# Patient Record
Sex: MALE | Race: BLACK OR AFRICAN AMERICAN | NOT HISPANIC OR LATINO | Employment: PART TIME | URBAN - METROPOLITAN AREA
[De-identification: names, ages, dates, MRNs, and addresses within clinical notes are randomized per-mention and may not be internally consistent; named-entity substitution may affect disease eponyms.]

---

## 2018-04-06 LAB
ABSOL LYMPHOCYTES (HISTORICAL): 1.9 K/UL (ref 0.5–4)
ACETAMINOPHEN LEVEL (HISTORICAL): <10 UG/ML (ref 10–30)
ALBUMIN SERPL BCP-MCNC: 5 G/DL (ref 3–5.2)
ALP SERPL-CCNC: 68 U/L (ref 43–122)
ALT SERPL W P-5'-P-CCNC: 28 U/L (ref 9–52)
AMPHETAMINE URINE (HISTORICAL): NEGATIVE
ANION GAP SERPL CALCULATED.3IONS-SCNC: 12 MMOL/L (ref 5–14)
AST SERPL W P-5'-P-CCNC: 24 U/L (ref 17–59)
BARBITURATE URINE (HISTORICAL): NEGATIVE
BASOPHILS # BLD AUTO: 0.1 K/UL (ref 0–0.1)
BASOPHILS # BLD AUTO: 1 % (ref 0–1)
BENZODIAZEPINE URINE (HISTORICAL): NEGATIVE
BILIRUB SERPL-MCNC: 0.4 MG/DL
BILIRUB UR QL STRIP: NEGATIVE MG/DL
BUN SERPL-MCNC: 8 MG/DL (ref 5–25)
CALCIUM SERPL-MCNC: 9.7 MG/DL (ref 8.4–10.2)
CHLORIDE SERPL-SCNC: 106 MEQ/L (ref 97–108)
CLARITY UR: CLEAR
CO2 SERPL-SCNC: 22 MMOL/L (ref 22–30)
COCAINE (METAB.), URINE (HISTORICAL): NEGATIVE
COLOR UR: YELLOW
CREATINE, SERUM (HISTORICAL): 0.88 MG/DL (ref 0.7–1.5)
DEPRECATED RDW RBC AUTO: 15.1 %
DRUG COMMENT (HISTORICAL): ABNORMAL
EGFR (HISTORICAL): >60 ML/MIN/1.73 M2
EOSINOPHIL # BLD AUTO: 0 K/UL (ref 0–0.4)
EOSINOPHIL NFR BLD AUTO: 0 % (ref 0–6)
GLUCOSE SERPL-MCNC: 92 MG/DL (ref 70–99)
GLUCOSE UR STRIP-MCNC: NEGATIVE MG/DL
HCT VFR BLD AUTO: 43.6 % (ref 41–53)
HGB BLD-MCNC: 14.2 G/DL (ref 13.5–17.5)
HGB UR QL STRIP.AUTO: NEGATIVE
KETONES UR STRIP-MCNC: NEGATIVE MG/DL
LEUKOCYTE ESTERASE UR QL STRIP: NEGATIVE
LYMPHOCYTES NFR BLD AUTO: 15 % (ref 25–45)
MCH RBC QN AUTO: 28 PG (ref 26–34)
MCHC RBC AUTO-ENTMCNC: 32.5 % (ref 31–36)
MCV RBC AUTO: 86 FL (ref 80–100)
MDMA (GC/MS) (HISTORICAL): NEGATIVE
METHADONE URINE (HISTORICAL): NEGATIVE
METHAMPHETAMINE URINE (HISTORICAL): NEGATIVE
METHYL ALCOHOL (HISTORICAL): NEGATIVE MG/DL
MONOCYTES # BLD AUTO: 0.9 K/UL (ref 0.2–0.9)
MONOCYTES NFR BLD AUTO: 7 % (ref 1–10)
NEUTROPHILS ABS COUNT (HISTORICAL): 9.9 K/UL (ref 1.8–7.8)
NEUTS SEG NFR BLD AUTO: 77 % (ref 45–65)
NITRITE UR QL STRIP: NEGATIVE
OPIATES (HISTORICAL): NEGATIVE
OXYCODONE (HISTORICAL): NEGATIVE
PH UR STRIP.AUTO: 6.5 [PH] (ref 4.5–8)
PHENCYCLIDINE URINE (HISTORICAL): NEGATIVE
PLATELET # BLD AUTO: 316 K/MCL (ref 150–450)
POTASSIUM SERPL-SCNC: 3.9 MEQ/L (ref 3.6–5)
PROT UR STRIP-MCNC: NEGATIVE MG/DL
RBC # BLD AUTO: 5.05 M/MCL (ref 4.5–5.9)
SALICYLATE LEVEL (HISTORICAL): <1 MG/DL (ref 10–30)
SODIUM SERPL-SCNC: 141 MEQ/L (ref 137–147)
SP GR UR STRIP.AUTO: 1.01 (ref 1–1.04)
THC URINE (HISTORICAL): POSITIVE
TOTAL PROTEIN (HISTORICAL): 8.6 G/DL (ref 5.9–8.4)
TRICYCLICS URINE (HISTORICAL): NEGATIVE
UROBILINOGEN UR QL STRIP.AUTO: NEGATIVE MG/DL (ref 0–1)
WBC # BLD AUTO: 12.9 K/MCL (ref 4.5–11)

## 2018-04-11 LAB
ABSOL LYMPHOCYTES (HISTORICAL): 1.8 K/UL (ref 0.5–4)
ALBUMIN SERPL BCP-MCNC: 4.7 G/DL (ref 3–5.2)
ALP SERPL-CCNC: 64 U/L (ref 43–122)
ALT SERPL W P-5'-P-CCNC: 32 U/L (ref 9–52)
ANION GAP SERPL CALCULATED.3IONS-SCNC: 13 MMOL/L (ref 5–14)
AST SERPL W P-5'-P-CCNC: 24 U/L (ref 17–59)
BANDS (HISTORICAL): 2 % (ref 3–11)
BILIRUB SERPL-MCNC: 0.3 MG/DL
BUN SERPL-MCNC: 12 MG/DL (ref 5–25)
CALCIUM SERPL-MCNC: 9.6 MG/DL (ref 8.4–10.2)
CHLORIDE SERPL-SCNC: 103 MEQ/L (ref 97–108)
CO2 SERPL-SCNC: 26 MMOL/L (ref 22–30)
COMMENT (HISTORICAL): ABNORMAL
CREATINE, SERUM (HISTORICAL): 0.81 MG/DL (ref 0.7–1.5)
DEPRECATED RDW RBC AUTO: 15.3 %
EGFR (HISTORICAL): >60 ML/MIN/1.73 M2
EOSINOPHIL # BLD AUTO: 0.1 K/UL (ref 0–0.4)
EOSINOPHIL NFR BLD AUTO: 1 % (ref 0–6)
GLUCOSE SERPL-MCNC: 76 MG/DL (ref 70–99)
HCT VFR BLD AUTO: 44.1 % (ref 41–53)
HGB BLD-MCNC: 13.7 G/DL (ref 13.5–17.5)
LYMPHOCYTES NFR BLD AUTO: 15 % (ref 25–45)
MCH RBC QN AUTO: 27.4 PG (ref 26–34)
MCHC RBC AUTO-ENTMCNC: 31.1 % (ref 31–36)
MCV RBC AUTO: 88 FL (ref 80–100)
MONOCYTES # BLD AUTO: 0.7 K/UL (ref 0.2–0.9)
MONOCYTES NFR BLD AUTO: 6 % (ref 1–10)
NEUTROPHILS ABS COUNT (HISTORICAL): 9.5 K/UL (ref 1.8–7.8)
NEUTS SEG NFR BLD AUTO: 76 % (ref 45–65)
PLATELET # BLD AUTO: 314 K/MCL (ref 150–450)
POTASSIUM SERPL-SCNC: 4.5 MEQ/L (ref 3.6–5)
RBC # BLD AUTO: 5.01 M/MCL (ref 4.5–5.9)
RBC MORPHOLOGY (HISTORICAL): ABNORMAL
SODIUM SERPL-SCNC: 142 MEQ/L (ref 137–147)
TOTAL PROTEIN (HISTORICAL): 8.3 G/DL (ref 5.9–8.4)
VITAMIN D25-HYDROXY (HISTORICAL): 14.4 NG/ML (ref 30–100)
WBC # BLD AUTO: 12.1 K/MCL (ref 4.5–11)

## 2018-04-19 ENCOUNTER — TELEPHONE (OUTPATIENT)
Dept: BEHAVIORAL/MENTAL HEALTH CLINIC | Facility: CLINIC | Age: 28
End: 2018-04-19

## 2018-04-19 NOTE — TELEPHONE ENCOUNTER
Behavorial Health Outpatient Intake Questions    Referred by: INSURANCE    Check with provider before scheduling    Are there any developmental disabilities? No    Does the patient have hearing impairment? No    Does the patient have ICM or CTT? No    Taking injectable psychiatric medications? NoIf yes, patient can not be seen here  Has the patient ever seen or currently see a psychiatrist? Yes If yes who/when? SEEN 2013  Doctor Theron Hernandez [facility]    Has the patient ever seen or currently see a therapist? Yes If yes who/when? AT [facility]    How many visits did the pt have for previous psychiatric treatment? X 1 5 YEARS     History    Has the patient served in the [facility] [place] [address]? No    If yes, have you had combat services? No    Was the patient activated into federal active duty as a member of the national guard or reserve? No    Minor Child    Who has custody of the child? N/A    Is there a custody agreement? N/A    If there is a custody agreement remind parent that they must bring a copy to the first appt or they will not be seen  [facility]     Presenting Problem (in patient's words) SEEN LAST WEEK AT [facility] FOR ANXIETY,PARANOIA,DX: SCHIZOPHRENIA,PSYCHOSIS    Substance Abuse:No concerns of substance abuse are reported  Has the patient been seen here previously, either inpatient or outpatient? No outpatient    If seen as outpatient, what provider(s) did the patient see? N/A    A member of the patient's family has been in therapy here with NO    ACCEPTED as a patient No Appointment Date: 7/6/18 @ 3:00PM CHIN MYERS    Referred Elsewhere? Yes    Primary Care Physician: No primary care provider on file  [facility]    PCP telephone number: None    SUB: PARDEEP  INS: MANDA OMALLEYO  (MA)  Id: [id number]     GRP:   [phone]

## 2018-07-06 ENCOUNTER — OFFICE VISIT (OUTPATIENT)
Dept: BEHAVIORAL/MENTAL HEALTH CLINIC | Facility: CLINIC | Age: 28
End: 2018-07-06
Payer: COMMERCIAL

## 2018-07-06 DIAGNOSIS — F20.9 SCHIZOPHRENIA, UNSPECIFIED TYPE (HCC): Primary | ICD-10-CM

## 2018-07-06 PROBLEM — F20.81 SCHIZOPHRENIFORM DISORDER (HCC): Status: ACTIVE | Noted: 2018-07-06

## 2018-07-06 PROCEDURE — 90834 PSYTX W PT 45 MINUTES: CPT | Performed by: SOCIAL WORKER

## 2018-07-06 NOTE — PSYCH
Assessment/Plan:      There are no diagnoses linked to this encounter  Subjective: Has heard loud sounds, had panic, admitted to [facility]     Patient ID: Mike Pedersen is a 29 y o  male  HPI:     Pre-morbid level of function and History of Present Illness: Chronic psychiatric history  Previous Psychiatric/psychological treatment/year: Psychiatrist at [facility], Therapist: Cesar Tompkins  Current Psychiatrist/Therapist: Malena psychiatrist at [address]  and/or Partial and Other Community Resources Used (CTT, ICM, VNA): Inpatient [place] [facility], [facility], [facility]      Problem Assessment:     SOCIAL/VOCATION:  Family Constellation (include parents, relationship with each and pertinent Psych/Medical History):     No family history on file  Mother: Brittney  Spouse: NA   Father: Linda Evans: NA   Sibling: Half sibs, Sarah-33, Ana Caputo, "there's more but it goes into the sound"   Sibling: NA   Children: NA   Other: Step father: Jabier Soriano relates best to "before all this started I was working two jobs, didn't have time for a social life"  he lives with alone  Domestic Violence: No past history of domestic violence and There is no history of child abuse    Additional Comments related to family/relationships/peer support: Good relationship with mother and sister  Can see them 3 days straight and then not see them for 3 months  Has good relationship with father, step father and brothers     School or Work History (strengths/limitations/needs): Trying to go back to work by July 20  Worked as a  at [employer]    Her highest grade level achieved was one year at [employer] for 3 semesters and then the voices started in 2002     history includesNA    Financial status includes not a stressor  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   On SSI and food stamps    LEISURE ASSESSMENT: Listen to music and video games, walk around and before he knows it's night time, watches movie, no groups or clubs  his primary language is [place]  Preferred language is [place]  Ethnic considerations are Afro-American  "To me I'm more  because I have tay's  They give me blood transfusions  I've had over 32 blood transfusions"   Religions affiliations and level of involvement Spiritism-watches Jew on TV, when goes to Jew "they are doing their own thing and that's not really Spiritism  They can be dong Sikhism, satanic, so I stopped going to Jew "   Does spirituality help you cope? Yes     FUNCTIONAL STATUS: There has been a recent change in Jerry Williamson ability to do the following: Pt does not drive    Level of Assistance Needed/By Whom?: Independent, except for driving    Jerry Williamson learns best by  picture    SUBSTANCE ABUSE ASSESSMENT: no substance abuse    Substance/Route/Age/Amount/Frequency/Last Use: NA    DETOX HISTORY: NA    Previous detox/rehab treatment: NA    HEALTH ASSESSMENT: no nausea, no vomiting and diarrhea    LEGAL: No Mental Health Advance Directive or Power of  on file and NA    Prenatal History: N/A    Delivery History: N/A    Developmental Milestones: N/A  Temperament as an infant was N/A  Temperament as a toddler was N/A  Temperament at school age was N/A  Temperament as a teenager was N/A  Risk Assessment:   The following ratings are based on my interview(s) with patient    Risk of Harm to Self:   Demographic risk factors include lowest socioeconomic class, never  or  status and male  Historical Risk Factors include chronic psychiatric problems  Recent Specific Risk Factors include presence of hallucinations  Additional Factors for a Child or Adolescent NA    Risk of Harm to Others:   Demographic Risk Factors include male and unemployed  Historical Risk Factors include NA  Recent Specific Risk Factors include concomitant mood or thought disorder    Access to Weapons:   Jerry Williamson has access to the following weapons: NA   The following steps have been taken to ensure weapons are properly secured: NA    Based on the above information, the client presents the following risk of harm to self or others:  low    The following interventions are recommended:   no intervention changes    Notes regarding this Risk Assessment: NA        Review Of Systems:     Mood Anxiety, Emotional Lability and Hostility only when people do "stupid stuff"   Behavior Unusual Behavior, Violent Behavior and Violent when people do "stupid stuff"   Thought Content Disturbing Thoughts, Feelings, Unreasonalbe or Irrational Fears, Magical Thinking and Having Fantasies   General Sleep Disturbances and difficulty falling and staying asleep   Personality Change in Personality and Character Deficiency   Other Psych Symptoms anhedonic, difficulty making decisions, tense, mind goes blank, fear of dying,    Constitutional Feeling Tired   ENT NA   Cardiovascular Heart Rate is Fast and Chest Pain   Respiratory NA   Gastrointestinal Chrone's Disease   Genitourinary NA   Musculoskeletal NA   Integumentary NA   Neurological Confusion   Endocrine NA         Mental status:  Appearance calm and cooperative  and good eye contact    Mood irritable, anxious and angry   Affect affect was flat   Speech pressured   Thought Processes disorganized and flight of ideas   Hallucinations auditory hallucinations   Thought Content somatic delusions and paranoid ideation    Abnormal Thoughts NA   Orientation  oriented to person and place and time   Remote Memory short term memory intact and long term memory intact   Attention Span concentration intact   Intellect Impaired Attention Span   Fund of Knowledge adequate fund of knowledge regarding vocabulary    Insight Poor insight    Judgement judgment was impaired   Muscle Strength Muscle strength and tone were normal and Normal gait    Language no difficulty naming common objects, no difficulty repeating a phrase  and no difficulty writing a sentence    Pain none   Pain Scale 0

## 2018-10-08 ENCOUNTER — OFFICE VISIT (OUTPATIENT)
Dept: BEHAVIORAL/MENTAL HEALTH CLINIC | Facility: CLINIC | Age: 28
End: 2018-10-08
Payer: COMMERCIAL

## 2018-10-08 ENCOUNTER — DOCUMENTATION (OUTPATIENT)
Dept: PSYCHIATRY | Facility: CLINIC | Age: 28
End: 2018-10-08

## 2018-10-08 DIAGNOSIS — F20.9 SCHIZOPHRENIA, UNSPECIFIED TYPE (HCC): Primary | ICD-10-CM

## 2018-10-08 PROCEDURE — 90834 PSYTX W PT 45 MINUTES: CPT | Performed by: SOCIAL WORKER

## 2018-10-08 NOTE — PROGRESS NOTES
Treatment Plan Tracking    # 1Treatment Plan not completed within required time limits due to: Client did not schedule an appointment within 15 days of initial assessment  Saida Elliott

## 2018-10-08 NOTE — PROGRESS NOTES
Psychotherapy Provided: Individual Psychotherapy 45 minutes     Length of time in session: 45 minutes    Goals addressed in session: Goal 1, Goal 2 and Goal 3      Pain:      none    0    Current suicide risk : [address] Treatment Plan [facility]: Diagnosis and Treatment Plan explained to Gregor Nguyen relates understanding diagnosis and is agreeable to Treatment Plan  Yes     D: Business items discussed  Pt identified the following problems; drama, Sz, and thoughts are loud and his stomach is messed up  Pt identified the following long term goals; understand what's going on, feel like he is not hearing voices, and figure out how to fix his head  Pt stated he is not taking meds because he has had a bad reaction  A: Pt thinking and speech were disorganized  P: Pt will work on all the objectives for all the goals      Intervention techniques; treatment planning, questioning, explanation, and redirection    RTO: Monday, October 22, 2018 at 11 am

## 2018-10-08 NOTE — PSYCH
Anu Orlando  1990       Date of Initial Treatment Plan: 10/8/18   Date of Current Treatment Plan: 10/08/18        Treatment Plan Number 1     Strengths/Personal Resources for Self Care: I work hard  I am a people interactor  I am caring, smart,     Diagnosis:   1  Schizophrenia, unspecified type ([facility] [address] )         Area of Needs:   1  I have drama  2  I have Schizophrenia  3  My thought are loud and my stomach is messed up      Long Term Goal 1: A  I will understand what's going on    Target Date: 1/19  Completion Date:  NA         Short Term Objectives for Goal 1: A  I will think about and apply what I discuss in therapy   Target Date: 1/19    Long Term Goal 2:   I will feel like I am not hearing voices    Target Date: 1/19  Completion Date: N/A    Short Term Objectives for Goal 2: A  I will think about and apply what I discuss in therapy   Target Date: 1/19         Long Term Goal # 3:   I will figure out how to fix my head     Target Date: 1/19  Completion Date: N/A    Short Term Objectives for Goal 3: A  I will think about and apply what I discuss in therapy   Target Date: 1/19    GOAL 1: Modality: Individual 2 x per month   Completion Date NA and The person(s) responsible for carrying out the plan is  Breezy Redman and Teo    GOAL 2: Modality: Individual 2 x per month   Completion Date NA and The person(s) responsible for carrying out the plan is  Breezy Redman and Teo     GOAL 3: Modality: Individual 2 x per month   Completion Date NA and The person(s) responsible for carrying out the plan is  Breezy Redman and RosiO [facility] [address]: Diagnosis and Treatment Plan explained to Julee Velez relates understanding diagnosis and is agreeable to Treatment Plan         Client Comments : Please share your thoughts, feelings, need and/or experiences regarding your treatment plan:       "No comments"              Patient signature, Date Time: __________________________________________   10/8/18 Christiano, Date Time: Felix Weston   10/8/18   1147             Physician cosigner signature, Date, Time: ________________________________

## 2018-11-05 ENCOUNTER — DOCUMENTATION (OUTPATIENT)
Dept: PSYCHIATRY | Facility: CLINIC | Age: 28
End: 2018-11-05

## 2018-11-05 NOTE — PROGRESS NOTES
Pt did not arrive for scheduled appt  at 11:00am  Attempt to contact by phone but appears number is not working       RTO: 11/19 at 11:00am

## 2018-11-19 ENCOUNTER — DOCUMENTATION (OUTPATIENT)
Dept: PSYCHIATRY | Facility: CLINIC | Age: 28
End: 2018-11-19

## 2018-11-20 ENCOUNTER — DOCUMENTATION (OUTPATIENT)
Dept: PSYCHIATRY | Facility: CLINIC | Age: 28
End: 2018-11-20

## 2018-11-20 NOTE — PROGRESS NOTES
11/16/18    cancellation   Received: 4 days ago   Message Contents   Amanuel Drew   Cc: Amanuel Good             Pt cancelled for Monday, 11/19/18, he just got a job and can't miss work

## 2018-12-11 ENCOUNTER — DOCUMENTATION (OUTPATIENT)
Dept: BEHAVIORAL/MENTAL HEALTH CLINIC | Facility: CLINIC | Age: 28
End: 2018-12-11

## 2018-12-11 NOTE — PROGRESS NOTES
Assessment/Plan:      There are no diagnoses linked to this encounter  Subjective:      Patient ID: Elvia Mckeon is a 29 y o  male  Outpatient Discharge Summary:   Admission Date: 7/6/18  Sowmya Hurd was referred by Insurance  Discharge Date: 12/11/18    Discharge Diagnosis:    Schizophrenia, unspecified (F20 9)  Treating Physician: BETY  Treatment Complications: Cancelled 2 appointments and no showed for one  Presenting Problem: Has heard loud sounds, had panic, admitted to [facility]  Course of treatment includes:    Initial evaluation and one follow up appt    Treatment Progress: poor  Criteria for Discharge: Pt did not return to therapy after second session  Aftercare recommendations include Return to therapy, prn, f/u with psychiatrist  Discharge Medications include:  Current Outpatient Prescriptions:     aspirin 325 mg tablet, Take 325 mg by mouth as needed for mild pain , Disp: , Rfl:     Prognosis: poor

## 2023-06-25 ENCOUNTER — APPOINTMENT (EMERGENCY)
Dept: RADIOLOGY | Facility: HOSPITAL | Age: 33
End: 2023-06-25

## 2023-06-25 ENCOUNTER — HOSPITAL ENCOUNTER (EMERGENCY)
Facility: HOSPITAL | Age: 33
Discharge: HOME | End: 2023-06-25
Attending: EMERGENCY MEDICINE

## 2023-06-25 VITALS
TEMPERATURE: 97 F | RESPIRATION RATE: 20 BRPM | BODY MASS INDEX: 22.84 KG/M2 | DIASTOLIC BLOOD PRESSURE: 52 MMHG | SYSTOLIC BLOOD PRESSURE: 103 MMHG | WEIGHT: 141.5 LBS | HEART RATE: 104 BPM | OXYGEN SATURATION: 100 %

## 2023-06-25 DIAGNOSIS — S89.92XA INJURY OF LEFT KNEE, INITIAL ENCOUNTER: Primary | ICD-10-CM

## 2023-06-25 DIAGNOSIS — M54.50 LOW BACK PAIN: ICD-10-CM

## 2023-06-25 PROCEDURE — 73564 X-RAY EXAM KNEE 4 OR MORE: CPT

## 2023-06-25 PROCEDURE — 96372 THER/PROPH/DIAG INJ SC/IM: CPT

## 2023-06-25 PROCEDURE — 99283 EMERGENCY DEPT VISIT LOW MDM: CPT

## 2023-06-25 RX ORDER — METHOCARBAMOL 500 MG/1
500 TABLET, FILM COATED ORAL 2 TIMES DAILY
Qty: 20 TABLET | Refills: 0 | Status: SHIPPED | OUTPATIENT
Start: 2023-06-25

## 2023-06-25 RX ORDER — FERROUS SULFATE 325(65) MG
TABLET ORAL
COMMUNITY

## 2023-06-25 RX ORDER — LIDOCAINE 50 MG/G
1 PATCH TOPICAL EVERY 24 HOURS
Qty: 15 PATCH | Refills: 0 | Status: SHIPPED | OUTPATIENT
Start: 2023-06-25 | End: 2023-07-10

## 2023-06-25 RX ORDER — LIDOCAINE 50 MG/G
1 PATCH TOPICAL ONCE
Status: DISCONTINUED | OUTPATIENT
Start: 2023-06-25 | End: 2023-06-25 | Stop reason: HOSPADM

## 2023-06-25 RX ORDER — KETOROLAC TROMETHAMINE 30 MG/ML
15 INJECTION, SOLUTION INTRAMUSCULAR; INTRAVENOUS ONCE
Status: COMPLETED | OUTPATIENT
Start: 2023-06-25 | End: 2023-06-25

## 2023-06-25 RX ORDER — SENNOSIDES 8.6 MG
650 CAPSULE ORAL EVERY 8 HOURS PRN
Qty: 30 TABLET | Refills: 0 | Status: SHIPPED | OUTPATIENT
Start: 2023-06-25

## 2023-06-25 RX ORDER — NAPROXEN 500 MG/1
500 TABLET ORAL 2 TIMES DAILY WITH MEALS
Qty: 20 TABLET | Refills: 0 | Status: SHIPPED | OUTPATIENT
Start: 2023-06-25 | End: 2024-06-24

## 2023-06-25 RX ADMIN — KETOROLAC TROMETHAMINE 15 MG: 30 INJECTION, SOLUTION INTRAMUSCULAR at 11:47

## 2023-06-25 RX ADMIN — LIDOCAINE 5% 1 PATCH: 700 PATCH TOPICAL at 11:46

## 2023-06-25 NOTE — DISCHARGE INSTRUCTIONS
Wear knee immobilizer until follow-up with orthopedics  Take medications as needed for symptoms do not drive or operate heavy machinery while taking Robaxin  [facility] with [facility]  Follow-up with orthopedics  Return to ED for new or worsening symptoms as discussed

## 2023-06-25 NOTE — Clinical Note
Anny Geiger was seen and treated in our emergency department on 6/25/2023  wear knee immobilizer, use crutches until cleared by orthopedics    Diagnosis: knee injury    Trang Beck    He may return on this date: 06/28/2023         If you have any questions or concerns, please don't hesitate to call        Guillermo Schultz PA-C    ______________________________           _______________          _______________  Hospital Representative                              Date                                Time

## 2023-06-25 NOTE — ED PROVIDER NOTES
History  Chief Complaint   Patient presents with   • Knee Pain     Pt reports left knee pain x 1 week  41-year-old male past medical history of anemia, schizophrenia presents to the emergency department complaining of left knee pain x2 weeks and lower back pain x1 week  Dates that he was doing squats 2 weeks ago when he felt pain in the front of his left knee  Denies previous injury to the area  Was not using any weights when he was doing the squats  Denies dislocation  Reports since then he has been having left knee pain that is worse with flexion  Pain with extension  States he is able to bear weight and ambulate but with pain  States he sleeps on a futon and thinks that is why his back hurts  Denies any trauma, denies urinary continence, fecal incontinence, saddle anesthesia, fevers, chills, numbness, weakness, tingling, redness, swelling  History provided by:  Patient      Prior to Admission Medications   Prescriptions Last Dose Informant Patient Reported? Taking?   aspirin 325 mg tablet   Yes No   Sig: Take 325 mg by mouth as needed for mild pain  ferrous sulfate 325 (65 Fe) mg tablet   Yes No   Sig: Take by mouth      Facility-Administered Medications: None       Past Medical History:   Diagnosis Date   • Anemia 2004    hospitalization/transfusion       Past Surgical History:   Procedure Laterality Date   • [place] ANRCT XM SURG REQ ANES GENERAL SPI/EDRL DX N/A 4/7/2016    Procedure: EXAM UNDER ANESTHESIA (EUA); possible REMOVAL OF FOREIGN BODY anoscopy ;  Surgeon: Rene Morton MD;  Location: [facility];  Service: Colorectal   • OH SURG TX ANAL FISTULA INTERSPHINCTERIC N/A 4/7/2016    Procedure: superficial FISTULOTOMY; SETON PROCEDURE drainage of chronic ischiofistula abscess and debridement;  Surgeon: Rene Morton MD;  Location: [facility];  Service: Colorectal       History reviewed  No pertinent family history    I have reviewed and agree with the history as documented  E-Cigarette/Vaping     E-Cigarette/Vaping Substances     Social History     Tobacco Use   • Smoking status: Light Smoker     Packs/day: 0 25     Types: Cigarettes   • Smokeless tobacco: Never   • Tobacco comments:     Codie says 7 cigarettes per day   Substance Use Topics   • Alcohol use: No   • Drug use: No       Review of Systems   Constitutional: Negative for chills and fever  Musculoskeletal: Positive for arthralgias  Negative for gait problem and joint swelling  Skin: Negative for color change, rash and wound  Neurological: Negative for weakness and numbness  All other systems reviewed and are negative  Physical Exam  Physical Exam  Vitals and nursing note reviewed  Constitutional:       General: He is not in acute distress  Appearance: Normal appearance  He is not ill-appearing  HENT:      Head: Normocephalic and atraumatic  Cardiovascular:      Rate and Rhythm: Normal rate and regular rhythm  Pulmonary:      Effort: Pulmonary effort is normal    Abdominal:      General: There is no distension  Palpations: Abdomen is soft  Tenderness: There is no abdominal tenderness  Musculoskeletal:      Cervical back: Normal and neck supple  Thoracic back: Normal       Lumbar back: No tenderness or bony tenderness  Normal range of motion  Left hip: Normal  Normal strength  Left knee: Bony tenderness present  No swelling, effusion or crepitus  Normal range of motion  Tenderness present  Left ankle: Normal       Left foot: Normal  Normal capillary refill  Normal pulse  Skin:     General: Skin is warm and dry  Capillary Refill: Capillary refill takes less than 2 seconds  Findings: No bruising, erythema or rash  Neurological:      Mental Status: He is alert  Sensory: No sensory deficit (LE )  Motor: No weakness (LE )        Gait: Gait normal    Psychiatric:         Mood and Affect: Mood normal          Behavior: Behavior normal  Vital Signs  ED Triage Vitals   Temperature Pulse Respirations Blood Pressure SpO2   06/25/23 1119 06/25/23 1119 06/25/23 1119 06/25/23 1119 06/25/23 1119   (!) 97 °F (36 1 °C) 104 20 103/52 100 %      Temp Source Heart Rate Source Patient Position - Orthostatic VS BP Location FiO2 (%)   06/25/23 1119 06/25/23 1119 06/25/23 1119 06/25/23 1119 --   Oral Monitor Sitting Right arm       Pain Score       06/25/23 1147       9           Vitals:    06/25/23 1119   BP: 103/52   Pulse: 104   Patient Position - Orthostatic VS: Sitting         Visual Acuity      ED Medications  Medications   ketorolac (TORADOL) injection 15 mg (15 mg Intramuscular Given 6/25/23 1147)       Diagnostic Studies  Results Reviewed     None                 XR knee 4+ views left injury   ED Interpretation by Chanel Salazar PA-C (06/25 1202)   No acute fx or dislocation       Final Result by Ariella Avelar MD (06/26 5490)      No acute osseous abnormality  Abnormal findings suggestive of patellar tendinitis  Workstation performed: [id number]                    Procedures  Procedures         ED Course  ED Course as of 06/26/23 2131   Rosa  Jun 25, 2023   1229 Reports he took Tylenol Extra Strength prior to arrival   Without allergy  SBIRT 22yo+    Flowsheet Row Most Recent Value   Initial Alcohol Screen: US AUDIT-C     1  How often do you have a drink containing alcohol? 0 Filed at: 06/25/2023 1150   2  How many drinks containing alcohol do you have on a typical day you are drinking? 0 Filed at: 06/25/2023 1150   3a  Male UNDER 65: How often do you have five or more drinks on one occasion? 0 Filed at: 06/25/2023 1150   3b  FEMALE Any Age, or MALE 65+: How often do you have 4 or more drinks on one occassion? 0 Filed at: 06/25/2023 1150   Audit-C Score 0 Filed at: 06/25/2023 1150   PREMA: How many times in the past year have you        Used an illegal drug or used a prescription medication for "non-medical reasons? Never Filed at: 06/25/2023 1150                    Medical Decision Making  Patient is experiencing acute lower back pain present for <6 weeks  Started after he was sleeping differently due to knee pain  The patient has no symptoms of new extremity weakness or paresthesia, urinary retention or incontinence, fecal incontinence, saddle anesthesia or paresthesia, unintentional weight loss, unexplained fevers/chills or night sweats, recent trauma, persistent vertigo present at rest, or truncal ataxia  The patient has no history of Cancer (active or in remission), osteoporosis, chronic corticosteroid use, immunosuppression, recent spinal procedures or IVDA  There were no unexplained abnormal vital signs or new neurologic deficits on physical exam  Therefore imaging is not indicated at this time  L knee pain from squatting  fx vs strain vs sprain vs meniscal injury vs tendon injury  No dislocation  Afebrile  no skin changes  No signs of infection  no swelling or effusion  intact ROM, pain with flexion  Strength intact  Distal perfusion intact  Sensation intact  is able to bear weight, ambulate  Xray ordered  no acute fx visualized on wet read  Placed in knee immobilizer, ortho follow up given  All imaging and/or lab testing discussed with patient, strict return to ED precautions discussed  Patient recommended to follow up promptly with appropriate outpatient provider  Patient and/or family members verbalizes understanding and agrees with plan  Patient and/or family members were given opportunity to ask questions, all questions were answered at this time  Patient is stable for discharge      Portions of the record may have been created with voice recognition software  Occasional wrong word or \"sound a like\" substitutions may have occurred due to the inherent limitations of voice recognition software  Read the chart carefully and recognize, using context, where substitutions have occurred   " Amount and/or Complexity of Data Reviewed  Radiology: ordered and independent interpretation performed  Risk  OTC drugs  Prescription drug management  Disposition  Final diagnoses:   Injury of left knee, initial encounter   Low back pain     Time reflects when diagnosis was documented in both MDM as applicable and the Disposition within this note     Time User Action Codes Description Comment    6/25/2023 12:29 PM Lor Anders Add [Q48 68ID] Injury of left knee, initial encounter     6/25/2023 12:29 PM Lor Anders Add [M54 50] Low back pain       ED Disposition     ED Disposition   Discharge    Condition   Stable    Date/Time   Sun Jun 25, 2023 12:29 PM    Comment   Khai Gillna discharge to home/self care                 Follow-up Information     Follow up With Specialties Details Why Contact Info Additional [phone] [facility] Orthopedic Surgery Schedule an appointment as soon as possible for a visit  For follow up regarding your symptoms [address] [address]  [address] [place] [address], [address] [phone]          Discharge Medication List as of 6/25/2023 12:31 PM      START taking these medications    Details   acetaminophen (TYLENOL) 650 mg CR tablet Take 1 tablet (650 mg total) by mouth every 8 (eight) hours as needed for mild pain, Starting Sun 6/25/2023, Normal      lidocaine (LIDODERM) 5 % Apply 1 patch topically over 12 hours every 24 hours for 15 days Remove & Discard patch within 12 hours or as directed by MD, Starting Sun 6/25/2023, Until Mon 7/10/2023, Normal      methocarbamol (ROBAXIN) 500 mg tablet Take 1 tablet (500 mg total) by mouth 2 (two) times a day, Starting Sun 6/25/2023, Normal      naproxen (EC NAPROSYN) 500 MG EC tablet Take 1 tablet (500 mg total) by mouth 2 (two) times a day with meals, Starting Sun 6/25/2023, Until Mon 6/24/2024, Normal         CONTINUE these medications which have NOT CHANGED    Details   aspirin 325 mg tablet Take 325 mg by mouth as needed for mild pain , Until Discontinued, Historical Med      ferrous sulfate 325 (65 Fe) mg tablet Take by mouth, Historical Med                 PDMP Review     None          ED Provider  Electronically Signed by           Bishop Huey PA-C  06/26/23 1785

## 2023-06-26 ENCOUNTER — TELEPHONE (OUTPATIENT)
Dept: PHYSICAL THERAPY | Facility: OTHER | Age: 33
End: 2023-06-26

## 2023-06-26 NOTE — TELEPHONE ENCOUNTER
Call placed to the patient per [facility] referral     Voice message left for patient to call back  Phone number and hours of business provided  This is the 1st attempt to reach the patient  Will defer per protocol  On AVS to schedule with Ortho for left knee     [facility] (PT) for low back

## 2023-07-29 ENCOUNTER — HOSPITAL ENCOUNTER (INPATIENT)
Facility: HOSPITAL | Age: 33
LOS: 6 days | Discharge: HOME | DRG: 385 | End: 2023-08-04
Attending: EMERGENCY MEDICINE | Admitting: INTERNAL MEDICINE
Payer: MEDICARE

## 2023-07-29 DIAGNOSIS — K50.014 CROHN'S DISEASE OF SMALL INTESTINE WITH ABSCESS (HCC): Primary | ICD-10-CM

## 2023-07-29 DIAGNOSIS — F20.81 SCHIZOPHRENIFORM DISORDER (HCC): ICD-10-CM

## 2023-07-29 DIAGNOSIS — R93.89 ABNORMAL CT SCAN: ICD-10-CM

## 2023-07-29 DIAGNOSIS — F20.9 SCHIZOPHRENIA, UNSPECIFIED TYPE (HCC): ICD-10-CM

## 2023-07-29 DIAGNOSIS — D64.9 SYMPTOMATIC ANEMIA: ICD-10-CM

## 2023-07-29 DIAGNOSIS — K50.10 CROHN'S COLITIS (HCC): ICD-10-CM

## 2023-07-29 PROBLEM — M25.569 KNEE PAIN: Status: ACTIVE | Noted: 2023-07-29

## 2023-07-29 PROBLEM — K50.90 CROHN'S DISEASE (HCC): Status: ACTIVE | Noted: 2023-07-29

## 2023-07-29 LAB
ABO GROUP BLD: NORMAL
ABO GROUP BLD: NORMAL
ALBUMIN SERPL BCP-MCNC: 2.9 G/DL (ref 3.5–5)
ALP SERPL-CCNC: 66 U/L (ref 34–104)
ALT SERPL W P-5'-P-CCNC: 5 U/L (ref 7–52)
ANION GAP SERPL CALCULATED.3IONS-SCNC: 8 MMOL/L
AST SERPL W P-5'-P-CCNC: 10 U/L (ref 13–39)
BASOPHILS # BLD AUTO: 0.04 THOUSANDS/ÂΜL (ref 0–0.1)
BASOPHILS NFR BLD AUTO: 0 % (ref 0–1)
BILIRUB SERPL-MCNC: 0.19 MG/DL (ref 0.2–1)
BLD GP AB SCN SERPL QL: NEGATIVE
BUN SERPL-MCNC: 5 MG/DL (ref 5–25)
CALCIUM ALBUM COR SERPL-MCNC: 9.4 MG/DL (ref 8.3–10.1)
CALCIUM SERPL-MCNC: 8.5 MG/DL (ref 8.4–10.2)
CHLORIDE SERPL-SCNC: 102 MMOL/L (ref 96–108)
CO2 SERPL-SCNC: 25 MMOL/L (ref 21–32)
CREAT SERPL-MCNC: 0.83 MG/DL (ref 0.6–1.3)
EOSINOPHIL # BLD AUTO: 0.06 THOUSAND/ÂΜL (ref 0–0.61)
EOSINOPHIL NFR BLD AUTO: 0 % (ref 0–6)
ERYTHROCYTE [DISTWIDTH] IN BLOOD BY AUTOMATED COUNT: 19.4 % (ref 11.6–15.1)
GFR SERPL CREATININE-BSD FRML MDRD: 115 ML/MIN/1.73SQ M
GLUCOSE SERPL-MCNC: 100 MG/DL (ref 65–140)
HCT VFR BLD AUTO: 18.8 % (ref 36.5–49.3)
HGB BLD-MCNC: 4.9 G/DL (ref 12–17)
IMM GRANULOCYTES # BLD AUTO: 0.08 THOUSAND/UL (ref 0–0.2)
IMM GRANULOCYTES NFR BLD AUTO: 1 % (ref 0–2)
LYMPHOCYTES # BLD AUTO: 3.07 THOUSANDS/ÂΜL (ref 0.6–4.47)
LYMPHOCYTES NFR BLD AUTO: 21 % (ref 14–44)
MCH RBC QN AUTO: 17.1 PG (ref 26.8–34.3)
MCHC RBC AUTO-ENTMCNC: 26.1 G/DL (ref 31.4–37.4)
MCV RBC AUTO: 66 FL (ref 82–98)
MONOCYTES # BLD AUTO: 1.37 THOUSAND/ÂΜL (ref 0.17–1.22)
MONOCYTES NFR BLD AUTO: 9 % (ref 4–12)
NEUTROPHILS # BLD AUTO: 10.23 THOUSANDS/ÂΜL (ref 1.85–7.62)
NEUTS SEG NFR BLD AUTO: 69 % (ref 43–75)
NRBC BLD AUTO-RTO: 0 /100 WBCS
PLATELET # BLD AUTO: 803 THOUSANDS/UL (ref 149–390)
PMV BLD AUTO: 8.2 FL (ref 8.9–12.7)
POTASSIUM SERPL-SCNC: 3.6 MMOL/L (ref 3.5–5.3)
PROT SERPL-MCNC: 7.9 G/DL (ref 6.4–8.4)
RBC # BLD AUTO: 2.86 MILLION/UL (ref 3.88–5.62)
RH BLD: POSITIVE
RH BLD: POSITIVE
SODIUM SERPL-SCNC: 135 MMOL/L (ref 135–147)
SPECIMEN EXPIRATION DATE: NORMAL
WBC # BLD AUTO: 14.85 THOUSAND/UL (ref 4.31–10.16)

## 2023-07-29 PROCEDURE — 86900 BLOOD TYPING SEROLOGIC ABO: CPT | Performed by: EMERGENCY MEDICINE

## 2023-07-29 PROCEDURE — 86920 COMPATIBILITY TEST SPIN: CPT

## 2023-07-29 PROCEDURE — 85025 COMPLETE CBC W/AUTO DIFF WBC: CPT | Performed by: EMERGENCY MEDICINE

## 2023-07-29 PROCEDURE — 99291 CRITICAL CARE FIRST HOUR: CPT | Performed by: EMERGENCY MEDICINE

## 2023-07-29 PROCEDURE — 99284 EMERGENCY DEPT VISIT MOD MDM: CPT

## 2023-07-29 PROCEDURE — 86850 RBC ANTIBODY SCREEN: CPT | Performed by: EMERGENCY MEDICINE

## 2023-07-29 PROCEDURE — 99223 1ST HOSP IP/OBS HIGH 75: CPT | Performed by: INTERNAL MEDICINE

## 2023-07-29 PROCEDURE — 86901 BLOOD TYPING SEROLOGIC RH(D): CPT | Performed by: EMERGENCY MEDICINE

## 2023-07-29 PROCEDURE — C9113 INJ PANTOPRAZOLE SODIUM, VIA: HCPCS | Performed by: INTERNAL MEDICINE

## 2023-07-29 PROCEDURE — 80053 COMPREHEN METABOLIC PANEL: CPT | Performed by: EMERGENCY MEDICINE

## 2023-07-29 PROCEDURE — 36415 COLL VENOUS BLD VENIPUNCTURE: CPT | Performed by: EMERGENCY MEDICINE

## 2023-07-29 PROCEDURE — P9016 RBC LEUKOCYTES REDUCED: HCPCS

## 2023-07-29 RX ORDER — ACETAMINOPHEN 325 MG/1
650 TABLET ORAL EVERY 6 HOURS PRN
Status: DISCONTINUED | OUTPATIENT
Start: 2023-07-29 | End: 2023-08-04 | Stop reason: HOSPADM

## 2023-07-29 RX ORDER — PANTOPRAZOLE SODIUM 40 MG/10ML
40 INJECTION, POWDER, LYOPHILIZED, FOR SOLUTION INTRAVENOUS EVERY 12 HOURS SCHEDULED
Status: DISCONTINUED | OUTPATIENT
Start: 2023-07-29 | End: 2023-07-31

## 2023-07-29 RX ORDER — LIDOCAINE 50 MG/G
1 PATCH TOPICAL EVERY 24 HOURS
Status: DISCONTINUED | OUTPATIENT
Start: 2023-07-29 | End: 2023-08-04 | Stop reason: HOSPADM

## 2023-07-29 RX ADMIN — PANTOPRAZOLE SODIUM 40 MG: 40 INJECTION, POWDER, FOR SOLUTION INTRAVENOUS at 23:05

## 2023-07-29 RX ADMIN — LIDOCAINE 1 PATCH: 50 PATCH CUTANEOUS at 18:04

## 2023-07-29 NOTE — ASSESSMENT & PLAN NOTE
22-year-old male with history of schizophrenia, Crohn's disease, anemia presenting to the ER complaining of generalized fatigue, knee pain and worried that his hemoglobin is low. Patient overall a poor historian, some history was obtained from patient's sister who is also in the room. Patient states he injured his left knee at the gym several months ago has been ongoing knee pain and has been taking ibuprofen 3 times a day totaling 600 mg daily. Patient also states he felt fatigued has been having weight loss and loose diarrhea stools. Denies any hematemesis, hematochezia or melena. Complains of nausea but denies any vomiting. Denies any fever or chills.     · Hemoglobin 4.9, last hemoglobin was 11 in 2017/2018  ·  heme positive stool in the ED  · 2 unit PRBC ordered by ED  · Monitor H&H  · Clear liquid diet, will make patient n.p.o. past midnight  · GI consultation will be appreciated  · Continue PPI twice daily

## 2023-07-29 NOTE — ED NOTES
Made a call to lab concerning the type and screen recheck. Per lab, still in process.      Dawit Newman RN  07/29/23 [id number]

## 2023-07-29 NOTE — ASSESSMENT & PLAN NOTE
· Patient was diagnosed with Crohns disease at age 15  · He has not followed with GI and is currently not on any medications  · Patient complains of having loose stools for the past several days, nonbloody watery.   · GI consultation and input will be appreciated  · will send for CRP, fecal calprotectin

## 2023-07-29 NOTE — PLAN OF CARE
Problem: PAIN - ADULT  Goal: Verbalizes/displays adequate comfort level or baseline comfort level  Description: Interventions:  - Encourage patient to monitor pain and request assistance  - Assess pain using appropriate pain scale  - Administer analgesics based on type and severity of pain and evaluate response  - Implement non-pharmacological measures as appropriate and evaluate response  - Consider cultural and social influences on pain and pain management  - Notify physician/advanced practitioner if interventions unsuccessful or patient reports new pain  Outcome: Progressing     Problem: INFECTION - ADULT  Goal: Absence or prevention of progression during hospitalization  Description: INTERVENTIONS:  - Assess and monitor for signs and symptoms of infection  - Monitor lab/diagnostic results  - Monitor all insertion sites, i.e. indwelling lines, tubes, and drains  - Monitor endotracheal if appropriate and nasal secretions for changes in amount and color  - Gracey appropriate cooling/warming therapies per order  - Administer medications as ordered  - Instruct and encourage patient and family to use good hand hygiene technique  - Identify and instruct in appropriate isolation precautions for identified infection/condition  Outcome: Progressing     Problem: SAFETY ADULT  Goal: Patient will remain free of falls  Description: INTERVENTIONS:  - Educate patient/family on patient safety including physical limitations  - Instruct patient to call for assistance with activity   - Consult OT/PT to assist with strengthening/mobility   - Keep Call bell within reach  - Keep bed low and locked with side rails adjusted as appropriate  - Keep care items and personal belongings within reach  - Initiate and maintain comfort rounds  - Make Fall Risk Sign visible to staff  - Offer Toileting every 2 Hours, in advance of need  - Initiate/Maintain alarm  - Obtain necessary fall risk management equipment  - Apply yellow socks and bracelet for high fall risk patients  - Consider moving patient to room near nurses station  Outcome: Progressing  Goal: Maintain or return to baseline ADL function  Description: INTERVENTIONS:  -  Assess patient's ability to carry out ADLs; assess patient's baseline for ADL function and identify physical deficits which impact ability to perform ADLs (bathing, care of mouth/teeth, toileting, grooming, dressing, etc.)  - Assess/evaluate cause of self-care deficits   - Assess range of motion  - Assess patient's mobility; develop plan if impaired  - Assess patient's need for assistive devices and provide as appropriate  - Encourage maximum independence but intervene and supervise when necessary  - Involve family in performance of ADLs  - Assess for home care needs following discharge   - Consider OT consult to assist with ADL evaluation and planning for discharge  - Provide patient education as appropriate  Outcome: Progressing  Goal: Maintains/Returns to pre admission functional level  Description: INTERVENTIONS:  - Perform BMAT or MOVE assessment daily.   - Set and communicate daily mobility goal to care team and patient/family/caregiver. - Collaborate with rehabilitation services on mobility goals if consulted  - Perform Range of Motion 3 times a day. - Reposition patient every 2 hours.   - Dangle patient 3 times a day  - Stand patient 3 times a day  - Ambulate patient 3 times a day  - Out of bed to chair 3 times a day   - Out of bed for meals 3 times a day  - Out of bed for toileting  - Record patient progress and toleration of activity level   Outcome: Progressing     Problem: DISCHARGE PLANNING  Goal: Discharge to home or other facility with appropriate resources  Description: INTERVENTIONS:  - Identify barriers to discharge w/patient and caregiver  - Arrange for needed discharge resources and transportation as appropriate  - Identify discharge learning needs (meds, wound care, etc.)  - Arrange for interpretive services to assist at discharge as needed  - Refer to Case Management Department for coordinating discharge planning if the patient needs post-hospital services based on physician/advanced practitioner order or complex needs related to functional status, cognitive ability, or social support system  Outcome: Progressing     Problem: Knowledge Deficit  Goal: Patient/family/caregiver demonstrates understanding of disease process, treatment plan, medications, and discharge instructions  Description: Complete learning assessment and assess knowledge base. Interventions:  - Provide teaching at level of understanding  - Provide teaching via preferred learning methods  Outcome: Progressing     Problem: NEUROSENSORY - ADULT  Goal: Achieves maximal functionality and self care  Description: INTERVENTIONS  - Monitor swallowing and airway patency with patient fatigue and changes in neurological status  - Encourage and assist patient to increase activity and self care.    - Encourage visually impaired, hearing impaired and aphasic patients to use assistive/communication devices  Outcome: Progressing     Problem: CARDIOVASCULAR - ADULT  Goal: Maintains optimal cardiac output and hemodynamic stability  Description: INTERVENTIONS:  - Monitor I/O, vital signs and rhythm  - Monitor for S/S and trends of decreased cardiac output  - Administer and titrate ordered vasoactive medications to optimize hemodynamic stability  - Assess quality of pulses, skin color and temperature  - Assess for signs of decreased coronary artery perfusion  - Instruct patient to report change in severity of symptoms  Outcome: Progressing  Goal: Absence of cardiac dysrhythmias or at baseline rhythm  Description: INTERVENTIONS:  - Continuous cardiac monitoring, vital signs, obtain 12 lead EKG if ordered  - Administer antiarrhythmic and heart rate control medications as ordered  - Monitor electrolytes and administer replacement therapy as ordered  Outcome: Progressing     Problem: GASTROINTESTINAL - ADULT  Goal: Minimal or absence of nausea and/or vomiting  Description: INTERVENTIONS:  - Administer IV fluids if ordered to ensure adequate hydration  - Maintain NPO status until nausea and vomiting are resolved  - Nasogastric tube if ordered  - Administer ordered antiemetic medications as needed  - Provide nonpharmacologic comfort measures as appropriate  - Advance diet as tolerated, if ordered  - Consider nutrition services referral to assist patient with adequate nutrition and appropriate food choices  Outcome: Progressing  Goal: Maintains or returns to baseline bowel function  Description: INTERVENTIONS:  - Assess bowel function  - Encourage oral fluids to ensure adequate hydration  - Administer IV fluids if ordered to ensure adequate hydration  - Administer ordered medications as needed  - Encourage mobilization and activity  - Consider nutritional services referral to assist patient with adequate nutrition and appropriate food choices  Outcome: Progressing  Goal: Maintains adequate nutritional intake  Description: INTERVENTIONS:  - Monitor percentage of each meal consumed  - Identify factors contributing to decreased intake, treat as appropriate  - Assist with meals as needed  - Monitor I&O, weight, and lab values if indicated  - Obtain nutrition services referral as needed  Outcome: Progressing     Problem: METABOLIC, FLUID AND ELECTROLYTES - ADULT  Goal: Fluid balance maintained  Description: INTERVENTIONS:  - Monitor labs   - Monitor I/O and WT  - Instruct patient on fluid and nutrition as appropriate  - Assess for signs & symptoms of volume excess or deficit  Outcome: Progressing     Problem: HEMATOLOGIC - ADULT  Goal: Maintains hematologic stability  Description: INTERVENTIONS  - Assess for signs and symptoms of bleeding or hemorrhage  - Monitor labs  - Administer supportive blood products/factors as ordered and appropriate  Outcome: Progressing     Problem: MUSCULOSKELETAL - ADULT  Goal: Maintain or return mobility to safest level of function  Description: INTERVENTIONS:  - Assess patient's ability to carry out ADLs; assess patient's baseline for ADL function and identify physical deficits which impact ability to perform ADLs (bathing, care of mouth/teeth, toileting, grooming, dressing, etc.)  - Assess/evaluate cause of self-care deficits   - Assess range of motion  - Assess patient's mobility  - Assess patient's need for assistive devices and provide as appropriate  - Encourage maximum independence but intervene and supervise when necessary  - Involve family in performance of ADLs  - Assess for home care needs following discharge   - Consider OT consult to assist with ADL evaluation and planning for discharge  - Provide patient education as appropriate  Outcome: Progressing  Goal: Maintain proper alignment of affected body part  Description: INTERVENTIONS:  - Support, maintain and protect limb and body alignment  - Provide patient/ family with appropriate education  Outcome: Progressing

## 2023-07-29 NOTE — ASSESSMENT & PLAN NOTE
· Patient complains of left knee pain this has been ongoing for the past several weeks  · Knee x-ray 6/26/2023 revealed no acute abnormality, patellar tendinitis  · Range of motion intact, no obvious joint swelling  · Continue with pain control

## 2023-07-29 NOTE — PROGRESS NOTES
During admission navigator assessment, RN asked patient if he was having any pain to which patient replied "No. I feel pretty good now, I just had motrin." RN asked patient where he got motrin from and patient stated "Oh, I have it in my bag. I started to feel pain when I got up to the bathroom before I came up here and took 2 before they brought me here." RN educated patient on importance of not taking ibuprofen d/t to bleeding precautions as well as safety measures with mediation interactions in hospital. Dr. Vinicio Mckeon informed of incident and medications sent to pharmacy.

## 2023-07-29 NOTE — H&P
[facility]  H&P  Name: Angela Adames 35 y.o. male I MRN: [id number]  Unit/Bed#: ED-26 I Date of Admission: 7/29/2023   Date of Service: 7/29/2023 I Hospital Day: 0      Assessment/Plan   * Anemia  Assessment & Plan   75-year-old male with history of schizophrenia, Crohn's disease, anemia presenting to the ER complaining of generalized fatigue, knee pain and worried that his hemoglobin is low. Patient overall a poor historian, some history was obtained from patient's sister who is also in the room. Patient states he injured his left knee at the gym several months ago has been ongoing knee pain and has been taking ibuprofen 3 times a day totaling 600 mg daily. Patient also states he felt fatigued has been having weight loss and loose diarrhea stools. Denies any hematemesis, hematochezia or melena. Complains of nausea but denies any vomiting. Denies any fever or chills. · Hemoglobin 4.9, last hemoglobin was 11 in 2017/2018  ·  heme positive stool in the ED  · 2 unit PRBC ordered by ED  · Monitor H&H  · Clear liquid diet, will make patient n.p.o. past midnight  · GI consultation will be appreciated  · Continue PPI twice daily    Knee pain  Assessment & Plan  · Patient complains of left knee pain this has been ongoing for the past several weeks  · Knee x-ray 6/26/2023 revealed no acute abnormality, patellar tendinitis  · Range of motion intact, no obvious joint swelling  · Continue with pain control    Crohn's disease ([address])  Assessment & Plan  · Patient was diagnosed with Crohns disease at age 15  · He has not followed with GI and is currently not on any medications  · Patient complains of having loose stools for the past several days, nonbloody watery.   · GI consultation and input will be appreciated  · will send for CRP, fecal calprotectin    Schizophrenia ([address])  Assessment & Plan  · Patient currently not on any medications  · Is not currently following with psych  · Mood stable           VTE Prophylaxis: Contraindicated due to anemia  /    Code Status: Full  POLST: There is no POLST form on file for this patient (pre-hospital)    Anticipated Length of Stay:  Patient will be admitted on an Inpatient basis with an anticipated length of stay of  Greater than 2 midnights. Justification for Hospital Stay: anemia    Total Time for Visit, including Counseling / Coordination of Care:Greater than 50% of this total time spent on direct patient counseling and coordination of care. Chief Complaint:   Fatigue, knee pain    History of Present Illness:    Radha Almazan is a 35 y.o. male who presents with fatigue, knee pain. Patient has history of schizophrenia, Crohn's disease, anemia presenting to the ER complaining of generalized fatigue, knee pain and worried that his hemoglobin is low. Patient overall a poor historian, some history was obtained from patient's sister who is also in the room. Patient states he injured his left knee at the gym several months ago has been ongoing knee pain and has been taking ibuprofen 3 times a day totaling 600 mg daily. Patient also states he felt fatigued has been having weight loss and loose diarrhea stools. Denies any hematemesis, hematochezia or melena. Complains of nausea but denies any vomiting. Denies any fever or chills. Review of Systems:    Review of Systems   Constitutional: Positive for appetite change, fatigue and unexpected weight change. Eyes: Negative. Gastrointestinal: Positive for diarrhea and nausea. Endocrine: Negative. Genitourinary: Negative. Allergic/Immunologic: Negative. Neurological: Negative.         Past Medical and Surgical History:     Past Medical History:   Diagnosis Date   • Anemia 2004    hospitalization/transfusion       Past Surgical History:   Procedure Laterality Date   • DE ANRCT XM SURG REQ ANES GENERAL SPI/EDRL DX N/A 4/7/2016    Procedure: EXAM UNDER ANESTHESIA (EUA); possible REMOVAL OF FOREIGN BODY anoscopy ;  Surgeon: Ted Wong MD;  Location: [facility];  Service: Colorectal   • MS SURG TX ANAL FISTULA INTERSPHINCTERIC N/A 4/7/2016    Procedure: superficial FISTULOTOMY; SETON PROCEDURE drainage of chronic ischiofistula abscess and debridement;  Surgeon: Ted Wong MD;  Location: [facility];  Service: Colorectal       Meds/Allergies:    Prior to Admission medications    Medication Sig Start Date End Date Taking? Authorizing Provider   acetaminophen (TYLENOL) 650 mg CR tablet Take 1 tablet (650 mg total) by mouth every 8 (eight) hours as needed for mild pain 6/25/23  Yes Alphonso Valverde PA-C   ferrous sulfate 325 (65 Fe) mg tablet Take by mouth   Yes Historical Provider, MD   aspirin 325 mg tablet Take 325 mg by mouth as needed for mild pain. Patient not taking: Reported on 7/29/2023    Historical Provider, MD   lidocaine (LIDODERM) 5 % Apply 1 patch topically over 12 hours every 24 hours for 15 days Remove & Discard patch within 12 hours or as directed by MD 6/25/23 7/10/23  Alphonso Valverde PA-C   methocarbamol (ROBAXIN) 500 mg tablet Take 1 tablet (500 mg total) by mouth 2 (two) times a day  Patient not taking: Reported on 7/29/2023 6/25/23   Alphonso Valverde PA-C   naproxen (EC NAPROSYN) 500 MG EC tablet Take 1 tablet (500 mg total) by mouth 2 (two) times a day with meals  Patient not taking: Reported on 7/29/2023 6/25/23 6/24/24  Alphonso Valverde PA-C     I have reviewed home medications with patient personally. Allergies:    Allergies   Allergen Reactions   • Haldol Decanoate [Haloperidol] Anaphylaxis   • Oxycodone-Acetaminophen Rash     "swollon red rash"   • Sulfamethoxazole-Trimethoprim      "never really worked"       Social History:     Social History     Substance and Sexual Activity   Alcohol Use No     Social History     Tobacco Use   Smoking Status Light Smoker   • Packs/day: 0.25   • Types: Cigarettes   Smokeless Tobacco Never   Tobacco Comments    [employer] says 7 cigarettes per day     Social History     Substance and Sexual Activity   Drug Use No       Family History:    History reviewed. No pertinent family history. Physical Exam:     Vitals:   Blood Pressure: 105/59 (07/29/23 1500)  Pulse: 73 (07/29/23 1500)  Respirations: 16 (07/29/23 1500)  SpO2: 100 % (07/29/23 1500)    Constitutional: Patient is oriented to person, place and time, no acute distress  HEENT:  Normocephalic, atraumatic  Cardiovascular: Normal S1S2, RRR, No murmurs/rubs/gallops appreciated. Pulmonary:  Bilateral air entry, No rhonchi/rales/wheezing appreciated  Abdominal: Soft, Bowel sounds present, Non-tender, Non-distended  Extremities:  No cyanosis, clubbing or edema. Neurological: Cranial nerves II-XII grossly intact, sensation intact, otherwise no focal neurological symptoms. Additional Data:     Lab Results: I have personally reviewed pertinent reports. Results from last 7 days   Lab Units 07/29/23  1224   WBC Thousand/uL 14.85*   HEMOGLOBIN g/dL 4.9*   HEMATOCRIT % 18.8*   PLATELETS Thousands/uL 803*   NEUTROS PCT % 69   LYMPHS PCT % 21   MONOS PCT % 9   EOS PCT % 0     Results from last 7 days   Lab Units 07/29/23  1224   POTASSIUM mmol/L 3.6   CHLORIDE mmol/L 102   CO2 mmol/L 25   BUN mg/dL 5   CREATININE mg/dL 0.83   CALCIUM mg/dL 8.5   ALK PHOS U/L 66   ALT U/L 5*   AST U/L 10*           Imaging: I have personally reviewed pertinent reports. No results found. [facility] Yes     ** Please Note: This note has been constructed using a voice recognition system.  **

## 2023-07-29 NOTE — ED PROVIDER NOTES
History  Chief Complaint   Patient presents with   • Knee Injury     Pt reports hurt knee working out first week of July - Pt reports was seen for it and got xrays. Pt reports still having a lot of pain and wrist pains due to getting up with hands - Pt also wants HGB levels checked      59-year-old male with history of Crohn's colitis, schizophrenia, anemia presents to the emergency department requesting to have his hemoglobin checked. Patient is a poor historian secondary to his mental illness and is very vague with his symptoms and duration of his symptoms. Some of the history is obtained via the patient's sister who is in the room. The patient had injured his left knee at the gym several months ago. He had an x-ray done that showed patellar tendinitis but was otherwise normal.  He has been taking some Motrin without much relief and has also noticed increasing arm pain bilaterally secondary to using his arms to get up. Patient's sister is concerned because he has lost a significant amount of weight unsure how much over the last couple of months. He is really only been drinking liquids and taking something called liquid oxygen that he bought off [employer]. Patient does admit to diarrhea but denies any bloody stools. He has required blood transfusions in the past.  He is complaining of fatigue but no shortness of breath. Patient unsure the last time he saw his gastroenterologist.  He states he lost his insurance and his job. He does not take any other medications for Crohn's disease. History provided by:  Patient and relative  History limited by:  Psychiatric disorder  Medical Problem  Onset quality:  Unable to specify  Duration:  2 months  Timing:  Constant  Progression:  Unchanged  Context:  Possible anemia.   Patient complaining of weight loss, nonbloody diarrhea and fatigue with history of Crohn's  Relieved by:  Nothing  Ineffective treatments:  Liquid oxygen  Associated symptoms: diarrhea and fatigue Associated symptoms: no abdominal pain, no chest pain, no congestion, no cough, no fever, no headaches, no loss of consciousness, no myalgias, no nausea, no shortness of breath and no vomiting        Prior to Admission Medications   Prescriptions Last Dose Informant Patient Reported? Taking?   acetaminophen (TYLENOL) 650 mg CR tablet   No No   Sig: Take 1 tablet (650 mg total) by mouth every 8 (eight) hours as needed for mild pain   aspirin 325 mg tablet   Yes No   Sig: Take 325 mg by mouth as needed for mild pain. ferrous sulfate 325 (65 Fe) mg tablet   Yes No   Sig: Take by mouth   lidocaine (LIDODERM) 5 %   No No   Sig: Apply 1 patch topically over 12 hours every 24 hours for 15 days Remove & Discard patch within 12 hours or as directed by MD   methocarbamol (ROBAXIN) 500 mg tablet   No No   Sig: Take 1 tablet (500 mg total) by mouth 2 (two) times a day   naproxen (EC NAPROSYN) 500 MG EC tablet   No No   Sig: Take 1 tablet (500 mg total) by mouth 2 (two) times a day with meals      Facility-Administered Medications: None       Past Medical History:   Diagnosis Date   • Anemia 2004    hospitalization/transfusion       Past Surgical History:   Procedure Laterality Date   • CA ANRCT XM SURG REQ ANES GENERAL SPI/EDRL DX N/A 4/7/2016    Procedure: EXAM UNDER ANESTHESIA (EUA); possible REMOVAL OF FOREIGN BODY anoscopy ;  Surgeon: Bushra Bryan MD;  Location: [facility];  Service: Colorectal   • CA SURG TX ANAL FISTULA INTERSPHINCTERIC N/A 4/7/2016    Procedure: superficial FISTULOTOMY; SETON PROCEDURE drainage of chronic ischiofistula abscess and debridement;  Surgeon: Bushra Bryan MD;  Location: [facility];  Service: Colorectal       History reviewed. No pertinent family history. I have reviewed and agree with the history as documented.     E-Cigarette/Vaping     E-Cigarette/Vaping Substances     Social History     Tobacco Use   • Smoking status: Light Smoker     Packs/day: 0.25     Types: Cigarettes • Smokeless tobacco: Never   • Tobacco comments:     Codie says 7 cigarettes per day   Substance Use Topics   • Alcohol use: No   • Drug use: No       Review of Systems   Constitutional: Positive for activity change, appetite change, fatigue and unexpected weight change. Negative for chills, diaphoresis and fever. HENT: Negative. Negative for congestion. Eyes: Negative. Respiratory: Negative. Negative for cough and shortness of breath. Cardiovascular: Negative. Negative for chest pain. Gastrointestinal: Positive for diarrhea. Negative for abdominal pain, nausea and vomiting. Genitourinary: Negative. Musculoskeletal: Negative for myalgias and neck pain. Skin: Negative. Allergic/Immunologic: Negative. Neurological: Negative. Negative for loss of consciousness, weakness, numbness and headaches. Hematological: Negative. Psychiatric/Behavioral: Negative. All other systems reviewed and are negative. Physical Exam  Physical Exam  Vitals and nursing note reviewed. Constitutional:       General: He is awake. He is not in acute distress. Appearance: Normal appearance. He is well-developed and normal weight. He is not ill-appearing, toxic-appearing or diaphoretic. HENT:      Head: Normocephalic and atraumatic. Right Ear: External ear normal.      Left Ear: External ear normal.      Nose: Nose normal.      Mouth/Throat:      Mouth: Mucous membranes are moist.   Eyes:      General: No scleral icterus. Conjunctiva/sclera: Conjunctivae normal.      Pupils: Pupils are equal, round, and reactive to light. Neck:      Thyroid: No thyromegaly. Vascular: No JVD. Cardiovascular:      Rate and Rhythm: Normal rate and regular rhythm. Heart sounds: Normal heart sounds. No murmur heard. No gallop. Pulmonary:      Effort: Pulmonary effort is normal. No respiratory distress. Breath sounds: Normal breath sounds. No stridor. No wheezing, rhonchi or rales. Abdominal:      General: Bowel sounds are normal. There is no distension. Palpations: Abdomen is soft. There is no mass. Tenderness: There is no abdominal tenderness. Hernia: No hernia is present. Genitourinary:     Rectum: Guaiac result positive (Trace heme positive). Musculoskeletal:         General: No tenderness or deformity. Normal range of motion. Cervical back: Normal range of motion and neck supple. Right lower leg: No edema. Left lower leg: No edema. Lymphadenopathy:      Cervical: No cervical adenopathy. Skin:     General: Skin is warm and dry. Coloration: Skin is pale. Skin is not jaundiced. Findings: No bruising, erythema, lesion or rash. Neurological:      General: No focal deficit present. Mental Status: He is alert and oriented to person, place, and time. Motor: No weakness. Deep Tendon Reflexes: Reflexes are normal and symmetric. Psychiatric:         Mood and Affect: Mood normal.         Behavior: Behavior is cooperative.          Vital Signs  ED Triage Vitals [07/29/23 1143]   Temp Pulse Respirations Blood Pressure SpO2   -- (!) 108 16 124/62 100 %      Temp src Heart Rate Source Patient Position - Orthostatic VS BP Location FiO2 (%)   -- Monitor Sitting Right arm --      Pain Score       --           Vitals:    07/29/23 1143   BP: 124/62   Pulse: (!) 108   Patient Position - Orthostatic VS: Sitting         Visual Acuity      ED Medications  Medications - No data to display    Diagnostic Studies  Results Reviewed     Procedure Component Value Units Date/Time    CBC and differential [[id number]]     Lab Status: No result Specimen: Blood     Comprehensive metabolic panel [[id number]]     Lab Status: No result Specimen: Blood                  No orders to display              Procedures  CriticalCare Time    Date/Time: 7/29/2023 2:18 PM    Performed by: Celia Waters DO  Authorized by: Celia Waters DO    Critical care provider statement:     Critical care time (minutes):  30    Critical care time was exclusive of:  Separately billable procedures and treating other patients and teaching time    Critical care was necessary to treat or prevent imminent or life-threatening deterioration of the following conditions:  Circulatory failure    Critical care was time spent personally by me on the following activities:  Blood draw for specimens, obtaining history from patient or surrogate, development of treatment plan with patient or surrogate, discussions with consultants, evaluation of patient's response to treatment, examination of patient, interpretation of cardiac output measurements, ordering and performing treatments and interventions, ordering and review of laboratory studies, ordering and review of radiographic studies, re-evaluation of patient's condition and review of old charts    I assumed direction of critical care for this patient from another provider in my specialty: no               ED Course  ED Course as of 07/29/23 1346   Sat Jul 29, 2023   1314 CMP normal other than mildly decreased LFTs   1325 Hemoglobin(!!): 4.9  Will transfuse. Will admit   1326 WBC(!): 14.85                                             Medical Decision Making  43-year-old male with history of Crohn's colitis presents to the ED requesting hemoglobin being checked. Some of the history is via the patient and also the patient's sister as the patient tends to ramble. He does have a history of schizophrenia. He had injured his knee a couple of months ago and ended up losing his job secondary to not being able to work because of the pain. He does have ongoing pain. He had a previous x-ray that showed tendinitis. He now complains of bilateral arm pain from trying to push himself off the ground. He complains of unintentional weight loss but cannot give me a specific amount over the last several months along with nonbloody diarrhea.   He has never had any surgery for Crohn's disease and does not take any medications for Crohn's other than liquid oxygen which he bought off of [address]. He has had some fatigue and loss of appetite. He has not seen his GI in quite some time due to lack of insurance. On exam he does appear pale. He otherwise examines normally with no abdominal tenderness or distention on exam.  Will check basic labs to rule out significant anemia requiring transfusion. He has had some symptoms of fatigue. He has required transfusions in the past but cannot tell me exactly when. Amount and/or Complexity of Data Reviewed  Labs: ordered. Decision-making details documented in ED Course. Disposition  Final diagnoses:   None     ED Disposition     None      Follow-up Information    None         Patient's Medications   Discharge Prescriptions    No medications on file       No discharge procedures on file.     PDMP Review     None          ED Provider  Electronically Signed by           Lakshmi Lewis DO  07/29/23 6792

## 2023-07-30 ENCOUNTER — APPOINTMENT (INPATIENT)
Dept: CT IMAGING | Facility: HOSPITAL | Age: 33
DRG: 385 | End: 2023-07-30
Payer: MEDICARE

## 2023-07-30 LAB
ABO GROUP BLD BPU: NORMAL
ABO GROUP BLD BPU: NORMAL
ALBUMIN SERPL BCP-MCNC: 2.7 G/DL (ref 3.5–5)
ALP SERPL-CCNC: 56 U/L (ref 34–104)
ALT SERPL W P-5'-P-CCNC: 4 U/L (ref 7–52)
ANION GAP SERPL CALCULATED.3IONS-SCNC: 5 MMOL/L
AST SERPL W P-5'-P-CCNC: 6 U/L (ref 13–39)
BASOPHILS # BLD AUTO: 0.05 THOUSANDS/ÂΜL (ref 0–0.1)
BASOPHILS NFR BLD AUTO: 0 % (ref 0–1)
BILIRUB SERPL-MCNC: 0.45 MG/DL (ref 0.2–1)
BPU ID: NORMAL
BPU ID: NORMAL
BUN SERPL-MCNC: 3 MG/DL (ref 5–25)
C DIFF TOX GENS STL QL NAA+PROBE: NEGATIVE
CALCIUM ALBUM COR SERPL-MCNC: 9.2 MG/DL (ref 8.3–10.1)
CALCIUM SERPL-MCNC: 8.2 MG/DL (ref 8.4–10.2)
CAMPYLOBACTER DNA SPEC NAA+PROBE: NORMAL
CHLORIDE SERPL-SCNC: 103 MMOL/L (ref 96–108)
CO2 SERPL-SCNC: 27 MMOL/L (ref 21–32)
CREAT SERPL-MCNC: 0.63 MG/DL (ref 0.6–1.3)
CROSSMATCH: NORMAL
CROSSMATCH: NORMAL
CRP SERPL QL: 97.2 MG/L
EOSINOPHIL # BLD AUTO: 0.04 THOUSAND/ÂΜL (ref 0–0.61)
EOSINOPHIL NFR BLD AUTO: 0 % (ref 0–6)
ERYTHROCYTE [DISTWIDTH] IN BLOOD BY AUTOMATED COUNT: 22.8 % (ref 11.6–15.1)
FERRITIN SERPL-MCNC: 19 NG/ML (ref 24–336)
GFR SERPL CREATININE-BSD FRML MDRD: 129 ML/MIN/1.73SQ M
GLUCOSE SERPL-MCNC: 78 MG/DL (ref 65–140)
HCT VFR BLD AUTO: 23 % (ref 36.5–49.3)
HCT VFR BLD AUTO: 29.5 % (ref 36.5–49.3)
HGB BLD-MCNC: 6.6 G/DL (ref 12–17)
HGB BLD-MCNC: 8.7 G/DL (ref 12–17)
IMM GRANULOCYTES # BLD AUTO: 0.11 THOUSAND/UL (ref 0–0.2)
IMM GRANULOCYTES NFR BLD AUTO: 1 % (ref 0–2)
IRON SATN MFR SERPL: 19 % (ref 20–50)
IRON SERPL-MCNC: 34 UG/DL (ref 65–175)
LYMPHOCYTES # BLD AUTO: 2.12 THOUSANDS/ÂΜL (ref 0.6–4.47)
LYMPHOCYTES NFR BLD AUTO: 14 % (ref 14–44)
MCH RBC QN AUTO: 20 PG (ref 26.8–34.3)
MCHC RBC AUTO-ENTMCNC: 28.7 G/DL (ref 31.4–37.4)
MCV RBC AUTO: 70 FL (ref 82–98)
MONOCYTES # BLD AUTO: 1.38 THOUSAND/ÂΜL (ref 0.17–1.22)
MONOCYTES NFR BLD AUTO: 9 % (ref 4–12)
NEUTROPHILS # BLD AUTO: 11.38 THOUSANDS/ÂΜL (ref 1.85–7.62)
NEUTS SEG NFR BLD AUTO: 76 % (ref 43–75)
NRBC BLD AUTO-RTO: 0 /100 WBCS
PLATELET # BLD AUTO: 683 THOUSANDS/UL (ref 149–390)
PMV BLD AUTO: 8.2 FL (ref 8.9–12.7)
POTASSIUM SERPL-SCNC: 3.8 MMOL/L (ref 3.5–5.3)
PROT SERPL-MCNC: 7.3 G/DL (ref 6.4–8.4)
RBC # BLD AUTO: 3.3 MILLION/UL (ref 3.88–5.62)
SALMONELLA DNA SPEC QL NAA+PROBE: NORMAL
SHIGA TOXIN STX GENE SPEC NAA+PROBE: NORMAL
SHIGELLA DNA SPEC QL NAA+PROBE: NORMAL
SODIUM SERPL-SCNC: 135 MMOL/L (ref 135–147)
TIBC SERPL-MCNC: 179 UG/DL (ref 250–450)
UNIT DISPENSE STATUS: NORMAL
UNIT DISPENSE STATUS: NORMAL
UNIT PRODUCT CODE: NORMAL
UNIT PRODUCT CODE: NORMAL
UNIT PRODUCT VOLUME: 300 ML
UNIT PRODUCT VOLUME: 300 ML
UNIT RH: NORMAL
UNIT RH: NORMAL
WBC # BLD AUTO: 15.08 THOUSAND/UL (ref 4.31–10.16)

## 2023-07-30 PROCEDURE — 99254 IP/OBS CNSLTJ NEW/EST MOD 60: CPT | Performed by: SURGERY

## 2023-07-30 PROCEDURE — 87505 NFCT AGENT DETECTION GI: CPT | Performed by: INTERNAL MEDICINE

## 2023-07-30 PROCEDURE — 85018 HEMOGLOBIN: CPT | Performed by: STUDENT IN AN ORGANIZED HEALTH CARE EDUCATION/TRAINING PROGRAM

## 2023-07-30 PROCEDURE — P9016 RBC LEUKOCYTES REDUCED: HCPCS

## 2023-07-30 PROCEDURE — 85014 HEMATOCRIT: CPT | Performed by: STUDENT IN AN ORGANIZED HEALTH CARE EDUCATION/TRAINING PROGRAM

## 2023-07-30 PROCEDURE — 87493 C DIFF AMPLIFIED PROBE: CPT | Performed by: INTERNAL MEDICINE

## 2023-07-30 PROCEDURE — 85025 COMPLETE CBC W/AUTO DIFF WBC: CPT | Performed by: INTERNAL MEDICINE

## 2023-07-30 PROCEDURE — 83540 ASSAY OF IRON: CPT | Performed by: STUDENT IN AN ORGANIZED HEALTH CARE EDUCATION/TRAINING PROGRAM

## 2023-07-30 PROCEDURE — G1004 CDSM NDSC: HCPCS

## 2023-07-30 PROCEDURE — 80074 ACUTE HEPATITIS PANEL: CPT | Performed by: STUDENT IN AN ORGANIZED HEALTH CARE EDUCATION/TRAINING PROGRAM

## 2023-07-30 PROCEDURE — 86140 C-REACTIVE PROTEIN: CPT | Performed by: INTERNAL MEDICINE

## 2023-07-30 PROCEDURE — 83993 ASSAY FOR CALPROTECTIN FECAL: CPT | Performed by: INTERNAL MEDICINE

## 2023-07-30 PROCEDURE — 80053 COMPREHEN METABOLIC PANEL: CPT | Performed by: INTERNAL MEDICINE

## 2023-07-30 PROCEDURE — C9113 INJ PANTOPRAZOLE SODIUM, VIA: HCPCS | Performed by: INTERNAL MEDICINE

## 2023-07-30 PROCEDURE — 74177 CT ABD & PELVIS W/CONTRAST: CPT

## 2023-07-30 PROCEDURE — 83550 IRON BINDING TEST: CPT | Performed by: STUDENT IN AN ORGANIZED HEALTH CARE EDUCATION/TRAINING PROGRAM

## 2023-07-30 PROCEDURE — 99254 IP/OBS CNSLTJ NEW/EST MOD 60: CPT | Performed by: GENERAL PRACTICE

## 2023-07-30 PROCEDURE — 82728 ASSAY OF FERRITIN: CPT | Performed by: STUDENT IN AN ORGANIZED HEALTH CARE EDUCATION/TRAINING PROGRAM

## 2023-07-30 RX ORDER — MAGNESIUM HYDROXIDE/ALUMINUM HYDROXICE/SIMETHICONE 120; 1200; 1200 MG/30ML; MG/30ML; MG/30ML
30 SUSPENSION ORAL EVERY 4 HOURS PRN
Status: DISCONTINUED | OUTPATIENT
Start: 2023-07-30 | End: 2023-08-04 | Stop reason: HOSPADM

## 2023-07-30 RX ADMIN — ALUMINUM HYDROXIDE, MAGNESIUM HYDROXIDE, AND SIMETHICONE 30 ML: 200; 200; 20 SUSPENSION ORAL at 04:14

## 2023-07-30 RX ADMIN — LIDOCAINE 1 PATCH: 50 PATCH CUTANEOUS at 17:59

## 2023-07-30 RX ADMIN — ACETAMINOPHEN 325MG 650 MG: 325 TABLET ORAL at 06:39

## 2023-07-30 RX ADMIN — PANTOPRAZOLE SODIUM 40 MG: 40 INJECTION, POWDER, FOR SOLUTION INTRAVENOUS at 08:36

## 2023-07-30 RX ADMIN — IOHEXOL 100 ML: 300 INJECTION, SOLUTION INTRAVENOUS at 13:08

## 2023-07-30 RX ADMIN — IOHEXOL 50 ML: 240 INJECTION, SOLUTION INTRATHECAL; INTRAVASCULAR; INTRAVENOUS; ORAL at 13:08

## 2023-07-30 RX ADMIN — POLYETHYLENE GLYCOL 3350, SODIUM SULFATE ANHYDROUS, SODIUM BICARBONATE, SODIUM CHLORIDE, POTASSIUM CHLORIDE 4000 ML: 236; 22.74; 6.74; 5.86; 2.97 POWDER, FOR SOLUTION ORAL at 17:59

## 2023-07-30 RX ADMIN — PANTOPRAZOLE SODIUM 40 MG: 40 INJECTION, POWDER, FOR SOLUTION INTRAVENOUS at 20:21

## 2023-07-30 NOTE — ASSESSMENT & PLAN NOTE
Left knee pain without any evidence of trauma. Knee x-ray last 6/25/2023 showed: no acute osseous abnormality. Abnormal findings suggestive of patellar tendinitis.   · Continue supportive care

## 2023-07-30 NOTE — PLAN OF CARE
Problem: PAIN - ADULT  Goal: Verbalizes/displays adequate comfort level or baseline comfort level  Description: Interventions:  - Encourage patient to monitor pain and request assistance  - Assess pain using appropriate pain scale  - Administer analgesics based on type and severity of pain and evaluate response  - Implement non-pharmacological measures as appropriate and evaluate response  - Consider cultural and social influences on pain and pain management  - Notify physician/advanced practitioner if interventions unsuccessful or patient reports new pain  7/30/2023 0737 by Ronny Casas RN  Outcome: Progressing  7/29/2023 1816 by Ronny Casas RN  Outcome: Progressing     Problem: INFECTION - ADULT  Goal: Absence or prevention of progression during hospitalization  Description: INTERVENTIONS:  - Assess and monitor for signs and symptoms of infection  - Monitor lab/diagnostic results  - Monitor all insertion sites, i.e. indwelling lines, tubes, and drains  - Monitor endotracheal if appropriate and nasal secretions for changes in amount and color  - [place] appropriate cooling/warming therapies per order  - Administer medications as ordered  - Instruct and encourage patient and family to use good hand hygiene technique  - Identify and instruct in appropriate isolation precautions for identified infection/condition  7/30/2023 0737 by oRnny Casas RN  Outcome: Progressing  7/29/2023 1816 by Ronny Casas RN  Outcome: Progressing     Problem: SAFETY ADULT  Goal: Patient will remain free of falls  Description: INTERVENTIONS:  - Educate patient/family on patient safety including physical limitations  - Instruct patient to call for assistance with activity   - Consult OT/PT to assist with strengthening/mobility   - Keep Call bell within reach  - Keep bed low and locked with side rails adjusted as appropriate  - Keep care items and personal belongings within reach  - Initiate and maintain comfort rounds  - Make Fall Risk Sign visible to staff  - Offer Toileting every 2 Hours, in advance of need  - Initiate/Maintain alarm  - Obtain necessary fall risk management equipment  - Apply yellow socks and bracelet for high fall risk patients  - Consider moving patient to room near nurses station  7/30/2023 0737 by Jeremie Garrett RN  Outcome: Progressing  7/29/2023 1816 by Jeremie Garrett RN  Outcome: Progressing  Goal: Maintain or return to baseline ADL function  Description: INTERVENTIONS:  -  Assess patient's ability to carry out ADLs; assess patient's baseline for ADL function and identify physical deficits which impact ability to perform ADLs (bathing, care of mouth/teeth, toileting, grooming, dressing, etc.)  - Assess/evaluate cause of self-care deficits   - Assess range of motion  - Assess patient's mobility; develop plan if impaired  - Assess patient's need for assistive devices and provide as appropriate  - Encourage maximum independence but intervene and supervise when necessary  - Involve family in performance of ADLs  - Assess for home care needs following discharge   - Consider OT consult to assist with ADL evaluation and planning for discharge  - Provide patient education as appropriate  7/30/2023 0737 by Jeremie Garrett RN  Outcome: Progressing  7/29/2023 1816 by Jeremie Garrett RN  Outcome: Progressing  Goal: Maintains/Returns to pre admission functional level  Description: INTERVENTIONS:  - Perform BMAT or MOVE assessment daily.   - Set and communicate daily mobility goal to care team and patient/family/caregiver. - Collaborate with rehabilitation services on mobility goals if consulted  - Perform Range of Motion 3 times a day. - Reposition patient every 2 hours.   - Dangle patient 3 times a day  - Stand patient 3 times a day  - Ambulate patient 3 times a day  - Out of bed to chair 3 times a day   - Out of bed for meals 3 times a day  - Out of bed for toileting  - Record patient progress and toleration of activity level   7/30/2023 [id number] by Viry Ball RN  Outcome: Progressing  7/29/2023 1816 by Viry Ball RN  Outcome: Progressing     Problem: DISCHARGE PLANNING  Goal: Discharge to home or other facility with appropriate resources  Description: INTERVENTIONS:  - Identify barriers to discharge w/patient and caregiver  - Arrange for needed discharge resources and transportation as appropriate  - Identify discharge learning needs (meds, wound care, etc.)  - Arrange for interpretive services to assist at discharge as needed  - Refer to Case Management Department for coordinating discharge planning if the patient needs post-hospital services based on physician/advanced practitioner order or complex needs related to functional status, cognitive ability, or social support system  7/30/2023 0737 by Viry Ball RN  Outcome: Progressing  7/29/2023 1816 by Viry Ball RN  Outcome: Progressing     Problem: Knowledge Deficit  Goal: Patient/family/caregiver demonstrates understanding of disease process, treatment plan, medications, and discharge instructions  Description: Complete learning assessment and assess knowledge base. Interventions:  - Provide teaching at level of understanding  - Provide teaching via preferred learning methods  7/30/2023 0737 by Viry Ball RN  Outcome: Progressing  7/29/2023 1816 by Viry Ball RN  Outcome: Progressing     Problem: NEUROSENSORY - ADULT  Goal: Achieves maximal functionality and self care  Description: INTERVENTIONS  - Monitor swallowing and airway patency with patient fatigue and changes in neurological status  - Encourage and assist patient to increase activity and self care.    - Encourage visually impaired, hearing impaired and aphasic patients to use assistive/communication devices  7/30/2023 0737 by Viry Ball RN  Outcome: Progressing  7/29/2023 1816 by Viry Ball RN  Outcome: Progressing     Problem: CARDIOVASCULAR - ADULT  Goal: Maintains optimal cardiac output and hemodynamic stability  Description: INTERVENTIONS:  - Monitor I/O, vital signs and rhythm  - Monitor for S/S and trends of decreased cardiac output  - Administer and titrate ordered vasoactive medications to optimize hemodynamic stability  - Assess quality of pulses, skin color and temperature  - Assess for signs of decreased coronary artery perfusion  - Instruct patient to report change in severity of symptoms  7/30/2023 0737 by Jennifer Gomez RN  Outcome: Progressing  7/29/2023 1816 by Jennifer Gomez RN  Outcome: Progressing  Goal: Absence of cardiac dysrhythmias or at baseline rhythm  Description: INTERVENTIONS:  - Continuous cardiac monitoring, vital signs, obtain 12 lead EKG if ordered  - Administer antiarrhythmic and heart rate control medications as ordered  - Monitor electrolytes and administer replacement therapy as ordered  7/30/2023 0737 by Jennifer Gomez RN  Outcome: Progressing  7/29/2023 1816 by Jennifer Gomez RN  Outcome: Progressing     Problem: GASTROINTESTINAL - ADULT  Goal: Minimal or absence of nausea and/or vomiting  Description: INTERVENTIONS:  - Administer IV fluids if ordered to ensure adequate hydration  - Maintain NPO status until nausea and vomiting are resolved  - Nasogastric tube if ordered  - Administer ordered antiemetic medications as needed  - Provide nonpharmacologic comfort measures as appropriate  - Advance diet as tolerated, if ordered  - Consider nutrition services referral to assist patient with adequate nutrition and appropriate food choices  7/30/2023 0737 by Jennifer Gomez RN  Outcome: Progressing  7/29/2023 1816 by Jennifer Gomez RN  Outcome: Progressing  Goal: Maintains or returns to baseline bowel function  Description: INTERVENTIONS:  - Assess bowel function  - Encourage oral fluids to ensure adequate hydration  - Administer IV fluids if ordered to ensure adequate hydration  - Administer ordered medications as needed  - Encourage mobilization and activity  - Consider nutritional services referral to assist patient with adequate nutrition and appropriate food choices  7/30/2023 0737 by Cassie Cisneros RN  Outcome: Progressing  7/29/2023 1816 by Cassie Cisneros RN  Outcome: Progressing  Goal: Maintains adequate nutritional intake  Description: INTERVENTIONS:  - Monitor percentage of each meal consumed  - Identify factors contributing to decreased intake, treat as appropriate  - Assist with meals as needed  - Monitor I&O, weight, and lab values if indicated  - Obtain nutrition services referral as needed  7/30/2023 0737 by Cassie Cisneros RN  Outcome: Progressing  7/29/2023 1816 by Cassie Cisneros RN  Outcome: Progressing     Problem: METABOLIC, FLUID AND ELECTROLYTES - ADULT  Goal: Fluid balance maintained  Description: INTERVENTIONS:  - Monitor labs   - Monitor I/O and WT  - Instruct patient on fluid and nutrition as appropriate  - Assess for signs & symptoms of volume excess or deficit  7/30/2023 0737 by Cassie Cisneros RN  Outcome: Progressing  7/29/2023 1816 by Cassie Cisneros RN  Outcome: Progressing     Problem: HEMATOLOGIC - ADULT  Goal: Maintains hematologic stability  Description: INTERVENTIONS  - Assess for signs and symptoms of bleeding or hemorrhage  - Monitor labs  - Administer supportive blood products/factors as ordered and appropriate  7/30/2023 0737 by Cassie Cisneros RN  Outcome: Progressing  7/29/2023 1816 by Cassie Cisneros RN  Outcome: Progressing     Problem: MUSCULOSKELETAL - ADULT  Goal: Maintain or return mobility to safest level of function  Description: INTERVENTIONS:  - Assess patient's ability to carry out ADLs; assess patient's baseline for ADL function and identify physical deficits which impact ability to perform ADLs (bathing, care of mouth/teeth, toileting, grooming, dressing, etc.)  - Assess/evaluate cause of self-care deficits   - Assess range of motion  - Assess patient's mobility  - Assess patient's need for assistive devices and provide as appropriate  - Encourage maximum independence but intervene and supervise when necessary  - Involve family in performance of ADLs  - Assess for home care needs following discharge   - Consider OT consult to assist with ADL evaluation and planning for discharge  - Provide patient education as appropriate  7/30/2023 0737 by Evonne Soliman RN  Outcome: Progressing  7/29/2023 1816 by Evonne Soliman RN  Outcome: Progressing  Goal: Maintain proper alignment of affected body part  Description: INTERVENTIONS:  - Support, maintain and protect limb and body alignment  - Provide patient/ family with appropriate education  7/30/2023 0737 by Evonne Soliman RN  Outcome: Progressing  7/29/2023 1816 by Evonne Soliman RN  Outcome: Progressing

## 2023-07-30 NOTE — PLAN OF CARE
Problem: PAIN - ADULT  Goal: Verbalizes/displays adequate comfort level or baseline comfort level  Description: Interventions:  - Encourage patient to monitor pain and request assistance  - Assess pain using appropriate pain scale  - Administer analgesics based on type and severity of pain and evaluate response  - Implement non-pharmacological measures as appropriate and evaluate response  - Consider cultural and social influences on pain and pain management  - Notify physician/advanced practitioner if interventions unsuccessful or patient reports new pain  Outcome: Progressing     Problem: INFECTION - ADULT  Goal: Absence or prevention of progression during hospitalization  Description: INTERVENTIONS:  - Assess and monitor for signs and symptoms of infection  - Monitor lab/diagnostic results  - Monitor all insertion sites, i.e. indwelling lines, tubes, and drains  - Monitor endotracheal if appropriate and nasal secretions for changes in amount and color  - Gurley appropriate cooling/warming therapies per order  - Administer medications as ordered  - Instruct and encourage patient and family to use good hand hygiene technique  - Identify and instruct in appropriate isolation precautions for identified infection/condition  Outcome: Progressing     Problem: SAFETY ADULT  Goal: Patient will remain free of falls  Description: INTERVENTIONS:  - Educate patient/family on patient safety including physical limitations  - Instruct patient to call for assistance with activity   - Consult OT/PT to assist with strengthening/mobility   - Keep Call bell within reach  - Keep bed low and locked with side rails adjusted as appropriate  - Keep care items and personal belongings within reach  - Initiate and maintain comfort rounds  - Make Fall Risk Sign visible to staff  - Offer Toileting every 2 Hours, in advance of need    - Obtain necessary fall risk management equipment:   - Apply yellow socks and bracelet for high fall risk patients  - Consider moving patient to room near nurses station  Outcome: Progressing  Goal: Maintain or return to baseline ADL function  Description: INTERVENTIONS:  -  Assess patient's ability to carry out ADLs; assess patient's baseline for ADL function and identify physical deficits which impact ability to perform ADLs (bathing, care of mouth/teeth, toileting, grooming, dressing, etc.)  - Assess/evaluate cause of self-care deficits   - Assess range of motion  - Assess patient's mobility; develop plan if impaired  - Assess patient's need for assistive devices and provide as appropriate  - Encourage maximum independence but intervene and supervise when necessary  - Involve family in performance of ADLs  - Assess for home care needs following discharge   - Consider OT consult to assist with ADL evaluation and planning for discharge  - Provide patient education as appropriate  Outcome: Progressing  Goal: Maintains/Returns to pre admission functional level  Description: INTERVENTIONS:  - Perform BMAT or MOVE assessment daily.   - Set and communicate daily mobility goal to care team and patient/family/caregiver. - Collaborate with rehabilitation services on mobility goals if consulted  - Perform Range of Motion 2 times a day.   -- Out of bed to chair 3 times a day   - Out of bed for meals 3 times a day  - Out of bed for toileting  - Record patient progress and toleration of activity level   Outcome: Progressing     Problem: DISCHARGE PLANNING  Goal: Discharge to home or other facility with appropriate resources  Description: INTERVENTIONS:  - Identify barriers to discharge w/patient and caregiver  - Arrange for needed discharge resources and transportation as appropriate  - Identify discharge learning needs (meds, wound care, etc.)  - Arrange for interpretive services to assist at discharge as needed  - Refer to Case Management Department for coordinating discharge planning if the patient needs post-hospital services based on physician/advanced practitioner order or complex needs related to functional status, cognitive ability, or social support system  Outcome: Progressing     Problem: Knowledge Deficit  Goal: Patient/family/caregiver demonstrates understanding of disease process, treatment plan, medications, and discharge instructions  Description: Complete learning assessment and assess knowledge base. Interventions:  - Provide teaching at level of understanding  - Provide teaching via preferred learning methods  Outcome: Progressing     Problem: NEUROSENSORY - ADULT  Goal: Achieves maximal functionality and self care  Description: INTERVENTIONS  - Monitor swallowing and airway patency with patient fatigue and changes in neurological status  - Encourage and assist patient to increase activity and self care.    - Encourage visually impaired, hearing impaired and aphasic patients to use assistive/communication devices  Outcome: Progressing     Problem: CARDIOVASCULAR - ADULT  Goal: Maintains optimal cardiac output and hemodynamic stability  Description: INTERVENTIONS:  - Monitor I/O, vital signs and rhythm  - Monitor for S/S and trends of decreased cardiac output  - Administer and titrate ordered vasoactive medications to optimize hemodynamic stability  - Assess quality of pulses, skin color and temperature  - Assess for signs of decreased coronary artery perfusion  - Instruct patient to report change in severity of symptoms  Outcome: Progressing  Goal: Absence of cardiac dysrhythmias or at baseline rhythm  Description: INTERVENTIONS:  - Continuous cardiac monitoring, vital signs, obtain 12 lead EKG if ordered  - Administer antiarrhythmic and heart rate control medications as ordered  - Monitor electrolytes and administer replacement therapy as ordered  Outcome: Progressing     Problem: GASTROINTESTINAL - ADULT  Goal: Minimal or absence of nausea and/or vomiting  Description: INTERVENTIONS:  - Administer IV fluids if ordered to ensure adequate hydration  - Maintain NPO status until nausea and vomiting are resolved  - Nasogastric tube if ordered  - Administer ordered antiemetic medications as needed  - Provide nonpharmacologic comfort measures as appropriate  - Advance diet as tolerated, if ordered  - Consider nutrition services referral to assist patient with adequate nutrition and appropriate food choices  Outcome: Progressing  Goal: Maintains or returns to baseline bowel function  Description: INTERVENTIONS:  - Assess bowel function  - Encourage oral fluids to ensure adequate hydration  - Administer IV fluids if ordered to ensure adequate hydration  - Administer ordered medications as needed  - Encourage mobilization and activity  - Consider nutritional services referral to assist patient with adequate nutrition and appropriate food choices  Outcome: Progressing  Goal: Maintains adequate nutritional intake  Description: INTERVENTIONS:  - Monitor percentage of each meal consumed  - Identify factors contributing to decreased intake, treat as appropriate  - Assist with meals as needed  - Monitor I&O, weight, and lab values if indicated  - Obtain nutrition services referral as needed  Outcome: Progressing     Problem: METABOLIC, FLUID AND ELECTROLYTES - ADULT  Goal: Fluid balance maintained  Description: INTERVENTIONS:  - Monitor labs   - Monitor I/O and WT  - Instruct patient on fluid and nutrition as appropriate  - Assess for signs & symptoms of volume excess or deficit  Outcome: Progressing     Problem: HEMATOLOGIC - ADULT  Goal: Maintains hematologic stability  Description: INTERVENTIONS  - Assess for signs and symptoms of bleeding or hemorrhage  - Monitor labs  - Administer supportive blood products/factors as ordered and appropriate  Outcome: Progressing     Problem: MUSCULOSKELETAL - ADULT  Goal: Maintain or return mobility to safest level of function  Description: INTERVENTIONS:  - Assess patient's ability to carry out ADLs; assess patient's baseline for ADL function and identify physical deficits which impact ability to perform ADLs (bathing, care of mouth/teeth, toileting, grooming, dressing, etc.)  - Assess/evaluate cause of self-care deficits   - Assess range of motion  - Assess patient's mobility  - Assess patient's need for assistive devices and provide as appropriate  - Encourage maximum independence but intervene and supervise when necessary  - Involve family in performance of ADLs  - Assess for home care needs following discharge   - Consider OT consult to assist with ADL evaluation and planning for discharge  - Provide patient education as appropriate  Outcome: Progressing  Goal: Maintain proper alignment of affected body part  Description: INTERVENTIONS:  - Support, maintain and protect limb and body alignment  - Provide patient/ family with appropriate education  Outcome: Progressing

## 2023-07-30 NOTE — ASSESSMENT & PLAN NOTE
Received 3 units packed RBC so far. We will continue monitoring hemoglobin levels.     · Appreciate GI recommendations, for bidirectional evaluation with an EGD and colonoscopy  · Continue PPI   · Suspect anemia from malabsorption from Crohn's disease  · Will need outpatient B12 level, patient was transfused and B12 likely to be unreliable    Results from last 7 days   Lab Units 07/30/23  1616 07/30/23  0608 07/29/23  1224   HEMOGLOBIN g/dL 8.7* 6.6* 4.9*   MCV fL  --  70* 66*   RDW %  --  22.8* 19.4*   IRON ug/dL  --  34*  --    TIBC ug/dL  --  179*  --    FERRITIN ng/mL  --  19*  --

## 2023-07-30 NOTE — CONSULTS
Consultation - [address] [place] [address] [facility]  Angus Snyder 35 y.o. male MRN: [id number]  Unit/Bed#: [place] [id number] [facility] [place] [id number] Encounter: [id number]        Consults    Reason for Consult / Principal Problem:     Crohn's disease     ASSESSMENT AND PLAN:       80-year-old male with history significant for schizophrenia and fistulizing Crohn's disease (dx at age 15) with ischiorectal abscess currently not on treatment, presenting with several weeks history of generalized fatigue, weight loss, and bloody diarrhea concerning for acute Crohn's flare with acute blood loss anemia s/p transfusion of 3 units of PRBCs. Unfortunately has been lost to follow-up over several years and has had complications from his Crohn's disease including complex fistula and ischiorectal abscess. CRP elevated to 97.2. Infectious stool studies and fecal calprotectin pending. He has not had a colonoscopy in several years and his overall clinical presentation is concerning for an acute flare warranting bidirectional endoscopic evaluation. I suspect given the severity of his symptoms and prior complications, he will likely need induction with biologics and close follow-up in the IBD clinic. 1. Clear liquid diet today. 2. N.p.o. after midnight for EGD and colonoscopy tomorrow. 3. Monitor hemoglobin and transfuse for less than 7.  4. Follow-up infectious stool studies and fecal calprotectin. 5. Obtain pre-biologic workup including serologies for HIV, Hepatitis, and Quantiferon TB. 6. Ultimately needs long-term outpatient follow-up in the IBD clinic for management of his Crohn's disease. Rest of care per primary team.  Thank you for this consultation.    ______________________________________________________________________    HPI: Clarissa Hutchison is a 80-year-old male with history significant for schizophrenia and fistulizing Crohn's disease (dx at age 15) with ischiorectal abscess currently not on treatment, whom we are asked to see in consultation for management of Crohn's disease and rectal bleeding. Patient is a poor historian. He presented to the hospital with several weeks history of generalized fatigue, weight loss, and bloody diarrhea. He is unable to quantify bowel movements or amount of weight loss. Of note, patient reports a knee injury earlier this month for which he was taking Ibuprofen multiple times a day, cannot quantify amount. Denies coffee ground emesis, reflux, abdominal pain, melena. He reports previously being treated with Pentasa following initial diagnosis of Crohn's disease, however, he has been off of any medication for many years. Per chart review, he was following with [facility] previously with some concerns for ischiorectal abscess 2/2 crohn's disease in 2021. He does not appear to have had any follow up since this complication. Infectious stool studies and fecal calprotectin are pending at this time. CRP elevated to 97.2. Per chart review, patient also has a history of visualizing Crohn's disease with complex Crohn's fistula status post resection and history of rectal abscess in 2016. REVIEW OF SYSTEMS:  10 point ROS reviewed and negative except otherwise noted in the HPI above.      Historical Information   Past Medical History:   Diagnosis Date   • Anemia 2004    hospitalization/transfusion     Past Surgical History:   Procedure Laterality Date   • CO ANRCT XM SURG REQ ANES GENERAL SPI/EDRL DX N/A 4/7/2016    Procedure: EXAM UNDER ANESTHESIA (EUA); possible REMOVAL OF FOREIGN BODY anoscopy ;  Surgeon: Adry Mata MD;  Location: [facility];  Service: Colorectal   • CO SURG TX ANAL FISTULA INTERSPHINCTERIC N/A 4/7/2016    Procedure: superficial FISTULOTOMY; SETON PROCEDURE drainage of chronic ischiofistula abscess and debridement;  Surgeon: Adry Mata MD;  Location: [facility];  Service: Colorectal     Social History   Social History     Substance and Sexual Activity   Alcohol Use Not Currently    Comment: Socially     Social History     Substance and Sexual Activity   Drug Use No     Social History     Tobacco Use   Smoking Status Light Smoker   • Packs/day: 0.25   • Types: Cigarettes   Smokeless Tobacco Never   Tobacco Comments    Codie says 7 cigarettes per day     History reviewed. No pertinent family history. Meds/Allergies     Medications Prior to Admission   Medication   • acetaminophen (TYLENOL) 650 mg CR tablet   • ferrous sulfate 325 (65 Fe) mg tablet   • aspirin 325 mg tablet   • lidocaine (LIDODERM) 5 %   • methocarbamol (ROBAXIN) 500 mg tablet   • naproxen (EC NAPROSYN) 500 MG EC tablet     Current Facility-Administered Medications   Medication Dose Route Frequency   • acetaminophen (TYLENOL) tablet 650 mg  650 mg Oral Q6H PRN   • aluminum-magnesium hydroxide-simethicone (MAALOX) oral suspension 30 mL  30 mL Oral Q4H PRN   • lidocaine (LIDODERM) 5 % patch 1 patch  1 patch Topical Q24H   • pantoprazole (PROTONIX) injection 40 mg  40 mg Intravenous Q12H [address]   • polyethylene glycol (GOLYTELY) bowel prep 4,000 mL  4,000 mL Oral Once       Allergies   Allergen Reactions   • Haldol Decanoate [Haloperidol] Anaphylaxis   • Oxycodone-Acetaminophen Rash     "swollon red rash"   • Sulfamethoxazole-Trimethoprim      "never really worked"         Objective     Blood pressure 113/74, pulse 57, temperature 97.6 °F (36.4 °C), temperature source Axillary, resp. rate 21, height 5' 6" (1.676 m), weight 58 kg (127 lb 13.9 oz), SpO2 100 %. Body mass index is 20.64 kg/m².     PHYSICAL EXAM:    General:  NAD  Eyes: no conjunctival icterus or pallor  Abdominal: Soft, non-tender, non-distended  Extremities: Warm, no deformities, no edema  Neuro: alert and oriented  Psych: Normal affect    Lab Results:   Admission on 07/29/2023   Component Date Value   • WBC 07/29/2023 14.85 (H)    • RBC 07/29/2023 2.86 (L)    • Hemoglobin 07/29/2023 4.9 (LL)    • Hematocrit 07/29/2023 18.8 (L)    • MCV 07/29/2023 66 (L) • MCH 07/29/2023 17.1 (L)    • MCHC 07/29/2023 26.1 (L)    • RDW 07/29/2023 19.4 (H)    • MPV 07/29/2023 8.2 (L)    • Platelets 87/87/4005 803 (H)    • nRBC 07/29/2023 0    • Neutrophils Relative 07/29/2023 69    • Immat GRANS % 07/29/2023 1    • Lymphocytes Relative 07/29/2023 21    • Monocytes Relative 07/29/2023 9    • Eosinophils Relative 07/29/2023 0    • Basophils Relative 07/29/2023 0    • Neutrophils Absolute 07/29/2023 10.23 (H)    • Immature Grans Absolute 07/29/2023 0.08    • Lymphocytes Absolute 07/29/2023 3.07    • Monocytes Absolute 07/29/2023 1.37 (H)    • Eosinophils Absolute 07/29/2023 0.06    • Basophils Absolute 07/29/2023 0.04    • Sodium 07/29/2023 135    • Potassium 07/29/2023 3.6    • Chloride 07/29/2023 102    • CO2 07/29/2023 25    • ANION GAP 07/29/2023 8    • BUN 07/29/2023 5    • Creatinine 07/29/2023 0.83    • Glucose 07/29/2023 100    • Calcium 07/29/2023 8.5    • Corrected Calcium 07/29/2023 9.4    • AST 07/29/2023 10 (L)    • ALT 07/29/2023 5 (L)    • Alkaline Phosphatase 07/29/2023 66    • Total Protein 07/29/2023 7.9    • Albumin 07/29/2023 2.9 (L)    • Total Bilirubin 07/29/2023 0.19 (L)    • eGFR 07/29/2023 115    • ABO Grouping 07/29/2023 A    • Rh Factor 07/29/2023 Positive    • Antibody Screen 07/29/2023 Negative    • Specimen Expiration Date 07/29/2023 [id number]    • Unit Product Code 07/30/2023 [id number]    • Unit Number 07/30/2023 [id number]    • Unit ABO 07/30/2023 A    • Unit [facility] 07/30/2023 POS    • Crossmatch 07/30/2023 Compatible    • Unit Dispense Status 07/30/2023 Presumed Trans    • Unit Product Volume 07/30/2023 300    • Unit Product Code 07/30/2023 [id number]    • Unit Number 07/30/2023 [id number]    • Unit ABO 07/30/2023 A    • Unit [facility] 07/30/2023 POS    • Crossmatch 07/30/2023 Compatible    • Unit Dispense Status 07/30/2023 Presumed Trans    • Unit Product Volume 07/30/2023 300    • ABO Grouping 07/29/2023 A    • Rh Factor 07/29/2023 Positive    • Sodium 07/30/2023 135    • Potassium 07/30/2023 3.8    • Chloride 07/30/2023 103    • CO2 07/30/2023 27    • ANION GAP 07/30/2023 5    • BUN 07/30/2023 3 (L)    • Creatinine 07/30/2023 0.63    • Glucose 07/30/2023 78    • Calcium 07/30/2023 8.2 (L)    • Corrected Calcium 07/30/2023 9.2    • AST 07/30/2023 6 (L)    • ALT 07/30/2023 4 (L)    • Alkaline Phosphatase 07/30/2023 56    • Total Protein 07/30/2023 7.3    • Albumin 07/30/2023 2.7 (L)    • Total Bilirubin 07/30/2023 0.45    • eGFR 07/30/2023 129    • WBC 07/30/2023 15.08 (H)    • RBC 07/30/2023 3.30 (L)    • Hemoglobin 07/30/2023 6.6 (LL)    • Hematocrit 07/30/2023 23.0 (L)    • MCV 07/30/2023 70 (L)    • MCH 07/30/2023 20.0 (L)    • MCHC 07/30/2023 28.7 (L)    • RDW 07/30/2023 22.8 (H)    • MPV 07/30/2023 8.2 (L)    • Platelets 80/80/4830 683 (H)    • nRBC 07/30/2023 0    • Neutrophils Relative 07/30/2023 76 (H)    • Immat GRANS % 07/30/2023 1    • Lymphocytes Relative 07/30/2023 14    • Monocytes Relative 07/30/2023 9    • Eosinophils Relative 07/30/2023 0    • Basophils Relative 07/30/2023 0    • Neutrophils Absolute 07/30/2023 11.38 (H)    • Immature Grans Absolute 07/30/2023 0.11    • Lymphocytes Absolute 07/30/2023 2.12    • Monocytes Absolute 07/30/2023 1.38 (H)    • Eosinophils Absolute 07/30/2023 0.04    • Basophils Absolute 07/30/2023 0.05    • CRP 07/30/2023 97.2 (H)    • Unit Product Code 07/30/2023 [id number]    • Unit Number 07/30/2023 [id number]    • Unit ABO 07/30/2023 A    • Unit [facility] 07/30/2023 POS    • Crossmatch 07/30/2023 Compatible    • Unit Dispense Status 07/30/2023 Issued    • Unit Product Volume 07/30/2023 350        Imaging Studies: I have personally reviewed pertinent imaging studies. No results found. Zehra Antunez D.O.   Gastroenterology Fellow  PGY-4  Available on [facility]  7/30/2023 12:30 PM

## 2023-07-30 NOTE — ASSESSMENT & PLAN NOTE
Patient was diagnosed with Crohns disease at age 15  · Seems to have been lost to follow-up. Currently not on any medications.     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

## 2023-07-31 ENCOUNTER — APPOINTMENT (INPATIENT)
Dept: GASTROENTEROLOGY | Facility: HOSPITAL | Age: 33
DRG: 385 | End: 2023-07-31
Payer: MEDICARE

## 2023-07-31 ENCOUNTER — ANESTHESIA (INPATIENT)
Dept: GASTROENTEROLOGY | Facility: HOSPITAL | Age: 33
DRG: 385 | End: 2023-07-31
Payer: MEDICARE

## 2023-07-31 ENCOUNTER — ANESTHESIA EVENT (INPATIENT)
Dept: GASTROENTEROLOGY | Facility: HOSPITAL | Age: 33
DRG: 385 | End: 2023-07-31
Payer: MEDICARE

## 2023-07-31 PROBLEM — M25.569 KNEE PAIN: Status: RESOLVED | Noted: 2023-07-29 | Resolved: 2023-07-31

## 2023-07-31 LAB
ABO GROUP BLD BPU: NORMAL
BPU ID: NORMAL
CROSSMATCH: NORMAL
HAV IGM SER QL: NORMAL
HBV CORE IGM SER QL: NORMAL
HBV SURFACE AG SER QL: NORMAL
HCV AB SER QL: NORMAL
UNIT DISPENSE STATUS: NORMAL
UNIT PRODUCT CODE: NORMAL
UNIT PRODUCT VOLUME: 350 ML
UNIT RH: NORMAL

## 2023-07-31 PROCEDURE — 99233 SBSQ HOSP IP/OBS HIGH 50: CPT | Performed by: INTERNAL MEDICINE

## 2023-07-31 PROCEDURE — 88112 CYTOPATH CELL ENHANCE TECH: CPT | Performed by: PATHOLOGY

## 2023-07-31 PROCEDURE — 88342 IMHCHEM/IMCYTCHM 1ST ANTB: CPT | Performed by: SPECIALIST

## 2023-07-31 PROCEDURE — 0DB98ZX EXCISION OF DUODENUM, VIA NATURAL OR ARTIFICIAL OPENING ENDOSCOPIC, DIAGNOSTIC: ICD-10-PCS | Performed by: INTERNAL MEDICINE

## 2023-07-31 PROCEDURE — NC001 PR NO CHARGE: Performed by: SURGERY

## 2023-07-31 PROCEDURE — 88305 TISSUE EXAM BY PATHOLOGIST: CPT | Performed by: SPECIALIST

## 2023-07-31 PROCEDURE — 0W9M0ZZ DRAINAGE OF MALE PERINEUM, OPEN APPROACH: ICD-10-PCS | Performed by: INTERNAL MEDICINE

## 2023-07-31 PROCEDURE — 0DBE8ZX EXCISION OF LARGE INTESTINE, VIA NATURAL OR ARTIFICIAL OPENING ENDOSCOPIC, DIAGNOSTIC: ICD-10-PCS | Performed by: STUDENT IN AN ORGANIZED HEALTH CARE EDUCATION/TRAINING PROGRAM

## 2023-07-31 PROCEDURE — C9113 INJ PANTOPRAZOLE SODIUM, VIA: HCPCS | Performed by: INTERNAL MEDICINE

## 2023-07-31 PROCEDURE — 0DB68ZX EXCISION OF STOMACH, VIA NATURAL OR ARTIFICIAL OPENING ENDOSCOPIC, DIAGNOSTIC: ICD-10-PCS | Performed by: INTERNAL MEDICINE

## 2023-07-31 PROCEDURE — 88341 IMHCHEM/IMCYTCHM EA ADD ANTB: CPT | Performed by: SPECIALIST

## 2023-07-31 RX ORDER — SODIUM CHLORIDE 9 MG/ML
INJECTION, SOLUTION INTRAVENOUS CONTINUOUS PRN
Status: DISCONTINUED | OUTPATIENT
Start: 2023-07-31 | End: 2023-07-31

## 2023-07-31 RX ORDER — FLUCONAZOLE 100 MG/1
100 TABLET ORAL DAILY
Status: DISCONTINUED | OUTPATIENT
Start: 2023-08-01 | End: 2023-08-04 | Stop reason: HOSPADM

## 2023-07-31 RX ORDER — CEFAZOLIN SODIUM 2 G/50ML
2000 SOLUTION INTRAVENOUS EVERY 8 HOURS
Status: DISCONTINUED | OUTPATIENT
Start: 2023-07-31 | End: 2023-08-01

## 2023-07-31 RX ORDER — LIDOCAINE HCL/PF 100 MG/5ML
SYRINGE (ML) INJECTION AS NEEDED
Status: DISCONTINUED | OUTPATIENT
Start: 2023-07-31 | End: 2023-07-31

## 2023-07-31 RX ORDER — PROPOFOL 10 MG/ML
INJECTION, EMULSION INTRAVENOUS AS NEEDED
Status: DISCONTINUED | OUTPATIENT
Start: 2023-07-31 | End: 2023-07-31

## 2023-07-31 RX ORDER — METHYLPREDNISOLONE SODIUM SUCCINATE 40 MG/ML
20 INJECTION, POWDER, LYOPHILIZED, FOR SOLUTION INTRAMUSCULAR; INTRAVENOUS EVERY 8 HOURS SCHEDULED
Status: DISCONTINUED | OUTPATIENT
Start: 2023-07-31 | End: 2023-08-04 | Stop reason: HOSPADM

## 2023-07-31 RX ORDER — ENOXAPARIN SODIUM 100 MG/ML
40 INJECTION SUBCUTANEOUS
Status: DISCONTINUED | OUTPATIENT
Start: 2023-08-01 | End: 2023-08-04 | Stop reason: HOSPADM

## 2023-07-31 RX ORDER — FLUCONAZOLE 100 MG/1
200 TABLET ORAL ONCE
Status: COMPLETED | OUTPATIENT
Start: 2023-07-31 | End: 2023-07-31

## 2023-07-31 RX ORDER — PANTOPRAZOLE SODIUM 40 MG/1
40 TABLET, DELAYED RELEASE ORAL
Status: DISCONTINUED | OUTPATIENT
Start: 2023-08-01 | End: 2023-08-04 | Stop reason: HOSPADM

## 2023-07-31 RX ADMIN — PROPOFOL 50 MG: 10 INJECTION, EMULSION INTRAVENOUS at 18:27

## 2023-07-31 RX ADMIN — PROPOFOL 150 MG: 10 INJECTION, EMULSION INTRAVENOUS at 18:04

## 2023-07-31 RX ADMIN — METHYLPREDNISOLONE SODIUM SUCCINATE 20 MG: 40 INJECTION, POWDER, FOR SOLUTION INTRAMUSCULAR; INTRAVENOUS at 22:39

## 2023-07-31 RX ADMIN — PROPOFOL 50 MG: 10 INJECTION, EMULSION INTRAVENOUS at 18:19

## 2023-07-31 RX ADMIN — LIDOCAINE 1 PATCH: 50 PATCH CUTANEOUS at 20:24

## 2023-07-31 RX ADMIN — PROPOFOL 50 MG: 10 INJECTION, EMULSION INTRAVENOUS at 18:08

## 2023-07-31 RX ADMIN — FLUCONAZOLE 200 MG: 100 TABLET ORAL at 20:24

## 2023-07-31 RX ADMIN — CEFAZOLIN SODIUM 2000 MG: 2 SOLUTION INTRAVENOUS at 20:24

## 2023-07-31 RX ADMIN — SODIUM CHLORIDE: 0.9 INJECTION, SOLUTION INTRAVENOUS at 17:58

## 2023-07-31 RX ADMIN — PANTOPRAZOLE SODIUM 40 MG: 40 INJECTION, POWDER, FOR SOLUTION INTRAVENOUS at 08:53

## 2023-07-31 RX ADMIN — PROPOFOL 50 MG: 10 INJECTION, EMULSION INTRAVENOUS at 18:10

## 2023-07-31 RX ADMIN — LIDOCAINE HYDROCHLORIDE 100 MG: 20 INJECTION INTRAVENOUS at 18:04

## 2023-07-31 NOTE — PLAN OF CARE
Problem: PAIN - ADULT  Goal: Verbalizes/displays adequate comfort level or baseline comfort level  Description: Interventions:  - Encourage patient to monitor pain and request assistance  - Assess pain using appropriate pain scale  - Administer analgesics based on type and severity of pain and evaluate response  - Implement non-pharmacological measures as appropriate and evaluate response  - Consider cultural and social influences on pain and pain management  - Notify physician/advanced practitioner if interventions unsuccessful or patient reports new pain  Outcome: Progressing     Problem: INFECTION - ADULT  Goal: Absence or prevention of progression during hospitalization  Description: INTERVENTIONS:  - Assess and monitor for signs and symptoms of infection  - Monitor lab/diagnostic results  - Monitor all insertion sites, i.e. indwelling lines, tubes, and drains  - Monitor endotracheal if appropriate and nasal secretions for changes in amount and color  - [place] appropriate cooling/warming therapies per order  - Administer medications as ordered  - Instruct and encourage patient and family to use good hand hygiene technique  - Identify and instruct in appropriate isolation precautions for identified infection/condition  Outcome: Progressing     Problem: SAFETY ADULT  Goal: Patient will remain free of falls  Description: INTERVENTIONS:  - Educate patient/family on patient safety including physical limitations  - Instruct patient to call for assistance with activity   - Consult OT/PT to assist with strengthening/mobility   - Keep Call bell within reach  - Keep bed low and locked with side rails adjusted as appropriate  - Keep care items and personal belongings within reach  - Initiate and maintain comfort rounds  - Make Fall Risk Sign visible to staff  - Apply yellow socks and bracelet for high fall risk patients  - Consider moving patient to room near nurses station  Outcome: Progressing  Goal: Maintain or return to baseline ADL function  Description: INTERVENTIONS:  -  Assess patient's ability to carry out ADLs; assess patient's baseline for ADL function and identify physical deficits which impact ability to perform ADLs (bathing, care of mouth/teeth, toileting, grooming, dressing, etc.)  - Assess/evaluate cause of self-care deficits   - Assess range of motion  - Assess patient's mobility; develop plan if impaired  - Assess patient's need for assistive devices and provide as appropriate  - Encourage maximum independence but intervene and supervise when necessary  - Involve family in performance of ADLs  - Assess for home care needs following discharge   - Consider OT consult to assist with ADL evaluation and planning for discharge  - Provide patient education as appropriate  Outcome: Progressing  Goal: Maintains/Returns to pre admission functional level  Description: INTERVENTIONS:  - Perform BMAT or MOVE assessment daily.   - Set and communicate daily mobility goal to care team and patient/family/caregiver.    - Collaborate with rehabilitation services on mobility goals if consulted  - Out of bed for toileting  - Record patient progress and toleration of activity level   Outcome: Progressing     Problem: DISCHARGE PLANNING  Goal: Discharge to home or other facility with appropriate resources  Description: INTERVENTIONS:  - Identify barriers to discharge w/patient and caregiver  - Arrange for needed discharge resources and transportation as appropriate  - Identify discharge learning needs (meds, wound care, etc.)  - Arrange for interpretive services to assist at discharge as needed  - Refer to Case Management Department for coordinating discharge planning if the patient needs post-hospital services based on physician/advanced practitioner order or complex needs related to functional status, cognitive ability, or social support system  Outcome: Progressing     Problem: Knowledge Deficit  Goal: Patient/family/caregiver demonstrates understanding of disease process, treatment plan, medications, and discharge instructions  Description: Complete learning assessment and assess knowledge base. Interventions:  - Provide teaching at level of understanding  - Provide teaching via preferred learning methods  Outcome: Progressing     Problem: NEUROSENSORY - ADULT  Goal: Achieves maximal functionality and self care  Description: INTERVENTIONS  - Monitor swallowing and airway patency with patient fatigue and changes in neurological status  - Encourage and assist patient to increase activity and self care.    - Encourage visually impaired, hearing impaired and aphasic patients to use assistive/communication devices  Outcome: Progressing     Problem: CARDIOVASCULAR - ADULT  Goal: Maintains optimal cardiac output and hemodynamic stability  Description: INTERVENTIONS:  - Monitor I/O, vital signs and rhythm  - Monitor for S/S and trends of decreased cardiac output  - Administer and titrate ordered vasoactive medications to optimize hemodynamic stability  - Assess quality of pulses, skin color and temperature  - Assess for signs of decreased coronary artery perfusion  - Instruct patient to report change in severity of symptoms  Outcome: Progressing  Goal: Absence of cardiac dysrhythmias or at baseline rhythm  Description: INTERVENTIONS:  - Continuous cardiac monitoring, vital signs, obtain 12 lead EKG if ordered  - Administer antiarrhythmic and heart rate control medications as ordered  - Monitor electrolytes and administer replacement therapy as ordered  Outcome: Progressing     Problem: GASTROINTESTINAL - ADULT  Goal: Minimal or absence of nausea and/or vomiting  Description: INTERVENTIONS:  - Administer IV fluids if ordered to ensure adequate hydration  - Maintain NPO status until nausea and vomiting are resolved  - Nasogastric tube if ordered  - Administer ordered antiemetic medications as needed  - Provide nonpharmacologic comfort measures as appropriate  - Advance diet as tolerated, if ordered  - Consider nutrition services referral to assist patient with adequate nutrition and appropriate food choices  Outcome: Progressing  Goal: Maintains or returns to baseline bowel function  Description: INTERVENTIONS:  - Assess bowel function  - Encourage oral fluids to ensure adequate hydration  - Administer IV fluids if ordered to ensure adequate hydration  - Administer ordered medications as needed  - Encourage mobilization and activity  - Consider nutritional services referral to assist patient with adequate nutrition and appropriate food choices  Outcome: Progressing  Goal: Maintains adequate nutritional intake  Description: INTERVENTIONS:  - Monitor percentage of each meal consumed  - Identify factors contributing to decreased intake, treat as appropriate  - Assist with meals as needed  - Monitor I&O, weight, and lab values if indicated  - Obtain nutrition services referral as needed  Outcome: Progressing     Problem: METABOLIC, FLUID AND ELECTROLYTES - ADULT  Goal: Fluid balance maintained  Description: INTERVENTIONS:  - Monitor labs   - Monitor I/O and WT  - Instruct patient on fluid and nutrition as appropriate  - Assess for signs & symptoms of volume excess or deficit  Outcome: Progressing     Problem: HEMATOLOGIC - ADULT  Goal: Maintains hematologic stability  Description: INTERVENTIONS  - Assess for signs and symptoms of bleeding or hemorrhage  - Monitor labs  - Administer supportive blood products/factors as ordered and appropriate  Outcome: Progressing     Problem: MUSCULOSKELETAL - ADULT  Goal: Maintain or return mobility to safest level of function  Description: INTERVENTIONS:  - Assess patient's ability to carry out ADLs; assess patient's baseline for ADL function and identify physical deficits which impact ability to perform ADLs (bathing, care of mouth/teeth, toileting, grooming, dressing, etc.)  - Assess/evaluate cause of self-care deficits   - Assess range of motion  - Assess patient's mobility  - Assess patient's need for assistive devices and provide as appropriate  - Encourage maximum independence but intervene and supervise when necessary  - Involve family in performance of ADLs  - Assess for home care needs following discharge   - Consider OT consult to assist with ADL evaluation and planning for discharge  - Provide patient education as appropriate  Outcome: Progressing  Goal: Maintain proper alignment of affected body part  Description: INTERVENTIONS:  - Support, maintain and protect limb and body alignment  - Provide patient/ family with appropriate education  Outcome: Progressing

## 2023-07-31 NOTE — PLAN OF CARE
Problem: PAIN - ADULT  Goal: Verbalizes/displays adequate comfort level or baseline comfort level  Description: Interventions:  - Encourage patient to monitor pain and request assistance  - Assess pain using appropriate pain scale  - Administer analgesics based on type and severity of pain and evaluate response  - Implement non-pharmacological measures as appropriate and evaluate response  - Consider cultural and social influences on pain and pain management  - Notify physician/advanced practitioner if interventions unsuccessful or patient reports new pain  Outcome: Progressing     Problem: INFECTION - ADULT  Goal: Absence or prevention of progression during hospitalization  Description: INTERVENTIONS:  - Assess and monitor for signs and symptoms of infection  - Monitor lab/diagnostic results  - Monitor all insertion sites, i.e. indwelling lines, tubes, and drains  - Monitor endotracheal if appropriate and nasal secretions for changes in amount and color  - [place] appropriate cooling/warming therapies per order  - Administer medications as ordered  - Instruct and encourage patient and family to use good hand hygiene technique  - Identify and instruct in appropriate isolation precautions for identified infection/condition  Outcome: Progressing     Problem: SAFETY ADULT  Goal: Patient will remain free of falls  Description: INTERVENTIONS:  - Educate patient/family on patient safety including physical limitations  - Instruct patient to call for assistance with activity   - Consult OT/PT to assist with strengthening/mobility   - Keep Call bell within reach  - Keep bed low and locked with side rails adjusted as appropriate  - Keep care items and personal belongings within reach  - Initiate and maintain comfort rounds  - Make Fall Risk Sign visible to staff  - Offer Toileting every 2 Hours, in advance of need  - Obtain necessary fall risk management equipment:   - Apply yellow socks and bracelet for high fall risk patients  - Consider moving patient to room near nurses station  Outcome: Progressing     Problem: Knowledge Deficit  Goal: Patient/family/caregiver demonstrates understanding of disease process, treatment plan, medications, and discharge instructions  Description: Complete learning assessment and assess knowledge base.   Interventions:  - Provide teaching at level of understanding  - Provide teaching via preferred learning methods  Outcome: Progressing     Problem: GASTROINTESTINAL - ADULT  Goal: Minimal or absence of nausea and/or vomiting  Description: INTERVENTIONS:  - Administer IV fluids if ordered to ensure adequate hydration  - Maintain NPO status until nausea and vomiting are resolved  - Nasogastric tube if ordered  - Administer ordered antiemetic medications as needed  - Provide nonpharmacologic comfort measures as appropriate  - Advance diet as tolerated, if ordered  - Consider nutrition services referral to assist patient with adequate nutrition and appropriate food choices  Outcome: Progressing     Problem: METABOLIC, FLUID AND ELECTROLYTES - ADULT  Goal: Fluid balance maintained  Description: INTERVENTIONS:  - Monitor labs   - Monitor I/O and WT  - Instruct patient on fluid and nutrition as appropriate  - Assess for signs & symptoms of volume excess or deficit  Outcome: Progressing     Problem: HEMATOLOGIC - ADULT  Goal: Maintains hematologic stability  Description: INTERVENTIONS  - Assess for signs and symptoms of bleeding or hemorrhage  - Monitor labs  - Administer supportive blood products/factors as ordered and appropriate  Outcome: Progressing

## 2023-07-31 NOTE — CONSULTS
TeleConsultation - [address] [place] [address] 35 y.o. male MRN: [id number]  Unit/Bed#: [facility] 2 [address] [facility] 221-02 Encounter: [id number]        REQUIRED DOCUMENTATION:     1. This service was provided via Telemedicine. 2. Provider located at [facility].  3. TeleMed provider: Robina Sandoval MD.  4. Identify all parties in room with patient during tele consult: Patient   5. Patient was then informed that this was a Telemedicine visit and that the exam was being conducted confidentially over secure lines. My office door was closed. No one else was in the room. Patient acknowledged consent and understanding of privacy and security of the Telemedicine visit, and gave us permission to have the assistant stay in the room in order to assist with the history and to conduct the exam.  I informed the patient that I have reviewed their record in Epic and presented the opportunity for them to ask any questions regarding the visit today. The patient agreed to participate. Discussed with Jennifer Salazar M.D     Assessment/Plan     Present on Admission:  • Schizophrenia [facility]    Assessment:    Schizophrenia     Patient presents with some notable disorganization in speech but denying any active auditory visual hallucinations. Discussed with patient use of Abilify 5 mg daily to which she was ambivalent about this would be the recommendation. Patient currently without any indication for voluntary or involuntary inpatient psychiatric admission and safe for discharge home.       Treatment Plan:    Planned Medication Changes:    -Per Above     Current Medications:     Current Facility-Administered Medications   Medication Dose Route Frequency Provider Last Rate   • acetaminophen  650 mg Oral Q6H PRN Cuong Breen MD     • aluminum-magnesium hydroxide-simethicone  30 mL Oral Q4H PRN Pastora Chow PA-C     • lidocaine  1 patch Topical Q24H Cuong Breen MD     • pantoprazole  40 mg Intravenous Q12H Ladi Nicole MD         Risks / Benefits of Treatment:    Risks, benefits, and possible side effects of medications explained to patient and patient verbalizes understanding. Other treatment modalities recommended as indicated:    · psychotherapy      Consults  Physician Requesting Consult: Alberto Seay MD  Principal Problem:Anemia    Reason for Consult: Psych Evaluation       History of Present Illness      Patient reports that he hurt his knee while doing squats and that the Vitamin C and Pop sicle may have been irritating his stomach. Patient states that he does not have schizophrenia and that his mood is good. Patient with some disorganized speech. Patient states that he has been IP before but feels that "its like Identity Theft". Patient states that he lives with his step father and that he works. When asked about AVH he discussed "sound concentration" Patient denied any current SI/HI/AVH or other acute psychiatric complaints. Psychiatric Review Of Systems:    sleep: no  appetite changes: no  weight changes: no  energy/anergy: no  interest/pleasure/anhedonia: no  somatic symptoms: no  anxiety/panic: no  lang: no  guilty/hopeless: no  self injurious behavior/risky behavior: no    Historical Information     Past Psychiatric History:     Psychiatric Hospitalizations:   • Several past inpatient psychiatric admissions  Outpatient Treatment History:   • Yes previously   Suicide Attempts:   • None  History of self-harm:   • None  Violence History:   • no  Past Psychiatric medication trials: Unsure     Substance Abuse History: Patient smokes cigarettes daily but denied any illicit substances or alcohol       Family Psychiatric History: Denied         Social History:    Education: high school diploma/GED  Learning Disabilities: Denied  Marital history: single  Children: Denied   Living arrangement, social support: The patient lives in home with step father.   Occupational History: unemployed  Functioning Relationships: good support system. Other Pertinent History: None    Traumatic History:     Abuse: None  Other Traumatic Events: none    Past Medical History:   Diagnosis Date   • Anemia 2004    hospitalization/transfusion       Medical Review Of Systems:    Review of Systems    Meds/Allergies     all current active meds have been reviewed  Allergies   Allergen Reactions   • Haldol Decanoate [Haloperidol] Anaphylaxis   • Oxycodone-Acetaminophen Rash     "swollon red rash"   • Sulfamethoxazole-Trimethoprim      "never really worked"       Objective     Vital signs in last 24 hours:  Temp:  [97.6 °F (36.4 °C)-99.3 °F (37.4 °C)] 98 °F (36.7 °C)  HR:  [57-82] 70  Resp:  [15-21] 16  BP: ()/(60-79) 110/69      Intake/Output Summary (Last 24 hours) at 7/30/2023 2123  Last data filed at 7/30/2023 2047  Gross per 24 hour   Intake 3070 ml   Output --   Net 3070 ml       Mental Status Evaluation:    Appearance:  age appropriate   Behavior:  normal   Speech:  normal pitch and normal volume   Mood:  "Fine"   Affect:  blunted   Language: naming objects   Thought Process:  disorganized   Associations intact associations   Thought Content:  normal   Perceptual Disturbances: None   Risk Potential: Suicidal Ideations none  Homicidal Ideations none  Potential for Aggression No   Sensorium:  person, place and time/date   Cognition:  recent and remote memory grossly intact   Consciousness:  alert    Attention: attention span and concentration were age appropriate   Intellect: within normal limits   Fund of Knowledge: awareness of current events: President   Insight:  limited   Judgment: limited   Muscle Strength:  Muscle Tone: normal NFT  normal   Gait/Station: normal gait/station   Motor Activity: no abnormal movements       Lab Results: I have personally reviewed all pertinent laboratory/tests results.      Most Recent Labs:   Lab Results   Component Value Date    WBC 15.08 (H) 07/30/2023    RBC 3.30 (L) 07/30/2023    HGB 8.7 (L) 07/30/2023    HCT 29.5 (L) 07/30/2023     (H) 07/30/2023    RDW 22.8 (H) 07/30/2023    NEUTROABS 11.38 (H) 07/30/2023    SODIUM 135 07/30/2023    K 3.8 07/30/2023     07/30/2023    CO2 27 07/30/2023    BUN 3 (L) 07/30/2023    CREATININE 0.63 07/30/2023    GLUC 78 07/30/2023    CALCIUM 8.2 (L) 07/30/2023    AST 6 (L) 07/30/2023    ALT 4 (L) 07/30/2023    ALKPHOS 56 07/30/2023    TP 7.3 07/30/2023    ALB 2.7 (L) 07/30/2023    TBILI 0.45 07/30/2023       Imaging Studies: CT abdomen pelvis w contrast    Addendum Date: 7/30/2023 Addendum:   ADDENDUM: Addition to the impression: 6. Mild periportal edema. This is a nonspecific finding that can be seen in hepatitis, cholangitis, blunt abdominal trauma, meghan hepatis lymphadenopathy, CHF and several less common entities. Result Date: 7/30/2023  Narrative: CT ABDOMEN AND PELVIS WITH IV CONTRAST INDICATION:   Crohn's exacerbation Anemia anemia, crohns (rule out strictures). History of abscess of the medial right buttock in 2015. COMPARISON: CT of the pelvis from October 7, 2015. No more recent examinations available (a report of a CT of the abdomen and pelvis from [facility], dated 5-17, accessed via Care Everywhere, reads, in part, "mild thickening of the terminal ileum and rectum can indicate active enteritis"". . TECHNIQUE:  CT examination of the abdomen and pelvis was performed. Multiplanar 2D reformatted images were created from the source data. This examination, like all CT scans performed in the [facility], was performed utilizing techniques to minimize radiation dose exposure, including the use of iterative reconstruction and automated exposure control. Radiation dose length product (DLP) for this visit:  401 mGy-cm IV Contrast:  50 mL of iohexol (OMNIPAQUE) 100 mL of iohexol (OMNIPAQUE) Enteric Contrast: Enteric contrast was administered.  FINDINGS: ABDOMEN LOWER CHEST:  No clinically significant abnormality identified in the visualized lower chest. LIVER/BILIARY TREE: Mild periportal edema. Otherwise normal attenuation. 1.0 x 2.4 cm irregular area of decreased enhancement in segment 2, not described on the outside CT report from 5/2/2017. No other masses. Bile ducts normal in caliber. GALLBLADDER:  No calcified gallstones. No pericholecystic inflammatory change. SPLEEN:  Unremarkable. PANCREAS:  Unremarkable. ADRENAL GLANDS:  Unremarkable. KIDNEYS/URETERS:  Unremarkable. No hydronephrosis. STOMACH AND BOWEL: Stomach unremarkable. Mural thickening in the distal 6 cm of terminal ileum, consistent with the history of Crohn disease. More proximally, small intestine normal in appearance. No evidence of small bowel obstruction. Other than the terminal ileum, no evidence of luminal narrowing in the small intestine. Diffuse mural thickening and luminal narrowing of the entire colon and rectum. Short segment of relatively normal colon ("skipped segment") at the rectosigmoid junction (series 2, image 127), which should not be mistaken for an extraluminal collection. APPENDIX: Normal. ABDOMINOPELVIC CAVITY: Multiple prominent mesenteric lymph nodes, presumably reactive, related to the extensive colitis. Otherwise, no lymphadenopathy or mass. Trace amount of pelvic and subhepatic ascites. No discrete collection. No extraluminal gas. VESSELS:  Unremarkable for patient's age. PELVIS REPRODUCTIVE ORGANS:  Unremarkable for patient's age. URINARY BLADDER: Apparent bladder wall thickening, probably an artifact due to limited distention of the bladder. ABDOMINAL WALL/INGUINAL REGIONS: No lymphadenopathy or mass. Diffuse skin thickening in the inferomedial portions of both buttocks, along the gluteal cleft, possibly cellulitis related. 2.0 x 0.8 x 2.1 cm subcutaneous collection of gas and fluid in the inferomedial left buttock (series 2, images 159-166, suspicious for abscess. OSSEOUS STRUCTURES:  No acute fracture or destructive osseous lesion. Impression: 1.   Crohn's disease involving the distal 6 cm of terminal ileum. 2.  Small intestine otherwise normal in appearance. 3.  Pancolitis, presumably Crohn's disease. 4.  No evidence of intestinal fistula or sinus tract. 5.  Cutaneous and subcutaneous thickening in the inferomedial portions of both buttocks, along the gluteal cleft, possibly cellulitis related to perianal Crohn's disease. In addition, 2 cm subcutaneous collection, consistent with abscess, in the left inferomedial buttock. The study was marked in [facility] for immediate notification. Workstation performed: [id number]     EKG/Pathology/Other Studies:   Lab Results   Component Value Date    VENTRATE 67 04/07/2016    ATRIALRATE 67 04/07/2016    PRINT 136 04/07/2016    QRSDINT 126 04/07/2016    QTINT 402 04/07/2016    QTCINT 424 04/07/2016    PAXIS 84 04/07/2016    QRSAXIS 26 04/07/2016    TWAVEAXIS 16 04/07/2016        Code Status: Level 1 - Full Code  Advance Directive and Living Will:      Power of :    POLST:      Screenings:    1. Nutrition Screening  Nutrition Assessment (completed by Staff): Nutrition  Feeding: Able to feed self  Diet Type: NPO  Appetite: Poor    2. Pain Screening  Pain Screening: Pain Assessment  Pain Assessment Tool: 0-10  Pain Score: 0  Pain Location/Orientation: Orientation: Left, Location: Knee    3. Suicide Screening  ED Crisis Suicide Risk Assessment:        Counseling / Coordination of Care: Total floor / unit time spent today 30 minutes. Greater than 50% of total time was spent with the patient and / or family counseling and / or coordination of care. A description of the counseling / coordination of care: Direct Patient Care, Chart Review, and Documentation.

## 2023-07-31 NOTE — ANESTHESIA PREPROCEDURE EVALUATION
Procedure:  EGD  COLONOSCOPY    Relevant Problems   HEMATOLOGY   (+) Anemia      NEURO/PSYCH   (+) Schizophrenia (HCC)   (+) Schizophreniform disorder (HCC)      Other   (+) Crohn's disease (HCC)        Physical Exam    Airway    Mallampati score: II  TM Distance: >3 FB  Neck ROM: full     Dental   No notable dental hx     Cardiovascular  Rhythm: regular, Rate: normal, Cardiovascular exam normal    Pulmonary  Pulmonary exam normal Breath sounds clear to auscultation,     Other Findings        Anesthesia Plan  ASA Score- 2     Anesthesia Type- IV sedation with anesthesia with ASA Monitors. Additional Monitors:   Airway Plan:           Plan Factors-    Chart reviewed. Patient summary reviewed. Patient is not a current smoker. Patient instructed to abstain from smoking on day of procedure. Patient did not smoke on day of surgery. Induction- intravenous. Postoperative Plan-     Informed Consent- Anesthetic plan and risks discussed with patient.

## 2023-07-31 NOTE — PROCEDURES
Incision and drain    Date/Time: 7/31/2023 8:42 AM    Performed by: Avni Mehta MD  Authorized by: Avni Mehta MD  Universal Protocol:  Consent: Verbal consent obtained. Consent given by: patient  Patient identity confirmed: verbally with patient      Patient location:  Bedside  Location:     Type:  Abscess    Location:  Anogenital    Anogenital location:  Perineum  Pre-procedure details:     Skin preparation:  Betadine  Anesthesia (see MAR for exact dosages): Anesthesia method:  Local infiltration    Local anesthetic:  Lidocaine 1% w/o epi  Procedure details:     Complexity:  Simple    Needle aspiration: no      Incision types:  Cruciate    Scalpel blade:  11    Approach:  Open    Incision depth:  Subcutaneous    Wound management:  Probed and deloculated and irrigated with saline    Drainage:  Purulent    Drainage amount: Moderate (3-5 cc)    Packing materials:  1/2 in iodoform gauze  Post-procedure details:     Patient tolerance of procedure: Tolerated well, no immediate complications  Comments:      Given that the patient's white blood cell count was 15 from 14.8 it was decided that even in the setting of his significant fistula disease the abscess should be drained to rule this out as a source of leukocytosis.

## 2023-07-31 NOTE — CONSULTS
Consult: General surgery  Manfred Wilhelm 35 y.o. male MRN: [id number]  Unit/Bed#: [facility] 2 [address] [facility] 221-02 Encounter: [id number]        Assessment/Plan     Assessment:  Patient is a 35 y.o. male with history of schizophrenia as well as fistulizing Crohn's disease currently not on medication status post multiple incision and drainages of perirectal abscesses who presented with complaints of generalized fatigue, weight loss, and bloody diarrhea found to be anemic to 4.9 on presentation and is status post multiple transfusions. General surgery consulted for concern for left gluteal abscess. On exam skin and tissue of the perineum is markedly thickened with multiple tracts consistent with fistulas multiple of which are actively draining scant amounts of serosanguineous to seropurulent fluid. There is no discrete area of tenderness, erythema, or warmth consistent with abscess. Afebrile, VSS  WBC: 14; Hb.7 from 6.6  CTAP showed:  1. Crohn's disease involving the distal 6 cm of terminal ileum.     2.  Small intestine otherwise normal in appearance.     3.  Pancolitis, presumably Crohn's disease.     4. No evidence of intestinal fistula or sinus tract.     5.  Cutaneous and subcutaneous thickening in the inferomedial portions of both buttocks, along the gluteal cleft, possibly cellulitis related to perianal Crohn's disease. In addition, 2 cm subcutaneous collection, consistent with abscess, in the left inferomedial buttock.     Plan:  -No acute surgical intervention   - Plan for follow-up with [facility] colorectal surgeon of which the patient follows  - Crohn's management per GI  - Rest of care per primary team    History of Present Illness     HPI:  Manfred Wilhelm is a 35 y.o. male with history of schizophrenia as well as fistulizing Crohn's disease currently not on medication status post multiple incision and drainages of perirectal abscesses who presented with complaints of generalized fatigue, weight loss, and bloody diarrhea found to be anemic to 4.9 on presentation. He is currently not on any medication for Crohn's or schizophrenia. A history was very difficult to obtain given the patient's tangential thought and pressured speech; history was primarily obtained via chart review. Patient reportedly diagnosed with fistulizing Crohn's disease at age 15 years with endoscopic evaluation at outside facility; Biopsies showed ileocolonic Crohn's disease. Complicated by fistulizing disease with ischio rectal abscess requiring surgical drainage at [facility] in 2021. He reports that he has intermittent episodes of drainage since his initial presentation and has had multiple I&D's both at [facility] and [facility]. He reports that he was trying to get into see his colorectal surgeon prior to presenting for his other symptoms due to ongoing drainage was started the week prior. He denies fevers, chills, worsening pain in the perineum.     Review of Systems   As above, otherwise negative  Inpatient consult to Acute Care Surgery  Consult performed by: Que Cabral MD  Consult ordered by: Michael Redman MD          Historical Information   Past Medical History:   Diagnosis Date   • Anemia 2004    hospitalization/transfusion     Past Surgical History:   Procedure Laterality Date   • [place] ANRCT XM SURG REQ ANES GENERAL SPI/EDRL DX N/A 4/7/2016    Procedure: EXAM UNDER ANESTHESIA (EUA); possible REMOVAL OF FOREIGN BODY anoscopy ;  Surgeon: Martha Dailey MD;  Location: [facility];  Service: Colorectal   • [place] SURG TX ANAL FISTULA INTERSPHINCTERIC N/A 4/7/2016    Procedure: superficial FISTULOTOMY; SETON PROCEDURE drainage of chronic ischiofistula abscess and debridement;  Surgeon: Martha Dailey MD;  Location: [facility];  Service: Colorectal     Social History   Social History     Substance and Sexual Activity   Alcohol Use Not Currently    Comment: Socially     Social History     Substance and Sexual Activity   Drug Use No     Social History     Tobacco Use   Smoking Status Light Smoker   • Packs/day: 0.25   • Types: Cigarettes   Smokeless Tobacco Never   Tobacco Comments    Codie says 7 cigarettes per day     Family History: non-contributory    Meds/Allergies   all medications and allergies reviewed  Allergies   Allergen Reactions   • Haldol Decanoate [Haloperidol] Anaphylaxis   • Oxycodone-Acetaminophen Rash     "swollon red rash"   • Sulfamethoxazole-Trimethoprim      "never really worked"       Objective   First Vitals:   Blood Pressure: 124/62 (07/29/23 1143)  Pulse: (!) 108 (07/29/23 1143)  Temperature: 98.3 °F (36.8 °C) (07/29/23 1742)  Temp Source: Oral (07/29/23 1742)  Respirations: 16 (07/29/23 1143)  Height: 5' 6" (167.6 cm) (07/29/23 1805)  Weight - Scale: 58 kg (127 lb 13.9 oz) (07/29/23 1805)  SpO2: 100 % (07/29/23 1143)    Current Vitals:   Blood Pressure: 110/69 (07/30/23 1518)  Pulse: 70 (07/30/23 1518)  Temperature: 98 °F (36.7 °C) (07/30/23 1518)  Temp Source: Axillary (07/30/23 1354)  Respirations: 16 (07/30/23 1518)  Height: 5' 6" (167.6 cm) (07/29/23 1805)  Weight - Scale: 58 kg (127 lb 13.9 oz) (07/29/23 1805)  SpO2: 100 % (07/30/23 1518)      Intake/Output Summary (Last 24 hours) at 7/30/2023 2314  Last data filed at 7/30/2023 2047  Gross per 24 hour   Intake 2680 ml   Output --   Net 2680 ml       Invasive Devices     Peripheral Intravenous Line  Duration           Peripheral IV 07/29/23 Left Antecubital 1 day                Physical Exam  General: NAD  Skin: Warm, dry, anicteric  HEENT: Normocephalic, atraumatic  CV: RRR  Pulm:  Nonlabored breathing on room air  Abd: Soft, ND/NT  Perineum: With skin and tissue of the perineum is generally thick and nodular with multiple areas of draining sinuses consistent with fistula; the drainage range from serosanguineous to seropurulent. There was no discrete areas of tenderness, erythema, or induration.   There was 1 area of nodularity versus fluctuance of the left buttock although again it was not tender, indurated, or erythematous. MSK: Symmetric, no edema, no tenderness, no deformity  Neuro: AOx3, GCS 15    Lab Results: I have personally reviewed pertinent lab results. Imaging: I have personally reviewed pertinent reports. and I have personally reviewed pertinent films in PACS  EKG, Pathology, and Other Studies: I have personally reviewed pertinent reports.       Code Status: Level 1 - Full Code  Advance Directive and Living Will:      Power of :    POLST:

## 2023-07-31 NOTE — PROGRESS NOTES
[address] [facility]  Progress Note  Name: Hiren Onofre  MRN: [id number]  Unit/Bed#: [facility] 2 [address] [facility] [place] 221-02 I Date of Admission: 7/29/2023   Date of Service: 7/31/2023 I Hospital Day: 2    Assessment/Plan   * Anemia  Assessment & Plan  Received 3 units packed RBC so far. We will continue monitoring hemoglobin levels. · Appreciate GI recommendations, for bidirectional evaluation with an EGD and colonoscopy  · Continue PPI   · Suspect anemia from malabsorption from Crohn's disease  · Will need outpatient B12 level, patient was transfused and B12 likely to be unreliable    Results from last 7 days   Lab Units 07/30/23  1616 07/30/23  0608 07/29/23  1224   HEMOGLOBIN g/dL 8.7* 6.6* 4.9*   MCV fL  --  70* 66*   RDW %  --  22.8* 19.4*   IRON ug/dL  --  34*  --    TIBC ug/dL  --  179*  --    FERRITIN ng/mL  --  19*  --          Crohn's disease (HCC)  Assessment & Plan  Patient was diagnosed with Crohns disease at age 15  · Seems to have been lost to follow-up. Currently not on any medications. 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 Admits to auditory hallucinations. Denies any SI or HI    Knee pain-resolved as of 7/31/2023  Assessment & Plan  Left knee pain without any evidence of trauma. Knee x-ray last 6/25/2023 showed: no acute osseous abnormality. Abnormal findings suggestive of patellar tendinitis.   · Continue supportive care                 Hospital Course:     80-year-old male patient with a past medical history of schizophrenia and inflammatory bowel disease diagnosed in eighth grade. Patient was treated initially with Pentasa but not currently on any therapy. His Crohn's disease is complicated by fistula, previously underwent incision and drainage for right buttocks abscess. Patient has previously been evaluated at [facility] and [facility]. He now presents with anemia fatigue and malaise with a admission hemoglobin of 4.9. He also has weight loss and consistent ibuprofen use. Assessment:      Principal Problem:    Anemia  Active Problems:    Schizophrenia ([address])    Crohn's disease ([address])      Plan:    · Endoscopy and colonoscopy  · We will plan for course of antibiotic given abscess, hopefully surgery has obtained culture. Plan for 7-day course regardless       VTE Pharmacologic Prophylaxis:   Pharmacologic: Pharmacologic VTE Prophylaxis contraindicated due to Anemia with concern for gastrointestinal hemorrhage  Mechanical VTE Prophylaxis in Place: Yes    Patient Centered Rounds: I have performed bedside rounds with nursing staff today. Discussions with Specialists or Other Care Team Provider: Discussed with case management    Education and Discussions with Family / Patient: Patient has updated his family member    Time Spent for Care: 1 hour. More than 50% of total time spent on counseling and coordination of care as described above. Current Length of Stay: 2 day(s)    Current Patient Status: Inpatient   Certification Statement: The patient will continue to require additional inpatient hospital stay due to Management of anemia, colonoscopy and endoscopy    Discharge Plan / Estimated Discharge Date: Possibly tomorrow    Code Status: Level 1 - Full Code      Subjective:   Seen and examined, the patient reports he takes healing oxygen water for the treatment of his disease. He is no longer on any type of medication for his Crohn's disease and has previous known fistula.     A complete and comprehensive 14 point organ system review has been performed and all other systems are negative other than stated above. Objective:     Vitals:   Temp (24hrs), Av.7 °F (37.1 °C), Min:97.7 °F (36.5 °C), Max:99.1 °F (37.3 °C)    Temp:  [97.7 °F (36.5 °C)-99.1 °F (37.3 °C)] 97.7 °F (36.5 °C)  HR:  [69] 69  Resp:  [12-20] 12  BP: (100-123)/(62-82) 108/62  SpO2:  [99 %-100 %] 99 %  Body mass index is 20.64 kg/m². Input and Output Summary (last 24 hours): Intake/Output Summary (Last 24 hours) at 2023  Last data filed at 2023  Gross per 24 hour   Intake 2000 ml   Output --   Net 2000 ml       Physical Exam:     General: well appearing, no acute distress  HEENT: atraumatic, PERRLA, moist mucosa, normal pharynx, normal tonsils and adenoids, normal tongue, no fluid in sinuses  Neck: Trachea midline, no carotid bruit, no masses  Respiratory: normal chest wall expansion, CTA B, no r/r/w, no rubs  Cardiovascular: RRR, no m/r/g, Normal S1 and S2  Abdomen: Soft, non-tender, non-distended, normal bowel sounds in all quadrants, no hepatosplenomegaly, no tympany  Rectal: deferred  Musculoskeletal: normal ROM in upper and lower extremities  Integumentary: warm, dry, and pink, with no rash, purpura, or petechia  Heme/Lymph: no lymphadenopathy, no bruises  Neurological: Cranial Nerves II-XII grossly intact  Psychiatric: cooperative with normal mood, affect, and cognition      Additional Data:     Labs:    Results from last 7 days   Lab Units 23  1616 23  0608   WBC Thousand/uL  --  15.08*   HEMOGLOBIN g/dL 8.7* 6.6*   HEMATOCRIT % 29.5* 23.0*   PLATELETS Thousands/uL  --  683*   NEUTROS PCT %  --  76*   LYMPHS PCT %  --  14   MONOS PCT %  --  9   EOS PCT %  --  0     Results from last 7 days   Lab Units 23  0608   POTASSIUM mmol/L 3.8   CHLORIDE mmol/L 103   CO2 mmol/L 27   BUN mg/dL 3*   CREATININE mg/dL 0.63   CALCIUM mg/dL 8.2*   ALK PHOS U/L 56   ALT U/L 4*   AST U/L 6*           * I Have Reviewed All Lab Data Listed Above.   * Additional Pertinent Lab Tests Reviewed: [address] Admission Reviewed    Imaging:    Imaging Reports Reviewed Today Include: No new imaging  Imaging Personally Reviewed by Myself Includes: No new imaging    Recent Cultures (last 7 days):     Results from last 7 days   Lab Units 07/30/23  0339   C DIFF TOXIN B BY PCR  Negative       Last 24 Hours Medication List:   Current Facility-Administered Medications   Medication Dose Route Frequency Provider Last Rate   • acetaminophen  650 mg Oral Q6H PRN Ashok Amos MD     • aluminum-magnesium hydroxide-simethicone  30 mL Oral Q4H PRN Pastora Guzman PA-C     • cefazolin  2,000 mg Intravenous Q8H Kory Mcdonald DO     • lidocaine  1 patch Topical Q24H Ashok Amos MD     • pantoprazole  40 mg Intravenous Q12H Kieran Garcia MD          Today, Patient Was Seen By: Andrea Brumfield DO    ** Please Note: This note was completed in part utilizing M-Modal Fluency Direct Software. Grammatical errors, random word insertions, spelling mistakes, and incomplete sentences may be an occasional consequence of this system secondary to software limitations, ambient noise, and hardware issues. If you have any questions or concerns about the content, text, or information contained within the body of this dictation, please contact the provider for clarification.  **

## 2023-08-01 PROBLEM — K20.90 ESOPHAGITIS: Status: ACTIVE | Noted: 2023-08-01

## 2023-08-01 PROBLEM — E43 SEVERE PROTEIN-CALORIE MALNUTRITION (HCC): Status: ACTIVE | Noted: 2023-08-01

## 2023-08-01 LAB
ANION GAP SERPL CALCULATED.3IONS-SCNC: 8 MMOL/L
BUN SERPL-MCNC: 6 MG/DL (ref 5–25)
CALCIUM SERPL-MCNC: 8.3 MG/DL (ref 8.4–10.2)
CALPROTECTIN STL-MCNT: 1660 UG/G (ref 0–120)
CHLORIDE SERPL-SCNC: 102 MMOL/L (ref 96–108)
CO2 SERPL-SCNC: 23 MMOL/L (ref 21–32)
CREAT SERPL-MCNC: 0.87 MG/DL (ref 0.6–1.3)
CRP SERPL QL: 106.2 MG/L
ERYTHROCYTE [DISTWIDTH] IN BLOOD BY AUTOMATED COUNT: 25.5 % (ref 11.6–15.1)
GFR SERPL CREATININE-BSD FRML MDRD: 113 ML/MIN/1.73SQ M
GLUCOSE SERPL-MCNC: 165 MG/DL (ref 65–140)
HBV CORE AB SER QL: NORMAL
HBV CORE IGM SER QL: NORMAL
HBV SURFACE AG SER QL: NORMAL
HCT VFR BLD AUTO: 30.9 % (ref 36.5–49.3)
HCV AB SER QL: NORMAL
HGB BLD-MCNC: 8.8 G/DL (ref 12–17)
HIV 1+2 AB+HIV1 P24 AG SERPL QL IA: NORMAL
HIV1 P24 AG SER QL: NORMAL
MCH RBC QN AUTO: 20.9 PG (ref 26.8–34.3)
MCHC RBC AUTO-ENTMCNC: 28.5 G/DL (ref 31.4–37.4)
MCV RBC AUTO: 73 FL (ref 82–98)
PLATELET # BLD AUTO: 706 THOUSANDS/UL (ref 149–390)
PMV BLD AUTO: 8.1 FL (ref 8.9–12.7)
POTASSIUM SERPL-SCNC: 4.1 MMOL/L (ref 3.5–5.3)
RBC # BLD AUTO: 4.21 MILLION/UL (ref 3.88–5.62)
SODIUM SERPL-SCNC: 133 MMOL/L (ref 135–147)
WBC # BLD AUTO: 14.3 THOUSAND/UL (ref 4.31–10.16)

## 2023-08-01 PROCEDURE — 87340 HEPATITIS B SURFACE AG IA: CPT | Performed by: STUDENT IN AN ORGANIZED HEALTH CARE EDUCATION/TRAINING PROGRAM

## 2023-08-01 PROCEDURE — 80048 BASIC METABOLIC PNL TOTAL CA: CPT | Performed by: INTERNAL MEDICINE

## 2023-08-01 PROCEDURE — 86803 HEPATITIS C AB TEST: CPT | Performed by: STUDENT IN AN ORGANIZED HEALTH CARE EDUCATION/TRAINING PROGRAM

## 2023-08-01 PROCEDURE — 86140 C-REACTIVE PROTEIN: CPT | Performed by: STUDENT IN AN ORGANIZED HEALTH CARE EDUCATION/TRAINING PROGRAM

## 2023-08-01 PROCEDURE — 86480 TB TEST CELL IMMUN MEASURE: CPT | Performed by: STUDENT IN AN ORGANIZED HEALTH CARE EDUCATION/TRAINING PROGRAM

## 2023-08-01 PROCEDURE — 86704 HEP B CORE ANTIBODY TOTAL: CPT | Performed by: STUDENT IN AN ORGANIZED HEALTH CARE EDUCATION/TRAINING PROGRAM

## 2023-08-01 PROCEDURE — 99233 SBSQ HOSP IP/OBS HIGH 50: CPT | Performed by: INTERNAL MEDICINE

## 2023-08-01 PROCEDURE — 86705 HEP B CORE ANTIBODY IGM: CPT | Performed by: STUDENT IN AN ORGANIZED HEALTH CARE EDUCATION/TRAINING PROGRAM

## 2023-08-01 PROCEDURE — 99232 SBSQ HOSP IP/OBS MODERATE 35: CPT | Performed by: INTERNAL MEDICINE

## 2023-08-01 PROCEDURE — 85027 COMPLETE CBC AUTOMATED: CPT | Performed by: INTERNAL MEDICINE

## 2023-08-01 PROCEDURE — 87806 HIV AG W/HIV1&2 ANTB W/OPTIC: CPT | Performed by: STUDENT IN AN ORGANIZED HEALTH CARE EDUCATION/TRAINING PROGRAM

## 2023-08-01 RX ORDER — CEPHALEXIN 500 MG/1
500 CAPSULE ORAL EVERY 6 HOURS SCHEDULED
Status: DISCONTINUED | OUTPATIENT
Start: 2023-08-01 | End: 2023-08-04 | Stop reason: HOSPADM

## 2023-08-01 RX ADMIN — CEPHALEXIN 500 MG: 500 CAPSULE ORAL at 17:16

## 2023-08-01 RX ADMIN — METHYLPREDNISOLONE SODIUM SUCCINATE 20 MG: 40 INJECTION, POWDER, FOR SOLUTION INTRAMUSCULAR; INTRAVENOUS at 15:38

## 2023-08-01 RX ADMIN — CEFAZOLIN SODIUM 2000 MG: 2 SOLUTION INTRAVENOUS at 10:18

## 2023-08-01 RX ADMIN — METHYLPREDNISOLONE SODIUM SUCCINATE 20 MG: 40 INJECTION, POWDER, FOR SOLUTION INTRAMUSCULAR; INTRAVENOUS at 05:15

## 2023-08-01 RX ADMIN — FLUCONAZOLE 100 MG: 100 TABLET ORAL at 10:15

## 2023-08-01 RX ADMIN — CEFAZOLIN SODIUM 2000 MG: 2 SOLUTION INTRAVENOUS at 02:00

## 2023-08-01 RX ADMIN — LIDOCAINE 1 PATCH: 50 PATCH CUTANEOUS at 17:15

## 2023-08-01 RX ADMIN — PANTOPRAZOLE SODIUM 40 MG: 40 TABLET, DELAYED RELEASE ORAL at 05:15

## 2023-08-01 NOTE — ASSESSMENT & PLAN NOTE
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 Has disease of the terminal ileum  Given fistula with purulent material we will plan for course of Keflex over the next 5 to 7 days.   White blood cell elevation but could be related to corticosteroids as well as infection  Day 2 of 7-10

## 2023-08-01 NOTE — ANESTHESIA POSTPROCEDURE EVALUATION
Post-Op Assessment Note    CV Status:  Stable    Pain management: adequate     Mental Status:  Alert and awake   Hydration Status:  Euvolemic   PONV Controlled:  Controlled   Airway Patency:  Patent      Post Op Vitals Reviewed: Yes            No notable events documented.     /73 (07/31/23 2107)    Temp 98.6 °F (37 °C) (07/31/23 2107)    Pulse     Resp 18 (07/31/23 2107)    SpO2

## 2023-08-01 NOTE — ASSESSMENT & PLAN NOTE
Malnutrition Findings:   Adult Malnutrition type: Acute illness  Adult Degree of Malnutrition: Other severe protein calorie malnutrition  Malnutrition Characteristics: Inadequate energy, Weight loss                  360 Statement: Severe calorie-protein malnutrition in context of acute illness r/t poor appetite, inadequate PO intake, altered GI function as evidance by energy intake less than 50% compared to estimated needsx3 weeks, significant 9.5% wt loss x 1 month ( 64.2kg 6/25/23-> 58kg 7/29/23); Treated with PO diet    BMI Findings: Body mass index is 20.64 kg/m².

## 2023-08-01 NOTE — QUICK NOTE
Wound check performed. Left gluteal incision appears to be clean and dry. Packing had been previously removed by patient. Incision approximately 1cm. No drainage appreciated. Repacked the wound with 1/4 plain packing (approximately 2-3 cm of packing was used). Placed untapped 4x4 gauze in the midline. Surgery will sign off at this time. Please reach out to [facility] Surgery role via tigertext with any additional questions or concerns. Rest of wound care per nursing. Orders have been placed.  See below for a visual of the wound:

## 2023-08-01 NOTE — MALNUTRITION/BMI
This medical record reflects one or more clinical indicators suggestive of malnutrition and/or morbid obesity. Malnutrition Findings:   Adult Malnutrition type: Acute illness  Adult Degree of Malnutrition: Other severe protein calorie malnutrition  Malnutrition Characteristics: Inadequate energy, Weight loss                  360 Statement: Severe calorie-protein malnutrition in context of acute illness r/t poor appetite, inadequate PO intake, altered GI function as evidance by energy intake less than 50% compared to estimated needsx3 weeks, significant 9.5% wt loss x 1 month ( 64.2kg 6/25/23-> 58kg 7/29/23); Treated with PO diet    BMI Findings: Body mass index is 20.64 kg/m². See Nutrition note dated 8/1/23 for additional details. Completed nutrition assessment is viewable in the nutrition documentation.

## 2023-08-01 NOTE — PROGRESS NOTES
[address] [facility]  Progress Note  Name: Sanjay Marie  MRN: [id number]  Unit/Bed#: [place] 2 [address] [facility] 221-02 I Date of Admission: 7/29/2023   Date of Service: 8/1/2023 I Hospital Day: 3    Assessment/Plan   * Anemia  Assessment & Plan  Received 3 units packed RBC so far. We will continue monitoring hemoglobin levels. · Appreciate GI recommendations, for bidirectional evaluation with an EGD and colonoscopy  · Continue PPI   · Suspect anemia from malabsorption from Crohn's disease  · Will need outpatient B12 level, patient was transfused and B12 likely to be unreliable    Results from last 7 days   Lab Units 08/01/23  0450 07/30/23  1616 07/30/23  0608 07/29/23  1224   HEMOGLOBIN g/dL 8.8* 8.7* 6.6* 4.9*   MCV fL 73*  --  70* 66*   RDW % 25.5*  --  22.8* 19.4*   IRON ug/dL  --   --  34*  --    TIBC ug/dL  --   --  179*  --    FERRITIN ng/mL  --   --  19*  --          Esophagitis  Assessment & Plan  Thought to have candidal esophagitis  Continue Diflucan day #2 of 14    Severe protein-calorie malnutrition (HCC)  Assessment & Plan  Malnutrition Findings:   Adult Malnutrition type: Acute illness  Adult Degree of Malnutrition: Other severe protein calorie malnutrition  Malnutrition Characteristics: Inadequate energy, Weight loss                  360 Statement: Severe calorie-protein malnutrition in context of acute illness r/t poor appetite, inadequate PO intake, altered GI function as evidance by energy intake less than 50% compared to estimated needsx3 weeks, significant 9.5% wt loss x 1 month ( 64.2kg 6/25/23-> 58kg 7/29/23); Treated with PO diet    BMI Findings: Body mass index is 20.64 kg/m². Crohn's disease [facility])  Assessment & Plan  Patient was diagnosed with Crohns disease at age 15  Found to have severe Crohn's on colonoscopy, as well as stricture at the ileocecal valve.   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 Has disease of the terminal ileum  Given fistula with purulent material we will plan for course of Keflex over the next 5 to 7 days. White blood cell elevation but could be related to corticosteroids as well as infection  Day 2 of 7-10       Schizophrenia (HCC)  Assessment & Plan  Currently not on any medications. Will need outpatient follow-up with psychiatry at discharge               Hospital Course:     51-year-old male patient with Crohn's disease who presents with Crohn's flare as well as rectal fistula    Assessment:      Principal Problem:    Anemia  Active Problems:    Schizophrenia ([address])    Crohn's disease ([address])    Severe protein-calorie malnutrition ([address])    Esophagitis      Plan:    · Change Ancef to Keflex  · Continue IV Solu-Medrol  · Continue Diflucan  · Discharge planning       VTE Pharmacologic Prophylaxis:   Pharmacologic: Enoxaparin (Lovenox)  Mechanical VTE Prophylaxis in Place: Yes    Patient Centered Rounds: I have performed bedside rounds with nursing staff today. Discussions with Specialists or Other Care Team Provider: Discussed with case management as well as GI    Education and Discussions with Family / Patient: Patient has updated family    Time Spent for Care: 1 hour. More than 50% of total time spent on counseling and coordination of care as described above. Current Length of Stay: 3 day(s)    Current Patient Status: Inpatient   Certification Statement: The patient will continue to require additional inpatient hospital stay due to Ongoing Crohn's flare    Discharge Plan / Estimated Discharge Date: Possibly tomorrow    Code Status: Level 1 - Full Code      Subjective:   Patient seen and examined, no acute complaints. No nausea no vomiting. Feels well    A complete and comprehensive 14 point organ system review has been performed and all other systems are negative other than stated above.     Objective:     Vitals:   Temp (24hrs), Av.9 °F (37.2 °C), Min:97.7 °F (36.5 °C), Max:100.3 °F (37.9 °C)    Temp:  [97.7 °F (36.5 °C)-100.3 °F (37.9 °C)] 99 °F (37.2 °C)  HR:  [62-69] 67  Resp:  [12-18] 16  BP: (100-117)/(58-73) 108/63  SpO2:  [99 %-100 %] 100 %  Body mass index is 20.64 kg/m². Input and Output Summary (last 24 hours): Intake/Output Summary (Last 24 hours) at 8/1/2023 1622  Last data filed at 8/1/2023 0559  Gross per 24 hour   Intake 2080 ml   Output --   Net 2080 ml       Physical Exam:     General: well appearing, no acute distress  HEENT: atraumatic, PERRLA, moist mucosa, normal pharynx, normal tonsils and adenoids, normal tongue, no fluid in sinuses  Neck: Trachea midline, no carotid bruit, no masses  Respiratory: normal chest wall expansion, CTA B, no r/r/w, no rubs  Cardiovascular: RRR, no m/r/g, Normal S1 and S2  Abdomen: Soft, non-tender, non-distended, normal bowel sounds in all quadrants, no hepatosplenomegaly, no tympany  Rectal: deferred  Musculoskeletal: normal ROM in upper and lower extremities  Integumentary: warm, dry, and pink, with no rash, purpura, or petechia  Heme/Lymph: no lymphadenopathy, no bruises  Neurological: Cranial Nerves II-XII grossly intact  Psychiatric: cooperative with normal mood, affect, and cognition      Additional Data:     Labs:    Results from last 7 days   Lab Units 08/01/23  0450 07/30/23  1616 07/30/23  0608   WBC Thousand/uL 14.30*  --  15.08*   HEMOGLOBIN g/dL 8.8*   < > 6.6*   HEMATOCRIT % 30.9*   < > 23.0*   PLATELETS Thousands/uL 706*  --  683*   NEUTROS PCT %  --   --  76*   LYMPHS PCT %  --   --  14   MONOS PCT %  --   --  9   EOS PCT %  --   --  0    < > = values in this interval not displayed.      Results from last 7 days   Lab Units 08/01/23  0450 07/30/23  0608   POTASSIUM mmol/L 4.1 3.8   CHLORIDE mmol/L 102 103   CO2 mmol/L 23 27   BUN mg/dL 6 3*   CREATININE mg/dL 0.87 0.63   CALCIUM mg/dL 8.3* 8.2*   ALK PHOS U/L  --  56   ALT U/L  --  4*   AST U/L  --  6*           * I Have Reviewed All Lab Data Listed Above. * Additional Pertinent Lab Tests Reviewed: [address] Admission Reviewed    Imaging:    Imaging Reports Reviewed Today Include: Reviewed colonoscopy  Imaging Personally Reviewed by Myself Includes: Reviewed colonoscopy    Recent Cultures (last 7 days):     Results from last 7 days   Lab Units 07/30/23  0339   C DIFF TOXIN B BY PCR  Negative       Last 24 Hours Medication List:   Current Facility-Administered Medications   Medication Dose Route Frequency Provider Last Rate   • acetaminophen  650 mg Oral Q6H PRN Serge Pineda MD     • aluminum-magnesium hydroxide-simethicone  30 mL Oral Q4H PRN Pastora Bonilla PA-C     • cephalexin  500 mg Oral Q6H [address] Kory Althea Ogren, DO     • enoxaparin  40 mg Subcutaneous Q24H [address] Von G Chiraya, DO     • fluconazole  100 mg Oral Daily [facility] [address] [facility]     • lidocaine  1 patch Topical Q24H Serge Pineda MD     • methylPREDNISolone sodium succinate  20 mg Intravenous Q8H [address] Von G [facility], DO     • pantoprazole  40 mg Oral Early Morning Nancy Mccord DO          Today, Patient Was Seen By: Richard Batista DO    ** Please Note: This note was completed in part utilizing [employer] Software. Grammatical errors, random word insertions, spelling mistakes, and incomplete sentences may be an occasional consequence of this system secondary to software limitations, ambient noise, and hardware issues. If you have any questions or concerns about the content, text, or information contained within the body of this dictation, please contact the provider for clarification.  **

## 2023-08-01 NOTE — PLAN OF CARE
Problem: PAIN - ADULT  Goal: Verbalizes/displays adequate comfort level or baseline comfort level  Description: Interventions:  - Encourage patient to monitor pain and request assistance  - Assess pain using appropriate pain scale  - Administer analgesics based on type and severity of pain and evaluate response  - Implement non-pharmacological measures as appropriate and evaluate response  - Consider cultural and social influences on pain and pain management  - Notify physician/advanced practitioner if interventions unsuccessful or patient reports new pain  Outcome: Progressing     Problem: INFECTION - ADULT  Goal: Absence or prevention of progression during hospitalization  Description: INTERVENTIONS:  - Assess and monitor for signs and symptoms of infection  - Monitor lab/diagnostic results  - Monitor all insertion sites, i.e. indwelling lines, tubes, and drains  - Monitor endotracheal if appropriate and nasal secretions for changes in amount and color  - [place] appropriate cooling/warming therapies per order  - Administer medications as ordered  - Instruct and encourage patient and family to use good hand hygiene technique  - Identify and instruct in appropriate isolation precautions for identified infection/condition  Outcome: Progressing     Problem: SAFETY ADULT  Goal: Patient will remain free of falls  Description: INTERVENTIONS:  - Educate patient/family on patient safety including physical limitations  - Instruct patient to call for assistance with activity   - Consult OT/PT to assist with strengthening/mobility   - Keep Call bell within reach  - Keep bed low and locked with side rails adjusted as appropriate  - Keep care items and personal belongings within reach  - Initiate and maintain comfort rounds  - Make Fall Risk Sign visible to staff  - Apply yellow socks and bracelet for high fall risk patients  - Consider moving patient to room near nurses station  Outcome: Progressing  Goal: Maintain or return to baseline ADL function  Description: INTERVENTIONS:  -  Assess patient's ability to carry out ADLs; assess patient's baseline for ADL function and identify physical deficits which impact ability to perform ADLs (bathing, care of mouth/teeth, toileting, grooming, dressing, etc.)  - Assess/evaluate cause of self-care deficits   - Assess range of motion  - Assess patient's mobility; develop plan if impaired  - Assess patient's need for assistive devices and provide as appropriate  - Encourage maximum independence but intervene and supervise when necessary  - Involve family in performance of ADLs  - Assess for home care needs following discharge   - Consider OT consult to assist with ADL evaluation and planning for discharge  - Provide patient education as appropriate  Outcome: Progressing  Goal: Maintains/Returns to pre admission functional level  Description: INTERVENTIONS:  - Perform BMAT or MOVE assessment daily.   - Set and communicate daily mobility goal to care team and patient/family/caregiver.    - Collaborate with rehabilitation services on mobility goals if consulted  - Out of bed for toileting  - Record patient progress and toleration of activity level   Outcome: Progressing     Problem: DISCHARGE PLANNING  Goal: Discharge to home or other facility with appropriate resources  Description: INTERVENTIONS:  - Identify barriers to discharge w/patient and caregiver  - Arrange for needed discharge resources and transportation as appropriate  - Identify discharge learning needs (meds, wound care, etc.)  - Arrange for interpretive services to assist at discharge as needed  - Refer to Case Management Department for coordinating discharge planning if the patient needs post-hospital services based on physician/advanced practitioner order or complex needs related to functional status, cognitive ability, or social support system  Outcome: Progressing     Problem: Knowledge Deficit  Goal: Patient/family/caregiver demonstrates understanding of disease process, treatment plan, medications, and discharge instructions  Description: Complete learning assessment and assess knowledge base. Interventions:  - Provide teaching at level of understanding  - Provide teaching via preferred learning methods  Outcome: Progressing     Problem: NEUROSENSORY - ADULT  Goal: Achieves maximal functionality and self care  Description: INTERVENTIONS  - Monitor swallowing and airway patency with patient fatigue and changes in neurological status  - Encourage and assist patient to increase activity and self care.    - Encourage visually impaired, hearing impaired and aphasic patients to use assistive/communication devices  Outcome: Progressing     Problem: CARDIOVASCULAR - ADULT  Goal: Maintains optimal cardiac output and hemodynamic stability  Description: INTERVENTIONS:  - Monitor I/O, vital signs and rhythm  - Monitor for S/S and trends of decreased cardiac output  - Administer and titrate ordered vasoactive medications to optimize hemodynamic stability  - Assess quality of pulses, skin color and temperature  - Assess for signs of decreased coronary artery perfusion  - Instruct patient to report change in severity of symptoms  Outcome: Progressing  Goal: Absence of cardiac dysrhythmias or at baseline rhythm  Description: INTERVENTIONS:  - Continuous cardiac monitoring, vital signs, obtain 12 lead EKG if ordered  - Administer antiarrhythmic and heart rate control medications as ordered  - Monitor electrolytes and administer replacement therapy as ordered  Outcome: Progressing     Problem: GASTROINTESTINAL - ADULT  Goal: Minimal or absence of nausea and/or vomiting  Description: INTERVENTIONS:  - Administer IV fluids if ordered to ensure adequate hydration  - Maintain NPO status until nausea and vomiting are resolved  - Nasogastric tube if ordered  - Administer ordered antiemetic medications as needed  - Provide nonpharmacologic comfort measures as appropriate  - Advance diet as tolerated, if ordered  - Consider nutrition services referral to assist patient with adequate nutrition and appropriate food choices  Outcome: Progressing  Goal: Maintains or returns to baseline bowel function  Description: INTERVENTIONS:  - Assess bowel function  - Encourage oral fluids to ensure adequate hydration  - Administer IV fluids if ordered to ensure adequate hydration  - Administer ordered medications as needed  - Encourage mobilization and activity  - Consider nutritional services referral to assist patient with adequate nutrition and appropriate food choices  Outcome: Progressing  Goal: Maintains adequate nutritional intake  Description: INTERVENTIONS:  - Monitor percentage of each meal consumed  - Identify factors contributing to decreased intake, treat as appropriate  - Assist with meals as needed  - Monitor I&O, weight, and lab values if indicated  - Obtain nutrition services referral as needed  Outcome: Progressing     Problem: METABOLIC, FLUID AND ELECTROLYTES - ADULT  Goal: Fluid balance maintained  Description: INTERVENTIONS:  - Monitor labs   - Monitor I/O and WT  - Instruct patient on fluid and nutrition as appropriate  - Assess for signs & symptoms of volume excess or deficit  Outcome: Progressing     Problem: HEMATOLOGIC - ADULT  Goal: Maintains hematologic stability  Description: INTERVENTIONS  - Assess for signs and symptoms of bleeding or hemorrhage  - Monitor labs  - Administer supportive blood products/factors as ordered and appropriate  Outcome: Progressing     Problem: MUSCULOSKELETAL - ADULT  Goal: Maintain or return mobility to safest level of function  Description: INTERVENTIONS:  - Assess patient's ability to carry out ADLs; assess patient's baseline for ADL function and identify physical deficits which impact ability to perform ADLs (bathing, care of mouth/teeth, toileting, grooming, dressing, etc.)  - Assess/evaluate cause of self-care deficits   - Assess range of motion  - Assess patient's mobility  - Assess patient's need for assistive devices and provide as appropriate  - Encourage maximum independence but intervene and supervise when necessary  - Involve family in performance of ADLs  - Assess for home care needs following discharge   - Consider OT consult to assist with ADL evaluation and planning for discharge  - Provide patient education as appropriate  Outcome: Progressing  Goal: Maintain proper alignment of affected body part  Description: INTERVENTIONS:  - Support, maintain and protect limb and body alignment  - Provide patient/ family with appropriate education  Outcome: Progressing     Problem: Nutrition/Hydration-ADULT  Goal: Nutrient/Hydration intake appropriate for improving, restoring or maintaining nutritional needs  Description: Monitor and assess patient's nutrition/hydration status for malnutrition. Collaborate with interdisciplinary team and initiate plan and interventions as ordered. Monitor patient's weight and dietary intake as ordered or per policy. Utilize nutrition screening tool and intervene as necessary. Determine patient's food preferences and provide high-protein, high-caloric foods as appropriate.      INTERVENTIONS:  - Monitor oral intake, urinary output, labs, and treatment plans  - Assess nutrition and hydration status and recommend course of action  - Evaluate amount of meals eaten  - Assist patient with eating if necessary   - Allow adequate time for meals  - Recommend/ encourage appropriate diets, oral nutritional supplements, and vitamin/mineral supplements  - Order, calculate, and assess calorie counts as needed  - Recommend, monitor, and adjust tube feedings and TPN based on assessed needs  - Assess need for intravenous fluids  - Provide nutrition/hydration education as appropriate  - Include patient/family/caregiver in decisions related to nutrition  Outcome: Progressing

## 2023-08-01 NOTE — PROGRESS NOTES
Progress Note -  [facility]  Josué Campos 35 y.o. male MRN: [id number]  Unit/Bed#: 1575 [address] [place] 221-02 Encounter: [id number]      ASSESSMENT AND PLAN:      40-year-old male with past medical history of schizoaffective disorder, Crohn's disease previously on Pentasa but currently on not on medication and lost to follow-up is presenting with abdominal pain, diarrhea, anemia. GI is consulted for management of his Crohn's disease. 1. Crohn's disease  2. Abdominal pain  3. Nausea, vomiting, diarrhea  Colonoscopy performed this admission showed severe pancolitis with evidence of TI involvement. Fortunately infectious studies have been negative. He is overall improved on  • Continue IV Solu-Medrol 20 mg every 8 for 72 hours total.  • Low fiber low residue diet. • IV fluids per primary team.  • Patient will require Remicade as an outpatient. We will begin prior authorization if patient elects to follow-up with [facility]. • Chronic hepatitis panel and QuantiFERON gold in preparation for biologic use. • DVT prophylaxis with Lovenox  • Monitor hemodynamics  • Monitor bowel movements. Rest of care per primary team.    ______________________________________________________________________    Subjective: Seen and examined. Overall improved. Had 2 bowel movements this morning which were brown. Denies any significant abdominal pain, nausea, vomiting. Tolerating diet. Rest of ROS was negative. REVIEW OF SYSTEMS:    Review of Systems   Constitutional: Negative for chills and fever. HENT: Negative for congestion and sinus pressure. Respiratory: Negative for cough and shortness of breath. Cardiovascular: Negative for chest pain, palpitations and leg swelling. Gastrointestinal: Negative for abdominal pain, diarrhea, nausea and vomiting. Genitourinary: Negative for dysuria and hematuria. Musculoskeletal: Negative for arthralgias and back pain.    Skin: Negative for color change and rash. Neurological: Negative for dizziness and headaches. Psychiatric/Behavioral: Negative for agitation and confusion. All other systems reviewed and are negative. Historical Information   Past Medical History:   Diagnosis Date   • Anemia 2004    hospitalization/transfusion     Past Surgical History:   Procedure Laterality Date   • NE ANRCT XM SURG REQ ANES GENERAL SPI/EDRL DX N/A 4/7/2016    Procedure: EXAM UNDER ANESTHESIA (EUA); possible REMOVAL OF FOREIGN BODY anoscopy ;  Surgeon: Jaclyn Guerrero MD;  Location: [facility];  Service: Colorectal   • NE SURG TX ANAL FISTULA INTERSPHINCTERIC N/A 4/7/2016    Procedure: superficial FISTULOTOMY; SETON PROCEDURE drainage of chronic ischiofistula abscess and debridement;  Surgeon: Jaclyn Guerrero MD;  Location: [facility];  Service: Colorectal     Social History   Social History     Substance and Sexual Activity   Alcohol Use Not Currently    Comment: Socially     Social History     Substance and Sexual Activity   Drug Use No     Social History     Tobacco Use   Smoking Status Light Smoker   • Packs/day: 0.25   • Types: Cigarettes   Smokeless Tobacco Never   Tobacco Comments    [employer] says 7 cigarettes per day     History reviewed. No pertinent family history.     Meds/Allergies     Medications Prior to Admission   Medication   • acetaminophen (TYLENOL) 650 mg CR tablet   • ferrous sulfate 325 (65 Fe) mg tablet   • aspirin 325 mg tablet   • lidocaine (LIDODERM) 5 %   • methocarbamol (ROBAXIN) 500 mg tablet   • naproxen (EC NAPROSYN) 500 MG EC tablet     Current Facility-Administered Medications   Medication Dose Route Frequency   • acetaminophen (TYLENOL) tablet 650 mg  650 mg Oral Q6H PRN   • aluminum-magnesium hydroxide-simethicone (MAALOX) oral suspension 30 mL  30 mL Oral Q4H PRN   • ceFAZolin (ANCEF) IVPB (premix in dextrose) 2,000 mg 50 mL  2,000 mg Intravenous Q8H   • enoxaparin (LOVENOX) subcutaneous injection 40 mg  40 mg Subcutaneous Q24H [address]   • fluconazole (DIFLUCAN) tablet 100 mg  100 mg Oral Daily   • lidocaine (LIDODERM) 5 % patch 1 patch  1 patch Topical Q24H   • methylPREDNISolone sodium succinate (Solu-MEDROL) injection 20 mg  20 mg Intravenous Q8H [address]   • pantoprazole (PROTONIX) EC tablet 40 mg  40 mg Oral Early Morning       Allergies   Allergen Reactions   • Haldol Decanoate [Haloperidol] Anaphylaxis   • Oxycodone-Acetaminophen Rash     "swollon red rash"   • Sulfamethoxazole-Trimethoprim      "never really worked"           Objective     Blood pressure 117/73, pulse 67, temperature 98.6 °F (37 °C), temperature source Oral, resp. rate 18, height 5' 6" (1.676 m), weight 58 kg (127 lb 13.9 oz), SpO2 100 %. Body mass index is 20.64 kg/m². Intake/Output Summary (Last 24 hours) at 8/1/2023 0745  Last data filed at 8/1/2023 0559  Gross per 24 hour   Intake 2080 ml   Output --   Net 2080 ml         PHYSICAL EXAM:      Physical Exam  Vitals and nursing note reviewed. Constitutional:       General: He is not in acute distress. Appearance: Normal appearance. He is well-developed. He is not ill-appearing. HENT:      Head: Normocephalic and atraumatic. Mouth/Throat:      Mouth: Mucous membranes are moist.   Eyes:      Extraocular Movements: Extraocular movements intact. Conjunctiva/sclera: Conjunctivae normal.   Cardiovascular:      Rate and Rhythm: Normal rate. Pulses: Normal pulses. Pulmonary:      Effort: Pulmonary effort is normal.   Abdominal:      General: Abdomen is flat. Bowel sounds are normal. There is no distension. Palpations: Abdomen is soft. Tenderness: There is no abdominal tenderness. There is no guarding. Musculoskeletal:      Cervical back: Neck supple. Right lower leg: No edema. Left lower leg: No edema. Skin:     General: Skin is warm and dry. Neurological:      General: No focal deficit present.       Mental Status: He is alert and oriented to person, place, and time.    Psychiatric:         Mood and Affect: Mood normal.         Behavior: Behavior normal.          Lab Results:   Admission on 07/29/2023   Component Date Value   • WBC 07/29/2023 14.85 (H)    • RBC 07/29/2023 2.86 (L)    • Hemoglobin 07/29/2023 4.9 (LL)    • Hematocrit 07/29/2023 18.8 (L)    • MCV 07/29/2023 66 (L)    • MCH 07/29/2023 17.1 (L)    • MCHC 07/29/2023 26.1 (L)    • RDW 07/29/2023 19.4 (H)    • MPV 07/29/2023 8.2 (L)    • Platelets 61/00/8507 803 (H)    • nRBC 07/29/2023 0    • Neutrophils Relative 07/29/2023 69    • Immat GRANS % 07/29/2023 1    • Lymphocytes Relative 07/29/2023 21    • Monocytes Relative 07/29/2023 9    • Eosinophils Relative 07/29/2023 0    • Basophils Relative 07/29/2023 0    • Neutrophils Absolute 07/29/2023 10.23 (H)    • Immature Grans Absolute 07/29/2023 0.08    • Lymphocytes Absolute 07/29/2023 3.07    • Monocytes Absolute 07/29/2023 1.37 (H)    • Eosinophils Absolute 07/29/2023 0.06    • Basophils Absolute 07/29/2023 0.04    • Sodium 07/29/2023 135    • Potassium 07/29/2023 3.6    • Chloride 07/29/2023 102    • CO2 07/29/2023 25    • ANION GAP 07/29/2023 8    • BUN 07/29/2023 5    • Creatinine 07/29/2023 0.83    • Glucose 07/29/2023 100    • Calcium 07/29/2023 8.5    • Corrected Calcium 07/29/2023 9.4    • AST 07/29/2023 10 (L)    • ALT 07/29/2023 5 (L)    • Alkaline Phosphatase 07/29/2023 66    • Total Protein 07/29/2023 7.9    • Albumin 07/29/2023 2.9 (L)    • Total Bilirubin 07/29/2023 0.19 (L)    • eGFR 07/29/2023 115    • ABO Grouping 07/29/2023 A    • Rh Factor 07/29/2023 Positive    • Antibody Screen 07/29/2023 Negative    • Specimen Expiration Date 07/29/2023 [id number]    • Unit Product Code 07/30/2023 [id number]    • Unit Number 07/30/2023 [id number]    • Unit ABO 07/30/2023 A    • Unit [facility] 07/30/2023 POS    • Crossmatch 07/30/2023 Compatible    • Unit Dispense Status 07/30/2023 Presumed Trans    • Unit Product Volume 07/30/2023 300    • Unit Product Code 07/30/2023 [id number]    • Unit Number 07/30/2023 [id number]    • Unit ABO 07/30/2023 A    • Unit [facility] 07/30/2023 POS    • Crossmatch 07/30/2023 Compatible    • Unit Dispense Status 07/30/2023 Presumed Trans    • Unit Product Volume 07/30/2023 300    • ABO Grouping 07/29/2023 A    • Rh Factor 07/29/2023 Positive    • Salmonella sp PCR 07/30/2023 None Detected    • Shigella sp/Enteroinvasi* 07/30/2023 None Detected    • Campylobacter sp (jejuni* 07/30/2023 None Detected    • Shiga toxin 1/Shiga toxi* 07/30/2023 None Detected    • C.difficile toxin by PCR 07/30/2023 Negative    • Sodium 07/30/2023 135    • Potassium 07/30/2023 3.8    • Chloride 07/30/2023 103    • CO2 07/30/2023 27    • ANION GAP 07/30/2023 5    • BUN 07/30/2023 3 (L)    • Creatinine 07/30/2023 0.63    • Glucose 07/30/2023 78    • Calcium 07/30/2023 8.2 (L)    • Corrected Calcium 07/30/2023 9.2    • AST 07/30/2023 6 (L)    • ALT 07/30/2023 4 (L)    • Alkaline Phosphatase 07/30/2023 56    • Total Protein 07/30/2023 7.3    • Albumin 07/30/2023 2.7 (L)    • Total Bilirubin 07/30/2023 0.45    • eGFR 07/30/2023 129    • WBC 07/30/2023 15.08 (H)    • RBC 07/30/2023 3.30 (L)    • Hemoglobin 07/30/2023 6.6 (LL)    • Hematocrit 07/30/2023 23.0 (L)    • MCV 07/30/2023 70 (L)    • MCH 07/30/2023 20.0 (L)    • MCHC 07/30/2023 28.7 (L)    • RDW 07/30/2023 22.8 (H)    • MPV 07/30/2023 8.2 (L)    • Platelets 56/80/6080 683 (H)    • nRBC 07/30/2023 0    • Neutrophils Relative 07/30/2023 76 (H)    • Immat GRANS % 07/30/2023 1    • Lymphocytes Relative 07/30/2023 14    • Monocytes Relative 07/30/2023 9    • Eosinophils Relative 07/30/2023 0    • Basophils Relative 07/30/2023 0    • Neutrophils Absolute 07/30/2023 11.38 (H)    • Immature Grans Absolute 07/30/2023 0.11    • Lymphocytes Absolute 07/30/2023 2.12    • Monocytes Absolute 07/30/2023 1.38 (H)    • Eosinophils Absolute 07/30/2023 0.04    • Basophils Absolute 07/30/2023 0.05    • CRP 07/30/2023 97.2 (H)    • Unit Product Code 07/31/2023 [id number]    • Unit Number 07/31/2023 [id number]    • Unit ABO 07/31/2023 A    • Unit [facility] 07/31/2023 POS    • Crossmatch 07/31/2023 Compatible    • Unit Dispense Status 07/31/2023 Presumed Trans    • Unit Product Volume 07/31/2023 350    • Iron Saturation 07/30/2023 19 (L)    • TIBC 07/30/2023 179 (L)    • Iron 07/30/2023 34 (L)    • Ferritin 07/30/2023 19 (L)    • Hemoglobin 07/30/2023 8.7 (L)    • Hematocrit 07/30/2023 29.5 (L)    • Hepatitis B Surface Ag 07/30/2023 Non-reactive    • Hep A IgM 07/30/2023 Non-reactive    • Hepatitis C Ab 07/30/2023 Non-reactive    • Hep B C IgM 07/30/2023 Non-reactive    • Sodium 08/01/2023 133 (L)    • Potassium 08/01/2023 4.1    • Chloride 08/01/2023 102    • CO2 08/01/2023 23    • ANION GAP 08/01/2023 8    • BUN 08/01/2023 6    • Creatinine 08/01/2023 0.87    • Glucose 08/01/2023 165 (H)    • Calcium 08/01/2023 8.3 (L)    • eGFR 08/01/2023 113    • WBC 08/01/2023 14.30 (H)    • RBC 08/01/2023 4.21    • Hemoglobin 08/01/2023 8.8 (L)    • Hematocrit 08/01/2023 30.9 (L)    • MCV 08/01/2023 73 (L)    • MCH 08/01/2023 20.9 (L)    • MCHC 08/01/2023 28.5 (L)    • RDW 08/01/2023 25.5 (H)    • Platelets 47/95/6225 706 (H)    • MPV 08/01/2023 8.1 (L)        Imaging Studies: I have personally reviewed pertinent imaging studies. [facility] [email or web address]   Gastroenterology Fellow  PGY-4  Available via [employer]  8/1/2023 7:45 AM

## 2023-08-01 NOTE — PLAN OF CARE
Problem: PAIN - ADULT  Goal: Verbalizes/displays adequate comfort level or baseline comfort level  Description: Interventions:  - Encourage patient to monitor pain and request assistance  - Assess pain using appropriate pain scale  - Administer analgesics based on type and severity of pain and evaluate response  - Implement non-pharmacological measures as appropriate and evaluate response  - Consider cultural and social influences on pain and pain management  - Notify physician/advanced practitioner if interventions unsuccessful or patient reports new pain  Outcome: Progressing     Problem: INFECTION - ADULT  Goal: Absence or prevention of progression during hospitalization  Description: INTERVENTIONS:  - Assess and monitor for signs and symptoms of infection  - Monitor lab/diagnostic results  - Monitor all insertion sites, i.e. indwelling lines, tubes, and drains  - Monitor endotracheal if appropriate and nasal secretions for changes in amount and color  - Jackson appropriate cooling/warming therapies per order  - Administer medications as ordered  - Instruct and encourage patient and family to use good hand hygiene technique  - Identify and instruct in appropriate isolation precautions for identified infection/condition  Outcome: Progressing     Problem: SAFETY ADULT  Goal: Patient will remain free of falls  Description: INTERVENTIONS:  - Educate patient/family on patient safety including physical limitations  - Instruct patient to call for assistance with activity   - Consult OT/PT to assist with strengthening/mobility   - Keep Call bell within reach  - Keep bed low and locked with side rails adjusted as appropriate  - Keep care items and personal belongings within reach  - Initiate and maintain comfort rounds  - Make Fall Risk Sign visible to staff  -Outcome: Progressing     Problem: DISCHARGE PLANNING  Goal: Discharge to home or other facility with appropriate resources  Description: INTERVENTIONS:  - Identify barriers to discharge w/patient and caregiver  - Arrange for needed discharge resources and transportation as appropriate  - Identify discharge learning needs (meds, wound care, etc.)  - Arrange for interpretive services to assist at discharge as needed  - Refer to Case Management Department for coordinating discharge planning if the patient needs post-hospital services based on physician/advanced practitioner order or complex needs related to functional status, cognitive ability, or social support system  Outcome: Progressing     Problem: Knowledge Deficit  Goal: Patient/family/caregiver demonstrates understanding of disease process, treatment plan, medications, and discharge instructions  Description: Complete learning assessment and assess knowledge base.   Interventions:  - Provide teaching at level of understanding  - Provide teaching via preferred learning methods  Outcome: Progressing     Problem: GASTROINTESTINAL - ADULT  Goal: Minimal or absence of nausea and/or vomiting  Description: INTERVENTIONS:  - Administer IV fluids if ordered to ensure adequate hydration  - Maintain NPO status until nausea and vomiting are resolved  - Nasogastric tube if ordered  - Administer ordered antiemetic medications as needed  - Provide nonpharmacologic comfort measures as appropriate  - Advance diet as tolerated, if ordered  - Consider nutrition services referral to assist patient with adequate nutrition and appropriate food choices  Outcome: Progressing     Problem: GASTROINTESTINAL - ADULT  Goal: Maintains or returns to baseline bowel function  Description: INTERVENTIONS:  - Assess bowel function  - Encourage oral fluids to ensure adequate hydration  - Administer IV fluids if ordered to ensure adequate hydration  - Administer ordered medications as needed  - Encourage mobilization and activity  - Consider nutritional services referral to assist patient with adequate nutrition and appropriate food choices  Outcome: Progressing     Problem: GASTROINTESTINAL - ADULT  Goal: Maintains adequate nutritional intake  Description: INTERVENTIONS:  - Monitor percentage of each meal consumed  - Identify factors contributing to decreased intake, treat as appropriate  - Assist with meals as needed  - Monitor I&O, weight, and lab values if indicated  - Obtain nutrition services referral as needed  Outcome: Progressing     Problem: METABOLIC, FLUID AND ELECTROLYTES - ADULT  Goal: Fluid balance maintained  Description: INTERVENTIONS:  - Monitor labs   - Monitor I/O and WT  - Instruct patient on fluid and nutrition as appropriate  - Assess for signs & symptoms of volume excess or deficit  Outcome: Progressing     Problem: HEMATOLOGIC - ADULT  Goal: Maintains hematologic stability  Description: INTERVENTIONS  - Assess for signs and symptoms of bleeding or hemorrhage  - Monitor labs  - Administer supportive blood products/factors as ordered and appropriate  Outcome: Progressing     Problem: Nutrition/Hydration-ADULT  Goal: Nutrient/Hydration intake appropriate for improving, restoring or maintaining nutritional needs  Description: Monitor and assess patient's nutrition/hydration status for malnutrition. Collaborate with interdisciplinary team and initiate plan and interventions as ordered. Monitor patient's weight and dietary intake as ordered or per policy. Utilize nutrition screening tool and intervene as necessary. Determine patient's food preferences and provide high-protein, high-caloric foods as appropriate.      INTERVENTIONS:  - Monitor oral intake, urinary output, labs, and treatment plans  - Assess nutrition and hydration status and recommend course of action  - Evaluate amount of meals eaten  - Assist patient with eating if necessary   - Allow adequate time for meals  - Recommend/ encourage appropriate diets, oral nutritional supplements, and vitamin/mineral supplements  - Order, calculate, and assess calorie counts as needed  - Recommend, monitor, and adjust tube feedings and TPN based on assessed needs  - Assess need for intravenous fluids  - Provide nutrition/hydration education as appropriate  - Include patient/family/caregiver in decisions related to nutrition  Outcome: Progressing

## 2023-08-01 NOTE — ASSESSMENT & PLAN NOTE
Received 3 units packed RBC so far. We will continue monitoring hemoglobin levels.     · Appreciate GI recommendations, for bidirectional evaluation with an EGD and colonoscopy  · Continue PPI   · Suspect anemia from malabsorption from Crohn's disease  · Will need outpatient B12 level, patient was transfused and B12 likely to be unreliable    Results from last 7 days   Lab Units 08/01/23  0450 07/30/23  1616 07/30/23  0608 07/29/23  1224   HEMOGLOBIN g/dL 8.8* 8.7* 6.6* 4.9*   MCV fL 73*  --  70* 66*   RDW % 25.5*  --  22.8* 19.4*   IRON ug/dL  --   --  34*  --    TIBC ug/dL  --   --  179*  --    FERRITIN ng/mL  --   --  19*  --

## 2023-08-02 ENCOUNTER — TELEPHONE (OUTPATIENT)
Dept: GASTROENTEROLOGY | Facility: CLINIC | Age: 33
End: 2023-08-02

## 2023-08-02 LAB
ANION GAP SERPL CALCULATED.3IONS-SCNC: 6 MMOL/L
BUN SERPL-MCNC: 6 MG/DL (ref 5–25)
CALCIUM SERPL-MCNC: 8.5 MG/DL (ref 8.4–10.2)
CHLORIDE SERPL-SCNC: 103 MMOL/L (ref 96–108)
CO2 SERPL-SCNC: 27 MMOL/L (ref 21–32)
CREAT SERPL-MCNC: 0.75 MG/DL (ref 0.6–1.3)
ERYTHROCYTE [DISTWIDTH] IN BLOOD BY AUTOMATED COUNT: 26 % (ref 11.6–15.1)
GAMMA INTERFERON BACKGROUND BLD IA-ACNC: 0.02 IU/ML
GFR SERPL CREATININE-BSD FRML MDRD: 120 ML/MIN/1.73SQ M
GLUCOSE SERPL-MCNC: 105 MG/DL (ref 65–140)
HCT VFR BLD AUTO: 28 % (ref 36.5–49.3)
HGB BLD-MCNC: 8 G/DL (ref 12–17)
M TB IFN-G BLD-IMP: ABNORMAL
M TB IFN-G CD4+ BCKGRND COR BLD-ACNC: 0 IU/ML
M TB IFN-G CD4+ BCKGRND COR BLD-ACNC: 0 IU/ML
MCH RBC QN AUTO: 20.9 PG (ref 26.8–34.3)
MCHC RBC AUTO-ENTMCNC: 28.6 G/DL (ref 31.4–37.4)
MCV RBC AUTO: 73 FL (ref 82–98)
MITOGEN IGNF BCKGRD COR BLD-ACNC: <0.1 IU/ML
PLATELET # BLD AUTO: 702 THOUSANDS/UL (ref 149–390)
PMV BLD AUTO: 8.1 FL (ref 8.9–12.7)
POTASSIUM SERPL-SCNC: 3.9 MMOL/L (ref 3.5–5.3)
RBC # BLD AUTO: 3.83 MILLION/UL (ref 3.88–5.62)
SODIUM SERPL-SCNC: 136 MMOL/L (ref 135–147)
WBC # BLD AUTO: 22.73 THOUSAND/UL (ref 4.31–10.16)

## 2023-08-02 PROCEDURE — 99233 SBSQ HOSP IP/OBS HIGH 50: CPT | Performed by: INTERNAL MEDICINE

## 2023-08-02 PROCEDURE — 99232 SBSQ HOSP IP/OBS MODERATE 35: CPT | Performed by: INTERNAL MEDICINE

## 2023-08-02 PROCEDURE — 80048 BASIC METABOLIC PNL TOTAL CA: CPT | Performed by: INTERNAL MEDICINE

## 2023-08-02 PROCEDURE — 85027 COMPLETE CBC AUTOMATED: CPT | Performed by: INTERNAL MEDICINE

## 2023-08-02 PROCEDURE — 88112 CYTOPATH CELL ENHANCE TECH: CPT | Performed by: PATHOLOGY

## 2023-08-02 RX ADMIN — CEPHALEXIN 500 MG: 500 CAPSULE ORAL at 11:47

## 2023-08-02 RX ADMIN — CEPHALEXIN 500 MG: 500 CAPSULE ORAL at 06:27

## 2023-08-02 RX ADMIN — METHYLPREDNISOLONE SODIUM SUCCINATE 20 MG: 40 INJECTION, POWDER, FOR SOLUTION INTRAMUSCULAR; INTRAVENOUS at 21:50

## 2023-08-02 RX ADMIN — METHYLPREDNISOLONE SODIUM SUCCINATE 20 MG: 40 INJECTION, POWDER, FOR SOLUTION INTRAMUSCULAR; INTRAVENOUS at 14:19

## 2023-08-02 RX ADMIN — CEPHALEXIN 500 MG: 500 CAPSULE ORAL at 17:42

## 2023-08-02 RX ADMIN — METHYLPREDNISOLONE SODIUM SUCCINATE 20 MG: 40 INJECTION, POWDER, FOR SOLUTION INTRAMUSCULAR; INTRAVENOUS at 06:26

## 2023-08-02 RX ADMIN — PANTOPRAZOLE SODIUM 40 MG: 40 TABLET, DELAYED RELEASE ORAL at 06:27

## 2023-08-02 RX ADMIN — LIDOCAINE 1 PATCH: 50 PATCH CUTANEOUS at 17:42

## 2023-08-02 RX ADMIN — FLUCONAZOLE 100 MG: 100 TABLET ORAL at 08:30

## 2023-08-02 RX ADMIN — CEPHALEXIN 500 MG: 500 CAPSULE ORAL at 01:39

## 2023-08-02 NOTE — TELEPHONE ENCOUNTER
----- Message from Kayla Nova MD sent at 8/1/2023  4:29 PM EDT -----  This patient was seen as an inpatient by our team.  He is a history of severe ileocolonic Crohn's disease and is proving on IV steroids. Please start prior approval process with this patient's insurance for infliximab 10 mg/kg. The prebiologic work-up has been sent as an inpatient and is pending.   Thank you

## 2023-08-02 NOTE — NURSING NOTE
Approx 2300 pt IV began leaking. Pt allowed 1 attempt at new IV but told nurse to take it out during the insertion. Pt refused IV statingl "the veins need time for the swelling to go down". Will try again during AM labs. Pt went into the bathroom and cleansed his wound by himself. Pt wouldn't allow nurse to dress per wound care instructions. Pt didn't sleep all night.

## 2023-08-02 NOTE — ASSESSMENT & PLAN NOTE
Received 3 units packed RBC so far. We will continue monitoring hemoglobin levels.     · Appreciate GI recommendations, for bidirectional evaluation with an EGD and colonoscopy  · Continue PPI   · Suspect anemia from malabsorption from Crohn's disease  · Will need outpatient B12 level, patient was transfused and B12 likely to be unreliable    Results from last 7 days   Lab Units 08/02/23  0557 08/01/23  0450 07/30/23  1616 07/30/23  0608 07/29/23  1224   HEMOGLOBIN g/dL 8.0* 8.8* 8.7* 6.6* 4.9*   MCV fL 73* 73*  --  70* 66*   RDW % 26.0* 25.5*  --  22.8* 19.4*   IRON ug/dL  --   --   --  34*  --    TIBC ug/dL  --   --   --  179*  --    FERRITIN ng/mL  --   --   --  19*  --

## 2023-08-02 NOTE — PROGRESS NOTES
Progress Note -  [facility]  Brooke Gil 35 y.o. male MRN: [id number]  Unit/Bed#: 1575 [address] [place] 221-02 Encounter: [id number]      ASSESSMENT AND PLAN:      26-year-old male with past medical history of schizoaffective disorder, Crohn's disease previously on Pentasa but currently on not on medication and lost to follow-up is presenting with abdominal pain, diarrhea, anemia. GI is consulted for management of his Crohn's disease. 1. Crohn's disease  2. Abdominal pain  3. Nausea, vomiting, diarrhea  Colonoscopy performed this admission showed severe pancolitis with evidence of TI involvement. Fortunately infectious studies have been negative. He is overall improved. • Continue IV Solu-Medrol 20 mg every 8 for 72 hours total.  Transition on 8/4/2023 to prednisone 40 mg daily with slow taper. • Low fiber low residue diet. • IV fluids per primary team.  • Patient will require Remicade as an outpatient. We will begin prior authorization as patient would like to follow-up with [facility]. • Chronic hepatitis panel and QuantiFERON gold in preparation for biologic use. • DVT prophylaxis with Lovenox  • Monitor hemodynamics  • Monitor bowel movements. Rest of care per primary team.    ______________________________________________________________________    Subjective: Seen and examined. Overall improved. Had 2 bowel movements this morning which were brown. Denies any significant abdominal pain, nausea, vomiting. Tolerating diet. Rest of ROS was negative. REVIEW OF SYSTEMS:    Review of Systems   Constitutional: Negative for chills and fever. HENT: Negative for congestion and sinus pressure. Respiratory: Negative for cough and shortness of breath. Cardiovascular: Negative for chest pain, palpitations and leg swelling. Gastrointestinal: Negative for abdominal pain, diarrhea, nausea and vomiting. Genitourinary: Negative for dysuria and hematuria.    Musculoskeletal: Negative for arthralgias and back pain. Skin: Negative for color change and rash. Neurological: Negative for dizziness and headaches. Psychiatric/Behavioral: Negative for agitation and confusion. All other systems reviewed and are negative. Historical Information   Past Medical History:   Diagnosis Date   • Anemia 2004    hospitalization/transfusion     Past Surgical History:   Procedure Laterality Date   • TN ANRCT XM SURG REQ ANES GENERAL SPI/EDRL DX N/A 4/7/2016    Procedure: EXAM UNDER ANESTHESIA (EUA); possible REMOVAL OF FOREIGN BODY anoscopy ;  Surgeon: Hugh Houser MD;  Location: [facility];  Service: Colorectal   • TN SURG TX ANAL FISTULA INTERSPHINCTERIC N/A 4/7/2016    Procedure: superficial FISTULOTOMY; SETON PROCEDURE drainage of chronic ischiofistula abscess and debridement;  Surgeon: Hugh Houser MD;  Location: [facility];  Service: Colorectal     Social History   Social History     Substance and Sexual Activity   Alcohol Use Not Currently    Comment: Socially     Social History     Substance and Sexual Activity   Drug Use No     Social History     Tobacco Use   Smoking Status Light Smoker   • Packs/day: 0.25   • Types: Cigarettes   Smokeless Tobacco Never   Tobacco Comments    [employer] says 7 cigarettes per day     History reviewed. No pertinent family history.     Meds/Allergies     Medications Prior to Admission   Medication   • acetaminophen (TYLENOL) 650 mg CR tablet   • ferrous sulfate 325 (65 Fe) mg tablet   • aspirin 325 mg tablet   • lidocaine (LIDODERM) 5 %   • methocarbamol (ROBAXIN) 500 mg tablet   • naproxen (EC NAPROSYN) 500 MG EC tablet     Current Facility-Administered Medications   Medication Dose Route Frequency   • acetaminophen (TYLENOL) tablet 650 mg  650 mg Oral Q6H PRN   • aluminum-magnesium hydroxide-simethicone (MAALOX) oral suspension 30 mL  30 mL Oral Q4H PRN   • cephalexin (KEFLEX) capsule 500 mg  500 mg Oral Q6H [address]   • enoxaparin (LOVENOX) subcutaneous injection 40 mg  40 mg Subcutaneous Q24H [address]   • fluconazole (DIFLUCAN) tablet 100 mg  100 mg Oral Daily   • lidocaine (LIDODERM) 5 % patch 1 patch  1 patch Topical Q24H   • methylPREDNISolone sodium succinate (Solu-MEDROL) injection 20 mg  20 mg Intravenous Q8H [address]   • pantoprazole (PROTONIX) EC tablet 40 mg  40 mg Oral Early Morning       Allergies   Allergen Reactions   • Haldol Decanoate [Haloperidol] Anaphylaxis   • Oxycodone-Acetaminophen Rash     "swollon red rash"   • Sulfamethoxazole-Trimethoprim      "never really worked"           Objective     Blood pressure 142/91, pulse 61, temperature 99 °F (37.2 °C), resp. rate 16, height 5' 6" (1.676 m), weight 58 kg (127 lb 13.9 oz), SpO2 100 %. Body mass index is 20.64 kg/m². No intake or output data in the 24 hours ending 08/02/23 0734      PHYSICAL EXAM:      Physical Exam  Vitals and nursing note reviewed. Constitutional:       General: He is not in acute distress. Appearance: Normal appearance. He is well-developed. He is not ill-appearing. HENT:      Head: Normocephalic and atraumatic. Mouth/Throat:      Mouth: Mucous membranes are moist.   Eyes:      Extraocular Movements: Extraocular movements intact. Conjunctiva/sclera: Conjunctivae normal.   Cardiovascular:      Rate and Rhythm: Normal rate. Pulses: Normal pulses. Pulmonary:      Effort: Pulmonary effort is normal.   Abdominal:      General: Abdomen is flat. Bowel sounds are normal. There is no distension. Palpations: Abdomen is soft. Tenderness: There is no abdominal tenderness. There is no guarding. Musculoskeletal:      Cervical back: Neck supple. Right lower leg: No edema. Left lower leg: No edema. Skin:     General: Skin is warm and dry. Neurological:      General: No focal deficit present. Mental Status: He is alert and oriented to person, place, and time. Comments: Flight of ideas. Patient difficult to redirect. Psychiatric:         Mood and Affect: Mood normal.         Behavior: Behavior normal.          Lab Results:   Admission on 07/29/2023   Component Date Value   • WBC 07/29/2023 14.85 (H)    • RBC 07/29/2023 2.86 (L)    • Hemoglobin 07/29/2023 4.9 (LL)    • Hematocrit 07/29/2023 18.8 (L)    • MCV 07/29/2023 66 (L)    • MCH 07/29/2023 17.1 (L)    • MCHC 07/29/2023 26.1 (L)    • RDW 07/29/2023 19.4 (H)    • MPV 07/29/2023 8.2 (L)    • Platelets 05/82/8580 803 (H)    • nRBC 07/29/2023 0    • Neutrophils Relative 07/29/2023 69    • Immat GRANS % 07/29/2023 1    • Lymphocytes Relative 07/29/2023 21    • Monocytes Relative 07/29/2023 9    • Eosinophils Relative 07/29/2023 0    • Basophils Relative 07/29/2023 0    • Neutrophils Absolute 07/29/2023 10.23 (H)    • Immature Grans Absolute 07/29/2023 0.08    • Lymphocytes Absolute 07/29/2023 3.07    • Monocytes Absolute 07/29/2023 1.37 (H)    • Eosinophils Absolute 07/29/2023 0.06    • Basophils Absolute 07/29/2023 0.04    • Sodium 07/29/2023 135    • Potassium 07/29/2023 3.6    • Chloride 07/29/2023 102    • CO2 07/29/2023 25    • ANION GAP 07/29/2023 8    • BUN 07/29/2023 5    • Creatinine 07/29/2023 0.83    • Glucose 07/29/2023 100    • Calcium 07/29/2023 8.5    • Corrected Calcium 07/29/2023 9.4    • AST 07/29/2023 10 (L)    • ALT 07/29/2023 5 (L)    • Alkaline Phosphatase 07/29/2023 66    • Total Protein 07/29/2023 7.9    • Albumin 07/29/2023 2.9 (L)    • Total Bilirubin 07/29/2023 0.19 (L)    • eGFR 07/29/2023 115    • ABO Grouping 07/29/2023 A    • Rh Factor 07/29/2023 Positive    • Antibody Screen 07/29/2023 Negative    • Specimen Expiration Date 07/29/2023 [id number]    • Unit Product Code 07/30/2023 I3016D22    • Unit Number 07/30/2023 [id number]    • Unit ABO 07/30/2023 A    • Unit [facility] 07/30/2023 POS    • Crossmatch 07/30/2023 Compatible    • Unit Dispense Status 07/30/2023 Presumed Trans    • Unit Product Volume 07/30/2023 300    • Unit Product Code 07/30/2023 S1428S62    • Unit Number 07/30/2023 [id number]    • Unit ABO 07/30/2023 A    • Unit [facility] 07/30/2023 POS    • Crossmatch 07/30/2023 Compatible    • Unit Dispense Status 07/30/2023 Presumed Trans    • Unit Product Volume 07/30/2023 300    • ABO Grouping 07/29/2023 A    • Rh Factor 07/29/2023 Positive    • Salmonella sp PCR 07/30/2023 None Detected    • Shigella sp/Enteroinvasi* 07/30/2023 None Detected    • Campylobacter sp (jejuni* 07/30/2023 None Detected    • Shiga toxin 1/Shiga toxi* 07/30/2023 None Detected    • C.difficile toxin by PCR 07/30/2023 Negative    • Calprotectin 07/30/2023 1660 (H)    • Sodium 07/30/2023 135    • Potassium 07/30/2023 3.8    • Chloride 07/30/2023 103    • CO2 07/30/2023 27    • ANION GAP 07/30/2023 5    • BUN 07/30/2023 3 (L)    • Creatinine 07/30/2023 0.63    • Glucose 07/30/2023 78    • Calcium 07/30/2023 8.2 (L)    • Corrected Calcium 07/30/2023 9.2    • AST 07/30/2023 6 (L)    • ALT 07/30/2023 4 (L)    • Alkaline Phosphatase 07/30/2023 56    • Total Protein 07/30/2023 7.3    • Albumin 07/30/2023 2.7 (L)    • Total Bilirubin 07/30/2023 0.45    • eGFR 07/30/2023 129    • WBC 07/30/2023 15.08 (H)    • RBC 07/30/2023 3.30 (L)    • Hemoglobin 07/30/2023 6.6 (LL)    • Hematocrit 07/30/2023 23.0 (L)    • MCV 07/30/2023 70 (L)    • MCH 07/30/2023 20.0 (L)    • MCHC 07/30/2023 28.7 (L)    • RDW 07/30/2023 22.8 (H)    • MPV 07/30/2023 8.2 (L)    • Platelets 20/45/5947 683 (H)    • nRBC 07/30/2023 0    • Neutrophils Relative 07/30/2023 76 (H)    • Immat GRANS % 07/30/2023 1    • Lymphocytes Relative 07/30/2023 14    • Monocytes Relative 07/30/2023 9    • Eosinophils Relative 07/30/2023 0    • Basophils Relative 07/30/2023 0    • Neutrophils Absolute 07/30/2023 11.38 (H)    • Immature Grans Absolute 07/30/2023 0.11    • Lymphocytes Absolute 07/30/2023 2.12    • Monocytes Absolute 07/30/2023 1.38 (H)    • Eosinophils Absolute 07/30/2023 0.04    • Basophils Absolute 07/30/2023 0.05    • CRP 07/30/2023 97.2 (H)    • Unit Product Code 07/31/2023 [id number]    • Unit Number 07/31/2023 [id number]    • Unit ABO 07/31/2023 A    • Unit [facility] 07/31/2023 POS    • Crossmatch 07/31/2023 Compatible    • Unit Dispense Status 07/31/2023 Presumed Trans    • Unit Product Volume 07/31/2023 350    • Iron Saturation 07/30/2023 19 (L)    • TIBC 07/30/2023 179 (L)    • Iron 07/30/2023 34 (L)    • Ferritin 07/30/2023 19 (L)    • Hemoglobin 07/30/2023 8.7 (L)    • Hematocrit 07/30/2023 29.5 (L)    • Hepatitis B Surface Ag 07/30/2023 Non-reactive    • Hep A IgM 07/30/2023 Non-reactive    • Hepatitis C Ab 07/30/2023 Non-reactive    • Hep B C IgM 07/30/2023 Non-reactive    • Rapid HIV 1 AND 2 08/01/2023 Non-Reactive    • HIV-1 P24 Ag Screen 08/01/2023 Non-Reactive    • Sodium 08/01/2023 133 (L)    • Potassium 08/01/2023 4.1    • Chloride 08/01/2023 102    • CO2 08/01/2023 23    • ANION GAP 08/01/2023 8    • BUN 08/01/2023 6    • Creatinine 08/01/2023 0.87    • Glucose 08/01/2023 165 (H)    • Calcium 08/01/2023 8.3 (L)    • eGFR 08/01/2023 113    • WBC 08/01/2023 14.30 (H)    • RBC 08/01/2023 4.21    • Hemoglobin 08/01/2023 8.8 (L)    • Hematocrit 08/01/2023 30.9 (L)    • MCV 08/01/2023 73 (L)    • MCH 08/01/2023 20.9 (L)    • MCHC 08/01/2023 28.5 (L)    • RDW 08/01/2023 25.5 (H)    • Platelets 68/24/3832 706 (H)    • MPV 08/01/2023 8.1 (L)    • CRP 08/01/2023 106.2 (H)    • Hepatitis B Surface Ag 08/01/2023 Non-reactive    • Hepatitis C Ab 08/01/2023 Non-reactive    • Hep B C IgM 08/01/2023 Non-reactive    • Hep B Core Total Ab 08/01/2023 Non-reactive    • Sodium 08/02/2023 136    • Potassium 08/02/2023 3.9    • Chloride 08/02/2023 103    • CO2 08/02/2023 27    • ANION GAP 08/02/2023 6    • BUN 08/02/2023 6    • Creatinine 08/02/2023 0.75    • Glucose 08/02/2023 105    • Calcium 08/02/2023 8.5    • eGFR 08/02/2023 120    • WBC 08/02/2023 22.73 (H)    • RBC 08/02/2023 3.83 (L)    • Hemoglobin 08/02/2023 8.0 (L)    • Hematocrit 08/02/2023 28.0 (L)    • MCV 08/02/2023 73 (L)    • MCH 08/02/2023 20.9 (L)    • MCHC 08/02/2023 28.6 (L)    • RDW 08/02/2023 26.0 (H)    • Platelets 15/56/3735 702 (H)    • MPV 08/02/2023 8.1 (L)        Imaging Studies: I have personally reviewed pertinent imaging studies. [address] [facility] D.O.   Gastroenterology Fellow  PGY-4  Available via [employer]  8/2/2023 7:34 AM

## 2023-08-02 NOTE — PLAN OF CARE
Problem: PAIN - ADULT  Goal: Verbalizes/displays adequate comfort level or baseline comfort level  Description: Interventions:  - Encourage patient to monitor pain and request assistance  - Assess pain using appropriate pain scale  - Administer analgesics based on type and severity of pain and evaluate response  - Implement non-pharmacological measures as appropriate and evaluate response  - Consider cultural and social influences on pain and pain management  - Notify physician/advanced practitioner if interventions unsuccessful or patient reports new pain  Outcome: Progressing     Problem: INFECTION - ADULT  Goal: Absence or prevention of progression during hospitalization  Description: INTERVENTIONS:  - Assess and monitor for signs and symptoms of infection  - Monitor lab/diagnostic results  - Monitor all insertion sites, i.e. indwelling lines, tubes, and drains  - Monitor endotracheal if appropriate and nasal secretions for changes in amount and color  - [place] appropriate cooling/warming therapies per order  - Administer medications as ordered  - Instruct and encourage patient and family to use good hand hygiene technique  - Identify and instruct in appropriate isolation precautions for identified infection/condition  Outcome: Progressing     Problem: SAFETY ADULT  Goal: Patient will remain free of falls  Description: INTERVENTIONS:  - Educate patient/family on patient safety including physical limitations  - Instruct patient to call for assistance with activity   - Consult OT/PT to assist with strengthening/mobility   - Keep Call bell within reach  - Keep bed low and locked with side rails adjusted as appropriate  - Keep care items and personal belongings within reach  - Initiate and maintain comfort rounds  - Make Fall Risk Sign visible to staff  - Obtain necessary fall risk management equipment: bed rails  - Apply yellow socks and bracelet for high fall risk patients  - Consider moving patient to room near nurses station  Outcome: Progressing  Goal: Maintain or return to baseline ADL function  Description: INTERVENTIONS:  -  Assess patient's ability to carry out ADLs; assess patient's baseline for ADL function and identify physical deficits which impact ability to perform ADLs (bathing, care of mouth/teeth, toileting, grooming, dressing, etc.)  - Assess/evaluate cause of self-care deficits   - Assess range of motion  - Assess patient's mobility; develop plan if impaired  - Assess patient's need for assistive devices and provide as appropriate  - Encourage maximum independence but intervene and supervise when necessary  - Involve family in performance of ADLs  - Assess for home care needs following discharge   - Consider OT consult to assist with ADL evaluation and planning for discharge  - Provide patient education as appropriate  Outcome: Progressing  Goal: Maintains/Returns to pre admission functional level  Description: INTERVENTIONS:  - Perform BMAT or MOVE assessment daily.   - Set and communicate daily mobility goal to care team and patient/family/caregiver.    - Collaborate with rehabilitation services on mobility goals if consulted  - Ambulate patient 3 times a day  - Out of bed to chair 2 times a day   - Out of bed for meals 2 times a day  - Out of bed for toileting  - Record patient progress and toleration of activity level   Outcome: Progressing     Problem: DISCHARGE PLANNING  Goal: Discharge to home or other facility with appropriate resources  Description: INTERVENTIONS:  - Identify barriers to discharge w/patient and caregiver  - Arrange for needed discharge resources and transportation as appropriate  - Identify discharge learning needs (meds, wound care, etc.)  - Arrange for interpretive services to assist at discharge as needed  - Refer to Case Management Department for coordinating discharge planning if the patient needs post-hospital services based on physician/advanced practitioner order or complex needs related to functional status, cognitive ability, or social support system  Outcome: Progressing     Problem: Knowledge Deficit  Goal: Patient/family/caregiver demonstrates understanding of disease process, treatment plan, medications, and discharge instructions  Description: Complete learning assessment and assess knowledge base. Interventions:  - Provide teaching at level of understanding  - Provide teaching via preferred learning methods  Outcome: Progressing     Problem: NEUROSENSORY - ADULT  Goal: Achieves maximal functionality and self care  Description: INTERVENTIONS  - Monitor swallowing and airway patency with patient fatigue and changes in neurological status  - Encourage and assist patient to increase activity and self care.    - Encourage visually impaired, hearing impaired and aphasic patients to use assistive/communication devices  Outcome: Progressing     Problem: CARDIOVASCULAR - ADULT  Goal: Maintains optimal cardiac output and hemodynamic stability  Description: INTERVENTIONS:  - Monitor I/O, vital signs and rhythm  - Monitor for S/S and trends of decreased cardiac output  - Administer and titrate ordered vasoactive medications to optimize hemodynamic stability  - Assess quality of pulses, skin color and temperature  - Assess for signs of decreased coronary artery perfusion  - Instruct patient to report change in severity of symptoms  Outcome: Progressing  Goal: Absence of cardiac dysrhythmias or at baseline rhythm  Description: INTERVENTIONS:  - Continuous cardiac monitoring, vital signs, obtain 12 lead EKG if ordered  - Administer antiarrhythmic and heart rate control medications as ordered  - Monitor electrolytes and administer replacement therapy as ordered  Outcome: Progressing     Problem: GASTROINTESTINAL - ADULT  Goal: Minimal or absence of nausea and/or vomiting  Description: INTERVENTIONS:  - Administer IV fluids if ordered to ensure adequate hydration  - Maintain NPO status until nausea and vomiting are resolved  - Nasogastric tube if ordered  - Administer ordered antiemetic medications as needed  - Provide nonpharmacologic comfort measures as appropriate  - Advance diet as tolerated, if ordered  - Consider nutrition services referral to assist patient with adequate nutrition and appropriate food choices  Outcome: Progressing  Goal: Maintains or returns to baseline bowel function  Description: INTERVENTIONS:  - Assess bowel function  - Encourage oral fluids to ensure adequate hydration  - Administer IV fluids if ordered to ensure adequate hydration  - Administer ordered medications as needed  - Encourage mobilization and activity  - Consider nutritional services referral to assist patient with adequate nutrition and appropriate food choices  Outcome: Progressing  Goal: Maintains adequate nutritional intake  Description: INTERVENTIONS:  - Monitor percentage of each meal consumed  - Identify factors contributing to decreased intake, treat as appropriate  - Assist with meals as needed  - Monitor I&O, weight, and lab values if indicated  - Obtain nutrition services referral as needed  Outcome: Progressing     Problem: METABOLIC, FLUID AND ELECTROLYTES - ADULT  Goal: Fluid balance maintained  Description: INTERVENTIONS:  - Monitor labs   - Monitor I/O and WT  - Instruct patient on fluid and nutrition as appropriate  - Assess for signs & symptoms of volume excess or deficit  Outcome: Progressing     Problem: HEMATOLOGIC - ADULT  Goal: Maintains hematologic stability  Description: INTERVENTIONS  - Assess for signs and symptoms of bleeding or hemorrhage  - Monitor labs  - Administer supportive blood products/factors as ordered and appropriate  Outcome: Progressing     Problem: MUSCULOSKELETAL - ADULT  Goal: Maintain or return mobility to safest level of function  Description: INTERVENTIONS:  - Assess patient's ability to carry out ADLs; assess patient's baseline for ADL function and identify physical deficits which impact ability to perform ADLs (bathing, care of mouth/teeth, toileting, grooming, dressing, etc.)  - Assess/evaluate cause of self-care deficits   - Assess range of motion  - Assess patient's mobility  - Assess patient's need for assistive devices and provide as appropriate  - Encourage maximum independence but intervene and supervise when necessary  - Involve family in performance of ADLs  - Assess for home care needs following discharge   - Consider OT consult to assist with ADL evaluation and planning for discharge  - Provide patient education as appropriate  Outcome: Progressing  Goal: Maintain proper alignment of affected body part  Description: INTERVENTIONS:  - Support, maintain and protect limb and body alignment  - Provide patient/ family with appropriate education  Outcome: Progressing     Problem: Nutrition/Hydration-ADULT  Goal: Nutrient/Hydration intake appropriate for improving, restoring or maintaining nutritional needs  Description: Monitor and assess patient's nutrition/hydration status for malnutrition. Collaborate with interdisciplinary team and initiate plan and interventions as ordered. Monitor patient's weight and dietary intake as ordered or per policy. Utilize nutrition screening tool and intervene as necessary. Determine patient's food preferences and provide high-protein, high-caloric foods as appropriate.      INTERVENTIONS:  - Monitor oral intake, urinary output, labs, and treatment plans  - Assess nutrition and hydration status and recommend course of action  - Evaluate amount of meals eaten  - Assist patient with eating if necessary   - Allow adequate time for meals  - Recommend/ encourage appropriate diets, oral nutritional supplements, and vitamin/mineral supplements  - Order, calculate, and assess calorie counts as needed  - Recommend, monitor, and adjust tube feedings and TPN based on assessed needs  - Assess need for intravenous fluids  - Provide nutrition/hydration education as appropriate  - Include patient/family/caregiver in decisions related to nutrition  Outcome: Progressing

## 2023-08-02 NOTE — PROGRESS NOTES
[address] [facility]  Progress Note  Name: Chi Gonzalez  MRN: [id number]  Unit/Bed#: 1575 [facility] 2 [address] [facility] [place] 221-02 I Date of Admission: 7/29/2023   Date of Service: 8/2/2023 I Hospital Day: 4    Assessment/Plan   * Anemia  Assessment & Plan  Received 3 units packed RBC so far. We will continue monitoring hemoglobin levels. · Appreciate GI recommendations, for bidirectional evaluation with an EGD and colonoscopy  · Continue PPI   · Suspect anemia from malabsorption from Crohn's disease  · Will need outpatient B12 level, patient was transfused and B12 likely to be unreliable    Results from last 7 days   Lab Units 08/02/23  0557 08/01/23  0450 07/30/23  1616 07/30/23  0608 07/29/23  1224   HEMOGLOBIN g/dL 8.0* 8.8* 8.7* 6.6* 4.9*   MCV fL 73* 73*  --  70* 66*   RDW % 26.0* 25.5*  --  22.8* 19.4*   IRON ug/dL  --   --   --  34*  --    TIBC ug/dL  --   --   --  179*  --    FERRITIN ng/mL  --   --   --  19*  --          Esophagitis  Assessment & Plan  Thought to have candidal esophagitis  Continue Diflucan day #3 of 14    Severe protein-calorie malnutrition (HCC)  Assessment & Plan  Malnutrition Findings:   Adult Malnutrition type: Acute illness  Adult Degree of Malnutrition: Other severe protein calorie malnutrition  Malnutrition Characteristics: Inadequate energy, Weight loss                  360 Statement: Severe calorie-protein malnutrition in context of acute illness r/t poor appetite, inadequate PO intake, altered GI function as evidance by energy intake less than 50% compared to estimated needsx3 weeks, significant 9.5% wt loss x 1 month ( 64.2kg 6/25/23-> 58kg 7/29/23); Treated with PO diet    BMI Findings: Body mass index is 20.64 kg/m². Crohn's disease [facility])  Assessment & Plan  Patient was diagnosed with Crohns disease at age 15  Found to have severe Crohn's on colonoscopy, as well as stricture at the ileocecal valve.   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 Has disease of the terminal ileum  Given fistula with purulent material we will plan for course of Keflex over the next 5 to 7 days. White blood cell elevation but could be related to corticosteroids as well as infection  Day 3 of 7       Schizophrenia (HCC)  Assessment & Plan  Currently not on any medications. Will need outpatient follow-up with psychiatry at discharge                 Hospital Course:     15-year-old male patient admitted with Crohn's flare, on IV steroids with severe disease. Will need outpatient evaluation for possible biologic therapy    Assessment:      Principal Problem:    Anemia  Active Problems:    Schizophrenia ([address])    Crohn's disease ([address])    Severe protein-calorie malnutrition ([address])    Esophagitis      Plan:    · Continue IV steroid  · New Diflucan for presumed Candida esophagitis  · Continue antibiotics for rectal fistula status post incision and drainage       VTE Pharmacologic Prophylaxis:   Pharmacologic: Enoxaparin (Lovenox)  Mechanical VTE Prophylaxis in Place: Yes    Patient Centered Rounds: I have performed bedside rounds with nursing staff today. Discussions with Specialists or Other Care Team Provider: Discussed with GI    Education and Discussions with Family / Patient: Patient updating family    Time Spent for Care: 1 hour. More than 50% of total time spent on counseling and coordination of care as described above. Current Length of Stay: 4 day(s)    Current Patient Status: Inpatient   Certification Statement: The patient will continue to require additional inpatient hospital stay due to IV steroid    Discharge Plan / Estimated Discharge Date: Tomorrow    Code Status: Level 1 - Full Code      Subjective:   Seen and examined, feels well. Tolerating oral diet.   He is hopeful for discharge tomorrow    A complete and comprehensive 14 point organ system review has been performed and all other systems are negative other than stated above. Objective:     Vitals:   Temp (24hrs), Av.2 °F (37.3 °C), Min:99 °F (37.2 °C), Max:99.5 °F (37.5 °C)    Temp:  [99 °F (37.2 °C)-99.5 °F (37.5 °C)] 99.5 °F (37.5 °C)  HR:  [61-68] 68  Resp:  [16-20] 20  BP: (108-142)/(63-91) 121/80  SpO2:  [100 %] 100 %  Body mass index is 20.64 kg/m². Input and Output Summary (last 24 hours):     No intake or output data in the 24 hours ending 23 1447    Physical Exam:     General: well appearing, no acute distress  HEENT: atraumatic, PERRLA, moist mucosa, normal pharynx, normal tonsils and adenoids, normal tongue, no fluid in sinuses  Neck: Trachea midline, no carotid bruit, no masses  Respiratory: normal chest wall expansion, CTA B, no r/r/w, no rubs  Cardiovascular: RRR, no m/r/g, Normal S1 and S2  Abdomen: Soft, non-tender, non-distended, normal bowel sounds in all quadrants, no hepatosplenomegaly, no tympany  Rectal: deferred  Musculoskeletal: normal ROM in upper and lower extremities  Integumentary: warm, dry, and pink, with no rash, purpura, or petechia  Heme/Lymph: no lymphadenopathy, no bruises  Neurological: Cranial Nerves II-XII grossly intact  Psychiatric: cooperative with normal mood, affect, and cognition      Additional Data:     Labs:    Results from last 7 days   Lab Units 23  0557 23  1616 23  0608   WBC Thousand/uL 22.73*   < > 15.08*   HEMOGLOBIN g/dL 8.0*   < > 6.6*   HEMATOCRIT % 28.0*   < > 23.0*   PLATELETS Thousands/uL 702*   < > 683*   NEUTROS PCT %  --   --  76*   LYMPHS PCT %  --   --  14   MONOS PCT %  --   --  9   EOS PCT %  --   --  0    < > = values in this interval not displayed.      Results from last 7 days   Lab Units 23  0557 23  0450 23  0608   POTASSIUM mmol/L 3.9   < > 3.8   CHLORIDE mmol/L 103   < > 103   CO2 mmol/L 27   < > 27   BUN mg/dL 6   < > 3*   CREATININE mg/dL 0.75   < > 0.63   CALCIUM mg/dL 8.5   < > 8.2*   ALK PHOS U/L  --   --  56   ALT U/L --   --  4*   AST U/L  --   --  6*    < > = values in this interval not displayed. * I Have Reviewed All Lab Data Listed Above. * Additional Pertinent Lab Tests Reviewed: [address] Admission Reviewed    Imaging:    Imaging Reports Reviewed Today Include: No new imaging  Imaging Personally Reviewed by Myself Includes: No new imaging    Recent Cultures (last 7 days):     Results from last 7 days   Lab Units 07/30/23  0339   C DIFF TOXIN B BY PCR  Negative       Last 24 Hours Medication List:   Current Facility-Administered Medications   Medication Dose Route Frequency Provider Last Rate   • acetaminophen  650 mg Oral Q6H PRN Carson Bernstein MD     • aluminum-magnesium hydroxide-simethicone  30 mL Oral Q4H PRN Pastora Azar PA-C     • cephalexin  500 mg Oral Q6H [address], DO     • enoxaparin  40 mg Subcutaneous Q24H [address] [facility], DO     • fluconazole  100 mg Oral Daily [facility], [facility]     • lidocaine  1 patch Topical Q24H Carson Bernstein MD     • methylPREDNISolone sodium succinate  20 mg Intravenous Q8H [address] [facility], DO     • pantoprazole  40 mg Oral Early Morning Meeta Bell DO          Today, Patient Was Seen By: Jovani Escobar DO    ** Please Note: This note was completed in part utilizing [employer] Software. Grammatical errors, random word insertions, spelling mistakes, and incomplete sentences may be an occasional consequence of this system secondary to software limitations, ambient noise, and hardware issues. If you have any questions or concerns about the content, text, or information contained within the body of this dictation, please contact the provider for clarification.  **

## 2023-08-02 NOTE — PLAN OF CARE
Problem: PAIN - ADULT  Goal: Verbalizes/displays adequate comfort level or baseline comfort level  Description: Interventions:  - Encourage patient to monitor pain and request assistance  - Assess pain using appropriate pain scale  - Administer analgesics based on type and severity of pain and evaluate response  - Implement non-pharmacological measures as appropriate and evaluate response  - Consider cultural and social influences on pain and pain management  - Notify physician/advanced practitioner if interventions unsuccessful or patient reports new pain  Outcome: Progressing     Problem: INFECTION - ADULT  Goal: Absence or prevention of progression during hospitalization  Description: INTERVENTIONS:  - Assess and monitor for signs and symptoms of infection  - Monitor lab/diagnostic results  - Monitor all insertion sites, i.e. indwelling lines, tubes, and drains  - Monitor endotracheal if appropriate and nasal secretions for changes in amount and color  - [place] appropriate cooling/warming therapies per order  - Administer medications as ordered  - Instruct and encourage patient and family to use good hand hygiene technique  - Identify and instruct in appropriate isolation precautions for identified infection/condition  Outcome: Progressing     Problem: SAFETY ADULT  Goal: Patient will remain free of falls  Description: INTERVENTIONS:  - Educate patient/family on patient safety including physical limitations  - Instruct patient to call for assistance with activity   - Consult OT/PT to assist with strengthening/mobility   - Keep Call bell within reach  - Keep bed low and locked with side rails adjusted as appropriate  - Keep care items and personal belongings within reach  - Initiate and maintain comfort rounds  - Make Fall Risk Sign visible to staff  - Apply yellow socks and bracelet for high fall risk patients  - Consider moving patient to room near nurses station  Outcome: Progressing  Goal: Maintain or return to baseline ADL function  Description: INTERVENTIONS:  -  Assess patient's ability to carry out ADLs; assess patient's baseline for ADL function and identify physical deficits which impact ability to perform ADLs (bathing, care of mouth/teeth, toileting, grooming, dressing, etc.)  - Assess/evaluate cause of self-care deficits   - Assess range of motion  - Assess patient's mobility; develop plan if impaired  - Assess patient's need for assistive devices and provide as appropriate  - Encourage maximum independence but intervene and supervise when necessary  - Involve family in performance of ADLs  - Assess for home care needs following discharge   - Consider OT consult to assist with ADL evaluation and planning for discharge  - Provide patient education as appropriate  Outcome: Progressing  Goal: Maintains/Returns to pre admission functional level  Description: INTERVENTIONS:  - Perform BMAT or MOVE assessment daily.   - Set and communicate daily mobility goal to care team and patient/family/caregiver. - Collaborate with rehabilitation services on mobility goals if consulted  - Perform Range of Motion 3 times a day. - Reposition patient every 2 hours.   - Dangle patient 3 times a day  - Stand patient 3 times a day  - Ambulate patient 3 times a day  - Out of bed to chair 3 times a day   - Out of bed for meals 3 times a day  - Out of bed for toileting  - Record patient progress and toleration of activity level   Outcome: Progressing     Problem: DISCHARGE PLANNING  Goal: Discharge to home or other facility with appropriate resources  Description: INTERVENTIONS:  - Identify barriers to discharge w/patient and caregiver  - Arrange for needed discharge resources and transportation as appropriate  - Identify discharge learning needs (meds, wound care, etc.)  - Arrange for interpretive services to assist at discharge as needed  - Refer to Case Management Department for coordinating discharge planning if the patient needs post-hospital services based on physician/advanced practitioner order or complex needs related to functional status, cognitive ability, or social support system  Outcome: Progressing     Problem: Knowledge Deficit  Goal: Patient/family/caregiver demonstrates understanding of disease process, treatment plan, medications, and discharge instructions  Description: Complete learning assessment and assess knowledge base. Interventions:  - Provide teaching at level of understanding  - Provide teaching via preferred learning methods  Outcome: Progressing     Problem: NEUROSENSORY - ADULT  Goal: Achieves maximal functionality and self care  Description: INTERVENTIONS  - Monitor swallowing and airway patency with patient fatigue and changes in neurological status  - Encourage and assist patient to increase activity and self care.    - Encourage visually impaired, hearing impaired and aphasic patients to use assistive/communication devices  Outcome: Progressing     Problem: CARDIOVASCULAR - ADULT  Goal: Maintains optimal cardiac output and hemodynamic stability  Description: INTERVENTIONS:  - Monitor I/O, vital signs and rhythm  - Monitor for S/S and trends of decreased cardiac output  - Administer and titrate ordered vasoactive medications to optimize hemodynamic stability  - Assess quality of pulses, skin color and temperature  - Assess for signs of decreased coronary artery perfusion  - Instruct patient to report change in severity of symptoms  Outcome: Progressing  Goal: Absence of cardiac dysrhythmias or at baseline rhythm  Description: INTERVENTIONS:  - Continuous cardiac monitoring, vital signs, obtain 12 lead EKG if ordered  - Administer antiarrhythmic and heart rate control medications as ordered  - Monitor electrolytes and administer replacement therapy as ordered  Outcome: Progressing     Problem: GASTROINTESTINAL - ADULT  Goal: Minimal or absence of nausea and/or vomiting  Description: INTERVENTIONS:  - Administer IV fluids if ordered to ensure adequate hydration  - Maintain NPO status until nausea and vomiting are resolved  - Nasogastric tube if ordered  - Administer ordered antiemetic medications as needed  - Provide nonpharmacologic comfort measures as appropriate  - Advance diet as tolerated, if ordered  - Consider nutrition services referral to assist patient with adequate nutrition and appropriate food choices  Outcome: Progressing  Goal: Maintains or returns to baseline bowel function  Description: INTERVENTIONS:  - Assess bowel function  - Encourage oral fluids to ensure adequate hydration  - Administer IV fluids if ordered to ensure adequate hydration  - Administer ordered medications as needed  - Encourage mobilization and activity  - Consider nutritional services referral to assist patient with adequate nutrition and appropriate food choices  Outcome: Progressing  Goal: Maintains adequate nutritional intake  Description: INTERVENTIONS:  - Monitor percentage of each meal consumed  - Identify factors contributing to decreased intake, treat as appropriate  - Assist with meals as needed  - Monitor I&O, weight, and lab values if indicated  - Obtain nutrition services referral as needed  Outcome: Progressing     Problem: METABOLIC, FLUID AND ELECTROLYTES - ADULT  Goal: Fluid balance maintained  Description: INTERVENTIONS:  - Monitor labs   - Monitor I/O and WT  - Instruct patient on fluid and nutrition as appropriate  - Assess for signs & symptoms of volume excess or deficit  Outcome: Progressing     Problem: HEMATOLOGIC - ADULT  Goal: Maintains hematologic stability  Description: INTERVENTIONS  - Assess for signs and symptoms of bleeding or hemorrhage  - Monitor labs  - Administer supportive blood products/factors as ordered and appropriate  Outcome: Progressing     Problem: MUSCULOSKELETAL - ADULT  Goal: Maintain or return mobility to safest level of function  Description: INTERVENTIONS:  - Assess patient's ability to carry out ADLs; assess patient's baseline for ADL function and identify physical deficits which impact ability to perform ADLs (bathing, care of mouth/teeth, toileting, grooming, dressing, etc.)  - Assess/evaluate cause of self-care deficits   - Assess range of motion  - Assess patient's mobility  - Assess patient's need for assistive devices and provide as appropriate  - Encourage maximum independence but intervene and supervise when necessary  - Involve family in performance of ADLs  - Assess for home care needs following discharge   - Consider OT consult to assist with ADL evaluation and planning for discharge  - Provide patient education as appropriate  Outcome: Progressing  Goal: Maintain proper alignment of affected body part  Description: INTERVENTIONS:  - Support, maintain and protect limb and body alignment  - Provide patient/ family with appropriate education  Outcome: Progressing     Problem: Nutrition/Hydration-ADULT  Goal: Nutrient/Hydration intake appropriate for improving, restoring or maintaining nutritional needs  Description: Monitor and assess patient's nutrition/hydration status for malnutrition. Collaborate with interdisciplinary team and initiate plan and interventions as ordered. Monitor patient's weight and dietary intake as ordered or per policy. Utilize nutrition screening tool and intervene as necessary. Determine patient's food preferences and provide high-protein, high-caloric foods as appropriate.      INTERVENTIONS:  - Monitor oral intake, urinary output, labs, and treatment plans  - Assess nutrition and hydration status and recommend course of action  - Evaluate amount of meals eaten  - Assist patient with eating if necessary   - Allow adequate time for meals  - Recommend/ encourage appropriate diets, oral nutritional supplements, and vitamin/mineral supplements  - Order, calculate, and assess calorie counts as needed  - Recommend, monitor, and adjust tube feedings and TPN based on assessed needs  - Assess need for intravenous fluids  - Provide nutrition/hydration education as appropriate  - Include patient/family/caregiver in decisions related to nutrition  Outcome: Progressing

## 2023-08-02 NOTE — CASE MANAGEMENT
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 Primary Insurance: MEDICARE  Secondary Insurance: ARLEY RUSSELL PENDING    ASSESSMENT:  [id number] [facility], [address] [place] [address] Representative - Mother   Primary Phone: [phone] (Mobile)               Advance Directives  Does patient have a [address]?: No  Was patient offered paperwork?: No  Does patient currently have a Health Care decision maker?: Yes, please see Health Care Proxy section  Does patient have Advance Directives?: No  Was patient offered paperwork?: No  Primary Contact: Efra , mom         Readmission Root Cause  30 Day Readmission: No    Patient Information  Admitted from[de-identified] Home  Mental Status: Alert  During Assessment patient was accompanied by: Not accompanied during assessment  Assessment information provided by[de-identified] Patient  Primary Caregiver: Self  Support Systems: Parent,  [address] of Residence: [address] [place] [address] do you live in?: [facility] entry access options.  Select all that apply.: Stairs  Number of steps to enter home.: 6  Do the steps have railings?: Yes  Type of Current Residence: 2 story home  Upon entering residence, is there a bedroom on the main floor (no further steps)?: No  A bedroom is located on the following floor levels of residence (select all that apply):: 2nd Floor  Upon entering residence, is there a bathroom on the main floor (no further steps)?: No  Indicate which floors of current residence have a bathroom (select all the apply):: 2nd Floor  Number of steps to 2nd floor from main floor: One Flight  In the last 12 months, was there a time when you were not able to pay the mortgage or rent on time?: No  In the last 12 months, how many places have you lived?: 2  In the last 12 months, was there a time when you did not have a steady place to sleep or slept in a shelter (including now)?: No  Homeless/housing insecurity resource given?: N/A  Living Arrangements: Lives w/ Parent(s)  Is patient a ?: No    Activities of Daily Living Prior to Admission  Functional Status: Independent  Completes ADLs independently?: Yes  Ambulates independently?: Yes  Does patient use assisted devices?: No  Does patient currently own DME?: No  Does patient have a history of Outpatient Therapy (PT/OT)?: No  Does the patient have a history of Short-Term Rehab?: No  Does patient have a history of HHC?: No  Does patient currently have [facility]?: No         Patient Information Continued  Income Source: Unemployed (had been working however as of recent- he "had to quit until I get this all worked out")  Does patient have prescription coverage?: Yes (Uses [facility] oin [address] [place] [address])  Within the past 12 months, you worried that your food would run out before you got the money to buy more.: Never true  Within the past 12 months, the food you bought just didn't last and you didn't have money to get more.: Never true  Food insecurity resource given?: N/A  Does patient have a history of substance abuse?: No  Does patient have a history of Mental Health Diagnosis?: Yes (Paranoid Schizphrenia)  Is patient receiving treatment for mental health?: No. Treatment options were provided.  (has not been seen by psychiatric service is a few years per record- contact information placed on AVS for [facility].)  Has patient received inpatient treatment related to mental health in the last 2 years?: No         Means of Transportation  Means of Transport to Appts[de-identified] Family transport  In the past 12 months, has lack of transportation kept you from medical appointments or from getting medications?: No  In the past 12 months, has lack of transportation kept you from meetings, work, or from getting things needed for daily living?: No  Was application for public transport provided?: N/A        DISCHARGE DETAILS:    Discharge planning discussed with[de-identified] pt        CM contacted family/caregiver?: No- see comments (pt declined having mother called as doesn't feel has needs on discharge other than medical follow up)  Were Treatment Team discharge recommendations reviewed with patient/caregiver?: Yes  Did patient/caregiver verbalize understanding of patient care needs?: Yes       Contacts  Patient Contacts: Rajani Nieto  Relationship to Patient[de-identified] Family  Contact Method: Phone  Phone Number: Kathryn         Is the patient interested in [address] at discharge?: No    DME Referral Provided  Referral made for DME?: No    Other Referral/Resources/Interventions Provided:  Interventions: Psychiatrist, PCP    Would you like to participate in our [address] service program?  : No - Declined    Treatment Team Recommendation: Home  Discharge Destination Plan[de-identified] Home  Transport at Discharge : Auto with designated , Family

## 2023-08-02 NOTE — RESTORATIVE TECHNICIAN NOTE
Restorative Technician Note      Patient Name: Jarad Landa     Restorative Tech Visit Date: 08/02/23  Note Type: Mobility  Patient Position Upon Consult: Supine  Activity Performed: Range of motion  Patient Position at End of Consult: Supine;  All needs within reach

## 2023-08-02 NOTE — ASSESSMENT & PLAN NOTE
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 Has disease of the terminal ileum  Given fistula with purulent material we will plan for course of Keflex over the next 5 to 7 days.   White blood cell elevation but could be related to corticosteroids as well as infection  Day 3 of 7

## 2023-08-03 ENCOUNTER — APPOINTMENT (INPATIENT)
Dept: RADIOLOGY | Facility: HOSPITAL | Age: 33
DRG: 385 | End: 2023-08-03
Payer: MEDICARE

## 2023-08-03 LAB
ANION GAP SERPL CALCULATED.3IONS-SCNC: 5 MMOL/L
BUN SERPL-MCNC: 10 MG/DL (ref 5–25)
CALCIUM SERPL-MCNC: 8.2 MG/DL (ref 8.4–10.2)
CHLORIDE SERPL-SCNC: 103 MMOL/L (ref 96–108)
CO2 SERPL-SCNC: 27 MMOL/L (ref 21–32)
CREAT SERPL-MCNC: 0.74 MG/DL (ref 0.6–1.3)
ERYTHROCYTE [DISTWIDTH] IN BLOOD BY AUTOMATED COUNT: 26.2 % (ref 11.6–15.1)
GFR SERPL CREATININE-BSD FRML MDRD: 121 ML/MIN/1.73SQ M
GLUCOSE SERPL-MCNC: 122 MG/DL (ref 65–140)
HCT VFR BLD AUTO: 28.1 % (ref 36.5–49.3)
HGB BLD-MCNC: 7.9 G/DL (ref 12–17)
MCH RBC QN AUTO: 21 PG (ref 26.8–34.3)
MCHC RBC AUTO-ENTMCNC: 28.1 G/DL (ref 31.4–37.4)
MCV RBC AUTO: 75 FL (ref 82–98)
PLATELET # BLD AUTO: 592 THOUSANDS/UL (ref 149–390)
PMV BLD AUTO: 8.1 FL (ref 8.9–12.7)
POTASSIUM SERPL-SCNC: 4.1 MMOL/L (ref 3.5–5.3)
RBC # BLD AUTO: 3.77 MILLION/UL (ref 3.88–5.62)
SODIUM SERPL-SCNC: 135 MMOL/L (ref 135–147)
WBC # BLD AUTO: 19.47 THOUSAND/UL (ref 4.31–10.16)

## 2023-08-03 PROCEDURE — 99232 SBSQ HOSP IP/OBS MODERATE 35: CPT | Performed by: INTERNAL MEDICINE

## 2023-08-03 PROCEDURE — 80048 BASIC METABOLIC PNL TOTAL CA: CPT | Performed by: INTERNAL MEDICINE

## 2023-08-03 PROCEDURE — 99233 SBSQ HOSP IP/OBS HIGH 50: CPT | Performed by: INTERNAL MEDICINE

## 2023-08-03 PROCEDURE — 85027 COMPLETE CBC AUTOMATED: CPT | Performed by: INTERNAL MEDICINE

## 2023-08-03 PROCEDURE — 71045 X-RAY EXAM CHEST 1 VIEW: CPT

## 2023-08-03 RX ADMIN — LIDOCAINE 1 PATCH: 50 PATCH CUTANEOUS at 18:22

## 2023-08-03 RX ADMIN — ALUMINUM HYDROXIDE, MAGNESIUM HYDROXIDE, AND SIMETHICONE 30 ML: 200; 200; 20 SUSPENSION ORAL at 18:22

## 2023-08-03 RX ADMIN — METHYLPREDNISOLONE SODIUM SUCCINATE 20 MG: 40 INJECTION, POWDER, FOR SOLUTION INTRAMUSCULAR; INTRAVENOUS at 12:59

## 2023-08-03 RX ADMIN — CEPHALEXIN 500 MG: 500 CAPSULE ORAL at 01:11

## 2023-08-03 RX ADMIN — METHYLPREDNISOLONE SODIUM SUCCINATE 20 MG: 40 INJECTION, POWDER, FOR SOLUTION INTRAMUSCULAR; INTRAVENOUS at 22:20

## 2023-08-03 RX ADMIN — CEPHALEXIN 500 MG: 500 CAPSULE ORAL at 12:59

## 2023-08-03 RX ADMIN — CEPHALEXIN 500 MG: 500 CAPSULE ORAL at 06:04

## 2023-08-03 RX ADMIN — METHYLPREDNISOLONE SODIUM SUCCINATE 20 MG: 40 INJECTION, POWDER, FOR SOLUTION INTRAMUSCULAR; INTRAVENOUS at 06:04

## 2023-08-03 RX ADMIN — FLUCONAZOLE 100 MG: 100 TABLET ORAL at 08:23

## 2023-08-03 RX ADMIN — PANTOPRAZOLE SODIUM 40 MG: 40 TABLET, DELAYED RELEASE ORAL at 06:04

## 2023-08-03 RX ADMIN — CEPHALEXIN 500 MG: 500 CAPSULE ORAL at 18:22

## 2023-08-03 NOTE — PLAN OF CARE
Problem: PAIN - ADULT  Goal: Verbalizes/displays adequate comfort level or baseline comfort level  Description: Interventions:  - Encourage patient to monitor pain and request assistance  - Assess pain using appropriate pain scale  - Administer analgesics based on type and severity of pain and evaluate response  - Implement non-pharmacological measures as appropriate and evaluate response  - Consider cultural and social influences on pain and pain management  - Notify physician/advanced practitioner if interventions unsuccessful or patient reports new pain  Outcome: Progressing     Problem: INFECTION - ADULT  Goal: Absence or prevention of progression during hospitalization  Description: INTERVENTIONS:  - Assess and monitor for signs and symptoms of infection  - Monitor lab/diagnostic results  - Monitor all insertion sites, i.e. indwelling lines, tubes, and drains  - Monitor endotracheal if appropriate and nasal secretions for changes in amount and color  - [place] appropriate cooling/warming therapies per order  - Administer medications as ordered  - Instruct and encourage patient and family to use good hand hygiene technique  - Identify and instruct in appropriate isolation precautions for identified infection/condition  Outcome: Progressing     Problem: SAFETY ADULT  Goal: Patient will remain free of falls  Description: INTERVENTIONS:  - Educate patient/family on patient safety including physical limitations  - Instruct patient to call for assistance with activity   - Consult OT/PT to assist with strengthening/mobility   - Keep Call bell within reach  - Keep bed low and locked with side rails adjusted as appropriate  - Keep care items and personal belongings within reach  - Initiate and maintain comfort rounds  - Make Fall Risk Sign visible to staff  - Apply yellow socks and bracelet for high fall risk patients  - Consider moving patient to room near nurses station  Outcome: Progressing  Goal: Maintain or return to baseline ADL function  Description: INTERVENTIONS:  -  Assess patient's ability to carry out ADLs; assess patient's baseline for ADL function and identify physical deficits which impact ability to perform ADLs (bathing, care of mouth/teeth, toileting, grooming, dressing, etc.)  - Assess/evaluate cause of self-care deficits   - Assess range of motion  - Assess patient's mobility; develop plan if impaired  - Assess patient's need for assistive devices and provide as appropriate  - Encourage maximum independence but intervene and supervise when necessary  - Involve family in performance of ADLs  - Assess for home care needs following discharge   - Consider OT consult to assist with ADL evaluation and planning for discharge  - Provide patient education as appropriate  Outcome: Progressing  Goal: Maintains/Returns to pre admission functional level  Description: INTERVENTIONS:  - Perform BMAT or MOVE assessment daily.   - Set and communicate daily mobility goal to care team and patient/family/caregiver. - Collaborate with rehabilitation services on mobility goals if consulted  - Perform Range of Motion 3 times a day. - Reposition patient every 2 hours.   - Dangle patient 3 times a day  - Stand patient 3 times a day  - Ambulate patient 3 times a day  - Out of bed to chair 3 times a day   - Out of bed for meals 3 times a day  - Out of bed for toileting  - Record patient progress and toleration of activity level   Outcome: Progressing     Problem: DISCHARGE PLANNING  Goal: Discharge to home or other facility with appropriate resources  Description: INTERVENTIONS:  - Identify barriers to discharge w/patient and caregiver  - Arrange for needed discharge resources and transportation as appropriate  - Identify discharge learning needs (meds, wound care, etc.)  - Arrange for interpretive services to assist at discharge as needed  - Refer to Case Management Department for coordinating discharge planning if the patient needs post-hospital services based on physician/advanced practitioner order or complex needs related to functional status, cognitive ability, or social support system  Outcome: Progressing     Problem: Knowledge Deficit  Goal: Patient/family/caregiver demonstrates understanding of disease process, treatment plan, medications, and discharge instructions  Description: Complete learning assessment and assess knowledge base. Interventions:  - Provide teaching at level of understanding  - Provide teaching via preferred learning methods  Outcome: Progressing     Problem: NEUROSENSORY - ADULT  Goal: Achieves maximal functionality and self care  Description: INTERVENTIONS  - Monitor swallowing and airway patency with patient fatigue and changes in neurological status  - Encourage and assist patient to increase activity and self care.    - Encourage visually impaired, hearing impaired and aphasic patients to use assistive/communication devices  Outcome: Progressing     Problem: CARDIOVASCULAR - ADULT  Goal: Maintains optimal cardiac output and hemodynamic stability  Description: INTERVENTIONS:  - Monitor I/O, vital signs and rhythm  - Monitor for S/S and trends of decreased cardiac output  - Administer and titrate ordered vasoactive medications to optimize hemodynamic stability  - Assess quality of pulses, skin color and temperature  - Assess for signs of decreased coronary artery perfusion  - Instruct patient to report change in severity of symptoms  Outcome: Progressing  Goal: Absence of cardiac dysrhythmias or at baseline rhythm  Description: INTERVENTIONS:  - Continuous cardiac monitoring, vital signs, obtain 12 lead EKG if ordered  - Administer antiarrhythmic and heart rate control medications as ordered  - Monitor electrolytes and administer replacement therapy as ordered  Outcome: Progressing     Problem: GASTROINTESTINAL - ADULT  Goal: Minimal or absence of nausea and/or vomiting  Description: INTERVENTIONS:  - Administer IV fluids if ordered to ensure adequate hydration  - Maintain NPO status until nausea and vomiting are resolved  - Nasogastric tube if ordered  - Administer ordered antiemetic medications as needed  - Provide nonpharmacologic comfort measures as appropriate  - Advance diet as tolerated, if ordered  - Consider nutrition services referral to assist patient with adequate nutrition and appropriate food choices  Outcome: Progressing  Goal: Maintains or returns to baseline bowel function  Description: INTERVENTIONS:  - Assess bowel function  - Encourage oral fluids to ensure adequate hydration  - Administer IV fluids if ordered to ensure adequate hydration  - Administer ordered medications as needed  - Encourage mobilization and activity  - Consider nutritional services referral to assist patient with adequate nutrition and appropriate food choices  Outcome: Progressing  Goal: Maintains adequate nutritional intake  Description: INTERVENTIONS:  - Monitor percentage of each meal consumed  - Identify factors contributing to decreased intake, treat as appropriate  - Assist with meals as needed  - Monitor I&O, weight, and lab values if indicated  - Obtain nutrition services referral as needed  Outcome: Progressing     Problem: METABOLIC, FLUID AND ELECTROLYTES - ADULT  Goal: Fluid balance maintained  Description: INTERVENTIONS:  - Monitor labs   - Monitor I/O and WT  - Instruct patient on fluid and nutrition as appropriate  - Assess for signs & symptoms of volume excess or deficit  Outcome: Progressing     Problem: HEMATOLOGIC - ADULT  Goal: Maintains hematologic stability  Description: INTERVENTIONS  - Assess for signs and symptoms of bleeding or hemorrhage  - Monitor labs  - Administer supportive blood products/factors as ordered and appropriate  Outcome: Progressing     Problem: MUSCULOSKELETAL - ADULT  Goal: Maintain or return mobility to safest level of function  Description: INTERVENTIONS:  - Assess patient's ability to carry out ADLs; assess patient's baseline for ADL function and identify physical deficits which impact ability to perform ADLs (bathing, care of mouth/teeth, toileting, grooming, dressing, etc.)  - Assess/evaluate cause of self-care deficits   - Assess range of motion  - Assess patient's mobility  - Assess patient's need for assistive devices and provide as appropriate  - Encourage maximum independence but intervene and supervise when necessary  - Involve family in performance of ADLs  - Assess for home care needs following discharge   - Consider OT consult to assist with ADL evaluation and planning for discharge  - Provide patient education as appropriate  Outcome: Progressing  Goal: Maintain proper alignment of affected body part  Description: INTERVENTIONS:  - Support, maintain and protect limb and body alignment  - Provide patient/ family with appropriate education  Outcome: Progressing     Problem: Nutrition/Hydration-ADULT  Goal: Nutrient/Hydration intake appropriate for improving, restoring or maintaining nutritional needs  Description: Monitor and assess patient's nutrition/hydration status for malnutrition. Collaborate with interdisciplinary team and initiate plan and interventions as ordered. Monitor patient's weight and dietary intake as ordered or per policy. Utilize nutrition screening tool and intervene as necessary. Determine patient's food preferences and provide high-protein, high-caloric foods as appropriate.      INTERVENTIONS:  - Monitor oral intake, urinary output, labs, and treatment plans  - Assess nutrition and hydration status and recommend course of action  - Evaluate amount of meals eaten  - Assist patient with eating if necessary   - Allow adequate time for meals  - Recommend/ encourage appropriate diets, oral nutritional supplements, and vitamin/mineral supplements  - Order, calculate, and assess calorie counts as needed  - Recommend, monitor, and adjust tube feedings and TPN based on assessed needs  - Assess need for intravenous fluids  - Provide nutrition/hydration education as appropriate  - Include patient/family/caregiver in decisions related to nutrition  Outcome: Progressing

## 2023-08-03 NOTE — TELEPHONE ENCOUNTER
Patient currently admitted to [facility] 7/29-present (5 day)    Connected with the patient via phone to educate the patient on Remicade. Declining education from RN. Pt requesting to speak with a physician for more details prior to starting authorization. Advised patient to consult with GI provider Dr. Christina Trevino tomorrow prior to discharge. If unable to speak with GI provider tomorrow pt would like to will wait until hospital f/u visit (not scheduled yet) to discuss.

## 2023-08-03 NOTE — ASSESSMENT & PLAN NOTE
Received 3 units packed RBC so far. We will continue monitoring hemoglobin levels.     · Appreciate GI recommendations, for bidirectional evaluation with an EGD and colonoscopy  · Continue PPI   · Suspect anemia from malabsorption from Crohn's disease  · Will need outpatient B12 level, patient was transfused and B12 likely to be unreliable    Results from last 7 days   Lab Units 08/03/23  0443 08/02/23  0557 08/01/23  0450 07/30/23  1616 07/30/23  0608   HEMOGLOBIN g/dL 7.9* 8.0* 8.8* 8.7* 6.6*   MCV fL 75* 73* 73*  --  70*   RDW % 26.2* 26.0* 25.5*  --  22.8*   IRON ug/dL  --   --   --   --  34*   TIBC ug/dL  --   --   --   --  179*   FERRITIN ng/mL  --   --   --   --  19*

## 2023-08-03 NOTE — ASSESSMENT & PLAN NOTE
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 Has disease of the terminal ileum  Given fistula with purulent material we will plan for course of Keflex over the next 5 to 7 days.   White blood cell elevation but could be related to corticosteroids as well as infection  Day 4 of 7

## 2023-08-03 NOTE — PROGRESS NOTES
[facility]  Progress Note  Name: Guille Thompson  MRN: [id number]  Unit/Bed#: [facility] 2 [address] [facility] [place] 221-02 I Date of Admission: 7/29/2023   Date of Service: 8/3/2023 I Hospital Day: 5    Assessment/Plan   * Anemia  Assessment & Plan  Received 3 units packed RBC so far. We will continue monitoring hemoglobin levels. · Appreciate GI recommendations, for bidirectional evaluation with an EGD and colonoscopy  · Continue PPI   · Suspect anemia from malabsorption from Crohn's disease  · Will need outpatient B12 level, patient was transfused and B12 likely to be unreliable    Results from last 7 days   Lab Units 08/03/23  0443 08/02/23  0557 08/01/23  0450 07/30/23  1616 07/30/23  0608   HEMOGLOBIN g/dL 7.9* 8.0* 8.8* 8.7* 6.6*   MCV fL 75* 73* 73*  --  70*   RDW % 26.2* 26.0* 25.5*  --  22.8*   IRON ug/dL  --   --   --   --  34*   TIBC ug/dL  --   --   --   --  179*   FERRITIN ng/mL  --   --   --   --  19*         Esophagitis  Assessment & Plan  Thought to have candidal esophagitis  Continue Diflucan day #4 of 14    Severe protein-calorie malnutrition (HCC)  Assessment & Plan  Malnutrition Findings:   Adult Malnutrition type: Acute illness  Adult Degree of Malnutrition: Other severe protein calorie malnutrition  Malnutrition Characteristics: Inadequate energy, Weight loss                  360 Statement: Severe calorie-protein malnutrition in context of acute illness r/t poor appetite, inadequate PO intake, altered GI function as evidance by energy intake less than 50% compared to estimated needsx3 weeks, significant 9.5% wt loss x 1 month ( 64.2kg 6/25/23-> 58kg 7/29/23); Treated with PO diet    BMI Findings: Body mass index is 20.64 kg/m². Crohn's disease [facility])  Assessment & Plan  Patient was diagnosed with Crohns disease at age 15  Found to have severe Crohn's on colonoscopy, as well as stricture at the ileocecal valve.   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 Has disease of the terminal ileum  Given fistula with purulent material we will plan for course of Keflex over the next 5 to 7 days. White blood cell elevation but could be related to corticosteroids as well as infection  Day 4 of 7       Schizophrenia ([address])  Assessment & Plan  Currently not on any medications. Will need outpatient follow-up with psychiatry at discharge                 Hospital Course:     79-year-old male patient admitted with Crohn's exacerbation. Clinically improving with notable severe disease. Assessment:      Principal Problem:    Anemia  Active Problems:    Schizophrenia ([address])    Crohn's disease ([address])    Severe protein-calorie malnutrition (HCC)    Esophagitis      Plan:    · Continue IV steroids x24 hours likely discharge tomorrow       VTE Pharmacologic Prophylaxis:   Pharmacologic: Enoxaparin (Lovenox)  Mechanical VTE Prophylaxis in Place: Yes    Patient Centered Rounds: I have performed bedside rounds with nursing staff today. Discussions with Specialists or Other Care Team Provider: Discussed with case management    Education and Discussions with Family / Patient: Patient updating family    Time Spent for Care: 1 hour. More than 50% of total time spent on counseling and coordination of care as described above. Current Length of Stay: 5 day(s)    Current Patient Status: Inpatient   Certification Statement: The patient will continue to require additional inpatient hospital stay due to Crohn's exacerbation    Discharge Plan / Estimated Discharge Date: Tomorrow    Code Status: Level 1 - Full Code      Subjective:   Seen and examined, no acute complaints. No nausea no vomiting, no abdominal pain    A complete and comprehensive 14 point organ system review has been performed and all other systems are negative other than stated above.     Objective:     Vitals:   Temp (24hrs), Av.8 °F (37.1 °C), Min:98.8 °F (37.1 °C), Max:98.8 °F (37.1 °C)    Temp:  [98.8 °F (37.1 °C)] 98.8 °F (37.1 °C)  HR:  [54] 54  Resp:  [16] 16  BP: (123)/(78) 123/78  SpO2:  [98 %] 98 %  Body mass index is 20.64 kg/m². Input and Output Summary (last 24 hours):     No intake or output data in the 24 hours ending 08/03/23 6963    Physical Exam:     General: well appearing, no acute distress  HEENT: atraumatic, PERRLA, moist mucosa, normal pharynx, normal tonsils and adenoids, normal tongue, no fluid in sinuses  Neck: Trachea midline, no carotid bruit, no masses  Respiratory: normal chest wall expansion, CTA B, no r/r/w, no rubs  Cardiovascular: RRR, no m/r/g, Normal S1 and S2  Abdomen: Soft, non-tender, non-distended, normal bowel sounds in all quadrants, no hepatosplenomegaly, no tympany  Rectal: deferred  Musculoskeletal: normal ROM in upper and lower extremities  Integumentary: warm, dry, and pink, with no rash, purpura, or petechia  Heme/Lymph: no lymphadenopathy, no bruises  Neurological: Cranial Nerves II-XII grossly intact  Psychiatric: cooperative with normal mood, affect, and cognition      Additional Data:     Labs:    Results from last 7 days   Lab Units 08/03/23  0443 07/30/23  1616 07/30/23  0608   WBC Thousand/uL 19.47*   < > 15.08*   HEMOGLOBIN g/dL 7.9*   < > 6.6*   HEMATOCRIT % 28.1*   < > 23.0*   PLATELETS Thousands/uL 592*   < > 683*   NEUTROS PCT %  --   --  76*   LYMPHS PCT %  --   --  14   MONOS PCT %  --   --  9   EOS PCT %  --   --  0    < > = values in this interval not displayed. Results from last 7 days   Lab Units 08/03/23  0443 08/01/23  0450 07/30/23  0608   POTASSIUM mmol/L 4.1   < > 3.8   CHLORIDE mmol/L 103   < > 103   CO2 mmol/L 27   < > 27   BUN mg/dL 10   < > 3*   CREATININE mg/dL 0.74   < > 0.63   CALCIUM mg/dL 8.2*   < > 8.2*   ALK PHOS U/L  --   --  56   ALT U/L  --   --  4*   AST U/L  --   --  6*    < > = values in this interval not displayed. * I Have Reviewed All Lab Data Listed Above.   * Additional Pertinent Lab Tests Reviewed: [address] Admission Reviewed    Imaging:    Imaging Reports Reviewed Today Include: No new imaging  Imaging Personally Reviewed by Myself Includes: No new imaging    Recent Cultures (last 7 days):     Results from last 7 days   Lab Units 07/30/23  0339   C DIFF TOXIN B BY PCR  Negative       Last 24 Hours Medication List:   Current Facility-Administered Medications   Medication Dose Route Frequency Provider Last Rate   • acetaminophen  650 mg Oral Q6H PRN Aleksander Branham MD     • aluminum-magnesium hydroxide-simethicone  30 mL Oral Q4H PRN Pastora Arreaga PA-C     • cephalexin  500 mg Oral Q6H [address], DO     • enoxaparin  40 mg Subcutaneous Q24H [address] [place] Varinderayath, DO     • fluconazole  100 mg Oral Daily [address], DO     • lidocaine  1 patch Topical Q24H Aleksander Branham MD     • methylPREDNISolone sodium succinate  20 mg Intravenous Q8H [address] [facility]     • pantoprazole  40 mg Oral Early Morning Rose Ignacio DO          Today, Patient Was Seen By: Db Marcus DO    ** Please Note: This note was completed in part utilizing [employer] Software. Grammatical errors, random word insertions, spelling mistakes, and incomplete sentences may be an occasional consequence of this system secondary to software limitations, ambient noise, and hardware issues. If you have any questions or concerns about the content, text, or information contained within the body of this dictation, please contact the provider for clarification.  **

## 2023-08-03 NOTE — PROGRESS NOTES
Progress Note -  [facility]  Addis Murillo 35 y.o. male MRN: [id number]  Unit/Bed#: Loren Acevedo 2 [id number] [facility] [place] 221-02 Encounter: [id number]      ASSESSMENT AND PLAN:      72-year-old male with past medical history of schizoaffective disorder, Crohn's disease previously on Pentasa but currently on not on medication and lost to follow-up is presenting with abdominal pain, diarrhea, anemia. GI is consulted for management of his Crohn's disease. 1. Crohn's disease  2. Abdominal pain  3. Nausea, vomiting, diarrhea  Colonoscopy performed this admission showed severe pancolitis with evidence of TI involvement. Fortunately infectious studies have been negative. He is overall improved. Chronic hepatitis panel negative. QuantiFERON gold indeterminate. • Continue IV Solu-Medrol 20 mg every 8 for 72 hours total.  Transition on 8/4/2023 to prednisone 40 mg daily with slow taper. • Low fiber low residue diet. • IV fluids per primary team.  • Check chest x-ray given determined Quant gold  • Patient will require Remicade as an outpatient. We will begin prior authorization as patient would like to follow-up with [facility].  • DVT prophylaxis with Lovenox  • Monitor hemodynamics  • Monitor bowel movements. Rest of care per primary team.    ______________________________________________________________________    Subjective: Seen and examined. Overall improved. Had 1 bowel movement this morning which were brown. Denies any significant abdominal pain, nausea, vomiting. Tolerating diet. Rest of ROS was negative. REVIEW OF SYSTEMS:    Review of Systems   Constitutional: Negative for chills and fever. HENT: Negative for congestion and sinus pressure. Respiratory: Negative for cough and shortness of breath. Cardiovascular: Negative for chest pain, palpitations and leg swelling. Gastrointestinal: Negative for abdominal pain, diarrhea, and vomiting.    Genitourinary: Negative for dysuria and hematuria. Musculoskeletal: Negative for arthralgias and back pain. Skin: Negative for color change and rash. Neurological: Negative for dizziness and headaches. Psychiatric/Behavioral: Negative for agitation and confusion. All other systems reviewed and are negative. Historical Information   Past Medical History:   Diagnosis Date   • Anemia 2004    hospitalization/transfusion     Past Surgical History:   Procedure Laterality Date   • [place] ANRCT XM SURG REQ ANES GENERAL SPI/EDRL DX N/A 4/7/2016    Procedure: EXAM UNDER ANESTHESIA (EUA); possible REMOVAL OF FOREIGN BODY anoscopy ;  Surgeon: Angelo Marques MD;  Location: [facility];  Service: Colorectal   • [place] SURG TX ANAL FISTULA INTERSPHINCTERIC N/A 4/7/2016    Procedure: superficial FISTULOTOMY; SETON PROCEDURE drainage of chronic ischiofistula abscess and debridement;  Surgeon: Angelo Marques MD;  Location: [facility];  Service: Colorectal     Social History   Social History     Substance and Sexual Activity   Alcohol Use Not Currently    Comment: Socially     Social History     Substance and Sexual Activity   Drug Use No     Social History     Tobacco Use   Smoking Status Light Smoker   • Packs/day: 0.25   • Types: Cigarettes   Smokeless Tobacco Never   Tobacco Comments    [employer] says 7 cigarettes per day     History reviewed. No pertinent family history.     Meds/Allergies     Medications Prior to Admission   Medication   • acetaminophen (TYLENOL) 650 mg CR tablet   • ferrous sulfate 325 (65 Fe) mg tablet   • aspirin 325 mg tablet   • lidocaine (LIDODERM) 5 %   • methocarbamol (ROBAXIN) 500 mg tablet   • naproxen (EC NAPROSYN) 500 MG EC tablet     Current Facility-Administered Medications   Medication Dose Route Frequency   • acetaminophen (TYLENOL) tablet 650 mg  650 mg Oral Q6H PRN   • aluminum-magnesium hydroxide-simethicone (MAALOX) oral suspension 30 mL  30 mL Oral Q4H PRN   • cephalexin (KEFLEX) capsule 500 mg  500 mg Oral Q6H [address] • enoxaparin (LOVENOX) subcutaneous injection 40 mg  40 mg Subcutaneous Q24H VLADIMIR   • fluconazole (DIFLUCAN) tablet 100 mg  100 mg Oral Daily   • lidocaine (LIDODERM) 5 % patch 1 patch  1 patch Topical Q24H   • methylPREDNISolone sodium succinate (Solu-MEDROL) injection 20 mg  20 mg Intravenous Q8H [facility]   • pantoprazole (PROTONIX) EC tablet 40 mg  40 mg Oral Early Morning       Allergies   Allergen Reactions   • Haldol Decanoate [Haloperidol] Anaphylaxis   • Oxycodone-Acetaminophen Rash     "swollon red rash"   • Sulfamethoxazole-Trimethoprim      "never really worked"           Objective     Blood pressure 123/78, pulse (!) 54, temperature 98.8 °F (37.1 °C), temperature source Oral, resp. rate 16, height 5' 6" (1.676 m), weight 58 kg (127 lb 13.9 oz), SpO2 98 %. Body mass index is 20.64 kg/m². No intake or output data in the 24 hours ending 08/03/23 1252      PHYSICAL EXAM:      Physical Exam  Vitals and nursing note reviewed. Constitutional:       General: He is not in acute distress. Appearance: Normal appearance. He is well-developed. He is not ill-appearing. HENT:      Head: Normocephalic and atraumatic. Mouth/Throat:      Mouth: Mucous membranes are moist.   Eyes:      Extraocular Movements: Extraocular movements intact. Conjunctiva/sclera: Conjunctivae normal.   Cardiovascular:      Rate and Rhythm: Normal rate. Pulses: Normal pulses. Pulmonary:      Effort: Pulmonary effort is normal.   Abdominal:      General: Abdomen is flat. Bowel sounds are normal. There is no distension. Palpations: Abdomen is soft. Tenderness: There is no abdominal tenderness. There is no guarding. Musculoskeletal:      Cervical back: Neck supple. Right lower leg: No edema. Left lower leg: No edema. Skin:     General: Skin is warm and dry. Neurological:      General: No focal deficit present. Mental Status: He is alert and oriented to person, place, and time.    Psychiatric: Mood and Affect: Mood normal.         Behavior: Behavior normal.          Lab Results:   Admission on 07/29/2023   Component Date Value   • WBC 07/29/2023 14.85 (H)    • RBC 07/29/2023 2.86 (L)    • Hemoglobin 07/29/2023 4.9 (LL)    • Hematocrit 07/29/2023 18.8 (L)    • MCV 07/29/2023 66 (L)    • MCH 07/29/2023 17.1 (L)    • MCHC 07/29/2023 26.1 (L)    • RDW 07/29/2023 19.4 (H)    • MPV 07/29/2023 8.2 (L)    • Platelets 52/04/3074 803 (H)    • nRBC 07/29/2023 0    • Neutrophils Relative 07/29/2023 69    • Immat GRANS % 07/29/2023 1    • Lymphocytes Relative 07/29/2023 21    • Monocytes Relative 07/29/2023 9    • Eosinophils Relative 07/29/2023 0    • Basophils Relative 07/29/2023 0    • Neutrophils Absolute 07/29/2023 10.23 (H)    • Immature Grans Absolute 07/29/2023 0.08    • Lymphocytes Absolute 07/29/2023 3.07    • Monocytes Absolute 07/29/2023 1.37 (H)    • Eosinophils Absolute 07/29/2023 0.06    • Basophils Absolute 07/29/2023 0.04    • Sodium 07/29/2023 135    • Potassium 07/29/2023 3.6    • Chloride 07/29/2023 102    • CO2 07/29/2023 25    • ANION GAP 07/29/2023 8    • BUN 07/29/2023 5    • Creatinine 07/29/2023 0.83    • Glucose 07/29/2023 100    • Calcium 07/29/2023 8.5    • Corrected Calcium 07/29/2023 9.4    • AST 07/29/2023 10 (L)    • ALT 07/29/2023 5 (L)    • Alkaline Phosphatase 07/29/2023 66    • Total Protein 07/29/2023 7.9    • Albumin 07/29/2023 2.9 (L)    • Total Bilirubin 07/29/2023 0.19 (L)    • eGFR 07/29/2023 115    • ABO Grouping 07/29/2023 A    • Rh Factor 07/29/2023 Positive    • Antibody Screen 07/29/2023 Negative    • Specimen Expiration Date 07/29/2023 [id number]    • Unit Product Code 07/30/2023 K7895H62    • Unit Number 07/30/2023 [id number]    • Unit ABO 07/30/2023 A    • Unit [facility] 07/30/2023 POS    • Crossmatch 07/30/2023 Compatible    • Unit Dispense Status 07/30/2023 Presumed Trans    • Unit Product Volume 07/30/2023 300    • Unit Product Code 07/30/2023 F7847B85    • Unit Number 07/30/2023 [id number]    • Unit ABO 07/30/2023 A    • Unit [facility] 07/30/2023 POS    • Crossmatch 07/30/2023 Compatible    • Unit Dispense Status 07/30/2023 Presumed Trans    • Unit Product Volume 07/30/2023 300    • ABO Grouping 07/29/2023 A    • Rh Factor 07/29/2023 Positive    • Salmonella sp PCR 07/30/2023 None Detected    • Shigella sp/Enteroinvasi* 07/30/2023 None Detected    • Campylobacter sp (jejuni* 07/30/2023 None Detected    • Shiga toxin 1/Shiga toxi* 07/30/2023 None Detected    • C.difficile toxin by PCR 07/30/2023 Negative    • Calprotectin 07/30/2023 1660 (H)    • Sodium 07/30/2023 135    • Potassium 07/30/2023 3.8    • Chloride 07/30/2023 103    • CO2 07/30/2023 27    • ANION GAP 07/30/2023 5    • BUN 07/30/2023 3 (L)    • Creatinine 07/30/2023 0.63    • Glucose 07/30/2023 78    • Calcium 07/30/2023 8.2 (L)    • Corrected Calcium 07/30/2023 9.2    • AST 07/30/2023 6 (L)    • ALT 07/30/2023 4 (L)    • Alkaline Phosphatase 07/30/2023 56    • Total Protein 07/30/2023 7.3    • Albumin 07/30/2023 2.7 (L)    • Total Bilirubin 07/30/2023 0.45    • eGFR 07/30/2023 129    • WBC 07/30/2023 15.08 (H)    • RBC 07/30/2023 3.30 (L)    • Hemoglobin 07/30/2023 6.6 (LL)    • Hematocrit 07/30/2023 23.0 (L)    • MCV 07/30/2023 70 (L)    • MCH 07/30/2023 20.0 (L)    • MCHC 07/30/2023 28.7 (L)    • RDW 07/30/2023 22.8 (H)    • MPV 07/30/2023 8.2 (L)    • Platelets 49/79/8250 683 (H)    • nRBC 07/30/2023 0    • Neutrophils Relative 07/30/2023 76 (H)    • Immat GRANS % 07/30/2023 1    • Lymphocytes Relative 07/30/2023 14    • Monocytes Relative 07/30/2023 9    • Eosinophils Relative 07/30/2023 0    • Basophils Relative 07/30/2023 0    • Neutrophils Absolute 07/30/2023 11.38 (H)    • Immature Grans Absolute 07/30/2023 0.11    • Lymphocytes Absolute 07/30/2023 2.12    • Monocytes Absolute 07/30/2023 1.38 (H)    • Eosinophils Absolute 07/30/2023 0.04    • Basophils Absolute 07/30/2023 0.05    • CRP 07/30/2023 97.2 (H) • Unit Product Code 07/31/2023 [id number]    • Unit Number 07/31/2023 [id number]    • Unit ABO 07/31/2023 A    • Unit [facility] 07/31/2023 POS    • Crossmatch 07/31/2023 Compatible    • Unit Dispense Status 07/31/2023 Presumed Trans    • Unit Product Volume 07/31/2023 350    • Iron Saturation 07/30/2023 19 (L)    • TIBC 07/30/2023 179 (L)    • Iron 07/30/2023 34 (L)    • Ferritin 07/30/2023 19 (L)    • Hemoglobin 07/30/2023 8.7 (L)    • Hematocrit 07/30/2023 29.5 (L)    • Hepatitis B Surface Ag 07/30/2023 Non-reactive    • Hep A IgM 07/30/2023 Non-reactive    • Hepatitis C Ab 07/30/2023 Non-reactive    • Hep B C IgM 07/30/2023 Non-reactive    • Rapid HIV 1 AND 2 08/01/2023 Non-Reactive    • HIV-1 P24 Ag Screen 08/01/2023 Non-Reactive    • QFT Nil 08/01/2023 0.02    • QFT TB1-NIL 08/01/2023 0.00    • QFT TB2-NIL 08/01/2023 0.00    • QFT Mitogen-NIL 08/01/2023 <0.10    • QFT Final Interpretation 08/01/2023 Indeterminate (A)    • Case Report 07/31/2023                      Value:Non-gynecologic Cytology                          Case: [id number]                                  Authorizing Provider:  Nancy Mccord DO      Collected:           07/31/2023 1812              Ordering Location:     [facility]        Received:            07/31/2023 2119                                     [place] [facility]                                                            Pathologist:           Grace Jovel MD                                                                    Specimen:    Brushing, esophageal, brushing R/O candida                                                • Final Diagnosis 07/31/2023                      Value: This result contains rich text formatting which cannot be displayed here. • Gross Description 07/31/2023                      Value: This result contains rich text formatting which cannot be displayed here. • Additional Information 07/31/2023                      Value: This result contains rich text formatting which cannot be displayed here.    • Sodium 08/01/2023 133 (L)    • Potassium 08/01/2023 4.1    • Chloride 08/01/2023 102    • CO2 08/01/2023 23    • ANION GAP 08/01/2023 8    • BUN 08/01/2023 6    • Creatinine 08/01/2023 0.87    • Glucose 08/01/2023 165 (H)    • Calcium 08/01/2023 8.3 (L)    • eGFR 08/01/2023 113    • WBC 08/01/2023 14.30 (H)    • RBC 08/01/2023 4.21    • Hemoglobin 08/01/2023 8.8 (L)    • Hematocrit 08/01/2023 30.9 (L)    • MCV 08/01/2023 73 (L)    • MCH 08/01/2023 20.9 (L)    • MCHC 08/01/2023 28.5 (L)    • RDW 08/01/2023 25.5 (H)    • Platelets 32/87/5883 706 (H)    • MPV 08/01/2023 8.1 (L)    • CRP 08/01/2023 106.2 (H)    • Hepatitis B Surface Ag 08/01/2023 Non-reactive    • Hepatitis C Ab 08/01/2023 Non-reactive    • Hep B C IgM 08/01/2023 Non-reactive    • Hep B Core Total Ab 08/01/2023 Non-reactive    • Sodium 08/02/2023 136    • Potassium 08/02/2023 3.9    • Chloride 08/02/2023 103    • CO2 08/02/2023 27    • ANION GAP 08/02/2023 6    • BUN 08/02/2023 6    • Creatinine 08/02/2023 0.75    • Glucose 08/02/2023 105    • Calcium 08/02/2023 8.5    • eGFR 08/02/2023 120    • WBC 08/02/2023 22.73 (H)    • RBC 08/02/2023 3.83 (L)    • Hemoglobin 08/02/2023 8.0 (L)    • Hematocrit 08/02/2023 28.0 (L)    • MCV 08/02/2023 73 (L)    • MCH 08/02/2023 20.9 (L)    • MCHC 08/02/2023 28.6 (L)    • RDW 08/02/2023 26.0 (H)    • Platelets 59/50/4614 702 (H)    • MPV 08/02/2023 8.1 (L)    • Sodium 08/03/2023 135    • Potassium 08/03/2023 4.1    • Chloride 08/03/2023 103    • CO2 08/03/2023 27    • ANION GAP 08/03/2023 5    • BUN 08/03/2023 10    • Creatinine 08/03/2023 0.74    • Glucose 08/03/2023 122    • Calcium 08/03/2023 8.2 (L)    • eGFR 08/03/2023 121    • WBC 08/03/2023 19.47 (H)    • RBC 08/03/2023 3.77 (L)    • Hemoglobin 08/03/2023 7.9 (L)    • Hematocrit 08/03/2023 28.1 (L)    • MCV 08/03/2023 75 (L)    • MCH 08/03/2023 21.0 (L)    • MCHC 08/03/2023 28.1 (L)    • RDW 08/03/2023 26.2 (H)    • Platelets 86/23/4044 592 (H)    • MPV 08/03/2023 8.1 (L)        Imaging Studies: I have personally reviewed pertinent imaging studies. [address] [facility] GIANNA   Gastroenterology Fellow  PGY-4  Available via [employer]  8/3/2023 12:52 PM

## 2023-08-03 NOTE — PLAN OF CARE
Problem: PAIN - ADULT  Goal: Verbalizes/displays adequate comfort level or baseline comfort level  Description: Interventions:  - Encourage patient to monitor pain and request assistance  - Assess pain using appropriate pain scale  - Administer analgesics based on type and severity of pain and evaluate response  - Implement non-pharmacological measures as appropriate and evaluate response  - Consider cultural and social influences on pain and pain management  - Notify physician/advanced practitioner if interventions unsuccessful or patient reports new pain  Outcome: Progressing     Problem: INFECTION - ADULT  Goal: Absence or prevention of progression during hospitalization  Description: INTERVENTIONS:  - Assess and monitor for signs and symptoms of infection  - Monitor lab/diagnostic results  - Monitor all insertion sites, i.e. indwelling lines, tubes, and drains  - Monitor endotracheal if appropriate and nasal secretions for changes in amount and color  - [place] appropriate cooling/warming therapies per order  - Administer medications as ordered  - Instruct and encourage patient and family to use good hand hygiene technique  - Identify and instruct in appropriate isolation precautions for identified infection/condition  Outcome: Progressing     Problem: SAFETY ADULT  Goal: Patient will remain free of falls  Description: INTERVENTIONS:  - Educate patient/family on patient safety including physical limitations  - Instruct patient to call for assistance with activity   - Consult OT/PT to assist with strengthening/mobility   - Keep Call bell within reach  - Keep bed low and locked with side rails adjusted as appropriate  - Keep care items and personal belongings within reach  - Initiate and maintain comfort rounds  - Make Fall Risk Sign visible to staff  - Offer Toileting every 2 Hours, in advance of need  - Initiate/Maintain bed alarm  - Obtain necessary fall risk management equipment: alarms  - Apply yellow socks and bracelet for high fall risk patients  - Consider moving patient to room near nurses station  Outcome: Progressing  Goal: Maintain or return to baseline ADL function  Description: INTERVENTIONS:  -  Assess patient's ability to carry out ADLs; assess patient's baseline for ADL function and identify physical deficits which impact ability to perform ADLs (bathing, care of mouth/teeth, toileting, grooming, dressing, etc.)  - Assess/evaluate cause of self-care deficits   - Assess range of motion  - Assess patient's mobility; develop plan if impaired  - Assess patient's need for assistive devices and provide as appropriate  - Encourage maximum independence but intervene and supervise when necessary  - Involve family in performance of ADLs  - Assess for home care needs following discharge   - Consider OT consult to assist with ADL evaluation and planning for discharge  - Provide patient education as appropriate  Outcome: Progressing  Goal: Maintains/Returns to pre admission functional level  Description: INTERVENTIONS:  - Perform BMAT or MOVE assessment daily.   - Set and communicate daily mobility goal to care team and patient/family/caregiver. - Collaborate with rehabilitation services on mobility goals if consulted  - Perform Range of Motion 4 times a day. - Reposition patient every 2 hours.   - Dangle patient 4 times a day  - Stand patient 4 times a day  - Ambulate patient 4 times a day  - Out of bed to chair 4 times a day   - Out of bed for meals 3 times a day  - Out of bed for toileting  - Record patient progress and toleration of activity level   Outcome: Progressing     Problem: DISCHARGE PLANNING  Goal: Discharge to home or other facility with appropriate resources  Description: INTERVENTIONS:  - Identify barriers to discharge w/patient and caregiver  - Arrange for needed discharge resources and transportation as appropriate  - Identify discharge learning needs (meds, wound care, etc.)  - Arrange for interpretive services to assist at discharge as needed  - Refer to Case Management Department for coordinating discharge planning if the patient needs post-hospital services based on physician/advanced practitioner order or complex needs related to functional status, cognitive ability, or social support system  Outcome: Progressing     Problem: Knowledge Deficit  Goal: Patient/family/caregiver demonstrates understanding of disease process, treatment plan, medications, and discharge instructions  Description: Complete learning assessment and assess knowledge base. Interventions:  - Provide teaching at level of understanding  - Provide teaching via preferred learning methods  Outcome: Progressing     Problem: NEUROSENSORY - ADULT  Goal: Achieves maximal functionality and self care  Description: INTERVENTIONS  - Monitor swallowing and airway patency with patient fatigue and changes in neurological status  - Encourage and assist patient to increase activity and self care.    - Encourage visually impaired, hearing impaired and aphasic patients to use assistive/communication devices  Outcome: Progressing     Problem: CARDIOVASCULAR - ADULT  Goal: Maintains optimal cardiac output and hemodynamic stability  Description: INTERVENTIONS:  - Monitor I/O, vital signs and rhythm  - Monitor for S/S and trends of decreased cardiac output  - Administer and titrate ordered vasoactive medications to optimize hemodynamic stability  - Assess quality of pulses, skin color and temperature  - Assess for signs of decreased coronary artery perfusion  - Instruct patient to report change in severity of symptoms  Outcome: Progressing  Goal: Absence of cardiac dysrhythmias or at baseline rhythm  Description: INTERVENTIONS:  - Continuous cardiac monitoring, vital signs, obtain 12 lead EKG if ordered  - Administer antiarrhythmic and heart rate control medications as ordered  - Monitor electrolytes and administer replacement therapy as ordered  Outcome: Progressing     Problem: GASTROINTESTINAL - ADULT  Goal: Minimal or absence of nausea and/or vomiting  Description: INTERVENTIONS:  - Administer IV fluids if ordered to ensure adequate hydration  - Maintain NPO status until nausea and vomiting are resolved  - Nasogastric tube if ordered  - Administer ordered antiemetic medications as needed  - Provide nonpharmacologic comfort measures as appropriate  - Advance diet as tolerated, if ordered  - Consider nutrition services referral to assist patient with adequate nutrition and appropriate food choices  Outcome: Progressing  Goal: Maintains or returns to baseline bowel function  Description: INTERVENTIONS:  - Assess bowel function  - Encourage oral fluids to ensure adequate hydration  - Administer IV fluids if ordered to ensure adequate hydration  - Administer ordered medications as needed  - Encourage mobilization and activity  - Consider nutritional services referral to assist patient with adequate nutrition and appropriate food choices  Outcome: Progressing  Goal: Maintains adequate nutritional intake  Description: INTERVENTIONS:  - Monitor percentage of each meal consumed  - Identify factors contributing to decreased intake, treat as appropriate  - Assist with meals as needed  - Monitor I&O, weight, and lab values if indicated  - Obtain nutrition services referral as needed  Outcome: Progressing     Problem: METABOLIC, FLUID AND ELECTROLYTES - ADULT  Goal: Fluid balance maintained  Description: INTERVENTIONS:  - Monitor labs   - Monitor I/O and WT  - Instruct patient on fluid and nutrition as appropriate  - Assess for signs & symptoms of volume excess or deficit  Outcome: Progressing     Problem: HEMATOLOGIC - ADULT  Goal: Maintains hematologic stability  Description: INTERVENTIONS  - Assess for signs and symptoms of bleeding or hemorrhage  - Monitor labs  - Administer supportive blood products/factors as ordered and appropriate  Outcome: Progressing     Problem: MUSCULOSKELETAL - ADULT  Goal: Maintain or return mobility to safest level of function  Description: INTERVENTIONS:  - Assess patient's ability to carry out ADLs; assess patient's baseline for ADL function and identify physical deficits which impact ability to perform ADLs (bathing, care of mouth/teeth, toileting, grooming, dressing, etc.)  - Assess/evaluate cause of self-care deficits   - Assess range of motion  - Assess patient's mobility  - Assess patient's need for assistive devices and provide as appropriate  - Encourage maximum independence but intervene and supervise when necessary  - Involve family in performance of ADLs  - Assess for home care needs following discharge   - Consider OT consult to assist with ADL evaluation and planning for discharge  - Provide patient education as appropriate  Outcome: Progressing  Goal: Maintain proper alignment of affected body part  Description: INTERVENTIONS:  - Support, maintain and protect limb and body alignment  - Provide patient/ family with appropriate education  Outcome: Progressing     Problem: Nutrition/Hydration-ADULT  Goal: Nutrient/Hydration intake appropriate for improving, restoring or maintaining nutritional needs  Description: Monitor and assess patient's nutrition/hydration status for malnutrition. Collaborate with interdisciplinary team and initiate plan and interventions as ordered. Monitor patient's weight and dietary intake as ordered or per policy. Utilize nutrition screening tool and intervene as necessary. Determine patient's food preferences and provide high-protein, high-caloric foods as appropriate.      INTERVENTIONS:  - Monitor oral intake, urinary output, labs, and treatment plans  - Assess nutrition and hydration status and recommend course of action  - Evaluate amount of meals eaten  - Assist patient with eating if necessary   - Allow adequate time for meals  - Recommend/ encourage appropriate diets, oral nutritional supplements, and vitamin/mineral supplements  - Order, calculate, and assess calorie counts as needed  - Recommend, monitor, and adjust tube feedings and TPN based on assessed needs  - Assess need for intravenous fluids  - Provide nutrition/hydration education as appropriate  - Include patient/family/caregiver in decisions related to nutrition  Outcome: Progressing

## 2023-08-04 VITALS
RESPIRATION RATE: 16 BRPM | OXYGEN SATURATION: 97 % | DIASTOLIC BLOOD PRESSURE: 74 MMHG | SYSTOLIC BLOOD PRESSURE: 123 MMHG | WEIGHT: 127.87 LBS | BODY MASS INDEX: 20.55 KG/M2 | HEART RATE: 52 BPM | TEMPERATURE: 98.9 F | HEIGHT: 66 IN

## 2023-08-04 PROCEDURE — 88305 TISSUE EXAM BY PATHOLOGIST: CPT | Performed by: SPECIALIST

## 2023-08-04 PROCEDURE — 88341 IMHCHEM/IMCYTCHM EA ADD ANTB: CPT | Performed by: SPECIALIST

## 2023-08-04 PROCEDURE — 99232 SBSQ HOSP IP/OBS MODERATE 35: CPT | Performed by: INTERNAL MEDICINE

## 2023-08-04 PROCEDURE — 99239 HOSP IP/OBS DSCHRG MGMT >30: CPT | Performed by: INTERNAL MEDICINE

## 2023-08-04 PROCEDURE — 88342 IMHCHEM/IMCYTCHM 1ST ANTB: CPT | Performed by: SPECIALIST

## 2023-08-04 RX ORDER — PREDNISONE 5 MG/1
TABLET ORAL
Qty: 252 TABLET | Refills: 0 | Status: SHIPPED | OUTPATIENT
Start: 2023-08-04 | End: 2023-08-08 | Stop reason: SDUPTHER

## 2023-08-04 RX ORDER — CEPHALEXIN 500 MG/1
500 CAPSULE ORAL EVERY 6 HOURS SCHEDULED
Qty: 16 CAPSULE | Refills: 0 | Status: SHIPPED | OUTPATIENT
Start: 2023-08-04 | End: 2023-08-08

## 2023-08-04 RX ORDER — FLUCONAZOLE 100 MG/1
100 TABLET ORAL DAILY
Qty: 10 TABLET | Refills: 0 | Status: SHIPPED | OUTPATIENT
Start: 2023-08-05 | End: 2023-08-15

## 2023-08-04 RX ADMIN — PANTOPRAZOLE SODIUM 40 MG: 40 TABLET, DELAYED RELEASE ORAL at 06:31

## 2023-08-04 RX ADMIN — METHYLPREDNISOLONE SODIUM SUCCINATE 20 MG: 40 INJECTION, POWDER, FOR SOLUTION INTRAMUSCULAR; INTRAVENOUS at 06:35

## 2023-08-04 RX ADMIN — CEPHALEXIN 500 MG: 500 CAPSULE ORAL at 06:31

## 2023-08-04 RX ADMIN — CEPHALEXIN 500 MG: 500 CAPSULE ORAL at 11:00

## 2023-08-04 RX ADMIN — ENOXAPARIN SODIUM 40 MG: 100 INJECTION SUBCUTANEOUS at 09:33

## 2023-08-04 RX ADMIN — FLUCONAZOLE 100 MG: 100 TABLET ORAL at 09:33

## 2023-08-04 RX ADMIN — CEPHALEXIN 500 MG: 500 CAPSULE ORAL at 00:56

## 2023-08-04 NOTE — ASSESSMENT & PLAN NOTE
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 Has disease of the terminal ileum  Given fistula with purulent material we will plan for course of Keflex over the next 5 to 7 days.   Continue a course of antibiotics

## 2023-08-04 NOTE — PLAN OF CARE
Problem: PAIN - ADULT  Goal: Verbalizes/displays adequate comfort level or baseline comfort level  Description: Interventions:  - Encourage patient to monitor pain and request assistance  - Assess pain using appropriate pain scale  - Administer analgesics based on type and severity of pain and evaluate response  - Implement non-pharmacological measures as appropriate and evaluate response  - Consider cultural and social influences on pain and pain management  - Notify physician/advanced practitioner if interventions unsuccessful or patient reports new pain  Outcome: Progressing     Problem: INFECTION - ADULT  Goal: Absence or prevention of progression during hospitalization  Description: INTERVENTIONS:  - Assess and monitor for signs and symptoms of infection  - Monitor lab/diagnostic results  - Monitor all insertion sites, i.e. indwelling lines, tubes, and drains  - Monitor endotracheal if appropriate and nasal secretions for changes in amount and color  - [place] appropriate cooling/warming therapies per order  - Administer medications as ordered  - Instruct and encourage patient and family to use good hand hygiene technique  - Identify and instruct in appropriate isolation precautions for identified infection/condition  Outcome: Progressing     Problem: SAFETY ADULT  Goal: Patient will remain free of falls  Description: INTERVENTIONS:  - Educate patient/family on patient safety including physical limitations  - Instruct patient to call for assistance with activity   - Consult OT/PT to assist with strengthening/mobility   - Keep Call bell within reach  - Keep bed low and locked with side rails adjusted as appropriate  - Keep care items and personal belongings within reach  - Initiate and maintain comfort rounds  - Make Fall Risk Sign visible to staff  - Offer Toileting every 2 Hours, in advance of need  - Initiate/Maintain bed alarm  - Obtain necessary fall risk management equipment:   - Apply yellow socks and bracelet for high fall risk patients  - Consider moving patient to room near nurses station  Outcome: Progressing  Goal: Maintain or return to baseline ADL function  Description: INTERVENTIONS:  -  Assess patient's ability to carry out ADLs; assess patient's baseline for ADL function and identify physical deficits which impact ability to perform ADLs (bathing, care of mouth/teeth, toileting, grooming, dressing, etc.)  - Assess/evaluate cause of self-care deficits   - Assess range of motion  - Assess patient's mobility; develop plan if impaired  - Assess patient's need for assistive devices and provide as appropriate  - Encourage maximum independence but intervene and supervise when necessary  - Involve family in performance of ADLs  - Assess for home care needs following discharge   - Consider OT consult to assist with ADL evaluation and planning for discharge  - Provide patient education as appropriate  Outcome: Progressing  Goal: Maintains/Returns to pre admission functional level  Description: INTERVENTIONS:  - Perform BMAT or MOVE assessment daily.   - Set and communicate daily mobility goal to care team and patient/family/caregiver. - Collaborate with rehabilitation services on mobility goals if consulted  - Perform Range of Motion 3 times a day. - Reposition patient every 2 hours.   - Dangle patient 3 times a day  - Stand patient 3 times a day  - Ambulate patient 3 times a day  - Out of bed to chair 3 times a day   - Out of bed for meals 3 times a day  - Out of bed for toileting  - Record patient progress and toleration of activity level   Outcome: Progressing     Problem: DISCHARGE PLANNING  Goal: Discharge to home or other facility with appropriate resources  Description: INTERVENTIONS:  - Identify barriers to discharge w/patient and caregiver  - Arrange for needed discharge resources and transportation as appropriate  - Identify discharge learning needs (meds, wound care, etc.)  - Arrange for interpretive services to assist at discharge as needed  - Refer to Case Management Department for coordinating discharge planning if the patient needs post-hospital services based on physician/advanced practitioner order or complex needs related to functional status, cognitive ability, or social support system  Outcome: Progressing     Problem: Knowledge Deficit  Goal: Patient/family/caregiver demonstrates understanding of disease process, treatment plan, medications, and discharge instructions  Description: Complete learning assessment and assess knowledge base. Interventions:  - Provide teaching at level of understanding  - Provide teaching via preferred learning methods  Outcome: Progressing     Problem: NEUROSENSORY - ADULT  Goal: Achieves maximal functionality and self care  Description: INTERVENTIONS  - Monitor swallowing and airway patency with patient fatigue and changes in neurological status  - Encourage and assist patient to increase activity and self care.    - Encourage visually impaired, hearing impaired and aphasic patients to use assistive/communication devices  Outcome: Progressing     Problem: CARDIOVASCULAR - ADULT  Goal: Maintains optimal cardiac output and hemodynamic stability  Description: INTERVENTIONS:  - Monitor I/O, vital signs and rhythm  - Monitor for S/S and trends of decreased cardiac output  - Administer and titrate ordered vasoactive medications to optimize hemodynamic stability  - Assess quality of pulses, skin color and temperature  - Assess for signs of decreased coronary artery perfusion  - Instruct patient to report change in severity of symptoms  Outcome: Progressing  Goal: Absence of cardiac dysrhythmias or at baseline rhythm  Description: INTERVENTIONS:  - Continuous cardiac monitoring, vital signs, obtain 12 lead EKG if ordered  - Administer antiarrhythmic and heart rate control medications as ordered  - Monitor electrolytes and administer replacement therapy as ordered  Outcome: Progressing     Problem: GASTROINTESTINAL - ADULT  Goal: Minimal or absence of nausea and/or vomiting  Description: INTERVENTIONS:  - Administer IV fluids if ordered to ensure adequate hydration  - Maintain NPO status until nausea and vomiting are resolved  - Nasogastric tube if ordered  - Administer ordered antiemetic medications as needed  - Provide nonpharmacologic comfort measures as appropriate  - Advance diet as tolerated, if ordered  - Consider nutrition services referral to assist patient with adequate nutrition and appropriate food choices  Outcome: Progressing  Goal: Maintains or returns to baseline bowel function  Description: INTERVENTIONS:  - Assess bowel function  - Encourage oral fluids to ensure adequate hydration  - Administer IV fluids if ordered to ensure adequate hydration  - Administer ordered medications as needed  - Encourage mobilization and activity  - Consider nutritional services referral to assist patient with adequate nutrition and appropriate food choices  Outcome: Progressing  Goal: Maintains adequate nutritional intake  Description: INTERVENTIONS:  - Monitor percentage of each meal consumed  - Identify factors contributing to decreased intake, treat as appropriate  - Assist with meals as needed  - Monitor I&O, weight, and lab values if indicated  - Obtain nutrition services referral as needed  Outcome: Progressing

## 2023-08-04 NOTE — DISCHARGE INSTR - AVS FIRST PAGE
Dear Hiren Onofre,     It was our pleasure to care for you here at [facility], [address] [facility]. It is our hope that we were always able to exceed the expected standards for your care during your stay. You were hospitalized due to Crohn's disease with exacerbation. You were cared for on the second floor by Pee Stinson DO with the [facility] who covers for your primary care physician (PCP), No primary care provider on file. , while you were hospitalized. If you have any questions or concerns related to this hospitalization, you may contact us at [phone]. For follow up as well as any medication refills, we recommend that you follow up with your primary care physician. A registered nurse will reach out to you by phone within a few days after your discharge to answer any additional questions that you may have after going home. However, at this time we provide for you here, the most important instructions / recommendations at discharge:     Notable Medication Adjustments -   Prednisone taper as ordered  Continue antibiotics to complete a course  Fluconazole has been ordered  You do have low iron levels, I recommend that you take iron tablets with some orange juice in the morning to help with absorption. Testing Required after Discharge -   Please follow-up with GI at discharge, you would be a good candidate for Remicade and/or Humira. Important follow up information -   PCP follow-up in 1 week  GI follow-up in 1 week  Await pathology results  Repeat colonoscopy in 6 months    Other Instructions -   None  Please review this entire after visit summary as additional general instructions including medication list, appointments, activity, diet, any pertinent wound care, and other additional recommendations from your care team that may be provided for you.       Sincerely,     Pee Stinson DO and Nurse Reshma Cantu

## 2023-08-04 NOTE — PLAN OF CARE
Problem: PAIN - ADULT  Goal: Verbalizes/displays adequate comfort level or baseline comfort level  Description: Interventions:  - Encourage patient to monitor pain and request assistance  - Assess pain using appropriate pain scale  - Administer analgesics based on type and severity of pain and evaluate response  - Implement non-pharmacological measures as appropriate and evaluate response  - Consider cultural and social influences on pain and pain management  - Notify physician/advanced practitioner if interventions unsuccessful or patient reports new pain  Outcome: Progressing     Problem: INFECTION - ADULT  Goal: Absence or prevention of progression during hospitalization  Description: INTERVENTIONS:  - Assess and monitor for signs and symptoms of infection  - Monitor lab/diagnostic results  - Monitor all insertion sites, i.e. indwelling lines, tubes, and drains  - Monitor endotracheal if appropriate and nasal secretions for changes in amount and color  - [place] appropriate cooling/warming therapies per order  - Administer medications as ordered  - Instruct and encourage patient and family to use good hand hygiene technique  - Identify and instruct in appropriate isolation precautions for identified infection/condition  Outcome: Progressing     Problem: SAFETY ADULT  Goal: Patient will remain free of falls  Description: INTERVENTIONS:  - Educate patient/family on patient safety including physical limitations  - Instruct patient to call for assistance with activity   - Consult OT/PT to assist with strengthening/mobility   - Keep Call bell within reach  - Keep bed low and locked with side rails adjusted as appropriate  - Keep care items and personal belongings within reach  - Initiate and maintain comfort rounds  - Make Fall Risk Sign visible to staff  - Obtain necessary fall risk management equipment: bed rails  - Apply yellow socks and bracelet for high fall risk patients  - Consider moving patient to room near nurses station  Outcome: Progressing  Goal: Maintain or return to baseline ADL function  Description: INTERVENTIONS:  -  Assess patient's ability to carry out ADLs; assess patient's baseline for ADL function and identify physical deficits which impact ability to perform ADLs (bathing, care of mouth/teeth, toileting, grooming, dressing, etc.)  - Assess/evaluate cause of self-care deficits   - Assess range of motion  - Assess patient's mobility; develop plan if impaired  - Assess patient's need for assistive devices and provide as appropriate  - Encourage maximum independence but intervene and supervise when necessary  - Involve family in performance of ADLs  - Assess for home care needs following discharge   - Consider OT consult to assist with ADL evaluation and planning for discharge  - Provide patient education as appropriate  Outcome: Progressing  Goal: Maintains/Returns to pre admission functional level  Description: INTERVENTIONS:  - Perform BMAT or MOVE assessment daily.   - Set and communicate daily mobility goal to care team and patient/family/caregiver.    - Collaborate with rehabilitation services on mobility goals if consulted  - Out of bed to chair 2 times a day   - Out of bed for meals 2 times a day  - Out of bed for toileting  - Record patient progress and toleration of activity level   Outcome: Progressing     Problem: DISCHARGE PLANNING  Goal: Discharge to home or other facility with appropriate resources  Description: INTERVENTIONS:  - Identify barriers to discharge w/patient and caregiver  - Arrange for needed discharge resources and transportation as appropriate  - Identify discharge learning needs (meds, wound care, etc.)  - Arrange for interpretive services to assist at discharge as needed  - Refer to Case Management Department for coordinating discharge planning if the patient needs post-hospital services based on physician/advanced practitioner order or complex needs related to functional status, cognitive ability, or social support system  Outcome: Progressing     Problem: Knowledge Deficit  Goal: Patient/family/caregiver demonstrates understanding of disease process, treatment plan, medications, and discharge instructions  Description: Complete learning assessment and assess knowledge base. Interventions:  - Provide teaching at level of understanding  - Provide teaching via preferred learning methods  Outcome: Progressing     Problem: NEUROSENSORY - ADULT  Goal: Achieves maximal functionality and self care  Description: INTERVENTIONS  - Monitor swallowing and airway patency with patient fatigue and changes in neurological status  - Encourage and assist patient to increase activity and self care.    - Encourage visually impaired, hearing impaired and aphasic patients to use assistive/communication devices  Outcome: Progressing     Problem: CARDIOVASCULAR - ADULT  Goal: Maintains optimal cardiac output and hemodynamic stability  Description: INTERVENTIONS:  - Monitor I/O, vital signs and rhythm  - Monitor for S/S and trends of decreased cardiac output  - Administer and titrate ordered vasoactive medications to optimize hemodynamic stability  - Assess quality of pulses, skin color and temperature  - Assess for signs of decreased coronary artery perfusion  - Instruct patient to report change in severity of symptoms  Outcome: Progressing  Goal: Absence of cardiac dysrhythmias or at baseline rhythm  Description: INTERVENTIONS:  - Continuous cardiac monitoring, vital signs, obtain 12 lead EKG if ordered  - Administer antiarrhythmic and heart rate control medications as ordered  - Monitor electrolytes and administer replacement therapy as ordered  Outcome: Progressing     Problem: GASTROINTESTINAL - ADULT  Goal: Minimal or absence of nausea and/or vomiting  Description: INTERVENTIONS:  - Administer IV fluids if ordered to ensure adequate hydration  - Maintain NPO status until nausea and vomiting are resolved  - Nasogastric tube if ordered  - Administer ordered antiemetic medications as needed  - Provide nonpharmacologic comfort measures as appropriate  - Advance diet as tolerated, if ordered  - Consider nutrition services referral to assist patient with adequate nutrition and appropriate food choices  Outcome: Progressing  Goal: Maintains or returns to baseline bowel function  Description: INTERVENTIONS:  - Assess bowel function  - Encourage oral fluids to ensure adequate hydration  - Administer IV fluids if ordered to ensure adequate hydration  - Administer ordered medications as needed  - Encourage mobilization and activity  - Consider nutritional services referral to assist patient with adequate nutrition and appropriate food choices  Outcome: Progressing  Goal: Maintains adequate nutritional intake  Description: INTERVENTIONS:  - Monitor percentage of each meal consumed  - Identify factors contributing to decreased intake, treat as appropriate  - Assist with meals as needed  - Monitor I&O, weight, and lab values if indicated  - Obtain nutrition services referral as needed  Outcome: Progressing     Problem: METABOLIC, FLUID AND ELECTROLYTES - ADULT  Goal: Fluid balance maintained  Description: INTERVENTIONS:  - Monitor labs   - Monitor I/O and WT  - Instruct patient on fluid and nutrition as appropriate  - Assess for signs & symptoms of volume excess or deficit  Outcome: Progressing     Problem: HEMATOLOGIC - ADULT  Goal: Maintains hematologic stability  Description: INTERVENTIONS  - Assess for signs and symptoms of bleeding or hemorrhage  - Monitor labs  - Administer supportive blood products/factors as ordered and appropriate  Outcome: Progressing     Problem: MUSCULOSKELETAL - ADULT  Goal: Maintain or return mobility to safest level of function  Description: INTERVENTIONS:  - Assess patient's ability to carry out ADLs; assess patient's baseline for ADL function and identify physical deficits which impact ability to perform ADLs (bathing, care of mouth/teeth, toileting, grooming, dressing, etc.)  - Assess/evaluate cause of self-care deficits   - Assess range of motion  - Assess patient's mobility  - Assess patient's need for assistive devices and provide as appropriate  - Encourage maximum independence but intervene and supervise when necessary  - Involve family in performance of ADLs  - Assess for home care needs following discharge   - Consider OT consult to assist with ADL evaluation and planning for discharge  - Provide patient education as appropriate  Outcome: Progressing  Goal: Maintain proper alignment of affected body part  Description: INTERVENTIONS:  - Support, maintain and protect limb and body alignment  - Provide patient/ family with appropriate education  Outcome: Progressing     Problem: Nutrition/Hydration-ADULT  Goal: Nutrient/Hydration intake appropriate for improving, restoring or maintaining nutritional needs  Description: Monitor and assess patient's nutrition/hydration status for malnutrition. Collaborate with interdisciplinary team and initiate plan and interventions as ordered. Monitor patient's weight and dietary intake as ordered or per policy. Utilize nutrition screening tool and intervene as necessary. Determine patient's food preferences and provide high-protein, high-caloric foods as appropriate.      INTERVENTIONS:  - Monitor oral intake, urinary output, labs, and treatment plans  - Assess nutrition and hydration status and recommend course of action  - Evaluate amount of meals eaten  - Assist patient with eating if necessary   - Allow adequate time for meals  - Recommend/ encourage appropriate diets, oral nutritional supplements, and vitamin/mineral supplements  - Order, calculate, and assess calorie counts as needed  - Recommend, monitor, and adjust tube feedings and TPN based on assessed needs  - Assess need for intravenous fluids  - Provide nutrition/hydration education as appropriate  - Include patient/family/caregiver in decisions related to nutrition  Outcome: Progressing

## 2023-08-04 NOTE — NURSING NOTE
Reviewed discharge instructions and upcoming medications with patient. Discussed the importance of follow up care.

## 2023-08-04 NOTE — PROGRESS NOTES
Progress Note -  [facility]  Brooke Gil 35 y.o. male MRN: [id number]  Unit/Bed#: [address]02 Encounter: [id number]      ASSESSMENT AND PLAN:      59-year-old male with past medical history of schizoaffective disorder, Crohn's disease previously on Pentasa but currently on not on medication and lost to follow-up is presenting with abdominal pain, diarrhea, anemia. GI is consulted for management of his Crohn's disease. 1. Crohn's disease  2. Abdominal pain  3. Nausea, vomiting, diarrhea  Colonoscopy performed this admission showed severe pancolitis with evidence of TI involvement. Fortunately infectious studies have been negative. He is overall improved. Chronic hepatitis panel negative. QuantiFERON gold indeterminate. Chest x-ray normal.  • Transition to slow prednisone taper at 40 mg daily for the next 7 days followed by decrease by 5 mg every week until discontinued. • We will arrange outpatient follow-up to discuss initiation of Remicade. • Patient will require Remicade as an outpatient. We will begin prior authorization as patient would like to follow-up with [facility].  • DVT prophylaxis with Lovenox while inpatient  • Monitor hemodynamics  • Monitor bowel movements. • Stable from GI perspective for discharge    Gastroenterology to sign off at this time. Please contact with any questions. Rest of care per primary team.    ______________________________________________________________________    Subjective: Seen and examined. Overall improved. Had 2 bowel movement this morning which were brown. Denies any significant abdominal pain, nausea, vomiting. Tolerating diet. Rest of ROS was negative. REVIEW OF SYSTEMS:    Review of Systems   Constitutional: Negative for chills and fever. HENT: Negative for congestion and sinus pressure. Respiratory: Negative for cough and shortness of breath.     Cardiovascular: Negative for chest pain, palpitations. Gastrointestinal: Negative for abdominal pain, diarrhea, and vomiting. Genitourinary: Negative for dysuria and hematuria. Musculoskeletal: Negative for arthralgias and back pain. Skin: Negative for color change and rash. Neurological: Negative for dizziness and headaches. Psychiatric/Behavioral: Negative for agitation and confusion. All other systems reviewed and are negative. Historical Information   Past Medical History:   Diagnosis Date   • Anemia 2004    hospitalization/transfusion     Past Surgical History:   Procedure Laterality Date   • NV ANRCT XM SURG REQ ANES GENERAL SPI/EDRL DX N/A 4/7/2016    Procedure: EXAM UNDER ANESTHESIA (EUA); possible REMOVAL OF FOREIGN BODY anoscopy ;  Surgeon: Jhoan Goodman MD;  Location: [facility];  Service: Colorectal   • NV SURG TX ANAL FISTULA INTERSPHINCTERIC N/A 4/7/2016    Procedure: superficial FISTULOTOMY; SETON PROCEDURE drainage of chronic ischiofistula abscess and debridement;  Surgeon: Jhoan Goodman MD;  Location: [facility];  Service: Colorectal     Social History   Social History     Substance and Sexual Activity   Alcohol Use Not Currently    Comment: Socially     Social History     Substance and Sexual Activity   Drug Use No     Social History     Tobacco Use   Smoking Status Light Smoker   • Packs/day: 0.25   • Types: Cigarettes   Smokeless Tobacco Never   Tobacco Comments    [employer] says 7 cigarettes per day     History reviewed. No pertinent family history.     Meds/Allergies     Medications Prior to Admission   Medication   • acetaminophen (TYLENOL) 650 mg CR tablet   • ferrous sulfate 325 (65 Fe) mg tablet   • aspirin 325 mg tablet   • lidocaine (LIDODERM) 5 %   • methocarbamol (ROBAXIN) 500 mg tablet   • naproxen (EC NAPROSYN) 500 MG EC tablet     Current Facility-Administered Medications   Medication Dose Route Frequency   • acetaminophen (TYLENOL) tablet 650 mg  650 mg Oral Q6H PRN   • aluminum-magnesium hydroxide-simethicone (MAALOX) oral suspension 30 mL  30 mL Oral Q4H PRN   • cephalexin (KEFLEX) capsule 500 mg  500 mg Oral Q6H [address]   • enoxaparin (LOVENOX) subcutaneous injection 40 mg  40 mg Subcutaneous Q24H VLADIMIR   • fluconazole (DIFLUCAN) tablet 100 mg  100 mg Oral Daily   • lidocaine (LIDODERM) 5 % patch 1 patch  1 patch Topical Q24H   • methylPREDNISolone sodium succinate (Solu-MEDROL) injection 20 mg  20 mg Intravenous Q8H [address]   • pantoprazole (PROTONIX) EC tablet 40 mg  40 mg Oral Early Morning       Allergies   Allergen Reactions   • Haldol Decanoate [Haloperidol] Anaphylaxis   • Oxycodone-Acetaminophen Rash     "swollon red rash"   • Sulfamethoxazole-Trimethoprim      "never really worked"           Objective     Blood pressure 121/75, pulse (!) 53, temperature 98.6 °F (37 °C), temperature source Oral, resp. rate 22, height 5' 6" (1.676 m), weight 58 kg (127 lb 13.9 oz), SpO2 100 %. Body mass index is 20.64 kg/m². No intake or output data in the 24 hours ending 08/04/23 0728      PHYSICAL EXAM:      Physical Exam  Vitals and nursing note reviewed. Constitutional:       General: He is not in acute distress. Appearance: Normal appearance. He is well-developed. He is not ill-appearing. HENT:      Head: Normocephalic and atraumatic. Mouth/Throat:      Mouth: Mucous membranes are moist.   Eyes:      Extraocular Movements: Extraocular movements intact. Conjunctiva/sclera: Conjunctivae normal.   Cardiovascular:      Rate and Rhythm: Normal rate. Pulses: Normal pulses. Pulmonary:      Effort: Pulmonary effort is normal.   Abdominal:      General: Abdomen is flat. Bowel sounds are normal. There is no distension. Palpations: Abdomen is soft. Tenderness: There is no abdominal tenderness. There is no guarding. Musculoskeletal:      Cervical back: Neck supple. Right lower leg: No edema. Left lower leg: No edema. Skin:     General: Skin is warm and dry.    Neurological: General: No focal deficit present. Mental Status: He is alert and oriented to person, place, and time.    Psychiatric:         Mood and Affect: Mood normal.         Behavior: Behavior normal.          Lab Results:   Admission on 07/29/2023   Component Date Value   • WBC 07/29/2023 14.85 (H)    • RBC 07/29/2023 2.86 (L)    • Hemoglobin 07/29/2023 4.9 (LL)    • Hematocrit 07/29/2023 18.8 (L)    • MCV 07/29/2023 66 (L)    • MCH 07/29/2023 17.1 (L)    • MCHC 07/29/2023 26.1 (L)    • RDW 07/29/2023 19.4 (H)    • MPV 07/29/2023 8.2 (L)    • Platelets 28/91/6618 803 (H)    • nRBC 07/29/2023 0    • Neutrophils Relative 07/29/2023 69    • Immat GRANS % 07/29/2023 1    • Lymphocytes Relative 07/29/2023 21    • Monocytes Relative 07/29/2023 9    • Eosinophils Relative 07/29/2023 0    • Basophils Relative 07/29/2023 0    • Neutrophils Absolute 07/29/2023 10.23 (H)    • Immature Grans Absolute 07/29/2023 0.08    • Lymphocytes Absolute 07/29/2023 3.07    • Monocytes Absolute 07/29/2023 1.37 (H)    • Eosinophils Absolute 07/29/2023 0.06    • Basophils Absolute 07/29/2023 0.04    • Sodium 07/29/2023 135    • Potassium 07/29/2023 3.6    • Chloride 07/29/2023 102    • CO2 07/29/2023 25    • ANION GAP 07/29/2023 8    • BUN 07/29/2023 5    • Creatinine 07/29/2023 0.83    • Glucose 07/29/2023 100    • Calcium 07/29/2023 8.5    • Corrected Calcium 07/29/2023 9.4    • AST 07/29/2023 10 (L)    • ALT 07/29/2023 5 (L)    • Alkaline Phosphatase 07/29/2023 66    • Total Protein 07/29/2023 7.9    • Albumin 07/29/2023 2.9 (L)    • Total Bilirubin 07/29/2023 0.19 (L)    • eGFR 07/29/2023 115    • ABO Grouping 07/29/2023 A    • Rh Factor 07/29/2023 Positive    • Antibody Screen 07/29/2023 Negative    • Specimen Expiration Date 07/29/2023 [id number]    • Unit Product Code 07/30/2023 H6477W39    • Unit Number 07/30/2023 [id number]    • Unit ABO 07/30/2023 A    • Unit [facility] 07/30/2023 POS    • Crossmatch 07/30/2023 Compatible    • Unit Dispense Status 07/30/2023 Presumed Trans    • Unit Product Volume 07/30/2023 300    • Unit Product Code 07/30/2023 Z5121I23    • Unit Number 07/30/2023 [id number]    • Unit ABO 07/30/2023 A    • Unit [facility] 07/30/2023 POS    • Crossmatch 07/30/2023 Compatible    • Unit Dispense Status 07/30/2023 Presumed Trans    • Unit Product Volume 07/30/2023 300    • ABO Grouping 07/29/2023 A    • Rh Factor 07/29/2023 Positive    • Salmonella sp PCR 07/30/2023 None Detected    • Shigella sp/Enteroinvasi* 07/30/2023 None Detected    • Campylobacter sp (jejuni* 07/30/2023 None Detected    • Shiga toxin 1/Shiga toxi* 07/30/2023 None Detected    • C.difficile toxin by PCR 07/30/2023 Negative    • Calprotectin 07/30/2023 1660 (H)    • Sodium 07/30/2023 135    • Potassium 07/30/2023 3.8    • Chloride 07/30/2023 103    • CO2 07/30/2023 27    • ANION GAP 07/30/2023 5    • BUN 07/30/2023 3 (L)    • Creatinine 07/30/2023 0.63    • Glucose 07/30/2023 78    • Calcium 07/30/2023 8.2 (L)    • Corrected Calcium 07/30/2023 9.2    • AST 07/30/2023 6 (L)    • ALT 07/30/2023 4 (L)    • Alkaline Phosphatase 07/30/2023 56    • Total Protein 07/30/2023 7.3    • Albumin 07/30/2023 2.7 (L)    • Total Bilirubin 07/30/2023 0.45    • eGFR 07/30/2023 129    • WBC 07/30/2023 15.08 (H)    • RBC 07/30/2023 3.30 (L)    • Hemoglobin 07/30/2023 6.6 (LL)    • Hematocrit 07/30/2023 23.0 (L)    • MCV 07/30/2023 70 (L)    • MCH 07/30/2023 20.0 (L)    • MCHC 07/30/2023 28.7 (L)    • RDW 07/30/2023 22.8 (H)    • MPV 07/30/2023 8.2 (L)    • Platelets 30/44/5365 683 (H)    • nRBC 07/30/2023 0    • Neutrophils Relative 07/30/2023 76 (H)    • Immat GRANS % 07/30/2023 1    • Lymphocytes Relative 07/30/2023 14    • Monocytes Relative 07/30/2023 9    • Eosinophils Relative 07/30/2023 0    • Basophils Relative 07/30/2023 0    • Neutrophils Absolute 07/30/2023 11.38 (H)    • Immature Grans Absolute 07/30/2023 0.11    • Lymphocytes Absolute 07/30/2023 2.12    • Monocytes Absolute 07/30/2023 1.38 (H)    • Eosinophils Absolute 07/30/2023 0.04    • Basophils Absolute 07/30/2023 0.05    • CRP 07/30/2023 97.2 (H)    • Unit Product Code 07/31/2023 [id number]    • Unit Number 07/31/2023 [id number]    • Unit ABO 07/31/2023 A    • Unit [facility] 07/31/2023 POS    • Crossmatch 07/31/2023 Compatible    • Unit Dispense Status 07/31/2023 Presumed Trans    • Unit Product Volume 07/31/2023 350    • Iron Saturation 07/30/2023 19 (L)    • TIBC 07/30/2023 179 (L)    • Iron 07/30/2023 34 (L)    • Ferritin 07/30/2023 19 (L)    • Hemoglobin 07/30/2023 8.7 (L)    • Hematocrit 07/30/2023 29.5 (L)    • Hepatitis B Surface Ag 07/30/2023 Non-reactive    • Hep A IgM 07/30/2023 Non-reactive    • Hepatitis C Ab 07/30/2023 Non-reactive    • Hep B C IgM 07/30/2023 Non-reactive    • Rapid HIV 1 AND 2 08/01/2023 Non-Reactive    • HIV-1 P24 Ag Screen 08/01/2023 Non-Reactive    • QFT Nil 08/01/2023 0.02    • QFT TB1-NIL 08/01/2023 0.00    • QFT TB2-NIL 08/01/2023 0.00    • QFT Mitogen-NIL 08/01/2023 <0.10    • QFT Final Interpretation 08/01/2023 Indeterminate (A)    • Case Report 07/31/2023                      Value:Non-gynecologic Cytology                          Case: [id number]                                  Authorizing Provider:  Scott Brooks DO      Collected:           07/31/2023 1812              Ordering Location:     [facility]        Received:            07/31/2023 2119                                     Selena Spencer 2                                                            Pathologist:           Silvia Gomez MD                                                                    Specimen:    Brushing, esophageal, brushing R/O candida                                                • Final Diagnosis 07/31/2023                      Value: This result contains rich text formatting which cannot be displayed here. • Gross Description 07/31/2023                      Value: This result contains rich text formatting which cannot be displayed here. • Additional Information 07/31/2023                      Value: This result contains rich text formatting which cannot be displayed here.    • Sodium 08/01/2023 133 (L)    • Potassium 08/01/2023 4.1    • Chloride 08/01/2023 102    • CO2 08/01/2023 23    • ANION GAP 08/01/2023 8    • BUN 08/01/2023 6    • Creatinine 08/01/2023 0.87    • Glucose 08/01/2023 165 (H)    • Calcium 08/01/2023 8.3 (L)    • eGFR 08/01/2023 113    • WBC 08/01/2023 14.30 (H)    • RBC 08/01/2023 4.21    • Hemoglobin 08/01/2023 8.8 (L)    • Hematocrit 08/01/2023 30.9 (L)    • MCV 08/01/2023 73 (L)    • MCH 08/01/2023 20.9 (L)    • MCHC 08/01/2023 28.5 (L)    • RDW 08/01/2023 25.5 (H)    • Platelets 83/47/9123 706 (H)    • MPV 08/01/2023 8.1 (L)    • CRP 08/01/2023 106.2 (H)    • Hepatitis B Surface Ag 08/01/2023 Non-reactive    • Hepatitis C Ab 08/01/2023 Non-reactive    • Hep B C IgM 08/01/2023 Non-reactive    • Hep B Core Total Ab 08/01/2023 Non-reactive    • Sodium 08/02/2023 136    • Potassium 08/02/2023 3.9    • Chloride 08/02/2023 103    • CO2 08/02/2023 27    • ANION GAP 08/02/2023 6    • BUN 08/02/2023 6    • Creatinine 08/02/2023 0.75    • Glucose 08/02/2023 105    • Calcium 08/02/2023 8.5    • eGFR 08/02/2023 120    • WBC 08/02/2023 22.73 (H)    • RBC 08/02/2023 3.83 (L)    • Hemoglobin 08/02/2023 8.0 (L)    • Hematocrit 08/02/2023 28.0 (L)    • MCV 08/02/2023 73 (L)    • MCH 08/02/2023 20.9 (L)    • MCHC 08/02/2023 28.6 (L)    • RDW 08/02/2023 26.0 (H)    • Platelets 90/09/2725 702 (H)    • MPV 08/02/2023 8.1 (L)    • Sodium 08/03/2023 135    • Potassium 08/03/2023 4.1    • Chloride 08/03/2023 103    • CO2 08/03/2023 27    • ANION GAP 08/03/2023 5    • BUN 08/03/2023 10    • Creatinine 08/03/2023 0.74    • Glucose 08/03/2023 122    • Calcium 08/03/2023 8.2 (L)    • eGFR 08/03/2023 121    • WBC 08/03/2023 19.47 (H)    • RBC 08/03/2023 3.77 (L)    • Hemoglobin 08/03/2023 7.9 (L)    • Hematocrit 08/03/2023 28.1 (L)    • MCV 08/03/2023 75 (L)    • MCH 08/03/2023 21.0 (L)    • MCHC 08/03/2023 28.1 (L)    • RDW 08/03/2023 26.2 (H)    • Platelets 59/29/1662 592 (H)    • MPV 08/03/2023 8.1 (L)        Imaging Studies: I have personally reviewed pertinent imaging studies. [facility] D.OPatrice   Gastroenterology Fellow  PGY-4  Available via [employer]  8/4/2023 7:28 AM

## 2023-08-08 DIAGNOSIS — K50.014 CROHN'S DISEASE OF SMALL INTESTINE WITH ABSCESS (HCC): ICD-10-CM

## 2023-08-08 RX ORDER — PREDNISONE 5 MG/1
TABLET ORAL
Qty: 252 TABLET | Refills: 0 | Status: SHIPPED | OUTPATIENT
Start: 2023-08-08 | End: 2023-10-02

## 2023-08-08 NOTE — RESULT ENCOUNTER NOTE
Called patient's sister who is POA. According to her patient has not picked up prednisone script as he gave the wrong pharmacy. Provided biopsy results. Resent prescription to correct pharmacy. Staff please schedule follow up for patient.

## 2023-08-08 NOTE — TELEPHONE ENCOUNTER
Spoke to sister. Stated he would call back to schedule. Dr. Ame Joe has nothing till October can try with her, [facility] or [facility] clinic and place on cancellation list. Thank you.

## 2023-08-11 NOTE — TELEPHONE ENCOUNTER
Scheduled for first available 10/11 with Dr. Droetha Blackmon and placed on high priority cancellation lst.

## 2023-10-11 ENCOUNTER — OFFICE VISIT (OUTPATIENT)
Dept: GASTROENTEROLOGY | Facility: CLINIC | Age: 33
End: 2023-10-11
Payer: COMMERCIAL

## 2023-10-11 VITALS
TEMPERATURE: 98.6 F | HEIGHT: 66 IN | HEART RATE: 92 BPM | DIASTOLIC BLOOD PRESSURE: 81 MMHG | WEIGHT: 121 LBS | BODY MASS INDEX: 19.44 KG/M2 | SYSTOLIC BLOOD PRESSURE: 116 MMHG

## 2023-10-11 DIAGNOSIS — K61.0 PERIANAL ABSCESS: ICD-10-CM

## 2023-10-11 DIAGNOSIS — K50.014 CROHN'S DISEASE OF SMALL INTESTINE WITH ABSCESS (HCC): Primary | ICD-10-CM

## 2023-10-11 DIAGNOSIS — K60.3 PERIANAL FISTULA: ICD-10-CM

## 2023-10-11 PROCEDURE — 99215 OFFICE O/P EST HI 40 MIN: CPT | Performed by: INTERNAL MEDICINE

## 2023-10-11 NOTE — PROGRESS NOTES
Gastroenterology Outpatient Follow-up - Crohn's Disease  Aster Anthony 35 y.o. male MRN: [id number]  Encounter: [id number]    Aster Anthony is a 35 y.o. male with Crohn's disease. Symptom onset:     Diagnosis:  Crohns disease  Year of diagnosis:  2003    IBD Summary: Paulina Britton has schizoaffective disorder, history of MI in 2015, and severe ileocolonic and perianal fistulizing Crohn's disease. He has never been treated with biologic therapy. He smokes. I am hoping that he will establish care with me in 2023.     CD Summary  Current Crohn's disease phenotype:  both stricturing and penetrating    Involved sites since disease onset   Esophagus: unknown   Stomach: unknown     Duodenum: unknown   Jejunum: no   Ileum: yes   Right colon: yes   Transverse colon: yes   Left colon: yes   Rectum: yes   Anus: no     History of stricture  Esophagus: no   Stomach: no   Duodenum: no   Jejunum: no   Ileum: yes   Right colon: no   Transverse colon: no   Left colon: no   Rectum: no   Anus: no     History of fistula other than perianal:  Intra-abdominal   abcess: no      Enteroenteric fistula: no      Enterocutaneous fistula: no      Enterovesical fistula: no      Other fistula: no            Surgical History  Number of IBD surgeries: 0      First IBD surgery:    Most Recent IBD surgery:    Esophageal:  0    Gastroduodenal:  0    Small bowel resection(s):  0    Ileocolonic resection(s): 0      Colonic resection(s):  0    Ileostomy or colostomy:  no previous ostomy    Complete colectomy:  No         Medications     Year last used Reason for discontinuation   Corticosteroids prior     PNR     Thiopurines   never         Methotrexate   never         Infliximab   never         Adalimumab   never         Certolizumab   never         Golimumab   never         Natalizumab   never         Mesalamine prior     PNR     Sulfasalzine   never         Vedolizumab   never         Ustekinumab   never         Tofacitinib   never Other biologic   never             Extraintestinal Manifestations    IBD-associated arthropathy No    Uveitis No    Oral aphthous ulcers No   Erythema nodosum No    Pyoderma gangrenosum No    Primary sclerosing cholangitis No    Thrombotic complications No          Cancer / Dysplasia History  History of IBD-associated dysplasia: none    Date of diagnosis (Year):     History of colorectal cancer: No   History of cervical dysplasia: No   History of skin cancer: none         Laboratory Data   Most recent (date) Result   PPD   unknown   Quanitferon gold 8/1/2023 indeterminate   TPMT   unknown   Hepatitis A   unknown   HBsAb   unknown   HBcAb 7/30/2023 negative   HBsAg 7/30/2023 negative   HCV Ab   unknown       Imaging / Diagnostic Procedures   Most recent (date) Findings   Colonoscopy or sigmoidoscopy #1 7/31/2023            Ulcers/Erosions  large           Strictures  primary           Stricture Severity  cannot pass           Endoscopic Score Ocasio Score:    Rutgeert's:               Findings  (1) Stricture at the ileocecal valve. This could not be traversed with the colonoscope, however ileum distal to the stricture was visualized noting moderate ulceration throughout. Biopsied  (2) Multiple ulcers in the terminal ileum, ileocecal valve, cecum, ascending colon, hepatic flexure, transverse colon, splenic flexure, descending colon and sigmoid colon  (3) Severe abnormal mucosa, consistent with IBD in the terminal ileum and throughout the colon  (4) Large complex, cutaneous fistula with no drainage and no fluctuance in the anal region           Pathology  A. Duodenum, biopsy retrieved by cold biopsy forceps:  -  Duodenal mucosa with brunner gland hyperplasia, focal acute inflammation and gastric foveolar metaplasia consistent with peptic duodenitis.   -  No villous atrophy, intraepithelial lymphocytosis or crypt hyperplasia; negative for features of malabsorptive enteropathy.  -  Negative for chronic or active duodenitis, dysplasia or malignancy. B. Terminal Ileum, biopsy retrieved by cold biopsy forceps, H/O Crohn's:  -  Chronic and severely active ileitis. -  Negative for dysplasia. C. Large Intestine, Right/Ascending Colon, biopsy retrieved by cold biopsy forceps, H/O Crohn's:  -  Chronic active colitis. -  Negative for granuloma or dysplasia. D. Large Intestine, Transverse Colon, biopsy retrieved by cold biopsy forceps, R/O CMV, H/O Crohn's:  -  Chronic and severely active colitis. -  Negative for granuloma or dysplasia. E. Large Intestine, Left/Descending Colon, biopsy retrieved by cold biopsy forceps, H/O Crohn's:  -  Chronic and severely active colitis. -  Negative for granuloma or dysplasia. Colonoscopy or sigmoidoscopy #2              Ulcers/Erosions                 Strictures                 Stricture Severity              Endoscopic Score Ocasio Score:    Rugeert's:              Findings              Pathology      EGD 7/31/2023 Candida esophagitis, 2 cm hiatal hernia, gastric erythema. Small bowel follow-through       CT enterography       CT without enterography 7/30/2023 (1) Crohn's disease of the distal 6 cm of TI. (2) Pancolitis. (3) Cutaneous and subcutaneous thickening in the inferomedial portions of both buttocks, along the gluteal cleft, possibly cellulitis related to perianal Crohn's disease. Two cm subcutaneous collection, consistent with abscess, in the left inferomedial buttock. MRI enterography       Capsule study       DEXA scan   Lowest Z-score:      CXR (for TB)           HPI:   Interval History:  10/11/2023 Initial visit  Sidney Bazan presents to establish care after recent hospital admission. He is in the office with his sister. He has history of schizoaffective disorder, NSTEMI in 2015, and ileocolonic and perianal Crohn's disease. He is currently also seeing physicians at [facility] and does not know where he wants to establish care.   He was diagnosed at age [age] when he began losing weight and developed GI bleeding and severe anemia. I believe he had ileocolonic Crohn's disease, but he does not remember many details of his history. He recalls being treated with Pentasa and a "small white pill," but he is not sure what it was. He was also treated with steroids. He has a complex perianal fistula which has been present at least since 2015. He is convinced that this fistula was caused by skin injury from shaving, rather than Crohn's disease. He was self managing this with a razor blade, Neosporin, and packing at home. Exam under anesthesia by Dr. Kenny Gaines and 2016 describes a complex fistula, large Ischiorectal abscess which was debrided, and placement of seton. He has never been on Biologics. He smokes. He presented to the ER in late July with hemoglobin 4.9, leukocytosis, thrombocytosis, and weight loss. His symptoms were fatigue and hematochezia. CT showed terminal ileitis, possible colitis, and a gluteal abscess. Colonoscopy showed severe ileitis and pancolitis with perianal fistula. EGD with Candida esophagitis. The abscess was drained, he received antibiotics, and was treated with IV Solu-Medrol. He received blood transfusions. He was discharged on p.o. prednisone. He also received fluconazole for Candida esophagitis. He then followed up at [address] [place] [address] because he had indeterminate QuantiFERON gold (which was almost certainly because he was on steroids when it was drawn). He is currently no longer on steroids which she discontinued at home. He is also not on antibiotics. He continues to smoke 4 to 5 cigarettes/day. He is not ready to start biologic therapy. Farzaneh Murillo reports the following symptoms over the last 7 days:    Stool Frequency >4 stools/day more than normal   Average stools per day: 7   Average liquid stools per day: 3   Consistency of bowel: soft or semi-formed   Awakening from sleep to move bowels?  No   Urgency mild fecal urgency   Visible blood in stool? none   Abdominal pain? mild   General Wellbeing? slightly under par     Deborah Gomez also reports the following symptoms in the last month:    Leakage of stool while sleeping? No   Leakage of stool while awake? No       REVIEW OF SYSTEMS:  During the last 7 days, Deborah Gomez experienced the following symptoms:  Unintentional Weight Loss (in the last month) Yes  Comment:40 lb weight loss within a year   Fever No   Eye irritation No   Mouth sores No   Sore throat No   Chest pain No   Shortness of breath Yes   Numbness or tingling in hands or feet Yes   Skin rash No   Pain or swelling in joints Yes  Comment:L knee and albows   Bruising or bleeding No   Felt depressed or blue No   Fatigue Yes   Dysuria No   Please see HPI for additional pertinent review of systems; otherwise remainder of ROS was unremarkable    MEDICATIONS:    Current Outpatient Medications:     acetaminophen (TYLENOL) 650 mg CR tablet    ferrous sulfate 325 (65 Fe) mg tablet    lidocaine (LIDODERM) 5 %    ALLERGIES:  Allergies   Allergen Reactions    Haldol Decanoate [Haloperidol] Anaphylaxis    Oxycodone-Acetaminophen Rash     "swollon red rash"    Sulfamethoxazole-Trimethoprim      "never really worked"       OBJECTIVE:  /81 (BP Location: Right arm, Patient Position: Sitting, Cuff Size: Adult)   Pulse 92   Temp 98.6 °F (37 °C) (Tympanic)   Ht 5' 6" (1.676 m)   Wt 54.9 kg (121 lb)   BMI 19.53 kg/m²      PHYSICAL EXAM:    General Appearance:   Alert, cooperative, no distress   HEENT:   Normocephalic, atraumatic, anicteric. Neck:  Supple, symmetrical, trachea midline   Lungs:   Clear to auscultation bilaterally; no rales, rhonchi or wheezing; respirations unlabored    Heart[de-identified]   Regular rate and rhythm; no murmur, rub, or gallop. Abdomen:   Soft, non-distended; normal bowel sounds    Abdominal exam was notable for no tenderness with no mass.  Comment: R-sided fistula, healed L fistulas   Genitalia:   Deferred    Rectal:   Perirectal assessment demonstrated the following-    skin tags: no  fistula: no or scantly draining   fissure: no    perirectal abscess: definite    Extremities:  No cyanosis, clubbing or edema    Pulses:  2+ and symmetric    Skin:  No jaundice, rashes, or lesions    Lymph nodes:  No palpable cervical lymphadenopathy        Lab Results   Component Value Date    WBC 19.47 (H) 08/03/2023    HGB 7.9 (L) 08/03/2023    HCT 28.1 (L) 08/03/2023    MCV 75 (L) 08/03/2023     (H) 08/03/2023     Lab Results   Component Value Date     04/11/2018    SODIUM 135 08/03/2023    K 4.1 08/03/2023     08/03/2023    CO2 27 08/03/2023    ANIONGAP 13 04/11/2018    AGAP 5 08/03/2023    BUN 10 08/03/2023    CREATININE 0.74 08/03/2023    GLUC 122 08/03/2023    CALCIUM 8.2 (L) 08/03/2023    AST 6 (L) 07/30/2023    ALT 4 (L) 07/30/2023    ALKPHOS 56 07/30/2023    PROT 8.3 04/11/2018    TP 7.3 07/30/2023    BILITOT 0.3 04/11/2018    TBILI 0.45 07/30/2023    EGFR 121 08/03/2023     Lab Results   Component Value Date    .2 (H) 08/01/2023     No results found for: "Malika Godoy"  Lab Results   Component Value Date    FERRITIN 19 (L) 07/30/2023       ASSESSMENT AND PLAN:    Farzaneh Murillo has a history of both stricturing and penetrating Crohn’s disease diagnosed in 2003 with involvement in the following areas:      Ileum, Right colon, Transverse colon, Left colon, Rectum,  with perianal fistula . Surgical history includes no small bowel resections, no colon resections, and no prior ostomy. Currently not on medical therapy. My global assessment is that the clinical disease activity is currently severely active. The 6-point Ocasio score was 3 and the 9-point Ocasio score was 6. The short CDAI was 170. Farzaneh Murillo has schizoaffective disorder, history of MI, and severe ileocolonic and perianal Crohn's disease. He smokes. He has never been treated with Biologics.   Farzaneh Murillo has a very strong notions about the causes of his Crohn's disease and how to manage it. He is quite convinced that the perianal component was caused by skin injury from shaving. He also believes that the current flare was somehow precipitated from exercising and injuring his knee. I think it is critically important he starts biologic therapy, preferably with infliximab, ASAP. On my exam today, he has chronic skin changes in the right perianal region with scant drainage, but no drainable collection. However, he is insistent on following up with colorectal surgery at [facility] and would like them to "cut out the abscess" before it causes problems. He was adamant about not being ready to start biologic therapy. He became agitated and upset when his sister tried to intervene. 1.  New Carcamo will follow-up at [facility] and will also schedule follow-up appointment with me. I am hopeful that, regardless of who takes care of him, the recommendation will be to start infliximab therapy ASAP. 2.  He plans to see colorectal surgery at [facility], and they can advise him on the need for potential further surgical management of his perianal disease. 3.  My preferred management for him would be with long-term antibiotics and anti-TNF therapy. 4.  I also very strongly encouraged him to quit smoking. 5.  He is supposed to have repeat QuantiFERON gold, but I am fairly confident that the indeterminate results was due to steroid therapy. 6.  I hope to see New Carcamo again in the near future. I can discuss health maintenance issues with him at that time. I have spent a total time of 60 minutes on 10/11/23 and 10/13/23 in caring for this patient including Diagnostic results, Prognosis, Risks and benefits of tx options, Instructions for management, Patient and family education, Importance of tx compliance, Risk factor reductions, Impressions, Documenting in the medical record, Reviewing / ordering tests, medicine, procedures  , and Obtaining or reviewing history  .          Health Maintenance Recommendations:  Vaccines & Infections  COVID-19 vaccination and boosters are recommended. There is no evidence that the COVID-19 vaccine would cause an IBD flare. Avoid live vaccines if on immunosuppressive therapy. Yearly influenza vaccine (flu shot). Pneumonia vaccines for patients on immunosuppression. These include Prevnar 20, followed by Pneumovax 23 at least 8 weeks later. Shingrix vaccine (series of 2 injections) for al patients 65 and older. Patients on tofacitinib or upadacitinib should be vaccinated regardless of age. If not immune to measles mumps or rubella, MMR vaccine is recommended. However, this is a live vaccine and should be given prior to immunosuppressive therapy. HPV vaccination as per national guidelines. Hepatitis A and B vaccinations if not previously vaccinated. Testing for tuberculosis with QuantiFERON Gold blood test and/or chest xray prior to starting immunosuppressive medications, and then annually    Cancer screening  Dysplasia surveillance for colorectal cancer. Colonoscopy in all patients with extensive colitis (more than 1/3 of the colon involved) who had disease for at least 8 or more years. Repeat colonoscopy approximately every 12-24 months. In patients with concurrent primary sclerosing cholangitis, history of dysplasia, or family history of colon cancer, repeat colonoscopy annually. Females: Pap smear annually for woman on immunosuppression. Annual dermatologic/skin exam in all patients with IBD, especially those on immunosuppression with thiopurines or BRODERICK inhibitors.     Miscellaneous  DEXA scan, once off steroids for 3 months  Depression screening recommended annually  Routine dental and ophthalmology examinations    Problem List Items Addressed This Visit    None      Bonnie Shaw MD

## 2023-10-17 DIAGNOSIS — K50.014 CROHN'S DISEASE OF SMALL INTESTINE WITH ABSCESS (HCC): Primary | ICD-10-CM

## 2023-11-17 ENCOUNTER — OFFICE VISIT (OUTPATIENT)
Dept: FAMILY MEDICINE CLINIC | Facility: CLINIC | Age: 33
End: 2023-11-17
Payer: COMMERCIAL

## 2023-11-17 VITALS
WEIGHT: 125.8 LBS | HEART RATE: 120 BPM | OXYGEN SATURATION: 98 % | TEMPERATURE: 98 F | DIASTOLIC BLOOD PRESSURE: 70 MMHG | SYSTOLIC BLOOD PRESSURE: 110 MMHG | BODY MASS INDEX: 20.22 KG/M2 | HEIGHT: 66 IN

## 2023-11-17 DIAGNOSIS — Z00.00 ANNUAL PHYSICAL EXAM: Primary | ICD-10-CM

## 2023-11-17 DIAGNOSIS — E55.9 VITAMIN D DEFICIENCY: ICD-10-CM

## 2023-11-17 DIAGNOSIS — M25.562 CHRONIC PAIN OF LEFT KNEE: ICD-10-CM

## 2023-11-17 DIAGNOSIS — D63.8 ANEMIA IN OTHER CHRONIC DISEASES CLASSIFIED ELSEWHERE: ICD-10-CM

## 2023-11-17 DIAGNOSIS — G89.29 CHRONIC PAIN OF LEFT KNEE: ICD-10-CM

## 2023-11-17 PROCEDURE — 99385 PREV VISIT NEW AGE 18-39: CPT | Performed by: FAMILY MEDICINE

## 2023-11-17 RX ORDER — BUDESONIDE 3 MG/1
3 CAPSULE, COATED PELLETS ORAL DAILY
COMMUNITY
Start: 2023-10-23

## 2023-11-17 RX ORDER — PREDNISONE 5 MG/ML
5 SOLUTION ORAL DAILY
COMMUNITY

## 2023-11-17 RX ORDER — METHOCARBAMOL 500 MG/1
500 TABLET, FILM COATED ORAL 4 TIMES DAILY
Qty: 90 TABLET | Refills: 0 | Status: SHIPPED | OUTPATIENT
Start: 2023-11-17

## 2023-11-17 RX ORDER — ERGOCALCIFEROL 1.25 MG/1
50000 CAPSULE ORAL WEEKLY
Qty: 8 CAPSULE | Refills: 0 | Status: SHIPPED | OUTPATIENT
Start: 2023-11-17

## 2023-11-17 RX ORDER — FERROUS SULFATE 325(65) MG
325 TABLET ORAL 2 TIMES DAILY WITH MEALS
Qty: 180 TABLET | Refills: 1 | Status: SHIPPED | OUTPATIENT
Start: 2023-11-17

## 2023-11-17 NOTE — PROGRESS NOTES
[address] [employer] [place] [employer]    NAME: Matthew Luis  AGE: 35 y.o. SEX: male  : 1990     DATE: 2023     Assessment and Plan:     Problem List Items Addressed This Visit        Other    Anemia     Hgb 7.7, from chrons flare ups. - refilled Iron BID WM         Relevant Medications    ferrous sulfate 325 (65 Fe) mg tablet    Annual physical exam - Primary     Normal annual, but needs GI followup         Chronic pain of left knee     After falling on knee after squatting.  - pain is better but will try muscle relaxer         Relevant Medications    methocarbamol (ROBAXIN) 500 mg tablet   Other Visit Diagnoses     Vitamin D deficiency        Relevant Medications    ergocalciferol (VITAMIN D2) 50,000 units          Immunizations and preventive care screenings were discussed with patient today. Appropriate education was printed on patient's after visit summary. Counseling:  Alcohol/drug use: discussed moderation in alcohol intake, the recommendations for healthy alcohol use, and avoidance of illicit drug use. Dental Health: discussed importance of regular tooth brushing, flossing, and dental visits. Injury prevention: discussed safety/seat belts, safety helmets, smoke detectors, carbon dioxide detectors, and smoking near bedding or upholstery. Sexual health: discussed sexually transmitted diseases, partner selection, use of condoms, avoidance of unintended pregnancy, and contraceptive alternatives. Exercise: the importance of regular exercise/physical activity was discussed. Recommend exercise 3-5 times per week for at least 30 minutes. Depression Screening and Follow-up Plan: Patient was screened for depression during today's encounter. They screened negative with a PHQ-2 score of 0. No follow-ups on file.      Chief Complaint:     Chief Complaint   Patient presents with   • New Patient Visit      History of Present Illness:     Adult Annual Physical   Patient here for a comprehensive physical exam. The patient reports problems - chrons, left knee pain . Diet and Physical Activity  Diet/Nutrition: limited junk food and consuming 3-5 servings of fruits/vegetables daily. Exercise: no formal exercise. Depression Screening  PHQ-2/9 Depression Screening    Little interest or pleasure in doing things: 0 - not at all  Feeling down, depressed, or hopeless: 0 - not at all  PHQ-2 Score: 0  PHQ-2 Interpretation: Negative depression screen       General Health  Sleep: sleeps well. Hearing: normal - bilateral.  Vision: no vision problems. Dental: regular dental visits and brushes teeth twice daily.  Health  History of STDs?: no.    Advanced Care Planning  Do you have an advanced directive? no  Do you have a durable medical power of ? no     Review of Systems:     Review of Systems   Constitutional:  Negative for chills and fever. HENT:  Negative for ear pain and sore throat. Eyes:  Negative for pain and visual disturbance. Respiratory:  Negative for cough and shortness of breath. Cardiovascular:  Negative for chest pain and palpitations. Gastrointestinal:  Negative for abdominal pain and vomiting. Genitourinary:  Negative for dysuria and hematuria. Musculoskeletal:  Negative for arthralgias and back pain. Skin:  Negative for color change and rash. Neurological:  Negative for seizures and syncope. All other systems reviewed and are negative.      Past Medical History:     Past Medical History:   Diagnosis Date   • Anemia 2004    hospitalization/transfusion      Past Surgical History:     Past Surgical History:   Procedure Laterality Date   • CT ANRCT XM SURG REQ ANES GENERAL SPI/EDRL DX N/A 4/7/2016    Procedure: EXAM UNDER ANESTHESIA (EUA); possible REMOVAL OF FOREIGN BODY anoscopy ;  Surgeon: Uche Bey MD;  Location: [facility];  Service: Colorectal   • CT SURG [address] [place] [address] FISTULA INTERSPHINCTERIC N/A 4/7/2016    Procedure: superficial FISTULOTOMY; SETON PROCEDURE drainage of chronic ischiofistula abscess and debridement;  Surgeon: Arjun Lorenzana MD;  Location: [facility];  Service: Colorectal      Social History:     Social History     Socioeconomic History   • Marital status: Single     Spouse name: None   • Number of children: None   • Years of education: None   • Highest education level: None   Occupational History   • None   Tobacco Use   • Smoking status: Light Smoker     Packs/day: 0.25     Types: Cigarettes   • Smokeless tobacco: Never   • Tobacco comments:     Codie says 7 cigarettes per day   Vaping Use   • Vaping Use: Never used   Substance and Sexual Activity   • Alcohol use: Not Currently     Comment: Socially   • Drug use: No   • Sexual activity: Yes     Partners: Female   Other Topics Concern   • None   Social History Narrative   • None     Social Determinants of Health     Financial Resource Strain: Not on file   Food Insecurity: No Food Insecurity (8/2/2023)    Hunger Vital Sign    • Worried About Running Out of Food in the Last Year: Never true    • Ran Out of Food in the Last Year: Never true   Transportation Needs: No Transportation Needs (8/2/2023)    PRAPARE - Transportation    • Lack of Transportation (Medical): No    • Lack of Transportation (Non-Medical): No   Physical Activity: Not on file   Stress: Not on file   Social Connections: Not on file   Intimate Partner Violence: Not on file   Housing Stability: Low Risk  (8/2/2023)    Housing Stability Vital Sign    • Unable to Pay for Housing in the Last Year: No    • Number of Places Lived in the Last Year: 2    • Unstable Housing in the Last Year: No      Family History:     History reviewed. No pertinent family history.    Current Medications:     Current Outpatient Medications   Medication Sig Dispense Refill   • acetaminophen (TYLENOL) 650 mg CR tablet Take 1 tablet (650 mg total) by mouth every 8 (eight) hours as needed for mild pain 30 tablet 0   • budesonide (ENTOCORT EC) 3 MG capsule Take 3 mg by mouth daily     • ergocalciferol (VITAMIN D2) 50,000 units Take 1 capsule (50,000 Units total) by mouth once a week , For 8 weeks 8 capsule 0   • ferrous sulfate 325 (65 Fe) mg tablet Take 1 tablet (325 mg total) by mouth 2 (two) times a day with meals 180 tablet 1   • methocarbamol (ROBAXIN) 500 mg tablet Take 1 tablet (500 mg total) by mouth 4 (four) times a day 90 tablet 0   • lidocaine (LIDODERM) 5 % Apply 1 patch topically over 12 hours every 24 hours for 15 days Remove & Discard patch within 12 hours or as directed by MD 15 patch 0   • predniSONE 5 mg/5 mL solution Take 5 mg by mouth daily       No current facility-administered medications for this visit. Allergies: Allergies   Allergen Reactions   • Haldol Decanoate [Haloperidol] Anaphylaxis   • Oxycodone-Acetaminophen Rash     "swollon red rash"   • Sulfamethoxazole-Trimethoprim      "never really worked"      Physical Exam:     /70 (BP Location: Left arm, Patient Position: Sitting, Cuff Size: Standard)   Pulse (!) 120   Temp 98 °F (36.7 °C) (Temporal)   Ht 5' 6" (1.676 m)   Wt 57.1 kg (125 lb 12.8 oz)   SpO2 98%   BMI 20.30 kg/m²     Physical Exam  Vitals reviewed. Constitutional:       General: He is not in acute distress. Appearance: Normal appearance. He is well-developed. HENT:      Head: Normocephalic and atraumatic. Eyes:      Conjunctiva/sclera: Conjunctivae normal.   Cardiovascular:      Rate and Rhythm: Normal rate and regular rhythm. Heart sounds: No murmur heard. Pulmonary:      Effort: Pulmonary effort is normal. No respiratory distress. Breath sounds: Normal breath sounds. Abdominal:      Palpations: Abdomen is soft. Tenderness: There is no abdominal tenderness. Musculoskeletal:         General: Tenderness present. No swelling. Cervical back: Neck supple.    Skin:     General: Skin is warm and dry. Capillary Refill: Capillary refill takes less than 2 seconds. Neurological:      Mental Status: He is alert.    Psychiatric:         Mood and Affect: Mood normal.          Arleth Soriano, DO   [address]

## 2024-01-03 ENCOUNTER — HOSPITAL ENCOUNTER (INPATIENT)
Facility: HOSPITAL | Age: 34
DRG: 580 | End: 2024-01-03
Attending: EMERGENCY MEDICINE | Admitting: FAMILY MEDICINE
Payer: MEDICARE

## 2024-01-03 ENCOUNTER — APPOINTMENT (EMERGENCY)
Dept: CT IMAGING | Facility: HOSPITAL | Age: 34
DRG: 580 | End: 2024-01-03
Payer: MEDICARE

## 2024-01-03 DIAGNOSIS — F25.8 OTHER SCHIZOAFFECTIVE DISORDERS (HCC): ICD-10-CM

## 2024-01-03 DIAGNOSIS — L03.317 CELLULITIS AND ABSCESS OF BUTTOCK: ICD-10-CM

## 2024-01-03 DIAGNOSIS — Z00.8 MEDICAL CLEARANCE FOR PSYCHIATRIC ADMISSION: ICD-10-CM

## 2024-01-03 DIAGNOSIS — L03.90 CELLULITIS: Primary | ICD-10-CM

## 2024-01-03 DIAGNOSIS — K50.90 CROHN'S DISEASE (HCC): ICD-10-CM

## 2024-01-03 DIAGNOSIS — D64.9 ANEMIA: ICD-10-CM

## 2024-01-03 DIAGNOSIS — L02.31 CELLULITIS AND ABSCESS OF BUTTOCK: ICD-10-CM

## 2024-01-03 LAB
ABO GROUP BLD: NORMAL
ALBUMIN SERPL BCP-MCNC: 3.7 G/DL (ref 3.5–5)
ALP SERPL-CCNC: 80 U/L (ref 34–104)
ALT SERPL W P-5'-P-CCNC: 6 U/L (ref 7–52)
ANION GAP SERPL CALCULATED.3IONS-SCNC: 9 MMOL/L
APTT PPP: 33 SECONDS (ref 23–37)
AST SERPL W P-5'-P-CCNC: 13 U/L (ref 13–39)
ATRIAL RATE: 104 BPM
BACTERIA UR QL AUTO: ABNORMAL /HPF
BASE EX.OXY STD BLDV CALC-SCNC: 52.6 % (ref 60–80)
BASE EXCESS BLDV CALC-SCNC: 1.5 MMOL/L
BASOPHILS # BLD AUTO: 0.05 THOUSANDS/ÂΜL (ref 0–0.1)
BASOPHILS NFR BLD AUTO: 1 % (ref 0–1)
BILIRUB SERPL-MCNC: 0.26 MG/DL (ref 0.2–1)
BILIRUB UR QL STRIP: NEGATIVE
BLD GP AB SCN SERPL QL: NEGATIVE
BUN SERPL-MCNC: 6 MG/DL (ref 5–25)
CALCIUM SERPL-MCNC: 8.8 MG/DL (ref 8.4–10.2)
CHLORIDE SERPL-SCNC: 100 MMOL/L (ref 96–108)
CLARITY UR: ABNORMAL
CO2 SERPL-SCNC: 26 MMOL/L (ref 21–32)
COLOR UR: ABNORMAL
CREAT SERPL-MCNC: 1.16 MG/DL (ref 0.6–1.3)
EOSINOPHIL # BLD AUTO: 0.03 THOUSAND/ÂΜL (ref 0–0.61)
EOSINOPHIL NFR BLD AUTO: 0 % (ref 0–6)
ERYTHROCYTE [DISTWIDTH] IN BLOOD BY AUTOMATED COUNT: 19.8 % (ref 11.6–15.1)
FLUAV RNA RESP QL NAA+PROBE: NEGATIVE
FLUBV RNA RESP QL NAA+PROBE: NEGATIVE
GFR SERPL CREATININE-BSD FRML MDRD: 82 ML/MIN/1.73SQ M
GLUCOSE SERPL-MCNC: 99 MG/DL (ref 65–140)
GLUCOSE UR STRIP-MCNC: NEGATIVE MG/DL
HCO3 BLDV-SCNC: 27.2 MMOL/L (ref 24–30)
HCT VFR BLD AUTO: 26 % (ref 36.5–49.3)
HGB BLD-MCNC: 7 G/DL (ref 12–17)
HGB UR QL STRIP.AUTO: NEGATIVE
HYALINE CASTS #/AREA URNS LPF: ABNORMAL /LPF
IMM GRANULOCYTES # BLD AUTO: 0.03 THOUSAND/UL (ref 0–0.2)
IMM GRANULOCYTES NFR BLD AUTO: 0 % (ref 0–2)
INR PPP: 1.07 (ref 0.84–1.19)
KETONES UR STRIP-MCNC: NEGATIVE MG/DL
LACTATE SERPL-SCNC: 0.6 MMOL/L (ref 0.5–2)
LACTATE SERPL-SCNC: 2.2 MMOL/L (ref 0.5–2)
LEUKOCYTE ESTERASE UR QL STRIP: NEGATIVE
LIPASE SERPL-CCNC: 7 U/L (ref 11–82)
LYMPHOCYTES # BLD AUTO: 1.67 THOUSANDS/ÂΜL (ref 0.6–4.47)
LYMPHOCYTES NFR BLD AUTO: 15 % (ref 14–44)
MAGNESIUM SERPL-MCNC: 2 MG/DL (ref 1.9–2.7)
MCH RBC QN AUTO: 18.5 PG (ref 26.8–34.3)
MCHC RBC AUTO-ENTMCNC: 26.9 G/DL (ref 31.4–37.4)
MCV RBC AUTO: 69 FL (ref 82–98)
MONOCYTES # BLD AUTO: 0.85 THOUSAND/ÂΜL (ref 0.17–1.22)
MONOCYTES NFR BLD AUTO: 8 % (ref 4–12)
MUCOUS THREADS UR QL AUTO: ABNORMAL
NEUTROPHILS # BLD AUTO: 8.22 THOUSANDS/ÂΜL (ref 1.85–7.62)
NEUTS SEG NFR BLD AUTO: 76 % (ref 43–75)
NITRITE UR QL STRIP: NEGATIVE
NON-SQ EPI CELLS URNS QL MICRO: ABNORMAL /HPF
NRBC BLD AUTO-RTO: 0 /100 WBCS
O2 CT BLDV-SCNC: 6 ML/DL
P AXIS: 77 DEGREES
PCO2 BLDV: 48.5 MM HG (ref 42–50)
PH BLDV: 7.37 [PH] (ref 7.3–7.4)
PH UR STRIP.AUTO: 6 [PH]
PLATELET # BLD AUTO: 661 THOUSANDS/UL (ref 149–390)
PMV BLD AUTO: 8.6 FL (ref 8.9–12.7)
PO2 BLDV: 31.7 MM HG (ref 35–45)
POTASSIUM SERPL-SCNC: 4.1 MMOL/L (ref 3.5–5.3)
PR INTERVAL: 126 MS
PROCALCITONIN SERPL-MCNC: 0.12 NG/ML
PROT SERPL-MCNC: 8.1 G/DL (ref 6.4–8.4)
PROT UR STRIP-MCNC: ABNORMAL MG/DL
PROTHROMBIN TIME: 14.2 SECONDS (ref 11.6–14.5)
QRS AXIS: 72 DEGREES
QRSD INTERVAL: 122 MS
QT INTERVAL: 346 MS
QTC INTERVAL: 454 MS
RBC # BLD AUTO: 3.78 MILLION/UL (ref 3.88–5.62)
RBC #/AREA URNS AUTO: ABNORMAL /HPF
RH BLD: POSITIVE
RSV RNA RESP QL NAA+PROBE: NEGATIVE
SARS-COV-2 RNA RESP QL NAA+PROBE: NEGATIVE
SODIUM SERPL-SCNC: 135 MMOL/L (ref 135–147)
SP GR UR STRIP.AUTO: 1.02 (ref 1–1.04)
SPECIMEN EXPIRATION DATE: NORMAL
T WAVE AXIS: 47 DEGREES
UROBILINOGEN UA: NEGATIVE MG/DL
VENTRICULAR RATE: 104 BPM
WBC # BLD AUTO: 10.85 THOUSAND/UL (ref 4.31–10.16)
WBC #/AREA URNS AUTO: ABNORMAL /HPF

## 2024-01-03 PROCEDURE — 86900 BLOOD TYPING SEROLOGIC ABO: CPT

## 2024-01-03 PROCEDURE — 36415 COLL VENOUS BLD VENIPUNCTURE: CPT

## 2024-01-03 PROCEDURE — 86920 COMPATIBILITY TEST SPIN: CPT

## 2024-01-03 PROCEDURE — 96361 HYDRATE IV INFUSION ADD-ON: CPT

## 2024-01-03 PROCEDURE — 86901 BLOOD TYPING SEROLOGIC RH(D): CPT

## 2024-01-03 PROCEDURE — 93005 ELECTROCARDIOGRAM TRACING: CPT

## 2024-01-03 PROCEDURE — 85730 THROMBOPLASTIN TIME PARTIAL: CPT

## 2024-01-03 PROCEDURE — 80053 COMPREHEN METABOLIC PANEL: CPT

## 2024-01-03 PROCEDURE — 93010 ELECTROCARDIOGRAM REPORT: CPT

## 2024-01-03 PROCEDURE — 99284 EMERGENCY DEPT VISIT MOD MDM: CPT

## 2024-01-03 PROCEDURE — 0241U HB NFCT DS VIR RESP RNA 4 TRGT: CPT

## 2024-01-03 PROCEDURE — 74177 CT ABD & PELVIS W/CONTRAST: CPT

## 2024-01-03 PROCEDURE — 83690 ASSAY OF LIPASE: CPT

## 2024-01-03 PROCEDURE — G1004 CDSM NDSC: HCPCS

## 2024-01-03 PROCEDURE — 30233N1 TRANSFUSION OF NONAUTOLOGOUS RED BLOOD CELLS INTO PERIPHERAL VEIN, PERCUTANEOUS APPROACH: ICD-10-PCS | Performed by: EMERGENCY MEDICINE

## 2024-01-03 PROCEDURE — 99285 EMERGENCY DEPT VISIT HI MDM: CPT

## 2024-01-03 PROCEDURE — 85610 PROTHROMBIN TIME: CPT

## 2024-01-03 PROCEDURE — 85025 COMPLETE CBC W/AUTO DIFF WBC: CPT

## 2024-01-03 PROCEDURE — 83735 ASSAY OF MAGNESIUM: CPT

## 2024-01-03 PROCEDURE — 81001 URINALYSIS AUTO W/SCOPE: CPT

## 2024-01-03 PROCEDURE — 84145 PROCALCITONIN (PCT): CPT

## 2024-01-03 PROCEDURE — 86850 RBC ANTIBODY SCREEN: CPT

## 2024-01-03 PROCEDURE — 96365 THER/PROPH/DIAG IV INF INIT: CPT

## 2024-01-03 PROCEDURE — 83605 ASSAY OF LACTIC ACID: CPT

## 2024-01-03 PROCEDURE — 87040 BLOOD CULTURE FOR BACTERIA: CPT

## 2024-01-03 PROCEDURE — 82805 BLOOD GASES W/O2 SATURATION: CPT

## 2024-01-03 PROCEDURE — P9016 RBC LEUKOCYTES REDUCED: HCPCS

## 2024-01-03 PROCEDURE — 81003 URINALYSIS AUTO W/O SCOPE: CPT

## 2024-01-03 RX ORDER — METHOCARBAMOL 500 MG/1
500 TABLET, FILM COATED ORAL 4 TIMES DAILY
Status: DISPENSED | OUTPATIENT
Start: 2024-01-03

## 2024-01-03 RX ORDER — ERGOCALCIFEROL 1.25 MG/1
50000 CAPSULE ORAL WEEKLY
Status: ACTIVE | OUTPATIENT
Start: 2024-01-10

## 2024-01-03 RX ORDER — ACETAMINOPHEN 160 MG/5ML
160 SUSPENSION ORAL EVERY 6 HOURS PRN
Status: DISCONTINUED | OUTPATIENT
Start: 2024-01-03 | End: 2024-01-07

## 2024-01-03 RX ORDER — CEFTRIAXONE 1 G/50ML
1000 INJECTION, SOLUTION INTRAVENOUS EVERY 24 HOURS
Status: DISCONTINUED | OUTPATIENT
Start: 2024-01-04 | End: 2024-01-05

## 2024-01-03 RX ORDER — DEXAMETHASONE SODIUM PHOSPHATE 10 MG/ML
4 INJECTION, SOLUTION INTRAMUSCULAR; INTRAVENOUS EVERY 8 HOURS SCHEDULED
Status: CANCELLED | OUTPATIENT
Start: 2024-01-03

## 2024-01-03 RX ORDER — FERROUS SULFATE 325(65) MG
325 TABLET ORAL 2 TIMES DAILY WITH MEALS
Status: DISCONTINUED | OUTPATIENT
Start: 2024-01-04 | End: 2024-01-05

## 2024-01-03 RX ORDER — PREDNISONE 10 MG/1
5 TABLET ORAL DAILY
Status: CANCELLED | OUTPATIENT
Start: 2024-01-04

## 2024-01-03 RX ORDER — LANOLIN ALCOHOL/MO/W.PET/CERES
3 CREAM (GRAM) TOPICAL
Status: DISPENSED | OUTPATIENT
Start: 2024-01-03

## 2024-01-03 RX ORDER — METRONIDAZOLE 500 MG/100ML
500 INJECTION, SOLUTION INTRAVENOUS 3 TIMES DAILY
Status: DISCONTINUED | OUTPATIENT
Start: 2024-01-04 | End: 2024-01-05

## 2024-01-03 RX ADMIN — MELATONIN TAB 3 MG 3 MG: 3 TAB at 22:42

## 2024-01-03 RX ADMIN — METHOCARBAMOL TABLETS 500 MG: 500 TABLET, COATED ORAL at 22:42

## 2024-01-03 RX ADMIN — SODIUM CHLORIDE 1000 ML: 0.9 INJECTION, SOLUTION INTRAVENOUS at 15:59

## 2024-01-03 RX ADMIN — PIPERACILLIN AND TAZOBACTAM 3.38 G: 3; .375 INJECTION, POWDER, FOR SOLUTION INTRAVENOUS at 18:37

## 2024-01-03 RX ADMIN — IOHEXOL 100 ML: 350 INJECTION, SOLUTION INTRAVENOUS at 16:48

## 2024-01-04 ENCOUNTER — ANESTHESIA EVENT (OUTPATIENT)
Dept: PERIOP | Facility: HOSPITAL | Age: 34
DRG: 580 | End: 2024-01-04
Payer: MEDICARE

## 2024-01-04 LAB
ABO GROUP BLD BPU: NORMAL
ABO GROUP BLD BPU: NORMAL
ANION GAP SERPL CALCULATED.3IONS-SCNC: 8 MMOL/L
BASOPHILS # BLD AUTO: 0.07 THOUSANDS/ÂΜL (ref 0–0.1)
BASOPHILS NFR BLD AUTO: 1 % (ref 0–1)
BPU ID: NORMAL
BPU ID: NORMAL
BUN SERPL-MCNC: 8 MG/DL (ref 5–25)
CALCIUM SERPL-MCNC: 8.1 MG/DL (ref 8.4–10.2)
CHLORIDE SERPL-SCNC: 102 MMOL/L (ref 96–108)
CO2 SERPL-SCNC: 26 MMOL/L (ref 21–32)
CREAT SERPL-MCNC: 1.04 MG/DL (ref 0.6–1.3)
CROSSMATCH: NORMAL
CROSSMATCH: NORMAL
CRP SERPL QL: 60.1 MG/L
EOSINOPHIL # BLD AUTO: 0.15 THOUSAND/ÂΜL (ref 0–0.61)
EOSINOPHIL NFR BLD AUTO: 1 % (ref 0–6)
ERYTHROCYTE [DISTWIDTH] IN BLOOD BY AUTOMATED COUNT: 21.1 % (ref 11.6–15.1)
ERYTHROCYTE [SEDIMENTATION RATE] IN BLOOD: 99 MM/HOUR (ref 0–14)
FERRITIN SERPL-MCNC: 9 NG/ML (ref 24–336)
FOLATE SERPL-MCNC: 7.3 NG/ML
GFR SERPL CREATININE-BSD FRML MDRD: 93 ML/MIN/1.73SQ M
GLUCOSE P FAST SERPL-MCNC: 112 MG/DL (ref 65–99)
GLUCOSE SERPL-MCNC: 112 MG/DL (ref 65–140)
HCT VFR BLD AUTO: 27.6 % (ref 36.5–49.3)
HGB BLD-MCNC: 8.2 G/DL (ref 12–17)
IMM GRANULOCYTES # BLD AUTO: 0.03 THOUSAND/UL (ref 0–0.2)
IMM GRANULOCYTES NFR BLD AUTO: 0 % (ref 0–2)
IRON SERPL-MCNC: 287 UG/DL (ref 50–212)
LYMPHOCYTES # BLD AUTO: 2.56 THOUSANDS/ÂΜL (ref 0.6–4.47)
LYMPHOCYTES NFR BLD AUTO: 20 % (ref 14–44)
MCH RBC QN AUTO: 21 PG (ref 26.8–34.3)
MCHC RBC AUTO-ENTMCNC: 29.7 G/DL (ref 31.4–37.4)
MCV RBC AUTO: 71 FL (ref 82–98)
MONOCYTES # BLD AUTO: 1.57 THOUSAND/ÂΜL (ref 0.17–1.22)
MONOCYTES NFR BLD AUTO: 12 % (ref 4–12)
NEUTROPHILS # BLD AUTO: 8.77 THOUSANDS/ÂΜL (ref 1.85–7.62)
NEUTS SEG NFR BLD AUTO: 66 % (ref 43–75)
NRBC BLD AUTO-RTO: 0 /100 WBCS
PLATELET # BLD AUTO: 554 THOUSANDS/UL (ref 149–390)
PMV BLD AUTO: 8.5 FL (ref 8.9–12.7)
POTASSIUM SERPL-SCNC: 4 MMOL/L (ref 3.5–5.3)
RBC # BLD AUTO: 3.9 MILLION/UL (ref 3.88–5.62)
SODIUM SERPL-SCNC: 136 MMOL/L (ref 135–147)
TIBC SERPL-MCNC: <342 UG/DL (ref 250–450)
UIBC SERPL-MCNC: <55 UG/DL (ref 155–355)
UNIT DISPENSE STATUS: NORMAL
UNIT DISPENSE STATUS: NORMAL
UNIT PRODUCT CODE: NORMAL
UNIT PRODUCT CODE: NORMAL
UNIT PRODUCT VOLUME: 300 ML
UNIT PRODUCT VOLUME: 300 ML
UNIT RH: NORMAL
UNIT RH: NORMAL
VIT B12 SERPL-MCNC: 307 PG/ML (ref 180–914)
WBC # BLD AUTO: 13.15 THOUSAND/UL (ref 4.31–10.16)

## 2024-01-04 PROCEDURE — P9016 RBC LEUKOCYTES REDUCED: HCPCS

## 2024-01-04 PROCEDURE — 85025 COMPLETE CBC W/AUTO DIFF WBC: CPT

## 2024-01-04 PROCEDURE — NC001 PR NO CHARGE: Performed by: STUDENT IN AN ORGANIZED HEALTH CARE EDUCATION/TRAINING PROGRAM

## 2024-01-04 PROCEDURE — 85652 RBC SED RATE AUTOMATED: CPT | Performed by: STUDENT IN AN ORGANIZED HEALTH CARE EDUCATION/TRAINING PROGRAM

## 2024-01-04 PROCEDURE — 87493 C DIFF AMPLIFIED PROBE: CPT | Performed by: PHYSICIAN ASSISTANT

## 2024-01-04 PROCEDURE — 82728 ASSAY OF FERRITIN: CPT | Performed by: STUDENT IN AN ORGANIZED HEALTH CARE EDUCATION/TRAINING PROGRAM

## 2024-01-04 PROCEDURE — 83540 ASSAY OF IRON: CPT | Performed by: STUDENT IN AN ORGANIZED HEALTH CARE EDUCATION/TRAINING PROGRAM

## 2024-01-04 PROCEDURE — 99223 1ST HOSP IP/OBS HIGH 75: CPT | Performed by: STUDENT IN AN ORGANIZED HEALTH CARE EDUCATION/TRAINING PROGRAM

## 2024-01-04 PROCEDURE — 82746 ASSAY OF FOLIC ACID SERUM: CPT | Performed by: STUDENT IN AN ORGANIZED HEALTH CARE EDUCATION/TRAINING PROGRAM

## 2024-01-04 PROCEDURE — 86140 C-REACTIVE PROTEIN: CPT | Performed by: STUDENT IN AN ORGANIZED HEALTH CARE EDUCATION/TRAINING PROGRAM

## 2024-01-04 PROCEDURE — 82607 VITAMIN B-12: CPT | Performed by: STUDENT IN AN ORGANIZED HEALTH CARE EDUCATION/TRAINING PROGRAM

## 2024-01-04 PROCEDURE — 87505 NFCT AGENT DETECTION GI: CPT | Performed by: PHYSICIAN ASSISTANT

## 2024-01-04 PROCEDURE — 83993 ASSAY FOR CALPROTECTIN FECAL: CPT | Performed by: STUDENT IN AN ORGANIZED HEALTH CARE EDUCATION/TRAINING PROGRAM

## 2024-01-04 PROCEDURE — 99222 1ST HOSP IP/OBS MODERATE 55: CPT | Performed by: PHYSICIAN ASSISTANT

## 2024-01-04 PROCEDURE — 99255 IP/OBS CONSLTJ NEW/EST HI 80: CPT | Performed by: PSYCHIATRY & NEUROLOGY

## 2024-01-04 PROCEDURE — 80048 BASIC METABOLIC PNL TOTAL CA: CPT

## 2024-01-04 PROCEDURE — 83550 IRON BINDING TEST: CPT | Performed by: STUDENT IN AN ORGANIZED HEALTH CARE EDUCATION/TRAINING PROGRAM

## 2024-01-04 RX ORDER — PALIPERIDONE 3 MG/1
3 TABLET, EXTENDED RELEASE ORAL
Status: DISPENSED | OUTPATIENT
Start: 2024-01-04

## 2024-01-04 RX ORDER — SODIUM CHLORIDE 9 MG/ML
100 INJECTION, SOLUTION INTRAVENOUS CONTINUOUS
Status: DISCONTINUED | OUTPATIENT
Start: 2024-01-04 | End: 2024-01-05

## 2024-01-04 RX ADMIN — FERROUS SULFATE TAB 325 MG (65 MG ELEMENTAL FE) 325 MG: 325 (65 FE) TAB at 16:27

## 2024-01-04 RX ADMIN — METRONIDAZOLE 500 MG: 500 INJECTION, SOLUTION INTRAVENOUS at 20:55

## 2024-01-04 RX ADMIN — FERROUS SULFATE TAB 325 MG (65 MG ELEMENTAL FE) 325 MG: 325 (65 FE) TAB at 08:35

## 2024-01-04 RX ADMIN — METHOCARBAMOL TABLETS 500 MG: 500 TABLET, COATED ORAL at 22:27

## 2024-01-04 RX ADMIN — METRONIDAZOLE 500 MG: 500 INJECTION, SOLUTION INTRAVENOUS at 10:40

## 2024-01-04 RX ADMIN — METHOCARBAMOL TABLETS 500 MG: 500 TABLET, COATED ORAL at 08:35

## 2024-01-04 RX ADMIN — MELATONIN TAB 3 MG 3 MG: 3 TAB at 22:27

## 2024-01-04 RX ADMIN — SODIUM CHLORIDE 100 ML/HR: 0.9 INJECTION, SOLUTION INTRAVENOUS at 09:38

## 2024-01-04 RX ADMIN — PALIPERIDONE 3 MG: 3 TABLET, EXTENDED RELEASE ORAL at 22:27

## 2024-01-04 RX ADMIN — CEFTRIAXONE 1000 MG: 1 INJECTION, SOLUTION INTRAVENOUS at 08:35

## 2024-01-04 RX ADMIN — METHOCARBAMOL TABLETS 500 MG: 500 TABLET, COATED ORAL at 18:15

## 2024-01-04 RX ADMIN — METRONIDAZOLE 500 MG: 500 INJECTION, SOLUTION INTRAVENOUS at 16:27

## 2024-01-04 NOTE — CONSULTS
PSYCHIATRY CONSULTATION NOTE    Carlos Wolf 33 y.o. male MRN: [id number]  Unit/Bed#: 7T [facility] 705-01 Encounter: [id number]     Assessment & Plan     Assessment     Carlos Wolf is a 33 y.o. male, single, lives in room that he rents from stepfather, with medical history of Crohn's disease, in the past pertinent psychiatric history of psychosis unspecified vs schizophrenia vs schizophreniform, who initially presented to [facility] ED for abdominal discomfort and drainage from fistula on buttock.  Patient was admitted to [facility] medical floor for medical treatment of physical complaints.  While being treated on the medical floor, the patient endorsed psychiatric symptoms leading to a psychiatric consult being ordered.  Secondary to patient's current psychotic symptoms, patient is a limited historian decrease his ability to give a clear and concise history of presenting illness. On evaluation today, patient endorsed anxiety, AVH, and paranoia. That have been present since 2010 but began to significantly increase and worsen in June 2023. Patient denied depression, mood instability, suicidal ideation, and homicidal ideation. On examination today, patient displayed overt paranoia, increased rate of speech bordering on pressured speech, disorganized and tangential thought process, flight of ideas, response to internal stimuli, and perseverative thoughts regarding his desire to find out whether or not he actually has schizophrenia.  Patient has signed 201 for voluntary admission, awaiting inpatient psychiatry placement. While waiting for patient to be medically cleared and placed on a inpatient psychiatric unit, the decision has been made to start patient on p.o. Invega to help reduce his psychotic symptoms.  The hope is that as the patient's psychotic symptoms start to slowly decrease it, the patient will be able to provide more information regarding his current mental state and how psychiatry can  "best help moving forward.    Principal Psychiatric Problem:  Psychosis unspecified (HCC)  Differential includes but is not limited to: Schizophrenia vs schizoaffective disorder    Principal Problem:    Cellulitis and abscess of buttock  Active Problems:    Schizophreniform disorder (HCC)    Anemia    Crohn's disease (HCC)      Plan     Treatment Recommendations     Discussed plan with primary team as follows:  Hospital labs reviewed.  Collaborate with collaterals for baseline assessment and disposition as indicated.  Patient has signed 201 for voluntary admission, awaiting inpatient psychiatry placement  Recommend the following psychotropic medication regimen at this time:  Initiate Invega 3 mg p.o. nightly  Continue medical management per primary team.  Observation level: Not on observation, currently not indicated  Psychiatry will continue to follow as needed. Please contact our service via Bnooki with any additional questions or concerns. If contacting after hours, please call or TigerText the on-call team (Smarterphone: [phone]) with any questions or concerns.  Please reach out to on-call Psychiatry team via Bnooki, or if after hours/Fri/Sat/Sunday contact on-call psychiatric service via Wideo ([phone]) with any questions or concerns.    Risks, benefits and possible side effects of Medications:   Risks, benefits, and possible side effects of medications explained to patient and patient verbalizes understanding.          History of Present Illness      Provider Requesting Consult: Kiko Araujo MD    Reason for Consult / Principal Problem: Auditory and visual hallucinations, not currently on any psychiatric medications     Chief Complaint: \"they are following me\"    Carlos Wolf is a 33 y.o. male, with a past pertinent psychiatric history of psychosis unspecified vs schizophrenia vs schizophreniform and past inpatient admissions who initially presented to [facility] ED for " "abdominal discomfort and drainage from fistula on buttock.  After being admitted to [place] medical floor for treatment of physical complaints, patient endorsed psychiatric symptoms leading to a psychiatric consult being ordered.    Please note, secondary to patient's current psychotic symptoms, patient is a limited historian decrease his ability to give a clear and concise history of presenting illness.    Per provider note by Kiko Araujo MD on 1/3/2024,   \"Carlos Wolf is a 33 y.o. male who presents with chronic lower abdominal pain. Pain is irrespective to food. Worsened with stress. Improves with rest. Patient is established with Gastroenterology whom manage his Chron's disease. Patient was prompted to come to the ED because he was draining scant yellow to brown fluid from his buttocks. GI recommended that the patient undergo surgery in June to manage his perianal fistulas/abscesses, however, it was cancelled due to an acute infection at the time according to the patient. Patient is able to name all of his current medications and has been complying with all of his medications except prednisone because his stomach does not tolerate it. Of note, patient has 1-2 watery, non-bloody bowel movements a day. He denies seeing mucous in his stool.      Patient smokes 2-3 cigarettes a day. Denies consuming alcohol since June of 2023 and he does not use any recreational drugs. Patient has been diagnosed with Schizophreniform disorder. Unfortunately, at this time he is not currently on any psychiatric medications but he is establishing care with a therapist in upcoming weeks. Patient endorses auditory hallucinations. He denies that these voices are instructing him to harm himself or others. He denies suicidal/homicidal ideation. \"    Symptoms preceding psychiatry consultation included AVH, paranoia and anxiety related to worsening of AVH.  According to patient, he has experienced some form of " "AVH since 2010 when he was first admitted to an inpatient psychiatric unit at [facility].  Since 2010 patient reports almost continuous occurrence of AVH in addition to paranoid ideations.  In June 2023 patient reports that these auditory and visual hallucinations as well as his paranoia significantly worsened which led to an increase in anxiety.  Patient says that his recent physical symptoms including abdominal pain, knee pain, and draining buttocks fistula have also been stressors and contributors to his recent increase in anxiety.    On evaluation today patient reports his mood as \"feeling good, trying to get help.\"  Continues to endorse AVH, anxiety, paranoid ideations.  He states that his reasoning for coming to [facility] ED was to address his abdominal pain, draining fistula, and understand why he is having AVH. He worries that people are \"gaslighting him.\"  Patient reports that he is anxious to know if he actually has schizophrenia. However patient's thought process is extremely disorganized and tangential and therefore it is unclear whether he actually understands what gaslighting means.  Patient says his auditory and visual hallucinations are at times derogatory, but do not command him to hurt himself or others.  Patient believes that others are out to hurt or sabotage him. He also believes that others can read his mind at times.   Patient spent much of this initial psychiatric evaluation perseverating on how people of different ethnicity and races treat him.  His preoccupation with race and ethnicity seems to be a significant contributor to his paranoid ideations.    Today, patient denied active/passive SI or HI.  Patient denies access to firearms or weapons at home.      Medical Review Of Systems:  See above HPI.      Psychiatric Review of Systems   (Due to patient's current psychotic state, it was difficult to obtain a majority of the following psych ROS.)  Sleep: Patient reports that he sleeps during the " "day but is awake at night  Loss of Interest/Anhedonia: denies   Guilt/hopeless: Denies  Low energy/anergy: Unable to assess  Poor Concentration: Unable to assess  Memory: Decreased  Appetite changes: Unable to assess  Weight changes: Denies.  Reports he is trying to \"put on weight\"  Somatic symptoms: Unable to assess   Anxiety/panic: Yes increased anxiety especially regarding AVH  Dang: Patient reported symptoms of flight of ideas, increased tach activity, pressured speech.  However it is unclear if in his psychotic state, the patient truly understood the questions being asked.  Paranoia: yes  Delusions: Yes, regarding others reading his mind  SI: Denies  HI: Denied  AH: yes, derogatory at times  VH: Yes  Self Injurious behavior: no  OCD: no  Eating Disorder: no  Trauma: no   If yes: none      Historical Information      Past Psychiatric History:   Past Psychiatric Dx:   Per chart review, schizophreniform vs schizophrenia, paranoid schizophrenia  Past Inpatient Psychiatric management:   Per chart review, multiple past inpatient psychiatric admissions.   First - per patient report and chart review was in 2010  [facility]    Most Recent - [facility]  Past Outpatient Psychiatric management:   Per chart review, patient has past history of being treated in an outpatient setting.  Plans to establish care with outpatient therapist in the next few weeks.  Past Medication trials:   Per chart review, Zydis and Haldol.  Unsure what other medications patient has been on.  Patient's chart notes a allergy to Haldol (anaphylaxis)  Past Suicide attempts:   Per chart review, none. Today, patient denies.   History of non-suicidal self injury:   Per chart review,  none. Today, patient denies.   Past Violent behavior:   Per chart review,  none. Today, patient denies.       Substance Abuse History:  I have assessed this patient for substance use within the past 12 months   Alcohol use: Denies current use.  Last consumption was in June " "2023.  No current UDS for this admission.  Recreational drug use:   Cocaine:  denies use  Heroin:  denies use  Marijuana:  denies current use.  UDS in 2018 was positive for THC  Other drugs: denies use  History of Inpatient/Outpatient rehabilitation program: no  Smoking history: Reports currently smoking.  Exact number of cigarettes varies per day.      Family Psychiatric History:   Psychiatric Illness: patient denies  Substance Abuse: patient denies  Suicide Attempts: patient denies    Social History:  Education: high school diploma/GED  Learning Disabilities: Per chart review, none  Marital history: single  Children: none  Living Arrangement: Rents room from stepfather  Access to firearms: Denies  Occupational History: unemployed attempting to apply for disability  Functioning Relationships: good support system.  Legal History: none   History: None        Traumatic History:   Abuse:  Patient denies   Other Traumatic Events: Patient denies     Past Medical History:   Diagnosis Date    Anemia 2004    hospitalization/transfusion    Crohn's disease (HCC)      History of Seizures: yes in 2010.  Patient believes that this seizure was secondary to Haldol.  History of Head injury with loss of consciousness: Unable to assess. None see in record review     Meds/Allergies   all current active meds have been reviewed  Allergies   Allergen Reactions    Haldol Decanoate [Haloperidol] Anaphylaxis    Oxycodone-Acetaminophen Rash     \"swollon red rash\"    Sulfamethoxazole-Trimethoprim      \"never really worked\"         Objective      Vital signs in last 24 hours:  Temp:  [97.2 °F (36.2 °C)-98.8 °F (37.1 °C)] 97.2 °F (36.2 °C)  HR:  [] 72  Resp:  [16-18] 16  BP: ()/(64-87) 116/75    Intake/Output Summary (Last 24 hours) at 1/4/2024 0920  Last data filed at 1/4/2024 0826  Gross per 24 hour   Intake 2806.25 ml   Output --   Net 2806.25 ml       Mental Status Evaluation:    Appearance:  Male, overtly appearing " "-American, slightly disheveled, wearing hospital attire, IV and heart monitor in place   Behavior:  Calm and cooperative, sitting in bed, limited eye contact   Speech:  Creased rate of speech bordering on pressured speech, normal volume, normal pitch   Mood:  \"Feel good, trying to get help\"   Affect:  Constricted at times   Thought Process:  Disorganized, concrete, tangential, flight of ideas   Associations: loose associations   Thought Content:  paranoid ideation, perseverating on the ethnicity and race of his neighbors   Perceptual Disturbances: Reports AVH, appears to be responding to internal stimuli   Risk Potential: Suicidal ideation - None at present  Homicidal ideation - None at present  Potential for aggression - No   Sensorium:  oriented to person, place, time/date, and situation   Memory:  recent and remote memory grossly intact   Consciousness:  alert and awake   Attention/Concentration: Unable to accurately assess secondary to patient's psychotic state   Intellect: Unable to accurately assess secondary to patient's psychotic state   Fund of Knowledge: Unable to accurately assess secondary to patient's psychotic state   Insight:  limited   Judgment: limited   Gait/Station: in bed   Motor Activity: no abnormal movements             Risk Assessment     Risk of Harm to Self:   The following ratings are based on assessment at the time of the interview  Demographic risk factors include:  Unemployed,  Historical Risk Factors include: chronic psychiatric problems, chronic psychotic symptoms, history of psychosis  Current Specific Risk Factors include: mental illness diagnosis, chronic psychotic symptoms, current increased anxiety, current paranoia  Protective Factors: no current suicidal ideation, establish support system willing to pursue psychiatric medication and treatment  Weapons/Firearms: none. The following steps have been taken to ensure weapons are properly secured: not applicable  Based on " today's assessment, Carlos presents the following risk of harm to self: minimal    Risk of Harm to Others:  The following ratings are based on assessment at the time of the interview  Demographic Risk Factors include: male, unemployed, under age 40.  Historical Risk Factors include:  History of untreated psychotic symptoms .  Current Specific Risk Factors include: chronic psychotic symptoms, social difficulties, current psychotic symptoms  Protective Factors: no current homicidal ideation, is to pursue psychiatric medication and treatment  Weapons/Firearms: none. The following steps have been taken to ensure weapons are properly secured: not applicable  Based on today's assessment, Carlos presents the following risk of harm to others: minimal         ACTIVE MEDICATIONS     Current Medications:  Current Facility-Administered Medications   Medication Dose Route Frequency Provider Last Rate    acetaminophen  160 mg Oral Q6H PRN Kiko Araujo MD      cefTRIAXone  1,000 mg Intravenous Q24H Maikel Kiran MD 1,000 mg (01/04/24 0835)    [START ON 1/10/2024] ergocalciferol  50,000 Units Oral Weekly Kiko Araujo MD      ferrous sulfate  325 mg Oral BID With Meals Kiko Araujo MD      melatonin  3 mg Oral HS Kiko Araujo MD      methocarbamol  500 mg Oral 4x Daily Kiko Araujo MD      metroNIDAZOLE  500 mg Intravenous TID Maikel Kiran MD      sodium chloride  100 mL/hr Intravenous Continuous Harrison Mayorga DO         Behavioral Health Medications: I have personally reviewed all current active medications. Changes as in plan section above.      ADDITIONAL DATA     Code Status:     EKG Results: I have personally reviewed pertinent reports.  QTc= 454 on 1/3/2024  Results for orders placed or performed during the hospital encounter of 01/03/24 (from the past 1000 hour(s))   ECG 12 lead    Collection Time: 01/03/24  3:38 PM    Result Value    Ventricular Rate 104    Atrial Rate 104    DC Interval 126    QRSD Interval 122    QT Interval 346    QTC Interval 454    P Axis 77    QRS Axis 72    T Wave Axis 47    Narrative    Sinus tachycardia  Right bundle branch block  Abnormal ECG  Confirmed by Carlos Gray ([id number]) on 1/3/2024 4:14:29 PM     *Note: Due to a large number of results and/or encounters for the requested time period, some results have not been displayed. A complete set of results can be found in Results Review.       Radiology Results: I have personally reviewed pertinent reports. I have personally reviewed pertinent films in PACS.  CT abdomen pelvis with contrast    Result Date: 1/3/2024  Impression: 1. Extensive perianal and bilateral gluteal fold cellulitis. Collection extending from the perianal region 1:00 o'clock,  along the left gluteal fold and perineum measuring 3 x 1 x 3 cm, consistent with an abscess and/or fistula. 2. Mild diffuse colitis sparing the sigmoid colon. Mild distal ileitis. 3. Suggestion of mild proctitis. Workstation performed: [employer]     No Chest XR results available for this patient.     Laboratory Results: I have personally reviewed all pertinent laboratory/tests results.  Recent Results (from the past 48 hour(s))   ECG 12 lead    Collection Time: 01/03/24  3:38 PM   Result Value Ref Range    Ventricular Rate 104 BPM    Atrial Rate 104 BPM    DC Interval 126 ms    QRSD Interval 122 ms    QT Interval 346 ms    QTC Interval 454 ms    P Axis 77 degrees    QRS Axis 72 degrees    T Wave Axis 47 degrees   Lactic acid    Collection Time: 01/03/24  3:42 PM   Result Value Ref Range    LACTIC ACID 2.2 (HH) 0.5 - 2.0 mmol/L   Blood gas, Venous    Collection Time: 01/03/24  3:42 PM   Result Value Ref Range    pH, Drew 7.366 7.300 - 7.400    pCO2, Derw 48.5 42.0 - 50.0 mm Hg    pO2, Drew 31.7 (L) 35.0 - 45.0 mm Hg    HCO3, Drew 27.2 24 - 30 mmol/L    Base Excess, Drew 1.5 mmol/L    O2 Content, Drew 6.0 ml/dL     O2 HGB, VENOUS 52.6 (L) 60.0 - 80.0 %   CBC and differential    Collection Time: 01/03/24  3:48 PM   Result Value Ref Range    WBC 10.85 (H) 4.31 - 10.16 Thousand/uL    RBC 3.78 (L) 3.88 - 5.62 Million/uL    Hemoglobin 7.0 (L) 12.0 - 17.0 g/dL    Hematocrit 26.0 (L) 36.5 - 49.3 %    MCV 69 (L) 82 - 98 fL    MCH 18.5 (L) 26.8 - 34.3 pg    MCHC 26.9 (L) 31.4 - 37.4 g/dL    RDW 19.8 (H) 11.6 - 15.1 %    MPV 8.6 (L) 8.9 - 12.7 fL    Platelets 661 (H) 149 - 390 Thousands/uL    nRBC 0 /100 WBCs    Neutrophils Relative 76 (H) 43 - 75 %    Immat GRANS % 0 0 - 2 %    Lymphocytes Relative 15 14 - 44 %    Monocytes Relative 8 4 - 12 %    Eosinophils Relative 0 0 - 6 %    Basophils Relative 1 0 - 1 %    Neutrophils Absolute 8.22 (H) 1.85 - 7.62 Thousands/µL    Immature Grans Absolute 0.03 0.00 - 0.20 Thousand/uL    Lymphocytes Absolute 1.67 0.60 - 4.47 Thousands/µL    Monocytes Absolute 0.85 0.17 - 1.22 Thousand/µL    Eosinophils Absolute 0.03 0.00 - 0.61 Thousand/µL    Basophils Absolute 0.05 0.00 - 0.10 Thousands/µL   Comprehensive metabolic panel    Collection Time: 01/03/24  3:48 PM   Result Value Ref Range    Sodium 135 135 - 147 mmol/L    Potassium 4.1 3.5 - 5.3 mmol/L    Chloride 100 96 - 108 mmol/L    CO2 26 21 - 32 mmol/L    ANION GAP 9 mmol/L    BUN 6 5 - 25 mg/dL    Creatinine 1.16 0.60 - 1.30 mg/dL    Glucose 99 65 - 140 mg/dL    Calcium 8.8 8.4 - 10.2 mg/dL    AST 13 13 - 39 U/L    ALT 6 (L) 7 - 52 U/L    Alkaline Phosphatase 80 34 - 104 U/L    Total Protein 8.1 6.4 - 8.4 g/dL    Albumin 3.7 3.5 - 5.0 g/dL    Total Bilirubin 0.26 0.20 - 1.00 mg/dL    eGFR 82 ml/min/1.73sq m   Procalcitonin    Collection Time: 01/03/24  3:48 PM   Result Value Ref Range    Procalcitonin 0.12 <=0.25 ng/ml   Protime-INR    Collection Time: 01/03/24  3:48 PM   Result Value Ref Range    Protime 14.2 11.6 - 14.5 seconds    INR 1.07 0.84 - 1.19   APTT    Collection Time: 01/03/24  3:48 PM   Result Value Ref Range    PTT 33 23 - 37 seconds    Blood culture #2    Collection Time: 01/03/24  3:48 PM    Specimen: Arm, Left; Blood   Result Value Ref Range    Blood Culture Received in Microbiology Lab. Culture in Progress.    Magnesium    Collection Time: 01/03/24  3:48 PM   Result Value Ref Range    Magnesium 2.0 1.9 - 2.7 mg/dL   Lipase    Collection Time: 01/03/24  3:48 PM   Result Value Ref Range    Lipase 7 (L) 11 - 82 u/L   Blood culture #1    Collection Time: 01/03/24  3:58 PM    Specimen: Arm, Right; Blood   Result Value Ref Range    Blood Culture Received in Microbiology Lab. Culture in Progress.    FLU/RSV/COVID - if FLU/RSV clinically relevant    Collection Time: 01/03/24  3:58 PM    Specimen: Nose; Nares   Result Value Ref Range    SARS-CoV-2 Negative Negative    INFLUENZA A PCR Negative Negative    INFLUENZA B PCR Negative Negative    RSV PCR Negative Negative   UA w Reflex to Microscopic w Reflex to Culture    Collection Time: 01/03/24  4:44 PM    Specimen: Urine, Clean Catch   Result Value Ref Range    Color, UA Tsering (A) Straw, Yellow, Pale Yellow    Clarity, UA Slightly Cloudy (A) Clear, Other    Specific Gravity, UA 1.025 1.003 - 1.040    pH, UA 6.0 4.5, 5.0, 5.5, 6.0, 6.5, 7.0, 7.5, 8.0    Leukocytes, UA Negative Negative    Nitrite, UA Negative Negative    Protein, UA 30 (1+) (A) Negative mg/dl    Glucose, UA Negative Negative mg/dl    Ketones, UA Negative Negative mg/dl    Bilirubin, UA Negative Negative    Occult Blood, UA Negative Negative    UROBILINOGEN UA Negative 1.0, Negative mg/dL   Urine Microscopic    Collection Time: 01/03/24  4:44 PM   Result Value Ref Range    RBC, UA None Seen None Seen, 0-1, 1-2, 2-4, 0-5 /hpf    WBC, UA 1-2 None Seen, 0-1, 1-2, 0-5, 2-4 /hpf    Epithelial Cells Occasional None Seen, Occasional /hpf    Bacteria, UA Moderate (A) None Seen, Occasional /hpf    Hyaline Casts, UA 10-20 (A) (none) /lpf    MUCUS THREADS Innumerable (A) None Seen   Type and screen    Collection Time: 01/03/24  5:17 PM   Result  Value Ref Range    ABO Grouping A     Rh Factor Positive     Antibody Screen Negative     Specimen Expiration Date [id number]    Lactic acid 2 Hours    Collection Time: 01/03/24  6:18 PM   Result Value Ref Range    LACTIC ACID 0.6 0.5 - 2.0 mmol/L   Prepare Leukoreduced RBC: 2 Units    Collection Time: 01/04/24  6:15 AM   Result Value Ref Range    Unit Product Code Q8563I15     Unit Number [id number]     Unit ABO A     Unit RH POS     Crossmatch Compatible     Unit Dispense Status Presumed Trans     Unit Product Volume 300 ml    Unit Product Code G3781X45     Unit Number [id number]     Unit ABO A     Unit RH POS     Crossmatch Compatible     Unit Dispense Status Presumed Trans     Unit Product Volume 300 ml   Basic metabolic panel    Collection Time: 01/04/24  7:57 AM   Result Value Ref Range    Sodium 136 135 - 147 mmol/L    Potassium 4.0 3.5 - 5.3 mmol/L    Chloride 102 96 - 108 mmol/L    CO2 26 21 - 32 mmol/L    ANION GAP 8 mmol/L    BUN 8 5 - 25 mg/dL    Creatinine 1.04 0.60 - 1.30 mg/dL    Glucose 112 65 - 140 mg/dL    Glucose, Fasting 112 (H) 65 - 99 mg/dL    Calcium 8.1 (L) 8.4 - 10.2 mg/dL    eGFR 93 ml/min/1.73sq m   CBC and differential    Collection Time: 01/04/24  7:57 AM   Result Value Ref Range    WBC 13.15 (H) 4.31 - 10.16 Thousand/uL    RBC 3.90 3.88 - 5.62 Million/uL    Hemoglobin 8.2 (L) 12.0 - 17.0 g/dL    Hematocrit 27.6 (L) 36.5 - 49.3 %    MCV 71 (L) 82 - 98 fL    MCH 21.0 (L) 26.8 - 34.3 pg    MCHC 29.7 (L) 31.4 - 37.4 g/dL    RDW 21.1 (H) 11.6 - 15.1 %    MPV 8.5 (L) 8.9 - 12.7 fL    Platelets 554 (H) 149 - 390 Thousands/uL    nRBC 0 /100 WBCs    Neutrophils Relative 66 43 - 75 %    Immat GRANS % 0 0 - 2 %    Lymphocytes Relative 20 14 - 44 %    Monocytes Relative 12 4 - 12 %    Eosinophils Relative 1 0 - 6 %    Basophils Relative 1 0 - 1 %    Neutrophils Absolute 8.77 (H) 1.85 - 7.62 Thousands/µL    Immature Grans Absolute 0.03 0.00 - 0.20 Thousand/uL    Lymphocytes Absolute 2.56  0.60 - 4.47 Thousands/µL    Monocytes Absolute 1.57 (H) 0.17 - 1.22 Thousand/µL    Eosinophils Absolute 0.15 0.00 - 0.61 Thousand/µL    Basophils Absolute 0.07 0.00 - 0.10 Thousands/µL   Sedimentation rate, automated    Collection Time: 01/04/24  7:57 AM   Result Value Ref Range    Sed Rate 99 (H) 0 - 14 mm/hour          This note was not shared with the patient due to reasonable likelihood of causing patient harm      This note has been constructed in part using a voice recognition system.   There may be translation, syntax,  or grammatical errors. If you have any questions, please contact the dictating provider.    Cassandra Rodas DO  Department of Psychiatry and Behavioral Health  [facility] [facility]

## 2024-01-04 NOTE — H&P (VIEW-ONLY)
PSYCHIATRY CONSULTATION NOTE    Carlos Wolf 33 y.o. male MRN: [id number]  Unit/Bed#: 7T [facility] 705-01 Encounter: [id number]     Assessment & Plan     Assessment     Carlos Wolf is a 33 y.o. male, single, lives in room that he rents from stepfather, with medical history of Crohn's disease, in the past pertinent psychiatric history of psychosis unspecified vs schizophrenia vs schizophreniform, who initially presented to [facility] ED for abdominal discomfort and drainage from fistula on buttock.  Patient was admitted to [facility] medical floor for medical treatment of physical complaints.  While being treated on the medical floor, the patient endorsed psychiatric symptoms leading to a psychiatric consult being ordered.  Secondary to patient's current psychotic symptoms, patient is a limited historian decrease his ability to give a clear and concise history of presenting illness. On evaluation today, patient endorsed anxiety, AVH, and paranoia. That have been present since 2010 but began to significantly increase and worsen in June 2023. Patient denied depression, mood instability, suicidal ideation, and homicidal ideation. On examination today, patient displayed overt paranoia, increased rate of speech bordering on pressured speech, disorganized and tangential thought process, flight of ideas, response to internal stimuli, and perseverative thoughts regarding his desire to find out whether or not he actually has schizophrenia.  Patient has signed 201 for voluntary admission, awaiting inpatient psychiatry placement. While waiting for patient to be medically cleared and placed on a inpatient psychiatric unit, the decision has been made to start patient on p.o. Invega to help reduce his psychotic symptoms.  The hope is that as the patient's psychotic symptoms start to slowly decrease it, the patient will be able to provide more information regarding his current mental state and how psychiatry can  "best help moving forward.    Principal Psychiatric Problem:  Psychosis unspecified (HCC)  Differential includes but is not limited to: Schizophrenia vs schizoaffective disorder    Principal Problem:    Cellulitis and abscess of buttock  Active Problems:    Schizophreniform disorder (HCC)    Anemia    Crohn's disease (HCC)      Plan     Treatment Recommendations     Discussed plan with primary team as follows:  Hospital labs reviewed.  Collaborate with collaterals for baseline assessment and disposition as indicated.  Patient has signed 201 for voluntary admission, awaiting inpatient psychiatry placement  Recommend the following psychotropic medication regimen at this time:  Initiate Invega 3 mg p.o. nightly  Continue medical management per primary team.  Observation level: Not on observation, currently not indicated  Psychiatry will continue to follow as needed. Please contact our service via [employer] with any additional questions or concerns. If contacting after hours, please call or TigerText the on-call team ([facility]: [phone]) with any questions or concerns.  Please reach out to on-call Psychiatry team via [employer], or if after hours/Fri/Sat/Sunday contact on-call psychiatric service via [facility] ([phone]) with any questions or concerns.    Risks, benefits and possible side effects of Medications:   Risks, benefits, and possible side effects of medications explained to patient and patient verbalizes understanding.          History of Present Illness      Provider Requesting Consult: Kiko Araujo MD    Reason for Consult / Principal Problem: Auditory and visual hallucinations, not currently on any psychiatric medications     Chief Complaint: \"they are following me\"    Carlos Wolf is a 33 y.o. male, with a past pertinent psychiatric history of psychosis unspecified vs schizophrenia vs schizophreniform and past inpatient admissions who initially presented to [place] ED for " "abdominal discomfort and drainage from fistula on buttock.  After being admitted to [facility] medical floor for treatment of physical complaints, patient endorsed psychiatric symptoms leading to a psychiatric consult being ordered.    Please note, secondary to patient's current psychotic symptoms, patient is a limited historian decrease his ability to give a clear and concise history of presenting illness.    Per provider note by Kiko Araujo MD on 1/3/2024,   \"Carlos Wolf is a 33 y.o. male who presents with chronic lower abdominal pain. Pain is irrespective to food. Worsened with stress. Improves with rest. Patient is established with Gastroenterology whom manage his Chron's disease. Patient was prompted to come to the ED because he was draining scant yellow to brown fluid from his buttocks. GI recommended that the patient undergo surgery in June to manage his perianal fistulas/abscesses, however, it was cancelled due to an acute infection at the time according to the patient. Patient is able to name all of his current medications and has been complying with all of his medications except prednisone because his stomach does not tolerate it. Of note, patient has 1-2 watery, non-bloody bowel movements a day. He denies seeing mucous in his stool.      Patient smokes 2-3 cigarettes a day. Denies consuming alcohol since June of 2023 and he does not use any recreational drugs. Patient has been diagnosed with Schizophreniform disorder. Unfortunately, at this time he is not currently on any psychiatric medications but he is establishing care with a therapist in upcoming weeks. Patient endorses auditory hallucinations. He denies that these voices are instructing him to harm himself or others. He denies suicidal/homicidal ideation. \"    Symptoms preceding psychiatry consultation included AVH, paranoia and anxiety related to worsening of AVH.  According to patient, he has experienced some form of " "AVH since 2010 when he was first admitted to an inpatient psychiatric unit at [facility].  Since 2010 patient reports almost continuous occurrence of AVH in addition to paranoid ideations.  In June 2023 patient reports that these auditory and visual hallucinations as well as his paranoia significantly worsened which led to an increase in anxiety.  Patient says that his recent physical symptoms including abdominal pain, knee pain, and draining buttocks fistula have also been stressors and contributors to his recent increase in anxiety.    On evaluation today patient reports his mood as \"feeling good, trying to get help.\"  Continues to endorse AVH, anxiety, paranoid ideations.  He states that his reasoning for coming to [place] ED was to address his abdominal pain, draining fistula, and understand why he is having AVH. He worries that people are \"gaslighting him.\"  Patient reports that he is anxious to know if he actually has schizophrenia. However patient's thought process is extremely disorganized and tangential and therefore it is unclear whether he actually understands what gaslighting means.  Patient says his auditory and visual hallucinations are at times derogatory, but do not command him to hurt himself or others.  Patient believes that others are out to hurt or sabotage him. He also believes that others can read his mind at times.   Patient spent much of this initial psychiatric evaluation perseverating on how people of different ethnicity and races treat him.  His preoccupation with race and ethnicity seems to be a significant contributor to his paranoid ideations.    Today, patient denied active/passive SI or HI.  Patient denies access to firearms or weapons at home.      Medical Review Of Systems:  See above HPI.      Psychiatric Review of Systems   (Due to patient's current psychotic state, it was difficult to obtain a majority of the following psych ROS.)  Sleep: Patient reports that he sleeps during the " "day but is awake at night  Loss of Interest/Anhedonia: denies   Guilt/hopeless: Denies  Low energy/anergy: Unable to assess  Poor Concentration: Unable to assess  Memory: Decreased  Appetite changes: Unable to assess  Weight changes: Denies.  Reports he is trying to \"put on weight\"  Somatic symptoms: Unable to assess   Anxiety/panic: Yes increased anxiety especially regarding AVH  Dang: Patient reported symptoms of flight of ideas, increased tach activity, pressured speech.  However it is unclear if in his psychotic state, the patient truly understood the questions being asked.  Paranoia: yes  Delusions: Yes, regarding others reading his mind  SI: Denies  HI: Denied  AH: yes, derogatory at times  VH: Yes  Self Injurious behavior: no  OCD: no  Eating Disorder: no  Trauma: no   If yes: none      Historical Information      Past Psychiatric History:   Past Psychiatric Dx:   Per chart review, schizophreniform vs schizophrenia, paranoid schizophrenia  Past Inpatient Psychiatric management:   Per chart review, multiple past inpatient psychiatric admissions.   First - per patient report and chart review was in 2010  - [facility]    Most Recent - 2015 [facility]  Past Outpatient Psychiatric management:   Per chart review, patient has past history of being treated in an outpatient setting.  Plans to establish care with outpatient therapist in the next few weeks.  Past Medication trials:   Per chart review, Zydis and Haldol.  Unsure what other medications patient has been on.  Patient's chart notes a allergy to Haldol (anaphylaxis)  Past Suicide attempts:   Per chart review, none. Today, patient denies.   History of non-suicidal self injury:   Per chart review,  none. Today, patient denies.   Past Violent behavior:   Per chart review,  none. Today, patient denies.       Substance Abuse History:  I have assessed this patient for substance use within the past 12 months   Alcohol use: Denies current use.  Last consumption was in June " "2023.  No current UDS for this admission.  Recreational drug use:   Cocaine:  denies use  Heroin:  denies use  Marijuana:  denies current use.  UDS in 2018 was positive for THC  Other drugs: denies use  History of Inpatient/Outpatient rehabilitation program: no  Smoking history: Reports currently smoking.  Exact number of cigarettes varies per day.      Family Psychiatric History:   Psychiatric Illness: patient denies  Substance Abuse: patient denies  Suicide Attempts: patient denies    Social History:  Education: high school diploma/GED  Learning Disabilities: Per chart review, none  Marital history: single  Children: none  Living Arrangement: Rents room from stepfather  Access to firearms: Denies  Occupational History: unemployed attempting to apply for disability  Functioning Relationships: good support system.  Legal History: none   History: None        Traumatic History:   Abuse:  Patient denies   Other Traumatic Events: Patient denies     Past Medical History:   Diagnosis Date    Anemia 2004    hospitalization/transfusion    Crohn's disease (HCC)      History of Seizures: yes in 2010.  Patient believes that this seizure was secondary to Haldol.  History of Head injury with loss of consciousness: Unable to assess. None see in record review     Meds/Allergies   all current active meds have been reviewed  Allergies   Allergen Reactions    Haldol Decanoate [Haloperidol] Anaphylaxis    Oxycodone-Acetaminophen Rash     \"swollon red rash\"    Sulfamethoxazole-Trimethoprim      \"never really worked\"         Objective      Vital signs in last 24 hours:  Temp:  [97.2 °F (36.2 °C)-98.8 °F (37.1 °C)] 97.2 °F (36.2 °C)  HR:  [] 72  Resp:  [16-18] 16  BP: ()/(64-87) 116/75    Intake/Output Summary (Last 24 hours) at 1/4/2024 0920  Last data filed at 1/4/2024 0826  Gross per 24 hour   Intake 2806.25 ml   Output --   Net 2806.25 ml       Mental Status Evaluation:    Appearance:  Male, overtly appearing " "-American, slightly disheveled, wearing hospital attire, IV and heart monitor in place   Behavior:  Calm and cooperative, sitting in bed, limited eye contact   Speech:  Creased rate of speech bordering on pressured speech, normal volume, normal pitch   Mood:  \"Feel good, trying to get help\"   Affect:  Constricted at times   Thought Process:  Disorganized, concrete, tangential, flight of ideas   Associations: loose associations   Thought Content:  paranoid ideation, perseverating on the ethnicity and race of his neighbors   Perceptual Disturbances: Reports AVH, appears to be responding to internal stimuli   Risk Potential: Suicidal ideation - None at present  Homicidal ideation - None at present  Potential for aggression - No   Sensorium:  oriented to person, place, time/date, and situation   Memory:  recent and remote memory grossly intact   Consciousness:  alert and awake   Attention/Concentration: Unable to accurately assess secondary to patient's psychotic state   Intellect: Unable to accurately assess secondary to patient's psychotic state   Fund of Knowledge: Unable to accurately assess secondary to patient's psychotic state   Insight:  limited   Judgment: limited   Gait/Station: in bed   Motor Activity: no abnormal movements             Risk Assessment     Risk of Harm to Self:   The following ratings are based on assessment at the time of the interview  Demographic risk factors include:  Unemployed,  Historical Risk Factors include: chronic psychiatric problems, chronic psychotic symptoms, history of psychosis  Current Specific Risk Factors include: mental illness diagnosis, chronic psychotic symptoms, current increased anxiety, current paranoia  Protective Factors: no current suicidal ideation, establish support system willing to pursue psychiatric medication and treatment  Weapons/Firearms: none. The following steps have been taken to ensure weapons are properly secured: not applicable  Based on " today's assessment, Carlos presents the following risk of harm to self: minimal    Risk of Harm to Others:  The following ratings are based on assessment at the time of the interview  Demographic Risk Factors include: male, unemployed, under age 40.  Historical Risk Factors include:  History of untreated psychotic symptoms .  Current Specific Risk Factors include: chronic psychotic symptoms, social difficulties, current psychotic symptoms  Protective Factors: no current homicidal ideation, is to pursue psychiatric medication and treatment  Weapons/Firearms: none. The following steps have been taken to ensure weapons are properly secured: not applicable  Based on today's assessment, Carlos presents the following risk of harm to others: minimal         ACTIVE MEDICATIONS     Current Medications:  Current Facility-Administered Medications   Medication Dose Route Frequency Provider Last Rate    acetaminophen  160 mg Oral Q6H PRN Kiko Araujo MD      cefTRIAXone  1,000 mg Intravenous Q24H Maikel Kiran MD 1,000 mg (01/04/24 0835)    [START ON 1/10/2024] ergocalciferol  50,000 Units Oral Weekly Kiko Araujo MD      ferrous sulfate  325 mg Oral BID With Meals Kiko Araujo MD      melatonin  3 mg Oral HS Kiko Araujo MD      methocarbamol  500 mg Oral 4x Daily Kiko Araujo MD      metroNIDAZOLE  500 mg Intravenous TID Maikel Kiran MD      sodium chloride  100 mL/hr Intravenous Continuous Harrison Mayorga DO         Behavioral Health Medications: I have personally reviewed all current active medications. Changes as in plan section above.      ADDITIONAL DATA     Code Status:     EKG Results: I have personally reviewed pertinent reports.  QTc= 454 on 1/3/2024  Results for orders placed or performed during the hospital encounter of 01/03/24 (from the past 1000 hour(s))   ECG 12 lead    Collection Time: 01/03/24  3:38 PM    Result Value    Ventricular Rate 104    Atrial Rate 104    CA Interval 126    QRSD Interval 122    QT Interval 346    QTC Interval 454    P Axis 77    QRS Axis 72    T Wave Axis 47    Narrative    Sinus tachycardia  Right bundle branch block  Abnormal ECG  Confirmed by Carlos Gray ([id number]) on 1/3/2024 4:14:29 PM     *Note: Due to a large number of results and/or encounters for the requested time period, some results have not been displayed. A complete set of results can be found in Results Review.       Radiology Results: I have personally reviewed pertinent reports. I have personally reviewed pertinent films in PACS.  CT abdomen pelvis with contrast    Result Date: 1/3/2024  Impression: 1. Extensive perianal and bilateral gluteal fold cellulitis. Collection extending from the perianal region 1:00 o'clock,  along the left gluteal fold and perineum measuring 3 x 1 x 3 cm, consistent with an abscess and/or fistula. 2. Mild diffuse colitis sparing the sigmoid colon. Mild distal ileitis. 3. Suggestion of mild proctitis. Workstation performed: [facility]     No Chest XR results available for this patient.     Laboratory Results: I have personally reviewed all pertinent laboratory/tests results.  Recent Results (from the past 48 hour(s))   ECG 12 lead    Collection Time: 01/03/24  3:38 PM   Result Value Ref Range    Ventricular Rate 104 BPM    Atrial Rate 104 BPM    CA Interval 126 ms    QRSD Interval 122 ms    QT Interval 346 ms    QTC Interval 454 ms    P Axis 77 degrees    QRS Axis 72 degrees    T Wave Axis 47 degrees   Lactic acid    Collection Time: 01/03/24  3:42 PM   Result Value Ref Range    LACTIC ACID 2.2 (HH) 0.5 - 2.0 mmol/L   Blood gas, Venous    Collection Time: 01/03/24  3:42 PM   Result Value Ref Range    pH, Drew 7.366 7.300 - 7.400    pCO2, Drew 48.5 42.0 - 50.0 mm Hg    pO2, Drew 31.7 (L) 35.0 - 45.0 mm Hg    HCO3, Drew 27.2 24 - 30 mmol/L    Base Excess, Drew 1.5 mmol/L    O2 Content, Drew 6.0 ml/dL     O2 HGB, VENOUS 52.6 (L) 60.0 - 80.0 %   CBC and differential    Collection Time: 01/03/24  3:48 PM   Result Value Ref Range    WBC 10.85 (H) 4.31 - 10.16 Thousand/uL    RBC 3.78 (L) 3.88 - 5.62 Million/uL    Hemoglobin 7.0 (L) 12.0 - 17.0 g/dL    Hematocrit 26.0 (L) 36.5 - 49.3 %    MCV 69 (L) 82 - 98 fL    MCH 18.5 (L) 26.8 - 34.3 pg    MCHC 26.9 (L) 31.4 - 37.4 g/dL    RDW 19.8 (H) 11.6 - 15.1 %    MPV 8.6 (L) 8.9 - 12.7 fL    Platelets 661 (H) 149 - 390 Thousands/uL    nRBC 0 /100 WBCs    Neutrophils Relative 76 (H) 43 - 75 %    Immat GRANS % 0 0 - 2 %    Lymphocytes Relative 15 14 - 44 %    Monocytes Relative 8 4 - 12 %    Eosinophils Relative 0 0 - 6 %    Basophils Relative 1 0 - 1 %    Neutrophils Absolute 8.22 (H) 1.85 - 7.62 Thousands/µL    Immature Grans Absolute 0.03 0.00 - 0.20 Thousand/uL    Lymphocytes Absolute 1.67 0.60 - 4.47 Thousands/µL    Monocytes Absolute 0.85 0.17 - 1.22 Thousand/µL    Eosinophils Absolute 0.03 0.00 - 0.61 Thousand/µL    Basophils Absolute 0.05 0.00 - 0.10 Thousands/µL   Comprehensive metabolic panel    Collection Time: 01/03/24  3:48 PM   Result Value Ref Range    Sodium 135 135 - 147 mmol/L    Potassium 4.1 3.5 - 5.3 mmol/L    Chloride 100 96 - 108 mmol/L    CO2 26 21 - 32 mmol/L    ANION GAP 9 mmol/L    BUN 6 5 - 25 mg/dL    Creatinine 1.16 0.60 - 1.30 mg/dL    Glucose 99 65 - 140 mg/dL    Calcium 8.8 8.4 - 10.2 mg/dL    AST 13 13 - 39 U/L    ALT 6 (L) 7 - 52 U/L    Alkaline Phosphatase 80 34 - 104 U/L    Total Protein 8.1 6.4 - 8.4 g/dL    Albumin 3.7 3.5 - 5.0 g/dL    Total Bilirubin 0.26 0.20 - 1.00 mg/dL    eGFR 82 ml/min/1.73sq m   Procalcitonin    Collection Time: 01/03/24  3:48 PM   Result Value Ref Range    Procalcitonin 0.12 <=0.25 ng/ml   Protime-INR    Collection Time: 01/03/24  3:48 PM   Result Value Ref Range    Protime 14.2 11.6 - 14.5 seconds    INR 1.07 0.84 - 1.19   APTT    Collection Time: 01/03/24  3:48 PM   Result Value Ref Range    PTT 33 23 - 37 seconds    Blood culture #2    Collection Time: 01/03/24  3:48 PM    Specimen: Arm, Left; Blood   Result Value Ref Range    Blood Culture Received in Microbiology Lab. Culture in Progress.    Magnesium    Collection Time: 01/03/24  3:48 PM   Result Value Ref Range    Magnesium 2.0 1.9 - 2.7 mg/dL   Lipase    Collection Time: 01/03/24  3:48 PM   Result Value Ref Range    Lipase 7 (L) 11 - 82 u/L   Blood culture #1    Collection Time: 01/03/24  3:58 PM    Specimen: Arm, Right; Blood   Result Value Ref Range    Blood Culture Received in Microbiology Lab. Culture in Progress.    FLU/RSV/COVID - if FLU/RSV clinically relevant    Collection Time: 01/03/24  3:58 PM    Specimen: Nose; Nares   Result Value Ref Range    SARS-CoV-2 Negative Negative    INFLUENZA A PCR Negative Negative    INFLUENZA B PCR Negative Negative    RSV PCR Negative Negative   UA w Reflex to Microscopic w Reflex to Culture    Collection Time: 01/03/24  4:44 PM    Specimen: Urine, Clean Catch   Result Value Ref Range    Color, UA Tsering (A) Straw, Yellow, Pale Yellow    Clarity, UA Slightly Cloudy (A) Clear, Other    Specific Gravity, UA 1.025 1.003 - 1.040    pH, UA 6.0 4.5, 5.0, 5.5, 6.0, 6.5, 7.0, 7.5, 8.0    Leukocytes, UA Negative Negative    Nitrite, UA Negative Negative    Protein, UA 30 (1+) (A) Negative mg/dl    Glucose, UA Negative Negative mg/dl    Ketones, UA Negative Negative mg/dl    Bilirubin, UA Negative Negative    Occult Blood, UA Negative Negative    UROBILINOGEN UA Negative 1.0, Negative mg/dL   Urine Microscopic    Collection Time: 01/03/24  4:44 PM   Result Value Ref Range    RBC, UA None Seen None Seen, 0-1, 1-2, 2-4, 0-5 /hpf    WBC, UA 1-2 None Seen, 0-1, 1-2, 0-5, 2-4 /hpf    Epithelial Cells Occasional None Seen, Occasional /hpf    Bacteria, UA Moderate (A) None Seen, Occasional /hpf    Hyaline Casts, UA 10-20 (A) (none) /lpf    MUCUS THREADS Innumerable (A) None Seen   Type and screen    Collection Time: 01/03/24  5:17 PM   Result  Value Ref Range    ABO Grouping A     Rh Factor Positive     Antibody Screen Negative     Specimen Expiration Date [id number]    Lactic acid 2 Hours    Collection Time: 01/03/24  6:18 PM   Result Value Ref Range    LACTIC ACID 0.6 0.5 - 2.0 mmol/L   Prepare Leukoreduced RBC: 2 Units    Collection Time: 01/04/24  6:15 AM   Result Value Ref Range    Unit Product Code G1734T40     Unit Number [id number]     Unit ABO A     Unit RH POS     Crossmatch Compatible     Unit Dispense Status Presumed Trans     Unit Product Volume 300 ml    Unit Product Code S6312A70     Unit Number [id number]     Unit ABO A     Unit RH POS     Crossmatch Compatible     Unit Dispense Status Presumed Trans     Unit Product Volume 300 ml   Basic metabolic panel    Collection Time: 01/04/24  7:57 AM   Result Value Ref Range    Sodium 136 135 - 147 mmol/L    Potassium 4.0 3.5 - 5.3 mmol/L    Chloride 102 96 - 108 mmol/L    CO2 26 21 - 32 mmol/L    ANION GAP 8 mmol/L    BUN 8 5 - 25 mg/dL    Creatinine 1.04 0.60 - 1.30 mg/dL    Glucose 112 65 - 140 mg/dL    Glucose, Fasting 112 (H) 65 - 99 mg/dL    Calcium 8.1 (L) 8.4 - 10.2 mg/dL    eGFR 93 ml/min/1.73sq m   CBC and differential    Collection Time: 01/04/24  7:57 AM   Result Value Ref Range    WBC 13.15 (H) 4.31 - 10.16 Thousand/uL    RBC 3.90 3.88 - 5.62 Million/uL    Hemoglobin 8.2 (L) 12.0 - 17.0 g/dL    Hematocrit 27.6 (L) 36.5 - 49.3 %    MCV 71 (L) 82 - 98 fL    MCH 21.0 (L) 26.8 - 34.3 pg    MCHC 29.7 (L) 31.4 - 37.4 g/dL    RDW 21.1 (H) 11.6 - 15.1 %    MPV 8.5 (L) 8.9 - 12.7 fL    Platelets 554 (H) 149 - 390 Thousands/uL    nRBC 0 /100 WBCs    Neutrophils Relative 66 43 - 75 %    Immat GRANS % 0 0 - 2 %    Lymphocytes Relative 20 14 - 44 %    Monocytes Relative 12 4 - 12 %    Eosinophils Relative 1 0 - 6 %    Basophils Relative 1 0 - 1 %    Neutrophils Absolute 8.77 (H) 1.85 - 7.62 Thousands/µL    Immature Grans Absolute 0.03 0.00 - 0.20 Thousand/uL    Lymphocytes Absolute 2.56  0.60 - 4.47 Thousands/µL    Monocytes Absolute 1.57 (H) 0.17 - 1.22 Thousand/µL    Eosinophils Absolute 0.15 0.00 - 0.61 Thousand/µL    Basophils Absolute 0.07 0.00 - 0.10 Thousands/µL   Sedimentation rate, automated    Collection Time: 01/04/24  7:57 AM   Result Value Ref Range    Sed Rate 99 (H) 0 - 14 mm/hour          This note was not shared with the patient due to reasonable likelihood of causing patient harm      This note has been constructed in part using a voice recognition system.   There may be translation, syntax,  or grammatical errors. If you have any questions, please contact the dictating provider.    Cassandra Rodas DO  Department of Psychiatry and Behavioral Health  [facility]

## 2024-01-04 NOTE — CASE MANAGEMENT
Case Management Assessment & Discharge Planning Note    Patient name Carlos Wolf  Location 7T [facility] 705/7T [facility] 705-01 MRN [id number]  : 1990 Date 2024       Current Admission Date: 1/3/2024  Current Admission Diagnosis:Cellulitis and abscess of buttock   Patient Active Problem List    Diagnosis Date Noted    Cellulitis and abscess of buttock 2024    Annual physical exam 2023    Chronic pain of left knee 2023    Severe protein-calorie malnutrition (HCC) 2023    Esophagitis 2023    Anemia 2023    Crohn's disease (HCC) 2023    Schizophreniform disorder (HCC) 2018    Schizophrenia (HCC) 2018      LOS (days): 0  Geometric Mean LOS (GMLOS) (days):   Days to GMLOS:     OBJECTIVE:         Current admission status: Observation       Preferred Pharmacy:   [id number] - [place] - [address] [place] [address] 100  [address]  [facility] [address]  Phone: [phone] Fax: [phone]    [id number] - [place] - [address] [facility]  [address] [facility] [address]  Phone: [phone] Fax: [phone]    Primary Care Provider: Kaykay Monroe DO    Primary Insurance: [place] [employer] AND [employer] [facility]  Secondary Insurance:     ASSESSMENT:  Active Health Care Proxies       Brittney Koch Health Care Representative - Mother   Primary Phone: [phone] (Mobile)                 Readmission Root Cause  30 Day Readmission: No    Patient Information  Admitted from:: Home  Mental Status: Alert  During Assessment patient was accompanied by: Not accompanied during assessment  Assessment information provided by:: Patient  Primary Caregiver: Self  Support Systems: Family members, Self, Parent  County of Residence: [place]  What city do you live in?: [place]  Home entry access options. Select all that apply.: Stairs  Number of steps to enter home.: 6  Do the steps have railings?: Yes  Type of Current  Residence: 3 story home  Upon entering residence, is there a bedroom on the main floor (no further steps)?: No  A bedroom is located on the following floor levels of residence (select all that apply):: Basement  Upon entering residence, is there a bathroom on the main floor (no further steps)?: Yes  Number of steps to basement from main floor: One Flight  Living Arrangements: Lives w/ Extended Family, Lives w/ Parent(s)  Is patient a ?: No    Activities of Daily Living Prior to Admission  Functional Status: Independent  Completes ADLs independently?: Yes  Ambulates independently?: Yes  Does patient use assisted devices?: Yes  Assisted Devices (DME) used: Other (Comment) (Walking stick)  Does patient currently own DME?: Yes  What DME does the patient currently own?: Other (Comment), Crutches (Walking stick)  Does patient have a history of Outpatient Therapy (PT/OT)?: No  Does the patient have a history of Short-Term Rehab?: No  Does patient have a history of HHC?: No  Does patient currently have HHC?: No      Patient Information Continued  Income Source: Unemployed  Does patient have prescription coverage?: Yes  Does patient receive dialysis treatments?: No  Does patient have a history of substance abuse?: No  Does patient have a history of Mental Health Diagnosis?: Yes (Schizophreniform disorder)  Is patient receiving treatment for mental health?: Yes  Has patient received inpatient treatment related to mental health in the last 2 years?: No      Means of Transportation  Means of Transport to Appts:: Public Transportation - Lyft      Housing Stability: Low Risk  (1/4/2024)    Housing Stability Vital Sign     Unable to Pay for Housing in the Last Year: No     Number of Places Lived in the Last Year: 2     Unstable Housing in the Last Year: No   Food Insecurity: No Food Insecurity (1/4/2024)    Hunger Vital Sign     Worried About Running Out of Food in the Last Year: Never true     Ran Out of Food in the Last  Year: Never true   Transportation Needs: No Transportation Needs (1/4/2024)    PRAPARE - Transportation     Lack of Transportation (Medical): No     Lack of Transportation (Non-Medical): No   Utilities: Not At Risk (1/4/2024)    [facility] Utilities     Threatened with loss of utilities: No     DISCHARGE DETAILS:    Discharge planning discussed with:: Patient  Freedom of Choice: Yes     CM contacted family/caregiver?: No- see comments (Declines call to mom reports she is at work)       Additional Comments: Met with patient at bedside.  CM department following thru discharge

## 2024-01-04 NOTE — H&P
History and Physical - [facility]    Patient Information: Carlos Wolf 33 y.o. male MRN: [id number]  Unit/Bed#: 7T [facility] 705-01 Encounter: [id number]  Admitting Physician: Kiko Araujo MD  PCP: Kaykay Monroe DO  Date of Admission:  01/03/24    Assessment and Plan    * Cellulitis and abscess of buttock  Assessment & Plan  -Not actively draining. No fluctuance. Afebrile. No chills.     -Ct abdomen today: Concerning for  extensive perianal and bilateral gluteal fold cellulitis. Collection extending from the perianal region 1:00 o'clock,  along the left gluteal fold and perineum measuring 3 x 1 x 3 cm, consistent with an abscess and/or fistula.   Received one dose of Zosyn in the ED for findings on CT scan.     Plan:   -Start Rocephin IV and Flagyl IV.   -Monitor vitals and worsening signs of infection.   -Blood cultures pending.   -Morning CBC.     Anemia  Assessment & Plan  Anemia is chronic secondary to Chron's disease. Denies bloody stools. Denies presyncope/syncope, dizziness. Takes iron supplement at home. Does not eat a diet high in iron content (spinach, red meats, beans).   Hb: 7.0  Received 2 units of Leukoreduced RBC's in the ED.     Plan:   -CBC am  -Give another unit of blood if HB level is below 7.0.   -Continue Ferrous sulfate.     Crohn's disease (HCC)  Assessment & Plan  -Endorses lower abdominal pain (5/10) and watery/non-bloody/non-mucous diarrhea.   -Left upper quadrant abdominal tenderness. Bowel sounds heard in all four quadrants.   -Numerous pockets/indurations in the buttocks concerning for perirectal fistulas seen on exam. White, linear, track like lines observed in gluteal folds. Not actively draining. No fluctuance.   -Patient last saw GI on October of 2023, where they recommended that he starts on a monoclonal antibody. Patient refused. When asked why he stated that the doctor was making weird sounds.   -In June 2023 GI recommended that patient  undergo surgery for management of his fistulas/abscesses, however according to patient it was cancelled because of an acute infection.     -Colonoscopy on 7/31/23: Severe, generalized cobblestone, edematous, erythematous, friable and ulcerated mucosa with erosion in the terminal ileum and throughout the colon, consistent with IBD. 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       Plan:   -GI consulted.   -General Surgery consulted.   -Hold steroids.         Schizophreniform disorder (HCC)  Assessment & Plan  Patient is having auditory hallucinations. Speech is disorganized and confabulated. Patient has a conversation with another individual while I'm in the room. When I ask him about the voices the patient talks about spies and that the voices are making bird signs at him. The voices, however, have not told him to harm himself or others. He denies suicidal/homicidal ideation. Per chart review and the patient he is not on any medication for his condition. He states that he has a future appointment with a therapist in the upcoming weeks, it is unclear as to when and where this appointment is.     Plan:   -Consulted Psychiatry, appreciate recommendations.           VTE Prophylaxis: VTE Score: 2 Low Risk (Score 0-2) - Encourage Ambulation.  Code Status: Level 1 - Full Code  Anticipated Length of Stay:  Patient will be admitted on an Observation basis with an anticipated length of stay of  more than 2 midnights.     Justification for Hospital Stay: Requires blood transfusion and evaluation by Psychiatry, Gastroenterology, and General Surgery.   Total Time for Visit, including Counseling / Coordination of Care: 45 mins. Greater than 50% of this total time spent on direct patient counseling and coordination of care.  Patient Information Sharing: N/A     Chief Complaint:     Chief Complaint   Patient presents with    Abdominal Pain     Pt  reports his Crohns has been flaring up since June, was supposed to have a surgery, but had it canceled due to an infection. Reports he has diffuse lower abdominal pain. Tried prescribed muscle relaxers with no relief.      History of Present Illness:    Carlos Wolf is a 33 y.o. male who presents with chronic lower abdominal pain. Pain is irrespective to food. Worsened with stress. Improves with rest. Patient is established with Gastroenterology whom manage his Chron's disease. Patient was prompted to come to the ED because he was draining scant yellow to brown fluid from his buttocks. GI recommended that the patient undergo surgery in June to manage his perianal fistulas/abscesses, however, it was cancelled due to an acute infection at the time according to the patient. Patient is able to name all of his current medications and has been complying with all of his medications except prednisone because his stomach does not tolerate it. Of note, patient has 1-2 watery, non-bloody bowel movements a day. He denies seeing mucous in his stool.     Patient smokes 2-3 cigarettes a day. Denies consuming alcohol since June of 2023 and he does not use any recreational drugs. Patient has been diagnosed with Schizophreniform disorder. Unfortunately, at this time he is not currently on any psychiatric medications but he is establishing care with a therapist in upcoming weeks. Patient endorses auditory hallucinations. He denies that these voices are instructing him to harm himself or others. He denies suicidal/homicidal ideation.     Review of Systems:  Review of Systems   Constitutional:  Positive for fatigue. Negative for activity change, chills and fever.   HENT:  Negative for congestion and sore throat.    Respiratory:  Negative for cough and shortness of breath.    Cardiovascular:  Negative for chest pain.   Gastrointestinal:  Positive for abdominal pain, diarrhea and rectal pain. Negative for anal bleeding.         "-Lower abdominal pain   -Watery diarrhea   Genitourinary:  Negative for difficulty urinating, dysuria, frequency and urgency.   Musculoskeletal:  Negative for arthralgias.   Skin:  Negative for rash.   Neurological:  Negative for dizziness, syncope, light-headedness, numbness and headaches.   Psychiatric/Behavioral:  Positive for hallucinations and sleep disturbance. Negative for agitation.         -Hearing voices during encounter.   -States voices often keep him up at night.        Past Medical and Surgical History:   Past Medical History:   Diagnosis Date    Anemia 2004    hospitalization/transfusion    Crohn's disease (HCC)      Past Surgical History:   Procedure Laterality Date    NE ANRCT XM SURG REQ ANES GENERAL SPI/EDRL DX N/A 4/7/2016    Procedure: EXAM UNDER ANESTHESIA (EUA); possible REMOVAL OF FOREIGN BODY anoscopy ;  Surgeon: Reymundo Araujo MD;  Location: [facility];  Service: Colorectal    NE SURG TX ANAL FISTULA INTERSPHINCTERIC N/A 4/7/2016    Procedure: superficial FISTULOTOMY; SETON PROCEDURE drainage of chronic ischiofistula abscess and debridement;  Surgeon: Reymundo Araujo MD;  Location: [facility];  Service: Colorectal     Meds/Allergies:  Allergies:   Allergies   Allergen Reactions    Haldol Decanoate [Haloperidol] Anaphylaxis    Oxycodone-Acetaminophen Rash     \"swollon red rash\"    Sulfamethoxazole-Trimethoprim      \"never really worked\"     Prior to Admission Medications   Prescriptions Last Dose Informant Patient Reported? Taking?   acetaminophen (TYLENOL) 650 mg CR tablet Unknown Self No No   Sig: Take 1 tablet (650 mg total) by mouth every 8 (eight) hours as needed for mild pain   budesonide (ENTOCORT EC) 3 MG capsule 1/3/2024  Yes Yes   Sig: Take 3 mg by mouth daily   ergocalciferol (VITAMIN D2) 50,000 units 1/3/2024  No Yes   Sig: Take 1 capsule (50,000 Units total) by mouth once a week , For 8 weeks   ferrous sulfate 325 (65 Fe) mg tablet 1/3/2024  No Yes   Sig: Take 1 tablet " (325 mg total) by mouth 2 (two) times a day with meals   lidocaine (LIDODERM) 5 %   No No   Sig: Apply 1 patch topically over 12 hours every 24 hours for 15 days Remove & Discard patch within 12 hours or as directed by MD   methocarbamol (ROBAXIN) 500 mg tablet 1/3/2024  No Yes   Sig: Take 1 tablet (500 mg total) by mouth 4 (four) times a day   predniSONE 5 mg/5 mL solution Not Taking  Yes No   Sig: Take 5 mg by mouth daily   Patient not taking: Reported on 1/3/2024      Facility-Administered Medications: None     Social History:     Social History     Socioeconomic History    Marital status: Single     Spouse name: Not on file    Number of children: Not on file    Years of education: Not on file    Highest education level: Not on file   Occupational History    Not on file   Tobacco Use    Smoking status: Light Smoker     Current packs/day: 0.01     Types: Cigarettes    Smokeless tobacco: Never    Tobacco comments:     Codie says 7 cigarettes per day   Vaping Use    Vaping status: Never Used   Substance and Sexual Activity    Alcohol use: Not Currently     Comment: Socially    Drug use: No    Sexual activity: Yes     Partners: Female   Other Topics Concern    Not on file   Social History Narrative    Not on file     Social Determinants of Health     Financial Resource Strain: Not on file   Food Insecurity: No Food Insecurity (8/2/2023)    Hunger Vital Sign     Worried About Running Out of Food in the Last Year: Never true     Ran Out of Food in the Last Year: Never true   Transportation Needs: No Transportation Needs (8/2/2023)    PRAPARE - Transportation     Lack of Transportation (Medical): No     Lack of Transportation (Non-Medical): No   Physical Activity: Not on file   Stress: Not on file   Social Connections: Not on file   Intimate Partner Violence: Not on file   Housing Stability: Low Risk  (8/2/2023)    Housing Stability Vital Sign     Unable to Pay for Housing in the Last Year: No     Number of Places  Lived in the Last Year: 2     Unstable Housing in the Last Year: No     Patient Pre-hospital Living Situation: Lives in a basement.   Patient Pre-hospital Level of Mobility: No limitations.   Patient Pre-hospital Diet Restrictions: Endorses losing weight due to decreased appetite/Chron's disease.     Family History:  History reviewed. No pertinent family history.    Physical Exam:   Vitals:   Blood Pressure: 126/87 (01/03/24 2046)  Pulse: 91 (01/03/24 2046)  Temperature: 98 °F (36.7 °C) (01/03/24 2046)  Temp Source: Temporal (01/03/24 2046)  Respirations: 18 (01/03/24 2046)  Weight - Scale: 61 kg (134 lb 7.7 oz) (01/03/24 1512)  SpO2: 100 % (01/03/24 2046)    Physical Exam  Constitutional:       General: He is not in acute distress.     Appearance: He is not ill-appearing, toxic-appearing or diaphoretic.   HENT:      Head: Normocephalic and atraumatic.      Nose: Nose normal. No congestion or rhinorrhea.      Mouth/Throat:      Mouth: Mucous membranes are moist.   Eyes:      Conjunctiva/sclera: Conjunctivae normal.   Cardiovascular:      Rate and Rhythm: Normal rate and regular rhythm.      Pulses: Normal pulses.      Heart sounds: Normal heart sounds.   Pulmonary:      Effort: Pulmonary effort is normal.      Breath sounds: Normal breath sounds.   Abdominal:      General: Bowel sounds are normal.      Palpations: Abdomen is soft.      Tenderness: There is abdominal tenderness.      Comments: Tenderness in left upper quadrant.    Genitourinary:     Comments: -pocket like indurations in gluteal fold concerning for fistulas.   -White, linear tracks concerning for abscess. Not actively draining. Not fluctuant.   -White tissue was present in between gluteal folds and it had yellow to brown stains.   Musculoskeletal:         General: Normal range of motion.      Cervical back: Normal range of motion.   Neurological:      General: No focal deficit present.      Mental Status: He is alert and oriented to person, place, and  time.      Motor: No weakness.   Psychiatric:      Comments: -Disorganized train of thought.   -Patient struggled to stay on topic.   -Was actively having a conversation with another individual not present in the room.   -Voices were not instructing patient to harm himself or others.   -Denies suicidal/homicidal ideation.            Lab Results: I have personally reviewed pertinent reports.   and I have personally reviewed pertinent films in PACS  Results from last 7 days   Lab Units 01/03/24  1548   WBC Thousand/uL 10.85*   HEMOGLOBIN g/dL 7.0*   HEMATOCRIT % 26.0*   PLATELETS Thousands/uL 661*   NEUTROS PCT % 76*   LYMPHS PCT % 15   MONOS PCT % 8   EOS PCT % 0     Results from last 7 days   Lab Units 01/03/24  1548   POTASSIUM mmol/L 4.1   CHLORIDE mmol/L 100   CO2 mmol/L 26   BUN mg/dL 6   CREATININE mg/dL 1.16   CALCIUM mg/dL 8.8   ALK PHOS U/L 80   ALT U/L 6*   AST U/L 13   EGFR ml/min/1.73sq m 82   MAGNESIUM mg/dL 2.0     Results from last 7 days   Lab Units 01/03/24  1548   INR  1.07         Results from last 7 days   Lab Units 01/03/24  1818 01/03/24  1542   LACTIC ACID mmol/L 0.6 2.2*              Results from last 7 days   Lab Units 01/03/24  1644   COLOR UA  Tsering*   CLARITY UA  Slightly Cloudy*   SPEC GRAV UA  1.025   PH UA  6.0   LEUKOCYTES UA  Negative   NITRITE UA  Negative   GLUCOSE UA mg/dl Negative   KETONES UA mg/dl Negative   BILIRUBIN UA  Negative   BLOOD UA  Negative      Results from last 7 days   Lab Units 01/03/24  1644   RBC UA /hpf None Seen   WBC UA /hpf 1-2   EPITHELIAL CELLS WET PREP /hpf Occasional   BACTERIA UA /hpf Moderate*        Imaging: I have personally reviewed pertinent reports.   and I have personally reviewed pertinent films in PACS  CT abdomen pelvis with contrast    Result Date: 1/3/2024  Narrative: CT ABDOMEN AND PELVIS WITH IV CONTRAST INDICATION:   Perianal fistula and abdominal pain. COMPARISON: 7/30/2023 TECHNIQUE:  CT examination of the abdomen and pelvis was  performed. Multiplanar 2D reformatted images were created from the source data. This examination, like all CT scans performed in the [facility], was performed utilizing techniques to minimize radiation dose exposure, including the use of iterative reconstruction and automated exposure control. Radiation dose length product (DLP) for this visit:  280 mGy-cm IV Contrast:  100 mL of iohexol (OMNIPAQUE) Enteric Contrast:  Enteric contrast was not administered. FINDINGS: ABDOMEN LOWER CHEST: Clear lung bases. LIVER/BILIARY TREE: Stable hepatomegaly. GALLBLADDER:  No calcified gallstones. No pericholecystic inflammatory change. SPLEEN:  Unremarkable. PANCREAS:  Unremarkable. ADRENAL GLANDS:  Unremarkable. KIDNEYS/URETERS: Symmetric nephrographic phase enhancement of the kidneys. No obstructive uropathy STOMACH AND BOWEL: Possible mild wall thickening at the gastroesophageal junction. Mild wall thickening of the terminal ileum and throughout the colon, sparing the sigmoid colon. Mild wall thickening in the rectum. No evidence of bowel obstruction. APPENDIX: Normal appendix. ABDOMINOPELVIC CAVITY:  No free intraperitoneal air, fluid collection or lymphadenopathy. VESSELS: Patent portal, splenic and mesenteric veins. PELVIS REPRODUCTIVE ORGANS: No prostate enlargement. URINARY BLADDER:  Unremarkable. ABDOMINAL WALL/INGUINAL REGIONS: Significant cutaneous thickening and subcutaneous inflammation along the bilateral gluteal folds and perianal region. Fluid collection in the left perineum and gluteal fold extending from the perianal soft tissues at approximately 1:00 o'clock, measuring 3 x 1 x 3 cm. OSSEOUS STRUCTURES:  No acute fracture or destructive osseous lesion.     Impression: 1. Extensive perianal and bilateral gluteal fold cellulitis. Collection extending from the perianal region 1:00 o'clock,  along the left gluteal fold and perineum measuring 3 x 1 x 3 cm, consistent with an abscess and/or fistula.  2. Mild diffuse colitis sparing the sigmoid colon. Mild distal ileitis. 3. Suggestion of mild proctitis. Workstation performed: [id number]       EKG, Pathology, and Other Studies Reviewed on Admission:   EKG  Result Date: 01/03/24  Impression:  Sinus tachycardia and RBB.     Entire H&P was discussed with Dr. Herrera who agreed to what is noted above    Kiko Araujo MD  01/03/24  9:13 PM

## 2024-01-04 NOTE — PROGRESS NOTES
Progress Note    Carlos oWlf 33 y.o. male MRN: [id number]  Unit/Bed#: 7T [facility] 705-01 Encounter: [id number]  Admitting Physician: Wing Hutchinson MD  PCP: Kaykay Monroe DO  Date of Admission:  1/3/2024  3:07 PM    Assessment and Plan    * Cellulitis and abscess of buttock  Assessment & Plan  -Not actively draining. No fluctuance. Afebrile. No chills.     -Ct abdomen today: Concerning for  extensive perianal and bilateral gluteal fold cellulitis. Collection extending from the perianal region 1:00 o'clock,  along the left gluteal fold and perineum measuring 3 x 1 x 3 cm, consistent with an abscess and/or fistula.   Received one dose of Zosyn in the ED for findings on CT scan.     Plan:   -Start Rocephin IV and Flagyl IV.   -Monitor vitals and worsening signs of infection.  -Follow-up surgery recommendations  -Blood cultures pending.   -Morning CBC.     Crohn's disease (HCC)  Assessment & Plan  Right upper quad and left lower quad reproducible pain with palpation.  Bowel sound present in all 4 quadrants.  -Numerous pockets/indurations in the buttocks concerning for perianal fistulas seen on exam. White, linear, track like lines observed in gluteal folds.  Small purulent drainage noted on examination.  June 2023: Surgery recommended by GI for for perianal fistula/abscess management.  Patient unable to follow-up due to infection.  October 2023: patient refused GI recommendations of starting monoclonal antibody  Colonoscopy on 7/31/23: Severe, generalized cobblestone, edematous, erythematous, friable and ulcerated mucosa with erosion in the terminal ileum and throughout the colon, consistent with IBD. 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     Plan:   -GI consulted.   -General Surgery consulted.   -Hold steroids.     Anemia  Assessment & Plan  2 units of Leukoreduced RBC administered on presentation with response.  Hgb of 8.2 of this  morning per CBC. Hgb of 7 on admission.  Denies dizziness or weakness.    Plan:   -Trend CBC  -Continue Ferrous sulfate.     Schizophrenia (HCC)  Assessment & Plan  Psych recommendations as follows:  Collaborate with collaterals for baseline assessment and disposition.  Introduce Invega 3 mg q.h.s. to address psychosis. Upward titration as necessary and as tolerated.  Presently, patient adheres to criteria for inpatient behavioral health admission by means of voluntary 201 commitment.  Please reconsult psychiatry if patient attempts to leave AGAINST MEDICAL ADVICE to assess for appropriateness of involuntary 302     Plan:  See A/P for schizophreniform    Schizophreniform disorder (HCC)  Assessment & Plan  Patient having active auditory and visual hallucinations since admission.   No current management for schizophreniform disorder.  Denies SH/SI, homicidal ideations.  Psych consult appreciated for further assessment and recommendations.  Psych recommendations as follow:  Collaborate with collaterals for baseline assessment and disposition.  Introduce Invega 3 mg q.h.s. to address psychosis. Upward titration as necessary and as tolerated.  Presently, patient adheres to criteria for inpatient behavioral health admission by means of voluntary 201 commitment.  Please reconsult psychiatry if patient attempts to leave AGAINST MEDICAL ADVICE to assess for appropriateness of involuntary 302 .    Plan:  Invega 3 mg bedtime  Monitor closely  Avoid Haldol; hx of anaphylactic reaction        VTE Pharmacologic Prophylaxis: VTE Score: 2 Low Risk (Score 0-2) - Encourage Ambulation.    Patient Centered Rounds: I have performed bedside rounds with nursing staff today.    Discussions with Specialists or Other Care Team Provider: Psychiatry    Education and Discussions with Family / Patient: N/A  Patient Information Sharing: N/A    Time Spent for Care: 30 minutes.  More than 50% of total time spent on counseling and coordination of  care as described above.    Current Length of Stay: 0 day(s)    Current Patient Status: Observation   Certification Statement: The patient will continue to require additional inpatient hospital stay due to Cellulitis and abscess of buttock.    Discharge Plan: None    Code Status: Level 1 - Full Code      Subjective:   Patient seen and assessed at bedside this morning. Pain has improved since admission. Endorses reproducible RUQ and LLQ  pain with palpation. Denies f/c/n/v/d. Admits to auditory and visual hallucination which has been present since admissions. Denies SH/SI or homicidal ideations.    Medical management discussed with patient and is amenable. All questions answered.    Objective:     Vitals:   Temp (24hrs), Av.1 °F (36.7 °C), Min:97.2 °F (36.2 °C), Max:98.8 °F (37.1 °C)    Temp:  [97.2 °F (36.2 °C)-98.8 °F (37.1 °C)] 97.2 °F (36.2 °C)  HR:  [] 72  Resp:  [16-18] 16  BP: ()/(64-87) 116/75  SpO2:  [98 %-100 %] 99 %  Body mass index is 20.9 kg/m².     Input and Output Summary (last 24 hours):       Intake/Output Summary (Last 24 hours) at 2024 1435  Last data filed at 2024 1100  Gross per 24 hour   Intake 3306.25 ml   Output --   Net 3306.25 ml       Physical Exam:     Physical Exam  Constitutional:       General: He is not in acute distress.     Appearance: Normal appearance.   HENT:      Head: Normocephalic and atraumatic.      Nose: Nose normal.   Eyes:      Extraocular Movements: Extraocular movements intact.      Conjunctiva/sclera: Conjunctivae normal.   Cardiovascular:      Rate and Rhythm: Normal rate and regular rhythm.      Pulses: Normal pulses.   Pulmonary:      Effort: Pulmonary effort is normal.      Breath sounds: Normal breath sounds.   Abdominal:      Tenderness: There is abdominal tenderness (RUQ and LLQ).   Musculoskeletal:         General: Normal range of motion.      Cervical back: Normal range of motion and neck supple. No rigidity.   Skin:     General: Skin is  warm and dry.      Capillary Refill: Capillary refill takes less than 2 seconds.          Neurological:      General: No focal deficit present.   Psychiatric:         Speech: Speech is rapid and pressured.         Behavior: Behavior is cooperative.         Thought Content: Thought content does not include homicidal or suicidal ideation.      Comments: Active visual and auditory hallucinations.       Additional Data:     Labs:    Results from last 7 days   Lab Units 01/04/24  0757   WBC Thousand/uL 13.15*   HEMOGLOBIN g/dL 8.2*   HEMATOCRIT % 27.6*   PLATELETS Thousands/uL 554*   NEUTROS PCT % 66   LYMPHS PCT % 20   MONOS PCT % 12   EOS PCT % 1     Results from last 7 days   Lab Units 01/04/24  0757 01/03/24  1548   POTASSIUM mmol/L 4.0 4.1   CHLORIDE mmol/L 102 100   CO2 mmol/L 26 26   BUN mg/dL 8 6   CREATININE mg/dL 1.04 1.16   CALCIUM mg/dL 8.1* 8.8   ALK PHOS U/L  --  80   ALT U/L  --  6*   AST U/L  --  13     Results from last 7 days   Lab Units 01/03/24  1548   INR  1.07                 * I Have Reviewed All Lab Data Listed Above.  * Additional Pertinent Lab Tests Reviewed: All Labs For Current Hospital Admission Reviewed    Imaging:    Imaging Reports Reviewed Today Include: None  Imaging Personally Reviewed by Myself Includes: None     Recent Cultures (last 7 days):     Results from last 7 days   Lab Units 01/03/24  1558 01/03/24  1548   BLOOD CULTURE  Received in Microbiology Lab. Culture in Progress. Received in Microbiology Lab. Culture in Progress.       Last 24 Hours Medication List:   Current Facility-Administered Medications   Medication Dose Route Frequency Provider Last Rate    acetaminophen  160 mg Oral Q6H PRN Kiko Araujo MD      cefTRIAXone  1,000 mg Intravenous Q24H Maikel Kiran MD Stopped (01/04/24 0910)    [START ON 1/10/2024] ergocalciferol  50,000 Units Oral Weekly Kiko Araujo MD      ferrous sulfate  325 mg Oral BID With Meals Kiko Galaviz  MD Van      melatonin  3 mg Oral HS Kiko Araujo MD      methocarbamol  500 mg Oral 4x Daily Kiko Araujo MD      metroNIDAZOLE  500 mg Intravenous TID Maikel Kiran  mg (01/04/24 1040)    paliperidone  3 mg Oral HS Cassandra Rodas DO      sodium chloride  100 mL/hr Intravenous Continuous Harrison Mayorga  mL/hr (01/04/24 0938)        ** Please Note: Dictation voice to text software may have been used in the creation of this document. **    Wing Hutchinson MD  01/04/24  2:35 PM

## 2024-01-04 NOTE — CONSULTS
Patient MRN: [id number]  Date of Service: 1/4/2024  Referring Provider: Maikel Kiran MD   Provider Creating Note: Bethanie Shepherd PA-C  PCP: Kaykay Monroe    Reason for Consult: Crohn's disease (HCC) [K50.90]     HISTORY OF PRESENT ILLNESS:  Carlos Wolf is a 33 y.o. male with PMH including schizoaffective disorder, NSTEMI 2015, chronic iron deficiency anemia requiring transfusion and iron supp, and severe ileocolonic and perianal fistulizing Crohn's disease diagnosed as a teen who was seen by Dr. Spears 10/11/2023 to establish care and recommended long-term antibiotics and infliximab therapy. He was also seen by at [facility]/EP, Dr. Delaney 11/8/2023 with recommendation to start infliximab. He has never been on biologic therapy and has repeatedly refused therapy. He states he uses budesonide at home regularly. He continues to smoke tobacco. Per notes, he has poor insight into his disease which is complicated by his psychiatric illness. Was seen by [facility] ID 10/2023. He had repeated indeterminate Quantiferon gold 8/1/2023, 9/14/2023 and 10/17/2023, but result felt related to concomitant steroid use. Last colonoscopy 7/31/2023 showed severe ileitis and pancolitis with perianal fistula. EGD with Candida esophagitis treated with fluconazole. Abscess was drained, he was treated with antibiotics and steroid taper. He last saw colorectal surgery at [facility] 11/1/2023 with plans for EUA and Seton placement to control infection. Pt states this was canceled due shortness of breath.    He presented to  ED yesterday with abdominal discomfort, watery NB diarrhea and continued fistula drainage. He had induration noted on buttock and upper thighs. Abdominal pain is worse on left side without radiation and has improved since admission. No fevers or chills reported. No nausea or vomiting. Labs showed hgb 7.0>>8.2, MCV 69 and WBC 10.85>>13. Lactic acid 2.2. CT AP shows extensive perianal and bilateral gluteal fold cellulitis with  "perianal collection measuring 3 x 1 x 3cm c/w abscess and/or fistula. Mild diffuse colitis sparing the sigmoid colon, mild distal ileitis and suggestion of mild proctitis.  He was transfused with 2u pRBCs and IV antibiotics initiated. Surgery evaluation pending.   During our interview he began talking about a [place] girl, Rohan Jefferson, Germans and other topics. He required redirection several times, but the interview became increasing difficult.     Review of Systems:    General:   No fever or chills; significant weight loss per EMR, but patient reports weight gain   EENT:   No ear pain, facial swelling; No sneezing, sore throat.   Skin:   +dry skin, redness   Respiratory:     No shortness of breath, cough, wheezing, stridor.   Cardiovascular:     No chest pain, palpitations.   Gastrointestinal:    As per HPI.   Musculoskeletal:     +arthralgias/knee pain No myalgias, swelling.   Neurologic:   No dizziness, numbness, weakness. No speech difficulties.   Psych:   +auditory/visual hallucinations. No agitation, suicidal ideations    Otherwise, All other twelve-point review of systems normal.     Past Medical History:   Diagnosis Date    Anemia 2004    hospitalization/transfusion    Crohn's disease (HCC)      Past Surgical History:   Procedure Laterality Date    HI ANRCT XM SURG REQ ANES GENERAL SPI/EDRL DX N/A 4/7/2016    Procedure: EXAM UNDER ANESTHESIA (EUA); possible REMOVAL OF FOREIGN BODY anoscopy ;  Surgeon: Reymundo Araujo MD;  Location: [facility];  Service: Colorectal    HI SURG TX ANAL FISTULA INTERSPHINCTERIC N/A 4/7/2016    Procedure: superficial FISTULOTOMY; SETON PROCEDURE drainage of chronic ischiofistula abscess and debridement;  Surgeon: Reymundo Araujo MD;  Location: [facility];  Service: Colorectal     Allergies   Allergen Reactions    Haldol Decanoate [Haloperidol] Anaphylaxis    Oxycodone-Acetaminophen Rash     \"swollon red rash\"    Sulfamethoxazole-Trimethoprim      \"never really worked\" "       Medications:  Home Medications  Prior to Admission medications    Medication Sig Start Date End Date Taking? Authorizing Provider   budesonide (ENTOCORT EC) 3 MG capsule Take 3 mg by mouth daily 10/23/23  Yes Historical Provider, MD   ergocalciferol (VITAMIN D2) 50,000 units Take 1 capsule (50,000 Units total) by mouth once a week , For 8 weeks 11/17/23  Yes Kaykay Monroe, DO   ferrous sulfate 325 (65 Fe) mg tablet Take 1 tablet (325 mg total) by mouth 2 (two) times a day with meals 11/17/23  Yes Kaykay Monroe, DO   methocarbamol (ROBAXIN) 500 mg tablet Take 1 tablet (500 mg total) by mouth 4 (four) times a day 11/17/23  Yes aKykay Monroe,    acetaminophen (TYLENOL) 650 mg CR tablet Take 1 tablet (650 mg total) by mouth every 8 (eight) hours as needed for mild pain 6/25/23   Candice Campo PA-C   lidocaine (LIDODERM) 5 % Apply 1 patch topically over 12 hours every 24 hours for 15 days Remove & Discard patch within 12 hours or as directed by MD 6/25/23 7/10/23  Candice Campo PA-C   predniSONE 5 mg/5 mL solution Take 5 mg by mouth daily  Patient not taking: Reported on 1/3/2024    Historical Provider, MD       Inhouse Medications    Current Facility-Administered Medications:     acetaminophen (TYLENOL) oral suspension 160 mg, 160 mg, Oral, Q6H PRN    cefTRIAXone (ROCEPHIN) IVPB (premix in dextrose) 1,000 mg 50 mL, 1,000 mg, Intravenous, Q24H, 1,000 mg at 01/04/24 0835    [START ON 1/10/2024] ergocalciferol (VITAMIN D2) capsule 50,000 Units, 50,000 Units, Oral, Weekly    ferrous sulfate tablet 325 mg, 325 mg, Oral, BID With Meals, 325 mg at 01/04/24 0835    melatonin tablet 3 mg, 3 mg, Oral, HS, 3 mg at 01/03/24 2242    methocarbamol (ROBAXIN) tablet 500 mg, 500 mg, Oral, 4x Daily, 500 mg at 01/04/24 0835    metroNIDAZOLE (FLAGYL) IVPB (premix) 500 mg 100 mL, 500 mg, Intravenous, TID    sodium chloride 0.9 % infusion, 100 mL/hr, Intravenous, Continuous      Social History   reports that he has been smoking cigarettes.  "He has never used smokeless tobacco. He reports that he does not currently use alcohol. He reports that he does not use drugs.    Family History  History reviewed. No pertinent family history.      OBJECTIVE:    /75 (BP Location: Right arm)   Pulse 72   Temp (!) 97.2 °F (36.2 °C) (Temporal)   Resp 16   Ht 5' 6\" (1.676 m)   Wt 58.7 kg (129 lb 8 oz)   SpO2 99%   BMI 20.90 kg/m²   Physical Exam:     General Appearance:    Awake, alert, oriented x3, no distress, well developed, well    nourished   Head:    Normocephalic without obvious abnormality   Eyes:    PERRL, conjunctiva/corneas clear, EOM's intact        Neck:   Supple, no adenopathy   Throat:   Mucous membranes moist   Lungs:     Clear to auscultation bilaterally, no wheezing or rhonchi   Heart:    Regular rate and rhythm, S1 and S2 normal, no murmur   Abdomen:     Soft, mild diffuse ttp L>R, non-distended. bowel sounds active. No masses, rebound or guarding.    Extremities:   Extremities normal. No clubbing, cyanosis or edema   Psych  Derm:   Disorganized thoughts/flight of ideas.     No jaundice.   Neurologic:   CNII-XII grossly intact. Speech intact         Laboratory Studies:  Results from last 7 days   Lab Units 01/04/24  0757 01/03/24  1548   WBC Thousand/uL 13.15* 10.85*   HEMOGLOBIN g/dL 8.2* 7.0*   HEMATOCRIT % 27.6* 26.0*   MCV fL 71* 69*   PLATELETS Thousands/uL 554* 661*   INR   --  1.07     Results from last 7 days   Lab Units 01/04/24  0757 01/03/24  1548   SODIUM mmol/L 136 135   POTASSIUM mmol/L 4.0 4.1   CHLORIDE mmol/L 102 100   CO2 mmol/L 26 26   BUN mg/dL 8 6   CREATININE mg/dL 1.04 1.16   CALCIUM mg/dL 8.1* 8.8   ALBUMIN g/dL  --  3.7   TOTAL BILIRUBIN mg/dL  --  0.26   ALK PHOS U/L  --  80   ALT U/L  --  6*   AST U/L  --  13           Imaging and Other Studies:  CT abdomen pelvis with contrast    Result Date: 1/3/2024  Narrative: CT ABDOMEN AND PELVIS WITH IV CONTRAST INDICATION:   Perianal fistula and abdominal pain. " COMPARISON: 7/30/2023 TECHNIQUE:  CT examination of the abdomen and pelvis was performed. Multiplanar 2D reformatted images were created from the source data. This examination, like all CT scans performed in the [facility], was performed utilizing techniques to minimize radiation dose exposure, including the use of iterative reconstruction and automated exposure control. Radiation dose length product (DLP) for this visit:  280 mGy-cm IV Contrast:  100 mL of iohexol (OMNIPAQUE) Enteric Contrast:  Enteric contrast was not administered. FINDINGS: ABDOMEN LOWER CHEST: Clear lung bases. LIVER/BILIARY TREE: Stable hepatomegaly. GALLBLADDER:  No calcified gallstones. No pericholecystic inflammatory change. SPLEEN:  Unremarkable. PANCREAS:  Unremarkable. ADRENAL GLANDS:  Unremarkable. KIDNEYS/URETERS: Symmetric nephrographic phase enhancement of the kidneys. No obstructive uropathy STOMACH AND BOWEL: Possible mild wall thickening at the gastroesophageal junction. Mild wall thickening of the terminal ileum and throughout the colon, sparing the sigmoid colon. Mild wall thickening in the rectum. No evidence of bowel obstruction. APPENDIX: Normal appendix. ABDOMINOPELVIC CAVITY:  No free intraperitoneal air, fluid collection or lymphadenopathy. VESSELS: Patent portal, splenic and mesenteric veins. PELVIS REPRODUCTIVE ORGANS: No prostate enlargement. URINARY BLADDER:  Unremarkable. ABDOMINAL WALL/INGUINAL REGIONS: Significant cutaneous thickening and subcutaneous inflammation along the bilateral gluteal folds and perianal region. Fluid collection in the left perineum and gluteal fold extending from the perianal soft tissues at approximately 1:00 o'clock, measuring 3 x 1 x 3 cm. OSSEOUS STRUCTURES:  No acute fracture or destructive osseous lesion.     Impression: 1. Extensive perianal and bilateral gluteal fold cellulitis. Collection extending from the perianal region 1:00 o'clock,  along the left gluteal fold  and perineum measuring 3 x 1 x 3 cm, consistent with an abscess and/or fistula. 2. Mild diffuse colitis sparing the sigmoid colon. Mild distal ileitis. 3. Suggestion of mild proctitis. Workstation performed: [id number]         ASSESSMENT AND PLAN:  33 year old male with severe ileocolonic and perianal fistulizing Crohn's disease admitted with perianal cellulitis and abscess. Imaging also shows mild diffuse colitis, distal ileitis, and proctitis.  Reluctant to start Remicade, no prior biologic therapy. Had been on budensonide at home. Started on IV antibiotics (Zosyn in ED, now on Rocephin/Flagyl). Blood cultures pending. Fecal calprotectin pending. Check infectious stool studies given antibiotic history, although likely low yield on antibiotics currently. Surgical evaluation pending, Seton placement was recently being planned at [facility] but canceled 2/2 shortness of breath per pt. Will discuss plan with GI attending. Patient states he plans to follow up with [facility] Delaney after discharge.   Chronic iron deficiency anemia on oral iron. Baseline hgb appears to be 7-8. 2u pRBCs since admission. No active gi bleeding reported. Continue to monitor, transfuse as needed. EGD/colonoscopy 7/2023 as noted above. Check iron panel, b12, folate.   Schizophreniform disorder, untreated. Psych evaluation pending.           Bethanie Shepherd PA-C

## 2024-01-04 NOTE — ASSESSMENT & PLAN NOTE
Admits to active auditory hallucinations this morning. Non commanding or threatening.  Denies SH/SI, homicidal ideations.     Plan:  Invega 3 mg bedtime  Monitor closely  Avoid Haldol; hx of anaphylactic reaction  On Discharge from [facility], will be admitted to [facility]

## 2024-01-04 NOTE — PLAN OF CARE
Problem: PAIN - ADULT  Goal: Verbalizes/displays adequate comfort level or baseline comfort level  Description: Interventions:  - Encourage patient to monitor pain and request assistance  - Assess pain using appropriate pain scale  - Administer analgesics based on type and severity of pain and evaluate response  - Implement non-pharmacological measures as appropriate and evaluate response  - Consider cultural and social influences on pain and pain management  - Notify physician/advanced practitioner if interventions unsuccessful or patient reports new pain  Outcome: Progressing     Problem: HEMATOLOGIC - ADULT  Goal: Maintains hematologic stability  Description: INTERVENTIONS  - Assess for signs and symptoms of bleeding or hemorrhage  - Monitor labs  - Administer supportive blood products/factors as ordered and appropriate  Outcome: Progressing     Problem: SKIN/TISSUE INTEGRITY - ADULT  Goal: Incision(s), wounds(s) or drain site(s) healing without S/S of infection  Description: INTERVENTIONS  - Assess and document dressing, incision, wound bed, drain sites and surrounding tissue  - Provide patient and family education  - Perform skin care/dressing changes ever  Outcome: Progressing

## 2024-01-04 NOTE — ED PROVIDER NOTES
History  Chief Complaint   Patient presents with    Abdominal Pain     Pt reports his Crohns has been flaring up since June, was supposed to have a surgery, but had it canceled due to an infection. Reports he has diffuse lower abdominal pain. Tried prescribed muscle relaxers with no relief.      Patient is a 33-year-old male coming in for evaluation of abdominal discomfort, as well as continues to have a drainage from his fistula.  Patient was admitted approximately 6 months ago for Crohn's and a large complex fistula.  Patient reports he has continued at discharge since then.  Is having issues with insurance and the infection.  No systemic symptoms at this time      Abdominal Pain  Associated symptoms: no chest pain, no chills, no dysuria, no fatigue, no fever, no nausea and no vomiting        Prior to Admission Medications   Prescriptions Last Dose Informant Patient Reported? Taking?   acetaminophen (TYLENOL) 650 mg CR tablet More than a month Self No No   Sig: Take 1 tablet (650 mg total) by mouth every 8 (eight) hours as needed for mild pain   budesonide (ENTOCORT EC) 3 MG capsule 1/3/2024  Yes Yes   Sig: Take 3 mg by mouth daily   ergocalciferol (VITAMIN D2) 50,000 units 1/2/2024  No Yes   Sig: Take 1 capsule (50,000 Units total) by mouth once a week , For 8 weeks   ferrous sulfate 325 (65 Fe) mg tablet Past Week  No Yes   Sig: Take 1 tablet (325 mg total) by mouth 2 (two) times a day with meals   lidocaine (LIDODERM) 5 %   No No   Sig: Apply 1 patch topically over 12 hours every 24 hours for 15 days Remove & Discard patch within 12 hours or as directed by MD   methocarbamol (ROBAXIN) 500 mg tablet 1/2/2024  No Yes   Sig: Take 1 tablet (500 mg total) by mouth 4 (four) times a day   predniSONE 5 mg/5 mL solution Not Taking  Yes No   Sig: Take 5 mg by mouth daily   Patient not taking: Reported on 1/3/2024      Facility-Administered Medications: None       Past Medical History:   Diagnosis Date    Anemia 2004     hospitalization/transfusion    Crohn's disease (HCC)        Past Surgical History:   Procedure Laterality Date    [place] ANRCT XM SURG REQ ANES GENERAL SPI/EDRL DX N/A 4/7/2016    Procedure: EXAM UNDER ANESTHESIA (EUA); possible REMOVAL OF FOREIGN BODY anoscopy ;  Surgeon: Reymundo Araujo MD;  Location: [facility];  Service: Colorectal    [place] SURG TX ANAL FISTULA INTERSPHINCTERIC N/A 4/7/2016    Procedure: superficial FISTULOTOMY; SETON PROCEDURE drainage of chronic ischiofistula abscess and debridement;  Surgeon: Reymundo Araujo MD;  Location: [facility];  Service: Colorectal       History reviewed. No pertinent family history.  I have reviewed and agree with the history as documented.    E-Cigarette/Vaping    E-Cigarette Use Never User      E-Cigarette/Vaping Substances     Social History     Tobacco Use    Smoking status: Light Smoker     Current packs/day: 0.01     Types: Cigarettes    Smokeless tobacco: Never    Tobacco comments:     Codie says 7 cigarettes per day   Vaping Use    Vaping status: Never Used   Substance Use Topics    Alcohol use: Not Currently     Comment: Socially    Drug use: No       Review of Systems   Constitutional:  Negative for chills, fatigue and fever.   Cardiovascular:  Negative for chest pain.   Gastrointestinal:  Positive for abdominal pain. Negative for nausea and vomiting.   Genitourinary:  Negative for dysuria.   Skin:  Positive for wound.       Physical Exam  Physical Exam  Vitals reviewed. Exam conducted with a chaperone present.   Constitutional:       Appearance: Normal appearance. He is normal weight.   HENT:      Head: Normocephalic and atraumatic.      Right Ear: External ear normal.      Left Ear: External ear normal.      Nose: Nose normal.   Eyes:      Conjunctiva/sclera: Conjunctivae normal.   Cardiovascular:      Rate and Rhythm: Normal rate.   Pulmonary:      Effort: Pulmonary effort is normal.   Genitourinary:     Comments: Induration and tenderness of bilateral  buttock.  No abscess felt at this time.  Induration on the upper thighs  Musculoskeletal:         General: Normal range of motion.      Cervical back: Normal range of motion.   Skin:     General: Skin is warm and dry.   Neurological:      Mental Status: He is alert.         Vital Signs  ED Triage Vitals   Temperature Pulse Respirations Blood Pressure SpO2   01/03/24 1512 01/03/24 1512 01/03/24 1512 01/03/24 1512 01/03/24 1512   98.2 °F (36.8 °C) (!) 131 16 133/74 100 %      Temp Source Heart Rate Source Patient Position - Orthostatic VS BP Location FiO2 (%)   01/03/24 1512 01/03/24 1512 01/03/24 1512 01/03/24 1512 --   Oral Monitor Sitting Left arm       Pain Score       01/03/24 2110       2           Vitals:    01/03/24 1630 01/03/24 1715 01/03/24 1951 01/03/24 2046   BP: 114/67 119/74 118/76 126/87   Pulse: 92  90 91   Patient Position - Orthostatic VS:   Sitting Sitting         Visual Acuity      ED Medications  Medications   ergocalciferol (VITAMIN D2) capsule 50,000 Units (has no administration in time range)   ferrous sulfate tablet 325 mg (has no administration in time range)   methocarbamol (ROBAXIN) tablet 500 mg (has no administration in time range)   metroNIDAZOLE (FLAGYL) IVPB (premix) 500 mg 100 mL (has no administration in time range)   cefTRIAXone (ROCEPHIN) IVPB (premix in dextrose) 1,000 mg 50 mL (has no administration in time range)   acetaminophen (TYLENOL) oral suspension 160 mg (has no administration in time range)   sodium chloride 0.9 % bolus 1,000 mL (0 mL Intravenous Stopped 1/3/24 1716)   iohexol (OMNIPAQUE) 350 MG/ML injection (MULTI-DOSE) 100 mL (100 mL Intravenous Given 1/3/24 1648)   piperacillin-tazobactam (ZOSYN) 3.375 g in sodium chloride 0.9 % 100 mL IVPB (0 g Intravenous Stopped 1/3/24 1934)       Diagnostic Studies  Results Reviewed       Procedure Component Value Units Date/Time    Lactic acid 2 Hours [[id number]]  (Normal) Collected: 01/03/24 1818    Lab Status: Final result  Specimen: Blood from Arm, Left Updated: 01/03/24 1850     LACTIC ACID 0.6 mmol/L     Narrative:      Result may be elevated if tourniquet was used during collection.    Urine Microscopic [[id number]]  (Abnormal) Collected: 01/03/24 1644    Lab Status: Final result Specimen: Urine, Clean Catch Updated: 01/03/24 1745     RBC, UA None Seen /hpf      WBC, UA 1-2 /hpf      Epithelial Cells Occasional /hpf      Bacteria, UA Moderate /hpf      Hyaline Casts, UA 10-20 /lpf      MUCUS THREADS Innumerable    UA w Reflex to Microscopic w Reflex to Culture [[id number]]  (Abnormal) Collected: 01/03/24 1644    Lab Status: Final result Specimen: Urine, Clean Catch Updated: 01/03/24 1711     Color, UA Tsering     Clarity, UA Slightly Cloudy     Specific Gravity, UA 1.025     pH, UA 6.0     Leukocytes, UA Negative     Nitrite, UA Negative     Protein, UA 30 (1+) mg/dl      Glucose, UA Negative mg/dl      Ketones, UA Negative mg/dl      Bilirubin, UA Negative     Occult Blood, UA Negative     UROBILINOGEN UA Negative mg/dL     FLU/RSV/COVID - if FLU/RSV clinically relevant [[id number]]  (Normal) Collected: 01/03/24 1558    Lab Status: Final result Specimen: Nares from Nose Updated: 01/03/24 1651     SARS-CoV-2 Negative     INFLUENZA A PCR Negative     INFLUENZA B PCR Negative     RSV PCR Negative    Narrative:      FOR PEDIATRIC PATIENTS - copy/paste COVID Guidelines URL to browser: [email or web address]    SARS-CoV-2 assay is a Nucleic Acid Amplification assay intended for the  qualitative detection of nucleic acid from SARS-CoV-2 in nasopharyngeal  swabs. Results are for the presumptive identification of SARS-CoV-2 RNA.    Positive results are indicative of infection with SARS-CoV-2, the virus  causing COVID-19, but do not rule out bacterial infection or co-infection  with other viruses. Laboratories within the [place] and its  territories are required to report all positive  results to the appropriate  public health authorities. Negative results do not preclude SARS-CoV-2  infection and should not be used as the sole basis for treatment or other  patient management decisions. Negative results must be combined with  clinical observations, patient history, and epidemiological information.  This test has not been FDA cleared or approved.    This test has been authorized by FDA under an Emergency Use Authorization  (EUA). This test is only authorized for the duration of time the  declaration that circumstances exist justifying the authorization of the  emergency use of an in vitro diagnostic tests for detection of SARS-CoV-2  virus and/or diagnosis of COVID-19 infection under section 564(b)(1) of  the Act, 21 U.S.C. 360bbb-3(b)(1), unless the authorization is terminated  or revoked sooner. The test has been validated but independent review by FDA  and CLIA is pending.    Test performed using Cepheid GeneXpert: This RT-PCR assay targets N2,  a region unique to SARS-CoV-2. A conserved region in the E-gene was chosen  for pan-Sarbecovirus detection which includes SARS-CoV-2.    According to CMS-2020-01-R, this platform meets the definition of high-throughput technology.    Procalcitonin [[id number]]  (Normal) Collected: 01/03/24 1548    Lab Status: Final result Specimen: Blood from Arm, Left Updated: 01/03/24 1637     Procalcitonin 0.12 ng/ml     Lipase [[id number]]  (Abnormal) Collected: 01/03/24 1548    Lab Status: Final result Specimen: Blood from Arm, Left Updated: 01/03/24 1628     Lipase 7 u/L     Comprehensive metabolic panel [[id number]]  (Abnormal) Collected: 01/03/24 1548    Lab Status: Final result Specimen: Blood from Arm, Left Updated: 01/03/24 1628     Sodium 135 mmol/L      Potassium 4.1 mmol/L      Chloride 100 mmol/L      CO2 26 mmol/L      ANION GAP 9 mmol/L      BUN 6 mg/dL      Creatinine 1.16 mg/dL      Glucose 99 mg/dL      Calcium 8.8 mg/dL      AST 13 U/L      ALT 6 U/L       Alkaline Phosphatase 80 U/L      Total Protein 8.1 g/dL      Albumin 3.7 g/dL      Total Bilirubin 0.26 mg/dL      eGFR 82 ml/min/1.73sq m     Narrative:      National Kidney Disease Foundation guidelines for Chronic Kidney Disease (CKD):     Stage 1 with normal or high GFR (GFR > 90 mL/min/1.73 square meters)    Stage 2 Mild CKD (GFR = 60-89 mL/min/1.73 square meters)    Stage 3A Moderate CKD (GFR = 45-59 mL/min/1.73 square meters)    Stage 3B Moderate CKD (GFR = 30-44 mL/min/1.73 square meters)    Stage 4 Severe CKD (GFR = 15-29 mL/min/1.73 square meters)    Stage 5 End Stage CKD (GFR <15 mL/min/1.73 square meters)  Note: GFR calculation is accurate only with a steady state creatinine    Magnesium [[id number]]  (Normal) Collected: 01/03/24 1548    Lab Status: Final result Specimen: Blood from Arm, Left Updated: 01/03/24 1628     Magnesium 2.0 mg/dL     Protime-INR [[id number]]  (Normal) Collected: 01/03/24 1548    Lab Status: Final result Specimen: Blood from Arm, Left Updated: 01/03/24 1623     Protime 14.2 seconds      INR 1.07    APTT [[id number]]  (Normal) Collected: 01/03/24 1548    Lab Status: Final result Specimen: Blood from Arm, Left Updated: 01/03/24 1623     PTT 33 seconds     CBC and differential [[id number]]  (Abnormal) Collected: 01/03/24 1548    Lab Status: Final result Specimen: Blood from Arm, Left Updated: 01/03/24 1622     WBC 10.85 Thousand/uL      RBC 3.78 Million/uL      Hemoglobin 7.0 g/dL      Hematocrit 26.0 %      MCV 69 fL      MCH 18.5 pg      MCHC 26.9 g/dL      RDW 19.8 %      MPV 8.6 fL      Platelets 661 Thousands/uL      nRBC 0 /100 WBCs      Neutrophils Relative 76 %      Immat GRANS % 0 %      Lymphocytes Relative 15 %      Monocytes Relative 8 %      Eosinophils Relative 0 %      Basophils Relative 1 %      Neutrophils Absolute 8.22 Thousands/µL      Immature Grans Absolute 0.03 Thousand/uL      Lymphocytes Absolute 1.67 Thousands/µL      Monocytes Absolute 0.85 Thousand/µL       Eosinophils Absolute 0.03 Thousand/µL      Basophils Absolute 0.05 Thousands/µL     Lactic acid [[id number]]  (Abnormal) Collected: 01/03/24 1542    Lab Status: Final result Specimen: Blood from Arm, Left Updated: 01/03/24 1614     LACTIC ACID 2.2 mmol/L     Narrative:      Result may be elevated if tourniquet was used during collection.    Blood culture #2 [[id number]] Collected: 01/03/24 1548    Lab Status: In process Specimen: Blood from Arm, Left Updated: 01/03/24 1609    Blood culture #1 [[id number]] Collected: 01/03/24 1558    Lab Status: In process Specimen: Blood from Arm, Right Updated: 01/03/24 1609    Blood gas, Venous [[id number]]  (Abnormal) Collected: 01/03/24 1542    Lab Status: Final result Specimen: Blood from Arm, Left Updated: 01/03/24 1554     pH, Drew 7.366     pCO2, Drew 48.5 mm Hg      pO2, Drew 31.7 mm Hg      HCO3, Drew 27.2 mmol/L      Base Excess, Drew 1.5 mmol/L      O2 Content, Drew 6.0 ml/dL      O2 HGB, VENOUS 52.6 %                    CT abdomen pelvis with contrast   Final Result by Fco Chavis MD (01/03 1742)         1. Extensive perianal and bilateral gluteal fold cellulitis. Collection extending from the perianal region 1:00 o'clock,  along the left gluteal fold and perineum measuring 3 x 1 x 3 cm, consistent with an abscess and/or fistula.   2. Mild diffuse colitis sparing the sigmoid colon. Mild distal ileitis.   3. Suggestion of mild proctitis.            Workstation performed: [id number]                    Procedures  Procedures         ED Course  ED Course as of 01/03/24 2123 Wed Jan 03, 2024   1614 LACTIC ACID(!!): 2.2   1622 WBC(!): 10.85   1623 Hemoglobin(!): 7.0   1634 Anion Gap: 9   1748 CT abdomen pelvis with contrast        1. Extensive perianal and bilateral gluteal fold cellulitis. Collection extending from the perianal region 1:00 o'clock,  along the left gluteal fold and perineum measuring 3 x 1 x 3 cm, consistent with an abscess and/or fistula.  2. Mild diffuse  colitis sparing the sigmoid colon. Mild distal ileitis.  3. Suggestion of mild proctitis                              Initial Sepsis Screening       Row Name 01/03/24 1623                Is the patient's history suggestive of a new or worsening infection? Yes (Proceed)  -        Suspected source of infection wound infection  -        Indicate SIRS criteria Tachycardia > 90 bpm  -        Are two or more of the above signs & symptoms of infection both present and new to the patient? No  -MC                  User Key  (r) = Recorded By, (t) = Taken By, (c) = Cosigned By      Initials Name Provider Type     Carlos Alfonso PA-C Physician Assistant                    SBIRT 22yo+      Flowsheet Row Most Recent Value   Initial Alcohol Screen: US AUDIT-C     1. How often do you have a drink containing alcohol? 0 Filed at: 01/03/2024 1515   2. How many drinks containing alcohol do you have on a typical day you are drinking?  0 Filed at: 01/03/2024 1515   3a. Male UNDER 65: How often do you have five or more drinks on one occasion? 0 Filed at: 01/03/2024 1515   3b. FEMALE Any Age, or MALE 65+: How often do you have 4 or more drinks on one occassion? 0 Filed at: 01/03/2024 1515   Audit-C Score 0 Filed at: 01/03/2024 1515   PREMA: How many times in the past year have you...    Used an illegal drug or used a prescription medication for non-medical reasons? Never Filed at: 01/03/2024 1515                      Medical Decision Making  Patient is a 33-year-old male who comes in for evaluation of dental discomfort, and cellulitis.  Is found anemic to approximately 7.  Decided to give blood along with attending's input.  Admitted for IV antibiotics.  I did speak to general surgery, who did not recommend immediate intervention and a outpatient follow-up.    Amount and/or Complexity of Data Reviewed  Labs: ordered. Decision-making details documented in ED Course.  Radiology: ordered. Decision-making details documented in ED  Course.    Risk  Prescription drug management.  Decision regarding hospitalization.             Disposition  Final diagnoses:   Cellulitis   Anemia     Time reflects when diagnosis was documented in both MDM as applicable and the Disposition within this note       Time User Action Codes Description Comment    1/3/2024  7:33 PM Carlos Alfonso Add [L03.90] Cellulitis     1/3/2024  7:33 PM Carlos Alfonso Add [D64.9] Anemia     1/3/2024  7:45 PM Kiko Hurley Add [F25.8] Other schizoaffective disorders (HCC)     1/3/2024  7:58 PM Maikel Kiran Add [K50.90] Crohn's disease (HCC)           ED Disposition       ED Disposition   Admit    Condition   Stable    Date/Time   Wed Philipp 3, 2024 1935    Comment   Case was discussed with Dr. Maikel Kiran and the patient's admission status was agreed to be Admission Status: observation status to the service of Dr. Herrera .               Follow-up Information    None         Current Discharge Medication List        CONTINUE these medications which have NOT CHANGED    Details   budesonide (ENTOCORT EC) 3 MG capsule Take 3 mg by mouth daily      ergocalciferol (VITAMIN D2) 50,000 units Take 1 capsule (50,000 Units total) by mouth once a week , For 8 weeks  Qty: 8 capsule, Refills: 0    Associated Diagnoses: Vitamin D deficiency      ferrous sulfate 325 (65 Fe) mg tablet Take 1 tablet (325 mg total) by mouth 2 (two) times a day with meals  Qty: 180 tablet, Refills: 1    Associated Diagnoses: Anemia in other chronic diseases classified elsewhere      methocarbamol (ROBAXIN) 500 mg tablet Take 1 tablet (500 mg total) by mouth 4 (four) times a day  Qty: 90 tablet, Refills: 0    Associated Diagnoses: Chronic pain of left knee      acetaminophen (TYLENOL) 650 mg CR tablet Take 1 tablet (650 mg total) by mouth every 8 (eight) hours as needed for mild pain  Qty: 30 tablet, Refills: 0    Associated Diagnoses: Injury of left knee, initial encounter; Low back pain       lidocaine (LIDODERM) 5 % Apply 1 patch topically over 12 hours every 24 hours for 15 days Remove & Discard patch within 12 hours or as directed by MD  Qty: 15 patch, Refills: 0    Associated Diagnoses: Low back pain      predniSONE 5 mg/5 mL solution Take 5 mg by mouth daily             No discharge procedures on file.    PDMP Review       None            ED Provider  Electronically Signed by             Carlos Alfonso PA-C  01/03/24 8984

## 2024-01-04 NOTE — ASSESSMENT & PLAN NOTE
Psych recommendations as follows:  Collaborate with collaterals for baseline assessment and disposition.  Introduce Invega 3 mg q.h.s. to address psychosis. Upward titration as necessary and as tolerated.  Presently, patient adheres to criteria for inpatient behavioral health admission by means of voluntary 201 commitment.  Please reconsult psychiatry if patient attempts to leave AGAINST MEDICAL ADVICE to assess for appropriateness of involuntary 302     Plan:  See A/P for schizophreniform

## 2024-01-04 NOTE — ASSESSMENT & PLAN NOTE
Currently pain 5/10 controlled with Tylenol PRN. No diarrhea or blood in the stool.  S/P surgical I&D and fistulectomy   Infliximab will be started as outpatient per GI recommendations     Plan:   - Hep B labs   - quantiferon gold  Per GI request

## 2024-01-04 NOTE — ASSESSMENT & PLAN NOTE
Hemoglobin today: 9.0 maintaining stable levels    Received Venofer infusion per GI recommendation   Denies dizziness or weakness.    Plan:   -Trend CBC

## 2024-01-04 NOTE — ASSESSMENT & PLAN NOTE
Seton drain in place. No active bleeding, pain or infectious signs present.  Surgical wound is dressed.   Leukocytosis trending: WBC 13.15> 12.0 >16.19> 12.73   Most recent WBC most likely reactive secondary to surgery.  No growth per blood culture (1 & 2).  Post surgical day #3 I & D and fistulectomy     Plan:   - Metronidazole 250mg IV twice daily 4/5   - Switch to PO if transferred to U  - AM CBC.

## 2024-01-04 NOTE — UTILIZATION REVIEW
Initial Clinical Review    Admission: Date/Time/Statement:   Admission Orders (From admission, onward)       Ordered        01/03/24 1935  Place in Observation  Once                          Orders Placed This Encounter   Procedures    Place in Observation     Standing Status:   Standing     Number of Occurrences:   1     Order Specific Question:   Level of Care     Answer:   Med Surg [16]     ED Arrival Information       Expected   -    Arrival   1/3/2024 15:04    Acuity   Urgent              Means of arrival   Walk-In    Escorted by   Self    Service   Hospitalist    Admission type   Emergency              Arrival complaint   abdominal pain             Chief Complaint   Patient presents with    Abdominal Pain     Pt reports his Crohns has been flaring up since June, was supposed to have a surgery, but had it canceled due to an infection. Reports he has diffuse lower abdominal pain. Tried prescribed muscle relaxers with no relief.        Initial Presentation: 33 y.o. male who presented self from home to [facility] ED. Admitted in observation status for evaluation and treatment of cellulitis and abscess of buttock. PMHx: anemia, Crohn's disease. Presented w/ chronic lower abdominal pain not associated w/ PO intake, worse w/ stress, improved w/ rest. Noted to be draining scant yellow to brown fluid from buttocks. On exam, abdominal tenderness in LUQ, pocket like indurations in gluteal fold, concerning for fistulas, white linear tracks concerning for abscess, white tissue in between gluteal folds w/ yellow to brown stains; disorganized thought patterns, auditory hallucinations. Pt reports in process setting up outpatient psychiatric care. Imaging concerning for cellulitis and abscess. Received 2 units pRBCs in ED. Plan: IV ABX, follow blood cultures, trend H&H, transfuse for Hgb below 7, continue current meds. General Surgery, GI, Psychiatry consulted.    GI: Pt w/ perianal cellulitis and abscess.  Reluctant to start Remicade. Check stool studies. Check iron panel, B12, folate.       01/04/24: Pt w/ cellulitis and abscess. IV ABX. Follow blood cultures, continue current meds. Start Invega 3 mg.     ED Triage Vitals   Temperature Pulse Respirations Blood Pressure SpO2   01/03/24 1512 01/03/24 1512 01/03/24 1512 01/03/24 1512 01/03/24 1512   98.2 °F (36.8 °C) (!) 131 16 133/74 100 %      Temp Source Heart Rate Source Patient Position - Orthostatic VS BP Location FiO2 (%)   01/03/24 1512 01/03/24 1512 01/03/24 1512 01/03/24 1512 --   Oral Monitor Sitting Left arm       Pain Score       01/03/24 2110       2          Wt Readings from Last 1 Encounters:   01/04/24 58.7 kg (129 lb 8 oz)     Additional Vital Signs:   Date/Time Temp Pulse Resp BP MAP (mmHg) SpO2 O2 Device   01/04/24 0729 97.2 °F (36.2 °C) Abnormal  72 16 116/75 83 99 % None (Room air)   01/04/24 0626 98 °F (36.7 °C) 75 18 101/70 76 99 % None (Room air)   01/04/24 0540 97.8 °F (36.6 °C) 78 18 108/70 76 100 % None (Room air)   01/04/24 0440 98.2 °F (36.8 °C) 72 18 99/67 73 98 % None (Room air)   01/04/24 0410 97.8 °F (36.6 °C) 74 18 106/69 74 98 % None (Room air)   01/04/24 0355 98 °F (36.7 °C) 91 18 106/66 73 98 % None (Room air)   01/04/24 0345 98.1 °F (36.7 °C) 79 18 113/74 80 100 % None (Room air)   01/04/24 0330 98.4 °F (36.9 °C) 80 18 105/68 74 99 % None (Room air)   01/04/24 0231 98.2 °F (36.8 °C) 85 18 106/69 -- 98 % None (Room air)   01/04/24 0205 98.4 °F (36.9 °C) 81 18 102/64 70 100 % None (Room air)   01/04/24 0105 98.5 °F (36.9 °C) 77 18 113/74 85 100 % None (Room air)   01/04/24 0035 98.1 °F (36.7 °C) 91 18 117/78 85 100 % None (Room air)   01/04/24 0020 98.5 °F (36.9 °C) 95 18 116/67 78 99 % None (Room air)   01/04/24 0010 98.2 °F (36.8 °C) 87 18 114/73 80 99 % None (Room air)   01/04/24 0000 97.7 °F (36.5 °C) 90 18 111/64 74 100 % None (Room air)   01/03/24 2300 97.8 °F (36.6 °C) 92 18 107/71 78 98 % None (Room air)   01/03/24 2046 98 °F  (36.7 °C) 91 18 126/87 95 100 % None (Room air)   01/03/24 1951 98.8 °F (37.1 °C) 90 18 118/76 -- 98 % None (Room air)   01/03/24 1715 -- -- -- 119/74 -- -- --   01/03/24 1630 -- 92 -- 114/67 -- 100 % None (Room air)   01/03/24 1618 -- 84 -- 120/71 -- 100 % None (Room air)   01/03/24 1529 -- 117 Abnormal  -- -- -- -- --     Pertinent Labs/Diagnostic Test Results:   CT abdomen pelvis with contrast   Final Result by Fco Chavis MD (01/03 1742)         1. Extensive perianal and bilateral gluteal fold cellulitis. Collection extending from the perianal region 1:00 o'clock,  along the left gluteal fold and perineum measuring 3 x 1 x 3 cm, consistent with an abscess and/or fistula.   2. Mild diffuse colitis sparing the sigmoid colon. Mild distal ileitis.   3. Suggestion of mild proctitis.            Workstation performed: [id number]           Results from last 7 days   Lab Units 01/03/24  1558   SARS-COV-2  Negative     Results from last 7 days   Lab Units 01/03/24  1548   WBC Thousand/uL 10.85*   HEMOGLOBIN g/dL 7.0*   HEMATOCRIT % 26.0*   PLATELETS Thousands/uL 661*   NEUTROS ABS Thousands/µL 8.22*         Results from last 7 days   Lab Units 01/03/24  1548   SODIUM mmol/L 135   POTASSIUM mmol/L 4.1   CHLORIDE mmol/L 100   CO2 mmol/L 26   ANION GAP mmol/L 9   BUN mg/dL 6   CREATININE mg/dL 1.16   EGFR ml/min/1.73sq m 82   CALCIUM mg/dL 8.8   MAGNESIUM mg/dL 2.0     Results from last 7 days   Lab Units 01/03/24  1548   AST U/L 13   ALT U/L 6*   ALK PHOS U/L 80   TOTAL PROTEIN g/dL 8.1   ALBUMIN g/dL 3.7   TOTAL BILIRUBIN mg/dL 0.26         Results from last 7 days   Lab Units 01/03/24  1548   GLUCOSE RANDOM mg/dL 99     Results from last 7 days   Lab Units 01/03/24  1542   PH BHARATHI  7.366   PCO2 BHARATHI mm Hg 48.5   PO2 BHARATHI mm Hg 31.7*   HCO3 BHARATHI mmol/L 27.2   BASE EXC BHARATHI mmol/L 1.5   O2 CONTENT BHARATHI ml/dL 6.0   O2 HGB, VENOUS % 52.6*                     Results from last 7 days   Lab Units 01/03/24  1548   PROTIME  seconds 14.2   INR  1.07   PTT seconds 33         Results from last 7 days   Lab Units 01/03/24  1548   PROCALCITONIN ng/ml 0.12     Results from last 7 days   Lab Units 01/03/24  1818 01/03/24  1542   LACTIC ACID mmol/L 0.6 2.2*     Results from last 7 days   Lab Units 01/03/24  1548   LIPASE u/L 7*                 Results from last 7 days   Lab Units 01/03/24  1644   CLARITY UA  Slightly Cloudy*   COLOR UA  Tsering*   SPEC GRAV UA  1.025   PH UA  6.0   GLUCOSE UA mg/dl Negative   KETONES UA mg/dl Negative   BLOOD UA  Negative   PROTEIN UA mg/dl 30 (1+)*   NITRITE UA  Negative   BILIRUBIN UA  Negative   UROBILINOGEN UA mg/dL Negative   LEUKOCYTES UA  Negative   WBC UA /hpf 1-2   RBC UA /hpf None Seen   BACTERIA UA /hpf Moderate*   EPITHELIAL CELLS WET PREP /hpf Occasional   MUCUS THREADS  Innumerable*     Results from last 7 days   Lab Units 01/03/24  1558   INFLUENZA A PCR  Negative   INFLUENZA B PCR  Negative   RSV PCR  Negative           Results from last 7 days   Lab Units 01/03/24  1558 01/03/24  1548   BLOOD CULTURE  Received in Microbiology Lab. Culture in Progress. Received in Microbiology Lab. Culture in Progress.                   ED Treatment:   Medication Administration from 01/03/2024 1504 to 01/03/2024 2041         Date/Time Order Dose Route Action     01/03/2024 1559 EST sodium chloride 0.9 % bolus 1,000 mL 1,000 mL Intravenous New Bag     01/03/2024 1648 EST iohexol (OMNIPAQUE) 350 MG/ML injection (MULTI-DOSE) 100 mL 100 mL Intravenous Given     01/03/2024 1837 EST piperacillin-tazobactam (ZOSYN) 3.375 g in sodium chloride 0.9 % 100 mL IVPB 3.375 g Intravenous New Bag          Past Medical History:   Diagnosis Date    Anemia 2004    hospitalization/transfusion    Crohn's disease (HCC)      Present on Admission:   Schizophreniform disorder (HCC)   Anemia   Crohn's disease (HCC)      Admitting Diagnosis: Cellulitis [L03.90]  Crohn's disease (HCC) [K50.90]  Abdominal pain [R10.9]  Anemia [D64.9]  Other  schizoaffective disorders (HCC) [F25.8]  Age/Sex: 33 y.o. male  Admission Orders:  Regular Diet.  Activity, Up & OOB as tolerated.    Scheduled Medications:  cefTRIAXone, 1,000 mg, Intravenous, Q24H  [START ON 1/10/2024] ergocalciferol, 50,000 Units, Oral, Weekly  ferrous sulfate, 325 mg, Oral, BID With Meals  melatonin, 3 mg, Oral, HS  methocarbamol, 500 mg, Oral, 4x Daily  metroNIDAZOLE, 500 mg, Intravenous, TID    Continuous IV Infusions:     PRN Meds:  acetaminophen, 160 mg, Oral, Q6H PRN        IP CONSULT TO PSYCHIATRY  IP CONSULT TO GASTROENTEROLOGY  IP CONSULT TO ACUTE CARE SURGERY    Network Utilization Review Department  ATTENTION: Please call with any questions or concerns to [phone] and carefully listen to the prompts so that you are directed to the right person. All voicemails are confidential.   For Discharge needs, contact Care Management DC Support Team at [phone] opt. 2  Send all requests for admission clinical reviews, approved or denied determinations and any other requests to dedicated fax number below belonging to the [facility] where the patient is receiving treatment. List of dedicated fax numbers for the Facilities:  FACILITY NAME UR FAX NUMBER   ADMISSION DENIALS (Administrative/Medical Necessity) [phone]   DISCHARGE SUPPORT TEAM (NETWORK) [phone]   PARENT CHILD HEALTH (Maternity/NICU/Pediatrics) [phone]   [facility] [phone]   [facility] [phone]   [facility] [phone]   [facility] [phone]   [facility] [phone]   [facility] [phone]   [facility] [phone]   [facility] [phone]   [facility] [phone]   [facility] [phone]   [facility]  [place] [phone]

## 2024-01-04 NOTE — UTILIZATION REVIEW
NOTIFICATION OF OBSERVATION MEDICAL ADMISSION   AUTHORIZATION REQUEST   SERVICING FACILITY:   [address] [address]  Tax ID: [id number]  NPI: [id number] ATTENDING PROVIDER:  Attending Name and NPI#: Edie Gutiérrez Md [[id number]]  Address: [address] [place] [address]  Phone: [phone]     ADMISSION INFORMATION:  Place of Service: [facility]  Place of Service Code: 22 CPT Code:   Admitting Diagnosis Code/Description:  Cellulitis [L03.90]  Crohn's disease (HCC) [K50.90]  Abdominal pain [R10.9]  Anemia [D64.9]  Other schizoaffective disorders (HCC) [F25.8]  Observation Admission Date/Time:   Discharge Date/Time: No discharge date for patient encounter.     UTILIZATION REVIEW CONTACT:  Criselda Brown, Utilization   Network Utilization Review Department  Phone: [phone]  Fax [phone]  Email: [email or web address]  Contact for approvals/pending authorizations, clinical reviews, and discharge.   PHYSICIAN ADVISORY SERVICES:  Medical Necessity Denial & Psjd-ux-Wucg Review  Phone: [phone]  Fax: [phone]  Email: [email or web address]     DISCHARGE SUPPORT TEAM:  For Patients Discharge Needs & Updates  Phone: [phone] opt. 2 Fax: [phone]  Email: [email or web address]

## 2024-01-05 ENCOUNTER — ANESTHESIA (OUTPATIENT)
Dept: PERIOP | Facility: HOSPITAL | Age: 34
DRG: 580 | End: 2024-01-05
Payer: MEDICARE

## 2024-01-05 PROBLEM — R76.11 ABNORMAL REACTION TO TUBERCULIN TEST: Status: ACTIVE | Noted: 2023-10-05

## 2024-01-05 PROBLEM — D72.829 LEUKOCYTOSIS: Status: ACTIVE | Noted: 2023-10-05

## 2024-01-05 PROBLEM — K60.30 PERIANAL FISTULA: Status: ACTIVE | Noted: 2023-10-05

## 2024-01-05 PROBLEM — K60.3 PERIANAL FISTULA: Status: ACTIVE | Noted: 2023-10-05

## 2024-01-05 LAB
ANION GAP SERPL CALCULATED.3IONS-SCNC: 7 MMOL/L
ANISOCYTOSIS BLD QL SMEAR: PRESENT
BUN SERPL-MCNC: 7 MG/DL (ref 5–25)
C DIFF TOX A+B STL QL IA: NEGATIVE
C DIFF TOX GENS STL QL NAA+PROBE: POSITIVE
CALCIUM SERPL-MCNC: 8.4 MG/DL (ref 8.4–10.2)
CAMPYLOBACTER DNA SPEC NAA+PROBE: NORMAL
CHLORIDE SERPL-SCNC: 105 MMOL/L (ref 96–108)
CO2 SERPL-SCNC: 26 MMOL/L (ref 21–32)
CREAT SERPL-MCNC: 0.82 MG/DL (ref 0.6–1.3)
ERYTHROCYTE [DISTWIDTH] IN BLOOD BY AUTOMATED COUNT: 21.2 % (ref 11.6–15.1)
GFR SERPL CREATININE-BSD FRML MDRD: 116 ML/MIN/1.73SQ M
GLUCOSE P FAST SERPL-MCNC: 91 MG/DL (ref 65–99)
GLUCOSE SERPL-MCNC: 91 MG/DL (ref 65–140)
HCT VFR BLD AUTO: 28.8 % (ref 36.5–49.3)
HGB BLD-MCNC: 8.4 G/DL (ref 12–17)
HYPERCHROMIA BLD QL SMEAR: PRESENT
LG PLATELETS BLD QL SMEAR: PRESENT
LYMPHOCYTES # BLD AUTO: 2.53 THOUSAND/UL (ref 0.6–4.47)
LYMPHOCYTES # BLD AUTO: 20 % (ref 14–44)
MCH RBC QN AUTO: 20.8 PG (ref 26.8–34.3)
MCHC RBC AUTO-ENTMCNC: 29.2 G/DL (ref 31.4–37.4)
MCV RBC AUTO: 71 FL (ref 82–98)
MICROCYTES BLD QL AUTO: PRESENT
MONOCYTES # BLD AUTO: 0.96 THOUSAND/UL (ref 0–1.22)
MONOCYTES NFR BLD: 8 % (ref 4–12)
NEUTROPHILS # BLD MANUAL: 8.53 THOUSAND/UL (ref 1.85–7.62)
NEUTS SEG NFR BLD AUTO: 71 % (ref 43–75)
NRBC BLD AUTO-RTO: 0 /100 WBC (ref 0–2)
PLATELET # BLD AUTO: 564 THOUSANDS/UL (ref 149–390)
PLATELET BLD QL SMEAR: ABNORMAL
PMV BLD AUTO: 8.3 FL (ref 8.9–12.7)
POIKILOCYTOSIS BLD QL SMEAR: PRESENT
POLYCHROMASIA BLD QL SMEAR: PRESENT
POTASSIUM SERPL-SCNC: 4 MMOL/L (ref 3.5–5.3)
RBC # BLD AUTO: 4.04 MILLION/UL (ref 3.88–5.62)
RBC MORPH BLD: PRESENT
SALMONELLA DNA SPEC QL NAA+PROBE: NORMAL
SHIGA TOXIN STX GENE SPEC NAA+PROBE: NORMAL
SHIGELLA DNA SPEC QL NAA+PROBE: NORMAL
SODIUM SERPL-SCNC: 138 MMOL/L (ref 135–147)
TARGETS BLD QL SMEAR: PRESENT
TOTAL CELLS COUNTED SPEC: 100
VARIANT LYMPHS # BLD AUTO: 1 %
WBC # BLD AUTO: 12.02 THOUSAND/UL (ref 4.31–10.16)

## 2024-01-05 PROCEDURE — 85027 COMPLETE CBC AUTOMATED: CPT | Performed by: PHYSICIAN ASSISTANT

## 2024-01-05 PROCEDURE — 0D9Q0ZZ DRAINAGE OF ANUS, OPEN APPROACH: ICD-10-PCS | Performed by: COLON & RECTAL SURGERY

## 2024-01-05 PROCEDURE — 85007 BL SMEAR W/DIFF WBC COUNT: CPT | Performed by: PHYSICIAN ASSISTANT

## 2024-01-05 PROCEDURE — 99232 SBSQ HOSP IP/OBS MODERATE 35: CPT | Performed by: PSYCHIATRY & NEUROLOGY

## 2024-01-05 PROCEDURE — 99231 SBSQ HOSP IP/OBS SF/LOW 25: CPT | Performed by: PHYSICIAN ASSISTANT

## 2024-01-05 PROCEDURE — 80048 BASIC METABOLIC PNL TOTAL CA: CPT | Performed by: PHYSICIAN ASSISTANT

## 2024-01-05 PROCEDURE — 88304 TISSUE EXAM BY PATHOLOGIST: CPT | Performed by: SPECIALIST

## 2024-01-05 PROCEDURE — 0DBQ0ZZ EXCISION OF ANUS, OPEN APPROACH: ICD-10-PCS | Performed by: COLON & RECTAL SURGERY

## 2024-01-05 PROCEDURE — 99232 SBSQ HOSP IP/OBS MODERATE 35: CPT | Performed by: FAMILY MEDICINE

## 2024-01-05 RX ORDER — DIPHENHYDRAMINE HYDROCHLORIDE 50 MG/ML
12.5 INJECTION INTRAMUSCULAR; INTRAVENOUS ONCE AS NEEDED
Status: DISCONTINUED | OUTPATIENT
Start: 2024-01-05 | End: 2024-01-05 | Stop reason: HOSPADM

## 2024-01-05 RX ORDER — FENTANYL CITRATE/PF 50 MCG/ML
50 SYRINGE (ML) INJECTION
Status: DISCONTINUED | OUTPATIENT
Start: 2024-01-05 | End: 2024-01-05 | Stop reason: HOSPADM

## 2024-01-05 RX ORDER — PROPOFOL 10 MG/ML
INJECTION, EMULSION INTRAVENOUS AS NEEDED
Status: DISCONTINUED | OUTPATIENT
Start: 2024-01-05 | End: 2024-01-05

## 2024-01-05 RX ORDER — FENTANYL CITRATE 50 UG/ML
INJECTION, SOLUTION INTRAMUSCULAR; INTRAVENOUS AS NEEDED
Status: DISCONTINUED | OUTPATIENT
Start: 2024-01-05 | End: 2024-01-05

## 2024-01-05 RX ORDER — BUPIVACAINE HYDROCHLORIDE 5 MG/ML
INJECTION, SOLUTION EPIDURAL; INTRACAUDAL AS NEEDED
Status: DISCONTINUED | OUTPATIENT
Start: 2024-01-05 | End: 2024-01-05 | Stop reason: HOSPADM

## 2024-01-05 RX ORDER — MIDAZOLAM HYDROCHLORIDE 2 MG/2ML
INJECTION, SOLUTION INTRAMUSCULAR; INTRAVENOUS AS NEEDED
Status: DISCONTINUED | OUTPATIENT
Start: 2024-01-05 | End: 2024-01-05

## 2024-01-05 RX ORDER — FENTANYL CITRATE/PF 50 MCG/ML
25 SYRINGE (ML) INJECTION
Status: DISCONTINUED | OUTPATIENT
Start: 2024-01-05 | End: 2024-01-05 | Stop reason: HOSPADM

## 2024-01-05 RX ORDER — PROMETHAZINE HYDROCHLORIDE 25 MG/ML
12.5 INJECTION, SOLUTION INTRAMUSCULAR; INTRAVENOUS ONCE AS NEEDED
Status: DISCONTINUED | OUTPATIENT
Start: 2024-01-05 | End: 2024-01-05 | Stop reason: HOSPADM

## 2024-01-05 RX ORDER — ONDANSETRON 2 MG/ML
INJECTION INTRAMUSCULAR; INTRAVENOUS AS NEEDED
Status: DISCONTINUED | OUTPATIENT
Start: 2024-01-05 | End: 2024-01-05

## 2024-01-05 RX ORDER — GLYCOPYRROLATE 0.2 MG/ML
INJECTION INTRAMUSCULAR; INTRAVENOUS AS NEEDED
Status: DISCONTINUED | OUTPATIENT
Start: 2024-01-05 | End: 2024-01-05

## 2024-01-05 RX ORDER — MAGNESIUM HYDROXIDE 1200 MG/15ML
LIQUID ORAL AS NEEDED
Status: DISCONTINUED | OUTPATIENT
Start: 2024-01-05 | End: 2024-01-05 | Stop reason: HOSPADM

## 2024-01-05 RX ORDER — SODIUM CHLORIDE, SODIUM LACTATE, POTASSIUM CHLORIDE, CALCIUM CHLORIDE 600; 310; 30; 20 MG/100ML; MG/100ML; MG/100ML; MG/100ML
50 INJECTION, SOLUTION INTRAVENOUS CONTINUOUS
Status: DISCONTINUED | OUTPATIENT
Start: 2024-01-05 | End: 2024-01-06

## 2024-01-05 RX ORDER — DEXAMETHASONE SODIUM PHOSPHATE 10 MG/ML
INJECTION, SOLUTION INTRAMUSCULAR; INTRAVENOUS AS NEEDED
Status: DISCONTINUED | OUTPATIENT
Start: 2024-01-05 | End: 2024-01-05

## 2024-01-05 RX ORDER — ONDANSETRON 2 MG/ML
4 INJECTION INTRAMUSCULAR; INTRAVENOUS ONCE AS NEEDED
Status: DISCONTINUED | OUTPATIENT
Start: 2024-01-05 | End: 2024-01-05 | Stop reason: HOSPADM

## 2024-01-05 RX ORDER — LIDOCAINE HYDROCHLORIDE 20 MG/ML
INJECTION, SOLUTION EPIDURAL; INFILTRATION; INTRACAUDAL; PERINEURAL AS NEEDED
Status: DISCONTINUED | OUTPATIENT
Start: 2024-01-05 | End: 2024-01-05

## 2024-01-05 RX ORDER — ROCURONIUM BROMIDE 10 MG/ML
INJECTION, SOLUTION INTRAVENOUS AS NEEDED
Status: DISCONTINUED | OUTPATIENT
Start: 2024-01-05 | End: 2024-01-05

## 2024-01-05 RX ADMIN — FENTANYL CITRATE 50 MCG: 50 INJECTION, SOLUTION INTRAMUSCULAR; INTRAVENOUS at 13:07

## 2024-01-05 RX ADMIN — SODIUM CHLORIDE: 0.9 INJECTION, SOLUTION INTRAVENOUS at 12:25

## 2024-01-05 RX ADMIN — METHOCARBAMOL TABLETS 500 MG: 500 TABLET, COATED ORAL at 11:13

## 2024-01-05 RX ADMIN — GLYCOPYRROLATE 0.1 MG: 0.2 INJECTION INTRAMUSCULAR; INTRAVENOUS at 12:34

## 2024-01-05 RX ADMIN — FENTANYL CITRATE 100 MCG: 50 INJECTION, SOLUTION INTRAMUSCULAR; INTRAVENOUS at 12:38

## 2024-01-05 RX ADMIN — METHOCARBAMOL TABLETS 500 MG: 500 TABLET, COATED ORAL at 08:28

## 2024-01-05 RX ADMIN — ROCURONIUM BROMIDE 50 MG: 10 INJECTION, SOLUTION INTRAVENOUS at 12:38

## 2024-01-05 RX ADMIN — MIDAZOLAM HYDROCHLORIDE 2 MG: 2 INJECTION, SOLUTION INTRAMUSCULAR; INTRAVENOUS at 12:34

## 2024-01-05 RX ADMIN — ONDANSETRON 4 MG: 2 INJECTION INTRAMUSCULAR; INTRAVENOUS at 12:38

## 2024-01-05 RX ADMIN — Medication 250 MG: at 21:14

## 2024-01-05 RX ADMIN — MELATONIN TAB 3 MG 3 MG: 3 TAB at 21:13

## 2024-01-05 RX ADMIN — CEFTRIAXONE 1000 MG: 1 INJECTION, SOLUTION INTRAVENOUS at 11:09

## 2024-01-05 RX ADMIN — LIDOCAINE HYDROCHLORIDE 100 MG: 20 INJECTION, SOLUTION EPIDURAL; INFILTRATION; INTRACAUDAL; PERINEURAL at 12:38

## 2024-01-05 RX ADMIN — METRONIDAZOLE 500 MG: 500 INJECTION, SOLUTION INTRAVENOUS at 08:34

## 2024-01-05 RX ADMIN — PALIPERIDONE 3 MG: 3 TABLET, EXTENDED RELEASE ORAL at 21:14

## 2024-01-05 RX ADMIN — METHOCARBAMOL TABLETS 500 MG: 500 TABLET, COATED ORAL at 18:01

## 2024-01-05 RX ADMIN — DEXAMETHASONE SODIUM PHOSPHATE 10 MG: 10 INJECTION, SOLUTION INTRAMUSCULAR; INTRAVENOUS at 12:38

## 2024-01-05 RX ADMIN — PROPOFOL 200 MG: 10 INJECTION, EMULSION INTRAVENOUS at 12:38

## 2024-01-05 RX ADMIN — SODIUM CHLORIDE 100 ML/HR: 0.9 INJECTION, SOLUTION INTRAVENOUS at 07:48

## 2024-01-05 RX ADMIN — METHOCARBAMOL TABLETS 500 MG: 500 TABLET, COATED ORAL at 21:13

## 2024-01-05 RX ADMIN — FERROUS SULFATE TAB 325 MG (65 MG ELEMENTAL FE) 325 MG: 325 (65 FE) TAB at 08:28

## 2024-01-05 NOTE — PLAN OF CARE
Problem: PAIN - ADULT  Goal: Verbalizes/displays adequate comfort level or baseline comfort level  Description: Interventions:  - Encourage patient to monitor pain and request assistance  - Assess pain using appropriate pain scale  - Administer analgesics based on type and severity of pain and evaluate response  - Implement non-pharmacological measures as appropriate and evaluate response  - Consider cultural and social influences on pain and pain management  - Notify physician/advanced practitioner if interventions unsuccessful or patient reports new pain  Outcome: Progressing     Problem: INFECTION - ADULT  Goal: Absence or prevention of progression during hospitalization  Description: INTERVENTIONS:  - Assess and monitor for signs and symptoms of infection  - Monitor lab/diagnostic results  - Monitor all insertion sites, i.e. indwelling lines, tubes, and drains  - Monitor endotracheal if appropriate and nasal secretions for changes in amount and color  - [place] appropriate cooling/warming therapies per order  - Administer medications as ordered  - Instruct and encourage patient and family to use good hand hygiene technique  - Identify and instruct in appropriate isolation precautions for identified infection/condition  Outcome: Progressing     Problem: DISCHARGE PLANNING  Goal: Discharge to home or other facility with appropriate resources  Description: INTERVENTIONS:  - Identify barriers to discharge w/patient and caregiver  - Arrange for needed discharge resources and transportation as appropriate  - Identify discharge learning needs (meds, wound care, etc.)  - Arrange for interpretive services to assist at discharge as needed  - Refer to Case Management Department for coordinating discharge planning if the patient needs post-hospital services based on physician/advanced practitioner order or complex needs related to functional status, cognitive ability, or social support system  Outcome: Progressing      Problem: Knowledge Deficit  Goal: Patient/family/caregiver demonstrates understanding of disease process, treatment plan, medications, and discharge instructions  Description: Complete learning assessment and assess knowledge base.  Interventions:  - Provide teaching at level of understanding  - Provide teaching via preferred learning methods  Outcome: Progressing

## 2024-01-05 NOTE — PROGRESS NOTES
"Patient Name: Carlos Wolf  Patient MRN: [id number]  Date: 01/05/24  Service: [facility]    Subjective   Pt NPO for OR, seen by Dr. Pace with plans for I&D per RN. Pt still complains of drainage. Abdominal pain has improved since admission. Vitals stable. No fevers/chills.     Vitals  Blood pressure 108/71, pulse 61, temperature 97.5 °F (36.4 °C), temperature source Temporal, resp. rate 18, height 5' 6\" (1.676 m), weight 58.7 kg (129 lb 8 oz), SpO2 100%.  Physical Exam:     General Appearance:    Awake, alert, oriented x3, no distress, well developed, well    nourished   Head:    Normocephalic without obvious abnormality   Eyes:    PERRL, conjunctiva/corneas clear, EOM's intact        Neck:   Supple, no adenopathy   Throat:   Mucous membranes moist   Lungs:     Clear to auscultation bilaterally, no wheezing or rhonchi   Heart:    Regular rate and rhythm, S1 and S2 normal, no murmur   Abdomen:     Soft, minimal ttp, non-distended. bowel sounds active. No    masses, rebound or guarding.    Extremities:   Extremities without edema   Psych  Derm:   Normal mood    No jaundice   Neurologic:   CNII-XII grossly intact. Speech intact         Laboratory Studies  Results from last 7 days   Lab Units 01/05/24 0514 01/04/24  0757 01/03/24  1548   WBC Thousand/uL 12.02* 13.15* 10.85*   HEMOGLOBIN g/dL 8.4* 8.2* 7.0*   HEMATOCRIT % 28.8* 27.6* 26.0*   PLATELETS Thousands/uL 564* 554* 661*   INR   --   --  1.07     Results from last 7 days   Lab Units 01/05/24  0514 01/04/24  0757 01/03/24  1548   SODIUM mmol/L 138 136 135   POTASSIUM mmol/L 4.0 4.0 4.1   CHLORIDE mmol/L 105 102 100   CO2 mmol/L 26 26 26   BUN mg/dL 7 8 6   CREATININE mg/dL 0.82 1.04 1.16   CALCIUM mg/dL 8.4 8.1* 8.8   ALBUMIN g/dL  --   --  3.7   TOTAL BILIRUBIN mg/dL  --   --  0.26   ALK PHOS U/L  --   --  80   ALT U/L  --   --  6*   AST U/L  --   --  13     Iron 287, TIBC <342, ferritin 9, unable to calc iron sat  B12 307  Folate " 7.3    Imaging and Other Studies      Inhouse Medications     Current Facility-Administered Medications:     acetaminophen (TYLENOL) oral suspension 160 mg, 160 mg, Oral, Q6H PRN    cefTRIAXone (ROCEPHIN) IVPB (premix in dextrose) 1,000 mg 50 mL, 1,000 mg, Intravenous, Q24H, Stopped at 01/04/24 0910    [START ON 1/10/2024] ergocalciferol (VITAMIN D2) capsule 50,000 Units, 50,000 Units, Oral, Weekly    ferrous sulfate tablet 325 mg, 325 mg, Oral, BID With Meals, 325 mg at 01/04/24 1627    melatonin tablet 3 mg, 3 mg, Oral, HS, 3 mg at 01/04/24 2227    methocarbamol (ROBAXIN) tablet 500 mg, 500 mg, Oral, 4x Daily, 500 mg at 01/04/24 2227    metroNIDAZOLE (FLAGYL) IVPB (premix) 500 mg 100 mL, 500 mg, Intravenous, TID, 500 mg at 01/04/24 2055    paliperidone (INVEGA) 24 hr tablet 3 mg, 3 mg, Oral, HS, 3 mg at 01/04/24 2227    sodium chloride 0.9 % infusion, 100 mL/hr, Intravenous, Continuous, 100 mL/hr at 01/04/24 0938      Assessment/Plan:    33 year old male with severe ileocolonic and perianal fistulizing Crohn's disease admitted with perianal cellulitis and abscess. Imaging also shows mild diffuse colitis, distal ileitis, and proctitis. Active infectious complications with abscess relative contraindication for remicade/steroids. Has refused Remicade, with poor insight into his disease. Continue IV antibiotics. Blood cultures NG@24 hours. Fecal calprotectin pending. Infectious stool studies pending. Seen by surgery this morning - plans for OR later today. NPO since MN. Patient states he plans to follow up with EPDr. Elaina RITCHIE after discharge.   Chronic iron deficiency anemia on oral iron. Baseline hgb appears to be 7-8. 2u pRBCs since admission. No active gi bleeding reported. Continue to monitor, transfuse as needed. EGD/colonoscopy 7/2023.   Schizophreniform disorder, untreated. Psych evaluation appreciated, started on Invega.           Bethanie Shepherd PA-C

## 2024-01-05 NOTE — PROGRESS NOTES
Progress Note    Carlos Wolf 33 y.o. male MRN: [id number]  Unit/Bed#: OR [place] Encounter: [id number]  Admitting Physician: Wing Hutchinson MD  PCP: Kaykay Monroe DO  Date of Admission:  1/3/2024  3:07 PM    Assessment and Plan    * Cellulitis and abscess of buttock  Assessment & Plan  On ceftriaxone and metronidazole for management.  Skin purulent drainage on physical exam.  Leukocytosis improving.  WBC 13.15> 12.02. No growth per blood culture (1 & 2).  Surgery today for I&D.    Plan:   Ceftriaxone 1 g daily  Metronidazole 500 mg 3 times daily  AM CBC.     Crohn's disease (HCC)  Assessment & Plan  Right upper quad and left lower quad reproducible pain with palpation.  Recommendations per GI as follows:  Pt should ideally undergo exam of perianal area under anesthesia and drain abscess and use seton placement if appropriate  Once infection controlled, plan to start remicade IV at 5mg/kg  By having infusions, it may help to enable better pt contact as outpt to ensure he keeps outpt appointments.    Plan:   -Start Remicade once infection under control per GI    Anemia  Assessment & Plan  Hemoglobin minimally improved to 8.4 from 8.2 posttransfusion.  Denies dizziness or weakness.    Plan:   -Trend CBC  -Continue Ferrous sulfate.     Schizophrenia (HCC)  Assessment & Plan  Psych recommendations as follows:  Collaborate with collaterals for baseline assessment and disposition.  Introduce Invega 3 mg q.h.s. to address psychosis. Upward titration as necessary and as tolerated.  Presently, patient adheres to criteria for inpatient behavioral health admission by means of voluntary 201 commitment.  Please reconsult psychiatry if patient attempts to leave AGAINST MEDICAL ADVICE to assess for appropriateness of involuntary 302     Plan:  See A/P for schizophreniform    Schizophreniform disorder (HCC)  Assessment & Plan  Admits to active auditory and visual hallucinations this morning.  Denies SH/SI, homicidal  ideations.  Psych recommendations as follow:  Collaborate with collaterals for baseline assessment and disposition.  Introduce Invega 3 mg q.h.s. to address psychosis. Upward titration as necessary and as tolerated.  Presently, patient adheres to criteria for inpatient behavioral health admission by means of voluntary 201 commitment.  Please reconsult psychiatry if patient attempts to leave AGAINST MEDICAL ADVICE to assess for appropriateness of involuntary 302 .    Plan:  Invega 3 mg bedtime  Monitor closely  Avoid Haldol; hx of anaphylactic reaction        VTE Pharmacologic Prophylaxis: VTE Score: 2 Low Risk (Score 0-2) - Encourage Ambulation.    Patient Centered Rounds: I have performed bedside rounds with nursing staff today.    Discussions with Specialists or Other Care Team Provider:     Education and Discussions with Family / Patient: N/A  Patient Information Sharing: N/A  Time Spent for Care: 20 minutes.  More than 50% of total time spent on counseling and coordination of care as described above.    Current Length of Stay: 0 day(s)    Current Patient Status: Inpatient   Certification Statement: The patient will continue to require additional inpatient hospital stay due to cellulitis and abscess of buttock.    Discharge Plan: None    Code Status: Level 1 - Full Code      Subjective:   Patient seen and assessed this morning.  Continues to endorse perianal pain due to abscess although has improved since admission with pain management.  Reproducible pain on the LLQ and RUQ with palpation.  Denies fever, chills, nausea, vomiting, diarrhea.  Endorses scant purulent drainage.  Continues to endorse auditory and visual hallucinations.  Denies SH, SI, homicidal ideations.    Medical management and treatment plan discussed with patient and is amenable.  All questions answered.    Objective:     Vitals:   Temp (24hrs), Av.4 °F (36.3 °C), Min:96.8 °F (36 °C), Max:97.6 °F (36.4 °C)    Temp:  [96.8 °F (36 °C)-97.6 °F  (36.4 °C)] 97.6 °F (36.4 °C)  HR:  [59-76] 76  Resp:  [14-18] 14  BP: ()/(54-87) 100/56  SpO2:  [99 %-100 %] 100 %  Body mass index is 20.9 kg/m².     Input and Output Summary (last 24 hours):       Intake/Output Summary (Last 24 hours) at 1/5/2024 1403  Last data filed at 1/5/2024 0839  Gross per 24 hour   Intake 480 ml   Output 0 ml   Net 480 ml       Physical Exam:     Physical Exam  Constitutional:       General: He is not in acute distress.     Appearance: Normal appearance.   HENT:      Head: Normocephalic and atraumatic.      Nose: Nose normal.      Mouth/Throat:      Mouth: Mucous membranes are moist.      Pharynx: Oropharynx is clear.   Eyes:      Extraocular Movements: Extraocular movements intact.   Cardiovascular:      Rate and Rhythm: Normal rate and regular rhythm.      Pulses: Normal pulses.      Heart sounds: Normal heart sounds. No murmur heard.  Pulmonary:      Effort: Pulmonary effort is normal.      Breath sounds: Normal breath sounds.   Abdominal:      Tenderness: There is abdominal tenderness (RUQ and LLQ).   Musculoskeletal:         General: Normal range of motion.      Cervical back: Normal range of motion and neck supple. No rigidity.   Skin:     General: Skin is warm and dry.      Capillary Refill: Capillary refill takes less than 2 seconds.   Neurological:      General: No focal deficit present.      Mental Status: He is alert.   Psychiatric:         Mood and Affect: Mood normal.         Behavior: Behavior is not agitated or aggressive. Behavior is cooperative.         Thought Content: Thought content does not include homicidal or suicidal ideation. Thought content does not include homicidal or suicidal plan.      Comments: Visual and auditory hallucinations.         Additional Data:     Labs:    Results from last 7 days   Lab Units 01/05/24  0514 01/04/24  0757   WBC Thousand/uL 12.02* 13.15*   HEMOGLOBIN g/dL 8.4* 8.2*   HEMATOCRIT % 28.8* 27.6*   PLATELETS Thousands/uL 564* 554*    NEUTROS PCT %  --  66   LYMPHS PCT %  --  20   LYMPHO PCT % 20  --    MONOS PCT %  --  12   MONO PCT % 8  --    EOS PCT %  --  1     Results from last 7 days   Lab Units 01/05/24  0514 01/04/24  0757 01/03/24  1548   POTASSIUM mmol/L 4.0   < > 4.1   CHLORIDE mmol/L 105   < > 100   CO2 mmol/L 26   < > 26   BUN mg/dL 7   < > 6   CREATININE mg/dL 0.82   < > 1.16   CALCIUM mg/dL 8.4   < > 8.8   ALK PHOS U/L  --   --  80   ALT U/L  --   --  6*   AST U/L  --   --  13    < > = values in this interval not displayed.     Results from last 7 days   Lab Units 01/03/24  1548   INR  1.07                 * I Have Reviewed All Lab Data Listed Above.  * Additional Pertinent Lab Tests Reviewed: All Labs For Current Hospital Admission Reviewed    Imaging:    Imaging Reports Reviewed Today Include: None  Imaging Personally Reviewed by Myself Includes:  None    Recent Cultures (last 7 days):     Results from last 7 days   Lab Units 01/04/24  1107 01/03/24  1558 01/03/24  1548   BLOOD CULTURE   --  No Growth at 24 hrs. No Growth at 24 hrs.   C DIFF TOXIN B BY PCR  Positive*  --   --        Last 24 Hours Medication List:   Current Facility-Administered Medications   Medication Dose Route Frequency Provider Last Rate    [Transfer Hold] acetaminophen  160 mg Oral Q6H PRN Kiko Araujo MD      [Transfer Hold] cefTRIAXone  1,000 mg Intravenous Q24H Maikel Kiran MD 1,000 mg (01/05/24 1109)    diphenhydrAMINE  12.5 mg Intravenous Once PRN Uday Flannery CRNA      [Transfer Hold] ergocalciferol  50,000 Units Oral Weekly Kiko Araujo MD      fentaNYL  25 mcg Intravenous Q5 Min PRN Uday Flannery CRNA      fentaNYL  50 mcg Intravenous Q3 min PRN Carlos Enriquez MD      [Transfer Hold] ferrous sulfate  325 mg Oral BID With Meals Kiko Araujo MD      lactated ringers  50 mL/hr Intravenous Continuous Carlos Enriquez MD      [Transfer Hold] melatonin  3 mg Oral HS Kiko  Guillaume Araujo MD      [Transfer Hold] methocarbamol  500 mg Oral 4x Daily Kiko Guillaume Araujo MD      [Transfer Hold] metroNIDAZOLE  500 mg Intravenous TID Maikel Kiran  mg (01/05/24 0834)    ondansetron  4 mg Intravenous Once PRN Carlos Enriquez MD      [Transfer Hold] paliperidone  3 mg Oral HS Cassandra Rodas,       promethazine  12.5 mg Intravenous Once PRN Uday Flannery CRNA      sodium chloride  100 mL/hr Intravenous Continuous Harrison Mayorga  mL/hr (01/05/24 1236)        ** Please Note: Dictation voice to text software may have been used in the creation of this document. **    Wing Hutchinson MD  01/05/24  2:03 PM

## 2024-01-05 NOTE — OP NOTE
OPERATIVE REPORT  PATIENT NAME: Carlos Wolf    :  1990  MRN: [id number]  Pt Location:  OR ROOM 07    SURGERY DATE: 2024    Preoperative Dx: History of Crohn's disease with perianal abscess    Postoperative Dx: Same    Procedure: Exam under anesthesia, I&D of perianal abscess, fistulectomy, placement of draining seton.    Surgeons: Dr. Keenan Rogers. Dr. Carl Pace.     First Assistant: There were no residents available to assist.    Findings: Severe perianal Crohn's disease with multiple fistulous tracts.    Specimen(s):   ID Type Source Tests Collected by Time Destination   1 : Fistula track Tissue Fistula TISSUE EXAM Carl Pace MD 2024 1308        Anesthesia Type: IV Sedation with Anesthesia    EBL: Minimal    Complications: None      Procedure: The patient was brought to the operating room on a transfer stretcher.  He was intubated on the stretcher and then transferred to the bed in a prone jackknife position.  The buttocks retracted with cloth tape.  The area was prepped and draped.  The patient had been admitted with perianal pain.  A CAT scan had suggested a small abscess in the left anterior perineum.      On examination of the anus the patient had signs of severe Crohn's disease.  He had a large area of scarring in the right lateral buttock from a previous I&D.  There were 2 external opening seen in the right anterior position.  There was a small external opening seen in the left lateral position.  We probed the opening in the left lateral position demonstrating a tract going down towards the perineum.  I suspect this is the small fistulous tract seen on CAT scan.  We curetted this removing the granulation tissue.  We opened up the external opening for potentially additional drainage.  We now explored the openings on the right side.  There was pus draining from the most distal external opening.  We placed the Brady retractor into the anus.  There was an internal opening  visualized in the right anterior position.  With placement of a crypt hook we demonstrated this opening to connect with the more proximal external opening.  We placed a fistula probe through here to maintain exposure.  We then examined the more distal external opening.  With placing of a curette we could identify that this tract connected with the more proximal tract from the same internal opening.  For this reason we made the plan to place a seton proximally to try and get the more distal tract to close.  A vessel loop was placed through the more proximal tract.  The skin had been excised demonstrating that this was a Stokes sphincteric tract involving the muscle.  We then went and performed an I&D and fistulectomy on the more distal fistulous opening.  We grabbed this with a clamp and excised it in a cylindrical fashion going towards anal canal.  It was sent for pathology.  We curetted the remaining abscess allowing it to drain.    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  This was controlled with a draining seton and I&D and fistulectomy of the distal portion.  Additionally the patient has a small fistula and abscess in the left anterolateral position which was curetted and the external opening excised.    The anus itself seem pliable with normal appearing mucosa and no signs of active Crohn's disease.  However I believe the perianal Crohn's disease will be best treated medically and I believe the patient should be offered biologic therapy per gastroenterology.                Attestation: I was present for the entire procedure.      Patient Disposition: PACU      SIGNATURE: Keenan Rogers MD  DATE: January 5, 2024  TIME: 1:26 PM

## 2024-01-05 NOTE — QUICK NOTE
Patient at the bedside postoperatively.  Pain is well-managed and well-controlled.  ---> Recommendations from surgery to sign off at this time.  Follow-up with Dr. Freitas in the outpatient clinic.  Recommendation for metronidazole to 50 twice daily.  Discontinue ceftriaxone.  Encourage patient to start Remicade at this time.  Patient agreeable.  Will have team tomorrow which out to GI for further instructions.

## 2024-01-05 NOTE — INTERVAL H&P NOTE
H&P reviewed. After examining the patient I find no changes in the patients condition since the H&P had been written.    Vitals:    01/05/24 1106   BP: 113/78   Pulse: 63   Resp: 18   Temp: 97.5 °F (36.4 °C)   SpO2: 99%

## 2024-01-05 NOTE — ANESTHESIA PREPROCEDURE EVALUATION
Procedure:  INCISION AND DRAINAGE (I&D) BUTTOCK (Buttocks)    Relevant Problems   CARDIO   (+) RBBB      HEMATOLOGY   (+) Anemia      NEURO/PSYCH   (+) Schizophrenia (HCC)   (+) Schizophreniform disorder (HCC)      Digestive   (+) Esophagitis      Musculoskeletal and Integument   (+) Pilonidal cyst with abscess      Other   (+) Abnormal reaction to tuberculin test   (+) Cellulitis and abscess of buttock   (+) Chronic pain of left knee   (+) Crohn's disease (HCC)   (+) Leukocytosis   (+) Perianal fistula   (+) Severe protein-calorie malnutrition (HCC)   (+) Tobacco abuse      Lab Results   Component Value Date     04/11/2018    SODIUM 138 01/05/2024    K 4.0 01/05/2024     01/05/2024    CO2 26 01/05/2024    ANIONGAP 13 04/11/2018    AGAP 7 01/05/2024    BUN 7 01/05/2024    CREATININE 0.82 01/05/2024    GLUC 91 01/05/2024    GLUF 91 01/05/2024    CALCIUM 8.4 01/05/2024    AST 13 01/03/2024    ALT 6 (L) 01/03/2024    ALKPHOS 80 01/03/2024    PROT 8.3 04/11/2018    TP 8.1 01/03/2024    BILITOT 0.3 04/11/2018    TBILI 0.26 01/03/2024    EGFR 116 01/05/2024     Lab Results   Component Value Date    WBC 12.02 (H) 01/05/2024    HGB 8.4 (L) 01/05/2024    HCT 28.8 (L) 01/05/2024    MCV 71 (L) 01/05/2024     (H) 01/05/2024              Anesthesia Plan  ASA Score- 2     Anesthesia Type- IV sedation with anesthesia with ASA Monitors.         Additional Monitors:     Airway Plan:            Plan Factors-Exercise tolerance (METS): >4 METS.    Chart reviewed. EKG reviewed. Imaging results reviewed. Existing labs reviewed. Patient summary reviewed.                  Induction- intravenous.    Postoperative Plan-     Informed Consent-

## 2024-01-05 NOTE — PLAN OF CARE
Problem: INFECTION - ADULT  Goal: Absence or prevention of progression during hospitalization  Description: INTERVENTIONS:  - Assess and monitor for signs and symptoms of infection  - Monitor lab/diagnostic results  - Monitor all insertion sites, i.e. indwelling lines, tubes, and drains  - Monitor endotracheal if appropriate and nasal secretions for changes in amount and color  - [place] appropriate cooling/warming therapies per order  - Administer medications as ordered  - Instruct and encourage patient and family to use good hand hygiene technique  - Identify and instruct in appropriate isolation precautions for identified infection/condition  Outcome: Progressing     Problem: SKIN/TISSUE INTEGRITY - ADULT  Goal: Incision(s), wounds(s) or drain site(s) healing without S/S of infection  Description: INTERVENTIONS  - Assess and document dressing, incision, wound bed, drain sites and surrounding tissue  - Provide patient and family education  - Perform skin care/dressing changes every every shift  Outcome: Progressing

## 2024-01-05 NOTE — UTILIZATION REVIEW
Initial Clinical Review   Observation on 01/03 @ 1935 upgraded to Inpatient on 01/05 @ 1352 d/t cont IV abx therapy for cellulitis and abscess of buttock.      Admission Orders (From admission, onward)       Ordered        01/05/24 1352  Inpatient Admission  Once            01/03/24 1935  Place in Observation  Once                          Orders Placed This Encounter   Procedures    Inpatient Admission     Standing Status:   Standing     Number of Occurrences:   1     Order Specific Question:   Level of Care     Answer:   Med Surg [16]     Order Specific Question:   Estimated length of stay     Answer:   More than 2 Midnights     Order Specific Question:   Certification     Answer:   I certify that inpatient services are medically necessary for this patient for a duration of greater than two midnights. See H&P and MD Progress Notes for additional information about the patient's course of treatment.       Date: 01/05                          Current Patient Class: IP  Current Level of Care: MS    HPI:33 y.o. male initially admitted on 01/05    Assessment/Plan:   01/04 Psych Consult: Psychosis, unspecified; consideration for schizophrenia versus schizoaffective disorder, bipolar type   Plan: Invega 3 mg q.h.s. to address psychosis. Upward titration as necessary and as tolerated. adheres to criteria for inpatient behavioral health admission by means of voluntary 201 commitment.  if patient attempts to leave AGAINST MEDICAL ADVICE, psych will reassess for  appropriateness of involuntary 302 commitment.     01/05  IM Notes:Leukocytosis improving.  WBC 13.15> 12.02. No growth per blood culture (1 & 2).   Continues to endorse perianal pain due to abscess.  Continues to endorse perianal pain due to abscess. Continues to endorse auditory and visual hallucinations.  Denies SH, SI, homicidal ideations. Surgery today for I&D. Ceftriaxone 1 g daily. Metronidazole 500 mg 3 times daily. AM CBC. Invega 3 mg bedtime. Monitor  closely.      Vital Signs:   ate/Time Temp Pulse Resp BP MAP (mmHg) SpO2 O2 Flow Rate (L/min) O2 Device Patient Position - Orthostatic VS   01/05/24 1619 -- 69 18 127/88 96 -- -- -- Lying   01/05/24 1549 -- 52 Abnormal  18 109/80 80 -- -- -- Lying   01/05/24 1519 -- 52 Abnormal  16 108/75 80 -- -- -- Lying   01/05/24 1504 -- 50 Abnormal  16 109/78 83 -- -- -- Lying   01/05/24 1449 -- 52 Abnormal  16 109/80 85 -- -- -- Lying   01/05/24 1434 97.1 °F (36.2 °C) Abnormal  58 16 106/72 86 98 % -- None (Room air) Lying   01/05/24 1400 -- -- 14 -- -- -- -- None (Room air) --   01/05/24 1345 -- 76 14 100/56 74 100 % 4 L/min Simple mask --   01/05/24 1344 -- -- 16 100/56 -- -- 4 L/min Simple mask --   01/05/24 1337 97.6 °F (36.4 °C) 75 16 96/54 -- 100 % 4 L/min Simple mask --   01/05/24 1106 97.5 °F (36.4 °C) 63 18 113/78 86 99 % -- None (Room air) Lying   01/05/24 0729 96.8 °F (36 °C) Abnormal  59 16 124/87 96 100 % -- None (Room air) Lying   01/05/24 0400 97.5 °F (36.4 °C) 61 18 108/71 -- 100 % -- None (Room air) Lying   01/05/24 0001 97.4 °F (36.3 °C) Abnormal  72 16 104/61 -- 100 % -- None (Room air) Lying   01/04/24 2000 97.6 °F (36.4 °C) 72 17 113/75 82 100 % -- None (Room air) Lying   01/04/24 1515 97.1 °F (36.2 °C) Abnormal  72 16 133/82 100 100 % -- None (Room air) Sitti     Pertinent Labs/Diagnostic Results:   Results from last 7 days   Lab Units 01/03/24  1558   SARS-COV-2  Negative     Results from last 7 days   Lab Units 01/05/24  0514 01/04/24  0757 01/03/24  1548   WBC Thousand/uL 12.02* 13.15* 10.85*   HEMOGLOBIN g/dL 8.4* 8.2* 7.0*   HEMATOCRIT % 28.8* 27.6* 26.0*   PLATELETS Thousands/uL 564* 554* 661*   NEUTROS ABS Thousands/µL  --  8.77* 8.22*         Results from last 7 days   Lab Units 01/05/24  0514 01/04/24  0757 01/03/24  1548   SODIUM mmol/L 138 136 135   POTASSIUM mmol/L 4.0 4.0 4.1   CHLORIDE mmol/L 105 102 100   CO2 mmol/L 26 26 26   ANION GAP mmol/L 7 8 9   BUN mg/dL 7 8 6   CREATININE mg/dL 0.82  "1.04 1.16   EGFR ml/min/1.73sq m 116 93 82   CALCIUM mg/dL 8.4 8.1* 8.8   MAGNESIUM mg/dL  --   --  2.0     Results from last 7 days   Lab Units 01/03/24  1548   AST U/L 13   ALT U/L 6*   ALK PHOS U/L 80   TOTAL PROTEIN g/dL 8.1   ALBUMIN g/dL 3.7   TOTAL BILIRUBIN mg/dL 0.26         Results from last 7 days   Lab Units 01/05/24  0514 01/04/24  0757 01/03/24  1548   GLUCOSE RANDOM mg/dL 91 112 99             No results found for: \"BETA-HYDROXYBUTYRATE\"       Results from last 7 days   Lab Units 01/03/24  1542   PH BHARATHI  7.366   PCO2 BHARATHI mm Hg 48.5   PO2 BHARATHI mm Hg 31.7*   HCO3 BHARATHI mmol/L 27.2   BASE EXC BHARATHI mmol/L 1.5   O2 CONTENT BHARATHI ml/dL 6.0   O2 HGB, VENOUS % 52.6*                     Results from last 7 days   Lab Units 01/03/24  1548   PROTIME seconds 14.2   INR  1.07   PTT seconds 33         Results from last 7 days   Lab Units 01/03/24  1548   PROCALCITONIN ng/ml 0.12     Results from last 7 days   Lab Units 01/03/24  1818 01/03/24  1542   LACTIC ACID mmol/L 0.6 2.2*                 Results from last 7 days   Lab Units 01/04/24  0757   FERRITIN ng/mL 9*   IRON ug/dL 287*   TIBC ug/dL <342         Results from last 7 days   Lab Units 01/04/24  0615   UNIT PRODUCT CODE  [id number]  [id number]   UNIT NUMBER  [id number]  [id number]   UNITABO  A  A   UNITRH  POS  POS   CROSSMATCH  Compatible  Compatible   UNIT DISPENSE STATUS  Presumed Trans  Presumed Trans   UNIT PRODUCT VOL ml 300  300         Results from last 7 days   Lab Units 01/03/24  1548   LIPASE u/L 7*     Results from last 7 days   Lab Units 01/04/24  0757   CRP mg/L 60.1*   SED RATE mm/hour 99*             Results from last 7 days   Lab Units 01/03/24  1644   CLARITY UA  Slightly Cloudy*   COLOR UA  [place]*   SPEC GRAV UA  1.025   PH UA  6.0   GLUCOSE UA mg/dl Negative   KETONES UA mg/dl Negative   BLOOD UA  Negative   PROTEIN UA mg/dl 30 (1+)*   NITRITE UA  Negative   BILIRUBIN UA  Negative   UROBILINOGEN UA mg/dL Negative   LEUKOCYTES UA  " Negative   WBC UA /hpf 1-2   RBC UA /hpf None Seen   BACTERIA UA /hpf Moderate*   EPITHELIAL CELLS WET PREP /hpf Occasional   MUCUS THREADS  Innumerable*     Results from last 7 days   Lab Units 01/03/24  1558   INFLUENZA A PCR  Negative   INFLUENZA B PCR  Negative   RSV PCR  Negative                 Results from last 7 days   Lab Units 01/04/24  1107   C DIFF TOXIN B BY PCR  Positive*     Results from last 7 days   Lab Units 01/04/24  1107   SALMONELLA SP PCR  None Detected   SHIGELLA SP/ENTEROINVASIVE E. COLI (EIEC)  None Detected   CAMPYLOBACTER SP (JEJUNI AND COLI)  None Detected   SHIGA TOXIN 1/SHIGA TOXIN 2  None Detected         Results from last 7 days   Lab Units 01/03/24  1558 01/03/24  1548   BLOOD CULTURE  No Growth at 24 hrs. No Growth at 24 hrs.     Results from last 7 days   Lab Units 01/05/24  0514   TOTAL COUNTED  100               Medications:   Scheduled Medications:  [START ON 1/10/2024] ergocalciferol, 50,000 Units, Oral, Weekly  melatonin, 3 mg, Oral, HS  methocarbamol, 500 mg, Oral, 4x Daily  metroNIDAZOLE, 250 mg, Intravenous, BID  paliperidone, 3 mg, Oral, HS  cefTRIAXone (ROCEPHIN) IVPB (premix in dextrose) 1,000 mg 50 mL  Dose: 1,000 mg  Freq: Every 24 hours Route: IV  Last Dose: 1,000 mg (01/05/24 1109)  Start: 01/04/24 0900 End: 01/05/24 1514       Continuous IV Infusions:  lactated ringers, 50 mL/hr, Intravenous, Continuous      PRN Meds:  acetaminophen, 160 mg, Oral, Q6H PRN        Discharge Plan: D    Network Utilization Review Department  ATTENTION: Please call with any questions or concerns to [phone] and carefully listen to the prompts so that you are directed to the right person. All voicemails are confidential.   For Discharge needs, contact Care Management DC Support Team at [phone] opt. 2  Send all requests for admission clinical reviews, approved or denied determinations and any other requests to dedicated fax number below belonging to the campus where the patient  is receiving treatment. List of dedicated fax numbers for the Facilities:  FACILITY NAME UR FAX NUMBER   ADMISSION DENIALS (Administrative/Medical Necessity) [phone]   DISCHARGE SUPPORT TEAM (NETWORK) [phone]   PARENT CHILD HEALTH (Maternity/NICU/Pediatrics) [phone]   [facility] [phone]   [facility] [phone]   [facility] [phone]   [facility] [phone]   [facility] [phone]   [facility] [phone]   [facility] [phone]   [facility] [phone]   [facility] [phone]   [facility] [phone]   [facility] [phone]

## 2024-01-05 NOTE — PROGRESS NOTES
Progress Note - Behavioral Health   Carlos Wolf 33 y.o. male MRN: [id number]  Unit/Bed#: 7T [facility] 705-01 Encounter: [id number]    Assessment  Carlos Wolf is a 33 y.o. male, single, lives in room that he rents from stepfather, with medical history of Crohn's disease, in the past pertinent psychiatric history of psychosis unspecified vs schizophrenia vs schizophreniform, who initially presented to [facility] ED for abdominal discomfort and drainage from fistula on buttock.  Patient was admitted to [facility] medical floor for medical treatment of physical complaints.  While being treated on the medical floor, the patient endorsed psychiatric symptoms leading to a psychiatric consult being ordered.  Secondary to patient's current psychotic symptoms, patient is a limited historian decreasing his ability to give a clear and concise history of presenting illness. On initial evaluation , patient  endorsed anxiety, AVH, and paranoia that have been present since 2010 but began to significantly increase and worsen in June 2023. Patient denied depression, mood instability, suicidal ideation, and homicidal ideation. On follow-up examination today, patient continues to endorse AVH, paranoid ideations, anxiety , and displaying a disorganized, concrete, and tangential thought process.  Patient continues to deny active/passive SI and HI.  Patient is still in agreement with voluntary admission to an inpatient psychiatric unit once he is medically cleared.  Patient did not report any significant side effects after initiating Invega 3 mg yesterday.  At this time, we will continue to follow-up with this patient daily to assess whether or not further optimization is needed prior to going to inpatient psychiatric unit.      Principal Psychiatric Problem:  Psychosis unspecified unspecified (HCC)  Differentials include but are not limited to: Schizophrenia versus schizoaffective disorder    Principal Problem:     "Cellulitis and abscess of buttock  Active Problems:    Schizophreniform disorder (HCC)    Anemia    Crohn's disease (HCC)      Plan  Discussed plan with primary team as follows:  Patient has signed 201 for voluntary admission, awaiting medical clearance and inpatient psychiatry placement  Recommend no changes to psychiatric medications at this time, will defer to inpatient psychiatry management  Continue to monitor patient on Invega 3 mg once daily over the weekend  Consider increasing Invega on Monday, 1/8/2024 if patient is still on medical floor and needs further medication optimization.  Continual 1:1 or virtual observation not needed at this time.  Please reach out to on-call Psychiatry team via [employer], or if after hours/Fri/Sat/Sunday contact on-call psychiatric service via [employer] ([phone]) with any questions or concerns.    ------------------------------------------------------------    Subjective:     Patient was seen and evaluated at bedside today for continuity of care. Patient was calm and cooperative with interview/exam today. Patient reports his mood today as \" happy to get surgery\".  Patient continues to report anxiety that remains at the same level as yesterday.  Patient denies depression, active/passive SI, HI.  Patient reports that he is still experiencing paranoid ideations and AVH.    Psychiatric Review of Systems:  Behavior over the last 24 hours: unchanged  Sleep: unclear - patient gave a disorganized answer when asked about sleep   Appetite: did not assess   Medication side effects: Denies  ROS: Denies lightheadedness, dizziness, vomiting, nausea, muscle stiffness, tremors.  Reports diarrhea but states that this is not a new issue for him.  Last bowel movement was this morning 1/5/2024.    Vital signs in last 24 hours:  Temp:  [96.8 °F (36 °C)-97.6 °F (36.4 °C)] 96.8 °F (36 °C)  HR:  [59-72] 59  Resp:  [16-18] 16  BP: (104-133)/(61-87) 124/87    Mental Status Exam:  Appearance:  Male, " "overtly -American appearing, wearing hospital attire, wearing shear cap on head, slightly disheveled, appears stated age   Behavior:  calm, cooperative, and sitting comfortably, eye contact slightly improved compared to yesterday   Motor: no abnormal movements, sitting in chair and therefore did not assess gait.balance    Speech:  Spontaneous, normal volume, normal pitch, rate still slightly increased at times, not pressured today    Mood:  \"Happy to get surgery\"   Affect:  constricted   Thought Process:  disorganized, illogical, perseverative, tangential   Thought Content: paranoid ideation, perseverations regarding effects of psychiatric medicine   Perceptual disturbances: appears to be responding to internal stimuli, reports AVH   Risk Potential: No active or passive suicidal or homicidal ideation was verbalized during interview   Cognition: oriented to self and situation and cognition not formally tested   Insight:  Limited   Judgment: Limited     Current Medications:  All current medications have been reviewed.  Current Facility-Administered Medications   Medication Dose Route Frequency Provider Last Rate    acetaminophen  160 mg Oral Q6H PRN Kiko Araujo MD      cefTRIAXone  1,000 mg Intravenous Q24H Maikel Kiran MD Stopped (01/04/24 0910)    [START ON 1/10/2024] ergocalciferol  50,000 Units Oral Weekly Kiko Araujo MD      ferrous sulfate  325 mg Oral BID With Meals Kiko Araujo MD      melatonin  3 mg Oral HS Kiko Araujo MD      methocarbamol  500 mg Oral 4x Daily Kiko Araujo MD      metroNIDAZOLE  500 mg Intravenous TID Maikel Kiran  mg (01/05/24 0834)    paliperidone  3 mg Oral HS Cassandra Rodas DO      sodium chloride  100 mL/hr Intravenous Continuous Harrison Mayorga  mL/hr (01/05/24 0748)       Laboratory results:  I have personally reviewed all pertinent laboratory/tests " results.  Most Recent Labs:   Lab Results   Component Value Date    WBC 12.02 (H) 01/05/2024    RBC 4.04 01/05/2024    HGB 8.4 (L) 01/05/2024    HCT 28.8 (L) 01/05/2024     (H) 01/05/2024    RDW 21.2 (H) 01/05/2024    NEUTROABS 8.77 (H) 01/04/2024    SODIUM 138 01/05/2024    K 4.0 01/05/2024     01/05/2024    CO2 26 01/05/2024    BUN 7 01/05/2024    CREATININE 0.82 01/05/2024    GLUC 91 01/05/2024    GLUF 91 01/05/2024    CALCIUM 8.4 01/05/2024    AST 13 01/03/2024    ALT 6 (L) 01/03/2024    ALKPHOS 80 01/03/2024    TP 8.1 01/03/2024    ALB 3.7 01/03/2024    TBILI 0.26 01/03/2024       Cassandra Rodas DO 01/05/24  Psychiatry Residency, PGY-I

## 2024-01-05 NOTE — ANESTHESIA POSTPROCEDURE EVALUATION
Post-Op Assessment Note    CV Status:  Stable  Pain Score: 0    Pain management: adequate       Mental Status:  Alert and awake   Hydration Status:  Euvolemic   PONV Controlled:  Controlled   Airway Patency:  Patent     Post Op Vitals Reviewed: Yes      Staff: CRNA               BP 96/54 (01/05/24 1337)    Temp 97.6 °F (36.4 °C) (01/05/24 1337)    Pulse 75 (01/05/24 1337)   Resp 16 (01/05/24 1337)    SpO2 100 % (01/05/24 1337)

## 2024-01-06 LAB
BASOPHILS # BLD AUTO: 0.05 THOUSANDS/ÂΜL (ref 0–0.1)
BASOPHILS NFR BLD AUTO: 0 % (ref 0–1)
EOSINOPHIL # BLD AUTO: 0.23 THOUSAND/ÂΜL (ref 0–0.61)
EOSINOPHIL NFR BLD AUTO: 1 % (ref 0–6)
ERYTHROCYTE [DISTWIDTH] IN BLOOD BY AUTOMATED COUNT: 22.3 % (ref 11.6–15.1)
HCT VFR BLD AUTO: 30 % (ref 36.5–49.3)
HGB BLD-MCNC: 8.6 G/DL (ref 12–17)
IMM GRANULOCYTES # BLD AUTO: 0.06 THOUSAND/UL (ref 0–0.2)
IMM GRANULOCYTES NFR BLD AUTO: 0 % (ref 0–2)
LYMPHOCYTES # BLD AUTO: 3.13 THOUSANDS/ÂΜL (ref 0.6–4.47)
LYMPHOCYTES NFR BLD AUTO: 19 % (ref 14–44)
MCH RBC QN AUTO: 20.6 PG (ref 26.8–34.3)
MCHC RBC AUTO-ENTMCNC: 28.7 G/DL (ref 31.4–37.4)
MCV RBC AUTO: 72 FL (ref 82–98)
MONOCYTES # BLD AUTO: 1.22 THOUSAND/ÂΜL (ref 0.17–1.22)
MONOCYTES NFR BLD AUTO: 8 % (ref 4–12)
NEUTROPHILS # BLD AUTO: 11.5 THOUSANDS/ÂΜL (ref 1.85–7.62)
NEUTS SEG NFR BLD AUTO: 72 % (ref 43–75)
NRBC BLD AUTO-RTO: 0 /100 WBCS
PLATELET # BLD AUTO: 599 THOUSANDS/UL (ref 149–390)
PMV BLD AUTO: 8.2 FL (ref 8.9–12.7)
RBC # BLD AUTO: 4.18 MILLION/UL (ref 3.88–5.62)
WBC # BLD AUTO: 16.19 THOUSAND/UL (ref 4.31–10.16)

## 2024-01-06 PROCEDURE — 87340 HEPATITIS B SURFACE AG IA: CPT

## 2024-01-06 PROCEDURE — 99232 SBSQ HOSP IP/OBS MODERATE 35: CPT | Performed by: FAMILY MEDICINE

## 2024-01-06 PROCEDURE — 86704 HEP B CORE ANTIBODY TOTAL: CPT

## 2024-01-06 PROCEDURE — 86706 HEP B SURFACE ANTIBODY: CPT

## 2024-01-06 PROCEDURE — 86480 TB TEST CELL IMMUN MEASURE: CPT

## 2024-01-06 PROCEDURE — 85025 COMPLETE CBC W/AUTO DIFF WBC: CPT | Performed by: STUDENT IN AN ORGANIZED HEALTH CARE EDUCATION/TRAINING PROGRAM

## 2024-01-06 RX ADMIN — METHOCARBAMOL TABLETS 500 MG: 500 TABLET, COATED ORAL at 22:00

## 2024-01-06 RX ADMIN — MELATONIN TAB 3 MG 3 MG: 3 TAB at 22:00

## 2024-01-06 RX ADMIN — Medication 250 MG: at 09:32

## 2024-01-06 RX ADMIN — METHOCARBAMOL TABLETS 500 MG: 500 TABLET, COATED ORAL at 09:31

## 2024-01-06 RX ADMIN — PALIPERIDONE 3 MG: 3 TABLET, EXTENDED RELEASE ORAL at 22:00

## 2024-01-06 RX ADMIN — SODIUM CHLORIDE, SODIUM LACTATE, POTASSIUM CHLORIDE, AND CALCIUM CHLORIDE 50 ML/HR: .6; .31; .03; .02 INJECTION, SOLUTION INTRAVENOUS at 09:36

## 2024-01-06 RX ADMIN — METHOCARBAMOL TABLETS 500 MG: 500 TABLET, COATED ORAL at 12:22

## 2024-01-06 RX ADMIN — METHOCARBAMOL TABLETS 500 MG: 500 TABLET, COATED ORAL at 17:05

## 2024-01-06 RX ADMIN — Medication 250 MG: at 22:02

## 2024-01-06 RX ADMIN — IRON SUCROSE 200 MG: 20 INJECTION, SOLUTION INTRAVENOUS at 16:23

## 2024-01-06 NOTE — PLAN OF CARE
Problem: PAIN - ADULT  Goal: Verbalizes/displays adequate comfort level or baseline comfort level  Description: Interventions:  - Encourage patient to monitor pain and request assistance  - Assess pain using appropriate pain scale  - Administer analgesics based on type and severity of pain and evaluate response  - Implement non-pharmacological measures as appropriate and evaluate response  - Consider cultural and social influences on pain and pain management  - Notify physician/advanced practitioner if interventions unsuccessful or patient reports new pain  Outcome: Progressing     Problem: INFECTION - ADULT  Goal: Absence or prevention of progression during hospitalization  Description: INTERVENTIONS:  - Assess and monitor for signs and symptoms of infection  - Monitor lab/diagnostic results  - Monitor all insertion sites, i.e. indwelling lines, tubes, and drains  - Monitor endotracheal if appropriate and nasal secretions for changes in amount and color  - [place] appropriate cooling/warming therapies per order  - Administer medications as ordered  - Instruct and encourage patient and family to use good hand hygiene technique  - Identify and instruct in appropriate isolation precautions for identified infection/condition  Outcome: Progressing     Problem: INFECTION - ADULT  Goal: Absence of fever/infection during neutropenic period  Description: INTERVENTIONS:  - Monitor WBC    Outcome: Progressing     Problem: SKIN/TISSUE INTEGRITY - ADULT  Goal: Incision(s), wounds(s) or drain site(s) healing without S/S of infection  Description: INTERVENTIONS  - Assess and document dressing, incision, wound bed, drain sites and surrounding tissue  - Provide patient and family education  - Perform skin care/dressing changes ever  Outcome: Progressing

## 2024-01-06 NOTE — PLAN OF CARE
Problem: PAIN - ADULT  Goal: Verbalizes/displays adequate comfort level or baseline comfort level  Description: Interventions:  - Encourage patient to monitor pain and request assistance  - Assess pain using appropriate pain scale  - Administer analgesics based on type and severity of pain and evaluate response  - Implement non-pharmacological measures as appropriate and evaluate response  - Consider cultural and social influences on pain and pain management  - Notify physician/advanced practitioner if interventions unsuccessful or patient reports new pain  Outcome: Progressing     Problem: Knowledge Deficit  Goal: Patient/family/caregiver demonstrates understanding of disease process, treatment plan, medications, and discharge instructions  Description: Complete learning assessment and assess knowledge base.  Interventions:  - Provide teaching at level of understanding  - Provide teaching via preferred learning methods  Outcome: Progressing

## 2024-01-06 NOTE — UTILIZATION REVIEW
Continued Stay Review    Date:   1/6/24                          Current Patient Class:  Inpatient  Current Level of Care:  med surg    HPI:33 y.o. male initially admitted on Observation on 01/03 @ 1935 upgraded to Inpatient on 01/05 @ 1352 d/t cont IV abx therapy for cellulitis and abscess of buttock.      Assessment/Plan:   1/6  Continue  CAROLINE.   Start  remicade  once infection is cleared  OP.   Continue  current meds.       Vital Signs:   98.7-78-18     109/60     sats 99 % Ra    Pertinent Labs/Diagnostic Results:   Results from last 7 days   Lab Units 01/03/24  1558   SARS-COV-2  Negative     Results from last 7 days   Lab Units 01/06/24  0554 01/05/24  0514 01/04/24  0757 01/03/24  1548   WBC Thousand/uL 16.19* 12.02* 13.15* 10.85*   HEMOGLOBIN g/dL 8.6* 8.4* 8.2* 7.0*   HEMATOCRIT % 30.0* 28.8* 27.6* 26.0*   PLATELETS Thousands/uL 599* 564* 554* 661*   NEUTROS ABS Thousands/µL 11.50*  --  8.77* 8.22*         Results from last 7 days   Lab Units 01/05/24  0514 01/04/24  0757 01/03/24  1548   SODIUM mmol/L 138 136 135   POTASSIUM mmol/L 4.0 4.0 4.1   CHLORIDE mmol/L 105 102 100   CO2 mmol/L 26 26 26   ANION GAP mmol/L 7 8 9   BUN mg/dL 7 8 6   CREATININE mg/dL 0.82 1.04 1.16   EGFR ml/min/1.73sq m 116 93 82   CALCIUM mg/dL 8.4 8.1* 8.8   MAGNESIUM mg/dL  --   --  2.0           Results from last 7 days   Lab Units 01/05/24  0514 01/04/24  0757 01/03/24  1548   GLUCOSE RANDOM mg/dL 91 112 99                     Results from last 7 days   Lab Units 01/04/24  0757   FERRITIN ng/mL 9*   IRON ug/dL 287*   TIBC ug/dL <342         Results from last 7 days   Lab Units 01/04/24  0615   UNIT PRODUCT CODE  [id number]  [id number]   UNIT NUMBER  [id number]  [id number]   UNITABO  A  A   UNITRH  POS  POS   CROSSMATCH  Compatible  Compatible   UNIT DISPENSE STATUS  Presumed Trans  Presumed Trans   UNIT PRODUCT VOL ml 300  300                   Results from last 7 days   Lab Units 01/04/24  1107   C DIFF TOXIN B BY  PCR  Positive*     Results from last 7 days   Lab Units 01/04/24  1107   SALMONELLA SP PCR  None Detected   SHIGELLA SP/ENTEROINVASIVE E. COLI (EIEC)  None Detected   CAMPYLOBACTER SP (JEJUNI AND COLI)  None Detected   SHIGA TOXIN 1/SHIGA TOXIN 2  None Detected         Results from last 7 days   Lab Units 01/03/24  1558 01/03/24  1548   BLOOD CULTURE  No Growth at 48 hrs. No Growth at 48 hrs.     Results from last 7 days   Lab Units 01/05/24  0514   TOTAL COUNTED  100               Medications:   Scheduled Medications:  [START ON 1/10/2024] ergocalciferol, 50,000 Units, Oral, Weekly  melatonin, 3 mg, Oral, HS  methocarbamol, 500 mg, Oral, 4x Daily  metroNIDAZOLE, 250 mg, Intravenous, BID  paliperidone, 3 mg, Oral, HS      Continuous IV Infusions:  lactated ringers, 50 mL/hr, Intravenous, Continuous      PRN Meds:  acetaminophen, 160 mg, Oral, Q6H PRN        Discharge Plan:  TBD    Network Utilization Review Department  ATTENTION: Please call with any questions or concerns to [phone] and carefully listen to the prompts so that you are directed to the right person. All voicemails are confidential.   For Discharge needs, contact Care Management DC Support Team at [phone] opt. 2  Send all requests for admission clinical reviews, approved or denied determinations and any other requests to dedicated fax number below belonging to the campus where the patient is receiving treatment. List of dedicated fax numbers for the Facilities:  FACILITY NAME UR FAX NUMBER   ADMISSION DENIALS (Administrative/Medical Necessity) [phone]   DISCHARGE SUPPORT TEAM (NETWORK) [phone]   PARENT CHILD HEALTH (Maternity/NICU/Pediatrics) [phone]   [facility] [phone]   [facility] [phone]   [facility] [phone]   [facility] [phone]   [facility] [phone]   [facility]  [facility] [phone]   [facility] [phone]   [facility] [phone]   [facility] [phone]   [facility] [phone]   [facility] [phone]

## 2024-01-06 NOTE — PROGRESS NOTES
Progress Note    Carlos Wolf 33 y.o. male MRN: [id number]  Unit/Bed#: 7T [facility] 705-01 Encounter: [id number]  Admitting Physician: Geneva Vasquez MD  PCP: Kaykay Monroe DO  Date of Admission:  1/3/2024  3:07 PM    Assessment and Plan    Crohn's disease (HCC)  Assessment & Plan  Currently no pain on palpation, but has some cramping in the lower left side on/off. No diarrhea or blood in the stool.    S/P surgical I&D and fistulectomy     Plan:   - reach out to GI concerning starting Remicade and it can be restarted when he is outpatient ( cannot be dispensed IP)   - Hep B labs   - quantiferon gold       Anemia  Assessment & Plan  Hemoglobin minimally improved to 8.4 from 8.2 posttransfusion.  Today: 8.6  Denies dizziness or weakness.    Plan:   -Trend CBC  - venofer infusion per GI    Schizophrenia (HCC)  Assessment & Plan  Psych recommendations as follows:  Collaborate with collaterals for baseline assessment and disposition.  Introduce Invega 3 mg q.h.s. to address psychosis. Upward titration as necessary and as tolerated.  Presently, patient adheres to criteria for inpatient behavioral health admission by means of voluntary 201 commitment.  Please reconsult psychiatry if patient attempts to leave AGAINST MEDICAL ADVICE to assess for appropriateness of involuntary 302     Plan:  See A/P for schizophreniform    Schizophreniform disorder (HCC)  Assessment & Plan  Admits to active auditory and visual hallucinations this morning. Non commanding.  Denies SH/SI, homicidal ideations.     Plan:  Invega 3 mg bedtime  Monitor closely  Avoid Haldol; hx of anaphylactic reaction  On Discharge from 7th tower, [facility]    * Cellulitis and abscess of buttock  Assessment & Plan    No skin purulent drainage on physical exam. Seton drain in place. No active bleeding, pain or infectious signs present   Leukocytosis trending up. WBC 13.15> 12.0 >16.19. Most likely reactive secondary to surgery.  No growth per blood  culture (1 & 2).  Post surgical I & D and fistulectomy     Plan:   Metronidazole 250 twice daily for 5 days  Can be switched to PO tomorrow and discharged to U  AM CBC.         VTE Pharmacologic Prophylaxis: VTE Score: 2 Low Risk (Score 0-2) - Encourage Ambulation.    Patient Centered Rounds: I have performed bedside rounds with nursing staff today.    Discussions with Specialists or Other Care Team Provider: GI    Education and Discussions with Patient: yes      Time Spent for Care: 30 minutes.  More than 50% of total time spent on counseling and coordination of care as described above.    Current Length of Stay: 1 day(s)    Current Patient Status: Inpatient   Certification Statement:  Patient is pending GI clearance before discharge to [facility].    Discharge Plan: When patient is cleared from GI, and recommendations give concerning medication management on crohn's, he may be considered for Discharge to U    Code Status: Level 1 - Full Code      Subjective:   Patient was seen this morning , after just spending some time in the bathroom cleaning his buttocks. He urinated however did not make any bowel movement that time. He feels well and denies any pain in his abdomen or at the surgical site at this time. Vitals as reviewed are within normal limits. He denies any nausea, vomiting, chest pain, SOB, change in appetite, dizziness, lightheadedness, constipation or diarrhea at this time.     Objective:     Vitals:   Temp (24hrs), Av.2 °F (36.8 °C), Min:97.6 °F (36.4 °C), Max:98.7 °F (37.1 °C)    Temp:  [97.6 °F (36.4 °C)-98.7 °F (37.1 °C)] 98.7 °F (37.1 °C)  HR:  [52-90] 77  Resp:  [16-18] 18  BP: (108-127)/(60-90) 108/64  SpO2:  [97 %-100 %] 99 %  Body mass index is 20.9 kg/m².     Input and Output Summary (last 24 hours):       Intake/Output Summary (Last 24 hours) at 2024 1506  Last data filed at 2024 1300  Gross per 24 hour   Intake 600 ml   Output --   Net 600 ml       Physical Exam:     Physical  Exam  Constitutional:       General: He is not in acute distress.     Appearance: Normal appearance. He is normal weight. He is not ill-appearing or toxic-appearing.   HENT:      Head: Normocephalic and atraumatic.      Right Ear: External ear normal.      Left Ear: External ear normal.      Nose: Nose normal. No congestion or rhinorrhea.      Mouth/Throat:      Mouth: Mucous membranes are moist.      Pharynx: No oropharyngeal exudate or posterior oropharyngeal erythema.   Eyes:      General: No scleral icterus.        Right eye: No discharge.         Left eye: No discharge.      Pupils: Pupils are equal, round, and reactive to light.   Cardiovascular:      Rate and Rhythm: Normal rate and regular rhythm.      Pulses: Normal pulses.      Heart sounds: No murmur heard.     No friction rub. No gallop.   Pulmonary:      Effort: Pulmonary effort is normal. No respiratory distress.      Breath sounds: Normal breath sounds. No wheezing.   Abdominal:      General: Bowel sounds are normal. There is no distension.      Tenderness: There is no abdominal tenderness.   Genitourinary:     Comments: Seton drainage in place, no active bleeding or infection of the buttocks/ surgical site   Musculoskeletal:         General: Normal range of motion.      Cervical back: Normal range of motion and neck supple.   Skin:     General: Skin is warm.      Capillary Refill: Capillary refill takes less than 2 seconds.      Findings: No rash.   Neurological:      General: No focal deficit present.      Mental Status: He is alert.   Psychiatric:      Comments: Auditory and visual hallucinations           Additional Data:     Labs:    Results from last 7 days   Lab Units 01/06/24  0554   WBC Thousand/uL 16.19*   HEMOGLOBIN g/dL 8.6*   HEMATOCRIT % 30.0*   PLATELETS Thousands/uL 599*   NEUTROS PCT % 72   LYMPHS PCT % 19   MONOS PCT % 8   EOS PCT % 1     Results from last 7 days   Lab Units 01/05/24  0514 01/04/24  0757 01/03/24  1548   POTASSIUM  mmol/L 4.0   < > 4.1   CHLORIDE mmol/L 105   < > 100   CO2 mmol/L 26   < > 26   BUN mg/dL 7   < > 6   CREATININE mg/dL 0.82   < > 1.16   CALCIUM mg/dL 8.4   < > 8.8   ALK PHOS U/L  --   --  80   ALT U/L  --   --  6*   AST U/L  --   --  13    < > = values in this interval not displayed.     Results from last 7 days   Lab Units 01/03/24  1548   INR  1.07                 * I Have Reviewed All Lab Data Listed Above.  * Additional Pertinent Lab Tests Reviewed: All Labs For Current Hospital Admission Reviewed    Imaging:    Imaging Reports Reviewed Today Include: CT abdomen and pelvis   Imaging Personally Reviewed by Myself Includes:  CT abdomen and pelvis     Recent Cultures (last 7 days):     Results from last 7 days   Lab Units 01/04/24  1107 01/03/24  1558 01/03/24  1548   BLOOD CULTURE   --  No Growth at 48 hrs. No Growth at 48 hrs.   C DIFF TOXIN B BY PCR  Positive*  --   --        Last 24 Hours Medication List:   Current Facility-Administered Medications   Medication Dose Route Frequency Provider Last Rate    acetaminophen  160 mg Oral Q6H PRN Harrison Mayorga DO      [START ON 1/10/2024] ergocalciferol  50,000 Units Oral Weekly Harrison Mayorga DO      iron sucrose  200 mg Intravenous Once Geneva Vasquez MD      lactated ringers  50 mL/hr Intravenous Continuous Harrison Mayorga DO 50 mL/hr (01/06/24 0936)    melatonin  3 mg Oral HS Harrison Mayorga DO      methocarbamol  500 mg Oral 4x Daily Harrison Mayorga DO      metroNIDAZOLE  250 mg Intravenous BID Harrison Mayorga  mg (01/06/24 0932)    paliperidone  3 mg Oral HS Harrison Mayorga DO          ** Please Note: Dictation voice to text software may have been used in the creation of this document. **    Geneva Vasquez MD  01/06/24  3:06 PM

## 2024-01-06 NOTE — DISCHARGE SUMMARY
Discharge Summary - [facility]    Patient Information: Carlos Wolf 33 y.o. male MRN: [id number]  Unit/Bed#: 7T [facility] 705-01 Encounter: [id number]    Discharging Physician / Practitioner: Dr. Kayli Day  PCP: Kaykay Monroe DO  Admission Date:   Admission Orders (From admission, onward)       Ordered        01/05/24 1352  Inpatient Admission  Once            01/03/24 1935  Place in Observation  Once                          Discharge Date: 1/6/24    Reason for Admission: symptomatic amenia, cellulitis    Discharge Diagnoses:     Principal Problem:    Cellulitis and abscess of buttock  Active Problems:    Schizophreniform disorder (HCC)    Anemia    Crohn's disease (HCC)  Resolved Problems:    * No resolved hospital problems. *        * Cellulitis and abscess of buttock  Assessment & Plan    No skin purulent drainage on physical exam. Seton drain in place. No active bleeding, pain or infectious signs present   Leukocytosis trending up. WBC 13.15> 12.0 >16.19. Most likely reactive secondary to surgery.  No growth per blood culture (1 & 2).  Post surgical I & D and fistulectomy     Plan:   Metronidazole 250 twice daily for 5 days  Can be switched to PO tomorrow and discharged to [facility]  AM CBC.     Crohn's disease (HCC)  Assessment & Plan  Currently no pain on palpation, but has some cramping in the lower left side on/off. No diarrhea or blood in the stool.    S/P surgical I&D and fistulectomy     Plan:   - reach out to GI concerning starting Remicade and it can be restarted when he is outpatient ( cannot be dispensed IP)   - Hep B labs   - quantiferon gold       Anemia  Assessment & Plan  Hemoglobin minimally improved to 8.4 from 8.2 posttransfusion.  Today: 8.6  Denies dizziness or weakness.    Plan:   -Trend CBC  - venofer infusion per GI    Schizophrenia (HCC)  Assessment & Plan  Psych recommendations as follows:  Collaborate with collaterals for baseline assessment and  "disposition.  Introduce Invega 3 mg q.h.s. to address psychosis. Upward titration as necessary and as tolerated.  Presently, patient adheres to criteria for inpatient behavioral health admission by means of voluntary 201 commitment.  Please reconsult psychiatry if patient attempts to leave AGAINST MEDICAL ADVICE to assess for appropriateness of involuntary 302     Plan:  See A/P for schizophreniform    Schizophreniform disorder (HCC)  Assessment & Plan  Admits to active auditory and visual hallucinations this morning. Non commanding.  Denies SH/SI, homicidal ideations.     Plan:  Invega 3 mg bedtime  Monitor closely  Avoid Haldol; hx of anaphylactic reaction  On Discharge from 7th tower, will be admitted to U         Consultations During Hospital Stay:  GI, Surgery     Procedures Performed:    surgical I&D and fistulectomy with draining seton placed    Significant Findings / Test Results:   Elevated lactic acid (resolved)  Leukocytosis  Anemia  Low ferritin    Incidental Findings:   None    Test Results Pending at Discharge (will require follow up):   ***     Outpatient Tests Requested:  ***    Outpatient follow-up Requested:  ***    Complications:  ***    Hospital Course:     Carlos Wolf is a 33 y.o. male patient who originally presented to the hospital on 1/3/2024 due to symptomatic anemia and yellow brown fluid draining from his buttocks    Review of Systems    Condition at Discharge: stable     Discharge Day Visit / Exam:     Vitals: Blood Pressure: 117/66 (01/06/24 1503)  Pulse: 75 (01/06/24 1503)  Temperature: 98.5 °F (36.9 °C) (01/06/24 1503)  Temp Source: Temporal (01/06/24 1503)  Respirations: 18 (01/06/24 1503)  Height: 5' 6\" (167.6 cm) (01/04/24 0642)  Weight - Scale: 58.7 kg (129 lb 8 oz) (01/04/24 0642)  SpO2: 99 % (01/06/24 1503)  Exam:   Physical Exam  ( *** Be Sure to Include Physical Exam: Delete this entire line when you have entered your exam)    Discussion with Family: ***  Patient " Information Sharing: With the consent of Carlos Wolf , their loved ones (insert name(s) here***) were notified today by inpatient team of the patient’s condition and current plan.  All questions answered. *** Carlos Wolf does not wish inpatient team to notify their loved ones of their condition and current plan. ***    Discharge instructions/Information to patient and family:   See after visit summary for information provided to patient and family.      Discharge Medications:  ***Copy discharge medications here     Provisions for Follow-Up Care:  See after visit summary for information related to follow-up care and any pertinent home health orders.      Disposition:     Other: U    For Discharges to [facility]:   Not Applicable to this Patient - Not Applicable to this Patient    Planned Readmission: ***     Discharge Statement:  I spent *** minutes discharging the patient. This time was spent on the day of discharge. I had direct contact with the patient on the day of discharge. Greater than 50% of the total time was spent examining patient, answering all patient questions, arranging and discussing plan of care with patient as well as directly providing post-discharge instructions.  Additional time then spent on discharge activities.    ** Please Note: This note has been constructed using a voice recognition system **    Geneva Vasquez MD  01/06/24  5:51 PM

## 2024-01-07 VITALS
BODY MASS INDEX: 20.81 KG/M2 | HEART RATE: 71 BPM | OXYGEN SATURATION: 100 % | HEIGHT: 66 IN | TEMPERATURE: 97.8 F | WEIGHT: 129.5 LBS | RESPIRATION RATE: 18 BRPM | DIASTOLIC BLOOD PRESSURE: 74 MMHG | SYSTOLIC BLOOD PRESSURE: 107 MMHG

## 2024-01-07 LAB
BASOPHILS # BLD AUTO: 0.06 THOUSANDS/ÂΜL (ref 0–0.1)
BASOPHILS NFR BLD AUTO: 1 % (ref 0–1)
EOSINOPHIL # BLD AUTO: 0.13 THOUSAND/ÂΜL (ref 0–0.61)
EOSINOPHIL NFR BLD AUTO: 1 % (ref 0–6)
ERYTHROCYTE [DISTWIDTH] IN BLOOD BY AUTOMATED COUNT: 23.9 % (ref 11.6–15.1)
HCT VFR BLD AUTO: 31.5 % (ref 36.5–49.3)
HGB BLD-MCNC: 9 G/DL (ref 12–17)
IMM GRANULOCYTES # BLD AUTO: 0.07 THOUSAND/UL (ref 0–0.2)
IMM GRANULOCYTES NFR BLD AUTO: 1 % (ref 0–2)
LYMPHOCYTES # BLD AUTO: 2.4 THOUSANDS/ÂΜL (ref 0.6–4.47)
LYMPHOCYTES NFR BLD AUTO: 19 % (ref 14–44)
MCH RBC QN AUTO: 21 PG (ref 26.8–34.3)
MCHC RBC AUTO-ENTMCNC: 28.6 G/DL (ref 31.4–37.4)
MCV RBC AUTO: 74 FL (ref 82–98)
MONOCYTES # BLD AUTO: 1.16 THOUSAND/ÂΜL (ref 0.17–1.22)
MONOCYTES NFR BLD AUTO: 9 % (ref 4–12)
NEUTROPHILS # BLD AUTO: 8.91 THOUSANDS/ÂΜL (ref 1.85–7.62)
NEUTS SEG NFR BLD AUTO: 69 % (ref 43–75)
NRBC BLD AUTO-RTO: 0 /100 WBCS
PLATELET # BLD AUTO: 520 THOUSANDS/UL (ref 149–390)
PMV BLD AUTO: 8.8 FL (ref 8.9–12.7)
RBC # BLD AUTO: 4.28 MILLION/UL (ref 3.88–5.62)
WBC # BLD AUTO: 12.73 THOUSAND/UL (ref 4.31–10.16)

## 2024-01-07 PROCEDURE — 85025 COMPLETE CBC W/AUTO DIFF WBC: CPT

## 2024-01-07 PROCEDURE — 99232 SBSQ HOSP IP/OBS MODERATE 35: CPT | Performed by: FAMILY MEDICINE

## 2024-01-07 RX ORDER — ACETAMINOPHEN 325 MG/1
975 TABLET ORAL EVERY 8 HOURS PRN
Status: DISPENSED | OUTPATIENT
Start: 2024-01-07

## 2024-01-07 RX ADMIN — PALIPERIDONE 3 MG: 3 TABLET, EXTENDED RELEASE ORAL at 21:56

## 2024-01-07 RX ADMIN — METHOCARBAMOL TABLETS 500 MG: 500 TABLET, COATED ORAL at 21:56

## 2024-01-07 RX ADMIN — METHOCARBAMOL TABLETS 500 MG: 500 TABLET, COATED ORAL at 08:06

## 2024-01-07 RX ADMIN — MELATONIN TAB 3 MG 3 MG: 3 TAB at 21:56

## 2024-01-07 RX ADMIN — Medication 250 MG: at 08:06

## 2024-01-07 RX ADMIN — Medication 250 MG: at 21:58

## 2024-01-07 RX ADMIN — METHOCARBAMOL TABLETS 500 MG: 500 TABLET, COATED ORAL at 19:17

## 2024-01-07 RX ADMIN — METHOCARBAMOL TABLETS 500 MG: 500 TABLET, COATED ORAL at 12:19

## 2024-01-07 RX ADMIN — ACETAMINOPHEN 975 MG: 325 TABLET ORAL at 10:39

## 2024-01-07 NOTE — PLAN OF CARE
Problem: PAIN - ADULT  Goal: Verbalizes/displays adequate comfort level or baseline comfort level  Description: Interventions:  - Encourage patient to monitor pain and request assistance  - Assess pain using appropriate pain scale  - Administer analgesics based on type and severity of pain and evaluate response  - Implement non-pharmacological measures as appropriate and evaluate response  - Consider cultural and social influences on pain and pain management  - Notify physician/advanced practitioner if interventions unsuccessful or patient reports new pain  Outcome: Progressing     Problem: INFECTION - ADULT  Goal: Absence or prevention of progression during hospitalization  Description: INTERVENTIONS:  - Assess and monitor for signs and symptoms of infection  - Monitor lab/diagnostic results  - Monitor all insertion sites, i.e. indwelling lines, tubes, and drains  - Monitor endotracheal if appropriate and nasal secretions for changes in amount and color  - [place] appropriate cooling/warming therapies per order  - Administer medications as ordered  - Instruct and encourage patient and family to use good hand hygiene technique  - Identify and instruct in appropriate isolation precautions for identified infection/condition  Outcome: Progressing     Problem: SKIN/TISSUE INTEGRITY - ADULT  Goal: Incision(s), wounds(s) or drain site(s) healing without S/S of infection  Description: INTERVENTIONS  - Assess and document dressing, incision, wound bed, drain sites and surrounding tissue  - Provide patient and family education  - Perform skin care/dressing changes every   Outcome: Progressing

## 2024-01-07 NOTE — PROGRESS NOTES
Progress Note    Carlos Wolf 33 y.o. male MRN: [id number]  Unit/Bed#: 7T [facility] 705-01 Encounter: [id number]  Admitting Physician: Susannah Reed MD  PCP: Kaykay Monroe DO  Date of Admission:  1/3/2024  3:07 PM    Assessment and Plan    Crohn's disease (HCC)  Assessment & Plan  Currently pain 5/10 controlled with Tylenol PRN. No diarrhea or blood in the stool.  S/P surgical I&D and fistulectomy   Infliximab will be started as outpatient per GI recommendations     Plan:   - Hep B labs   - quantiferon gold  Per GI request        Anemia  Assessment & Plan  Hemoglobin today: 9.0 maintaining stable levels    Received Venofer infusion per GI recommendation   Denies dizziness or weakness.    Plan:   -Trend CBC    Schizophrenia (HCC)  Assessment & Plan  Psych recommendations as follows:  Collaborate with collaterals for baseline assessment and disposition.  Introduce Invega 3 mg q.h.s. to address psychosis. Upward titration as necessary and as tolerated.  Presently, patient adheres to criteria for inpatient behavioral health admission by means of voluntary 201 commitment.  Please reconsult psychiatry if patient attempts to leave AGAINST MEDICAL ADVICE to assess for appropriateness of involuntary 302     Plan:  See A/P for schizophreniform    Schizophreniform disorder (HCC)  Assessment & Plan  Admits to active auditory hallucinations this morning. Non commanding or threatening.  Denies SH/SI, homicidal ideations.     Plan:  Invega 3 mg bedtime  Monitor closely  Avoid Haldol; hx of anaphylactic reaction  On Discharge from 7th tower, will be admitted to [facility]    * Cellulitis and abscess of buttock  Assessment & Plan   Seton drain in place. No active bleeding, pain or infectious signs present.  Surgical wound is dressed.   Leukocytosis trending: WBC 13.15> 12.0 >16.19> 12.73   Most recent WBC most likely reactive secondary to surgery.  No growth per blood culture (1 & 2).  Post surgical day #3 I & D and fistulectomy      Plan:   - Metronidazole 250mg IV twice daily    - Switch to PO if transferred to U  - AM CBC.         VTE Pharmacologic Prophylaxis: VTE Score: 2 Low Risk (Score 0-2) - Encourage Ambulation.    Patient Centered Rounds: I have performed bedside rounds with nursing staff today.    Discussions with Specialists or Other Care Team Provider: none    Education and Discussions with Family / Patient: n/a    Time Spent for Care: 30 minutes.  More than 50% of total time spent on counseling and coordination of care as described above.    Current Length of Stay: 2 day(s)    Current Patient Status: Inpatient   Certification Statement: The patient will continue to require additional inpatient hospital stay due to awaiting placement to U     Discharge Plan: U bed available     Code Status: Level 1 - Full Code      Subjective:   Patient seen and examine d at bedside. He is feeling better post surgical pain is well controlled with tylenol. Patient continues to have auditory hallucinations that are not threatening, non commanding and he seems not to be bothered by them. Currently no visual hallucinations. Patient is awaiting placement at U unit once a bed becomes available.     Objective:     Vitals:   Temp (24hrs), Av.4 °F (36.9 °C), Min:97.9 °F (36.6 °C), Max:98.8 °F (37.1 °C)    Temp:  [97.9 °F (36.6 °C)-98.8 °F (37.1 °C)] 98.8 °F (37.1 °C)  HR:  [70-86] 86  Resp:  [16-18] 18  BP: ()/(60-79) 121/79  SpO2:  [98 %-100 %] 99 %  Body mass index is 20.9 kg/m².     Input and Output Summary (last 24 hours):       Intake/Output Summary (Last 24 hours) at 2024 1541  Last data filed at 2024 1238  Gross per 24 hour   Intake 960 ml   Output --   Net 960 ml       Physical Exam:     Physical Exam  Constitutional:       General: He is not in acute distress.     Appearance: Normal appearance. He is not toxic-appearing.   HENT:      Head: Normocephalic.      Right Ear: External ear normal.      Left Ear: External  ear normal.      Nose: Nose normal. No congestion or rhinorrhea.      Mouth/Throat:      Mouth: Mucous membranes are moist.      Pharynx: No posterior oropharyngeal erythema.   Eyes:      General: No scleral icterus.     Extraocular Movements: Extraocular movements intact.      Conjunctiva/sclera: Conjunctivae normal.   Cardiovascular:      Rate and Rhythm: Normal rate and regular rhythm.      Pulses: Normal pulses.      Heart sounds: Normal heart sounds. No murmur heard.  Pulmonary:      Effort: Pulmonary effort is normal.      Breath sounds: Normal breath sounds. No wheezing.   Abdominal:      General: Abdomen is flat. Bowel sounds are normal.      Palpations: Abdomen is soft.      Tenderness: There is no abdominal tenderness.   Musculoskeletal:         General: Normal range of motion.      Cervical back: Normal range of motion and neck supple. No rigidity.      Right lower leg: No edema.      Left lower leg: No edema.   Lymphadenopathy:      Cervical: No cervical adenopathy.   Skin:     General: Skin is warm.      Coloration: Skin is not jaundiced or pale.      Comments: Surgical wound dressed, not able to examine   Neurological:      Mental Status: He is alert and oriented to person, place, and time. Mental status is at baseline.   Psychiatric:         Attention and Perception: He perceives auditory hallucinations.         Mood and Affect: Mood normal. Mood is not anxious or elated. Affect is not blunt or inappropriate.         Speech: Speech is not rapid and pressured.         Behavior: Behavior normal. Behavior is cooperative.         Thought Content: Thought content does not include suicidal ideation. Thought content does not include suicidal plan.         Additional Data:     Labs:    Results from last 7 days   Lab Units 01/07/24  0528   WBC Thousand/uL 12.73*   HEMOGLOBIN g/dL 9.0*   HEMATOCRIT % 31.5*   PLATELETS Thousands/uL 520*   NEUTROS PCT % 69   LYMPHS PCT % 19   MONOS PCT % 9   EOS PCT % 1      Results from last 7 days   Lab Units 01/05/24  0514 01/04/24  0757 01/03/24  1548   POTASSIUM mmol/L 4.0   < > 4.1   CHLORIDE mmol/L 105   < > 100   CO2 mmol/L 26   < > 26   BUN mg/dL 7   < > 6   CREATININE mg/dL 0.82   < > 1.16   CALCIUM mg/dL 8.4   < > 8.8   ALK PHOS U/L  --   --  80   ALT U/L  --   --  6*   AST U/L  --   --  13    < > = values in this interval not displayed.     Results from last 7 days   Lab Units 01/03/24  1548   INR  1.07                 * I Have Reviewed All Lab Data Listed Above.  * Additional Pertinent Lab Tests Reviewed: All Labs Within Last 24 Hours Reviewed    Imaging:    Imaging Reports Reviewed Today Include: none  Imaging Personally Reviewed by Myself Includes:  none    Recent Cultures (last 7 days):     Results from last 7 days   Lab Units 01/04/24  1107 01/03/24  1558 01/03/24  1548   BLOOD CULTURE   --  No Growth at 72 hrs. No Growth at 72 hrs.   C DIFF TOXIN B BY PCR  Positive*  --   --        Last 24 Hours Medication List:   Current Facility-Administered Medications   Medication Dose Route Frequency Provider Last Rate    acetaminophen  975 mg Oral Q8H PRN Susannah Reed MD      [START ON 1/10/2024] ergocalciferol  50,000 Units Oral Weekly Harrison Mayorga DO      melatonin  3 mg Oral HS Harrison Mayorga DO      methocarbamol  500 mg Oral 4x Daily Harrison Mayorga DO      metroNIDAZOLE  250 mg Intravenous BID Harrison Mayorga  mg (01/07/24 0806)    paliperidone  3 mg Oral HS DO Susannah Hussein MD  01/07/24  3:41 PM

## 2024-01-08 LAB
BACTERIA BLD CULT: NORMAL
BACTERIA BLD CULT: NORMAL
GAMMA INTERFERON BACKGROUND BLD IA-ACNC: <0 IU/ML
HBV CORE AB SER QL: NORMAL
HBV SURFACE AB SER-ACNC: 154 MIU/ML
HBV SURFACE AG SER QL: NORMAL
M TB IFN-G BLD-IMP: NEGATIVE
M TB IFN-G CD4+ BCKGRND COR BLD-ACNC: 0 IU/ML
M TB IFN-G CD4+ BCKGRND COR BLD-ACNC: 0.01 IU/ML
MITOGEN IGNF BCKGRD COR BLD-ACNC: 1.97 IU/ML

## 2024-01-08 PROCEDURE — 99232 SBSQ HOSP IP/OBS MODERATE 35: CPT | Performed by: FAMILY MEDICINE

## 2024-01-08 PROCEDURE — 99232 SBSQ HOSP IP/OBS MODERATE 35: CPT | Performed by: PSYCHIATRY & NEUROLOGY

## 2024-01-08 PROCEDURE — 99232 SBSQ HOSP IP/OBS MODERATE 35: CPT | Performed by: PHYSICIAN ASSISTANT

## 2024-01-08 RX ORDER — PALIPERIDONE 6 MG/1
6 TABLET, EXTENDED RELEASE ORAL
Status: DISCONTINUED | OUTPATIENT
Start: 2024-01-08 | End: 2024-01-10 | Stop reason: HOSPADM

## 2024-01-08 RX ADMIN — METHOCARBAMOL TABLETS 500 MG: 500 TABLET, COATED ORAL at 12:55

## 2024-01-08 RX ADMIN — METHOCARBAMOL TABLETS 500 MG: 500 TABLET, COATED ORAL at 18:26

## 2024-01-08 RX ADMIN — MELATONIN TAB 3 MG 3 MG: 3 TAB at 21:28

## 2024-01-08 RX ADMIN — ACETAMINOPHEN 975 MG: 325 TABLET ORAL at 09:25

## 2024-01-08 RX ADMIN — ACETAMINOPHEN 975 MG: 325 TABLET ORAL at 21:45

## 2024-01-08 RX ADMIN — METHOCARBAMOL TABLETS 500 MG: 500 TABLET, COATED ORAL at 21:28

## 2024-01-08 RX ADMIN — Medication 250 MG: at 09:25

## 2024-01-08 RX ADMIN — Medication 250 MG: at 21:29

## 2024-01-08 RX ADMIN — METHOCARBAMOL TABLETS 500 MG: 500 TABLET, COATED ORAL at 09:25

## 2024-01-08 RX ADMIN — PALIPERIDONE 6 MG: 6 TABLET, EXTENDED RELEASE ORAL at 21:28

## 2024-01-08 NOTE — UTILIZATION REVIEW
NOTIFICATION OF INPATIENT MEDICAL ADMISSION   AUTHORIZATION REQUEST   SERVICING FACILITY:   [address] [address]  Tax ID: [id number]  NPI: [id number] ATTENDING PROVIDER:  Attending Name and NPI#: Kayli Day Md [[id number]]  Address: [address] [place] [address]  Phone: [phone]     ADMISSION INFORMATION:  Place of Service: Inpatient [facility]  Place of Service Code: 21  Inpatient Admission Date/Time: 1/5/24  1:52 PM  Discharge Date/Time: No discharge date for patient encounter.  Admitting Diagnosis Code/Description:  Cellulitis [L03.90]  Crohn's disease (HCC) [K50.90]  Abdominal pain [R10.9]  Anemia [D64.9]  Other schizoaffective disorders (HCC) [F25.8]     UTILIZATION REVIEW CONTACT:  Criselda Brown, Utilization   Network Utilization Review Department  Phone: [phone]  Fax [phone]  Email: [email or web address]  Contact for approvals/pending authorizations, clinical reviews, and discharge.     PHYSICIAN ADVISORY SERVICES:  Medical Necessity Denial & Iffn-cv-Njic Review  Phone: [phone]  Fax: [phone]  Email: [email or web address]     DISCHARGE SUPPORT TEAM:  For Patients Discharge Needs & Updates  Phone: [phone] opt. 2 Fax: [phone]  Email: [email or web address]          Vivian Kraft RN  Registered Nurse  Specialty: Utilization Review     Utilization Review      Signed     Date of Service: 1/5/2024  4:59 PM     Signed         Initial Clinical Review   Observation on 01/03 @ 1935 upgraded to Inpatient on 01/05 @ 1352 d/t cont IV abx therapy for cellulitis and abscess of buttock.        Admission Orders (From admission, onward)          Ordered         01/05/24 1352   Inpatient Admission  Once             01/03/24 1935   Place in Observation  Once                                     Orders Placed This Encounter   Procedures    Inpatient Admission        Standing Status:   Standing       Number of Occurrences:   1       Order Specific Question:   Level of Care       Answer:   Med Surg [16]       Order Specific Question:   Estimated length of stay       Answer:   More than 2 Midnights       Order Specific Question:   Certification       Answer:   I certify that inpatient services are medically necessary for this patient for a duration of greater than two midnights. See H&P and MD Progress Notes for additional information about the patient's course of treatment.         Date: 01/05                           Current Patient Class: IP                 Current Level of Care: MS     HPI:33 y.o. male initially admitted on 01/05     Assessment/Plan:   01/04 Psych Consult: Psychosis, unspecified; consideration for schizophrenia versus schizoaffective disorder, bipolar type   Plan: Invega 3 mg q.h.s. to address psychosis. Upward titration as necessary and as tolerated. adheres to criteria for inpatient behavioral health admission by means of voluntary 201 commitment.  if patient attempts to leave AGAINST MEDICAL ADVICE, psych will reassess for  appropriateness of involuntary 302 commitment.      01/05  IM Notes:Leukocytosis improving.  WBC 13.15> 12.02. No growth per blood culture (1 & 2).   Continues to endorse perianal pain due to abscess.  Continues to endorse perianal pain due to abscess. Continues to endorse auditory and visual hallucinations.  Denies SH, SI, homicidal ideations. Surgery today for I&D. Ceftriaxone 1 g daily. Metronidazole 500 mg 3 times daily. AM CBC. Invega 3 mg bedtime. Monitor closely.        Vital Signs:   ate/Time Temp Pulse Resp BP MAP (mmHg) SpO2 O2 Flow Rate (L/min) O2 Device Patient Position - Orthostatic VS   01/05/24 1619 -- 69 18 127/88 96 -- -- -- Lying   01/05/24 1549 -- 52 Abnormal  18 109/80 80 -- -- -- Lying   01/05/24 1519 -- 52 Abnormal  16 108/75 80 -- -- -- Lying   01/05/24 1504 -- 50 Abnormal  16 109/78 83 -- -- -- Lying    01/05/24 1449 -- 52 Abnormal  16 109/80 85 -- -- -- Lying   01/05/24 1434 97.1 °F (36.2 °C) Abnormal  58 16 106/72 86 98 % -- None (Room air) Lying   01/05/24 1400 -- -- 14 -- -- -- -- None (Room air) --   01/05/24 1345 -- 76 14 100/56 74 100 % 4 L/min Simple mask --   01/05/24 1344 -- -- 16 100/56 -- -- 4 L/min Simple mask --   01/05/24 1337 97.6 °F (36.4 °C) 75 16 96/54 -- 100 % 4 L/min Simple mask --   01/05/24 1106 97.5 °F (36.4 °C) 63 18 113/78 86 99 % -- None (Room air) Lying   01/05/24 0729 96.8 °F (36 °C) Abnormal  59 16 124/87 96 100 % -- None (Room air) Lying   01/05/24 0400 97.5 °F (36.4 °C) 61 18 108/71 -- 100 % -- None (Room air) Lying   01/05/24 0001 97.4 °F (36.3 °C) Abnormal  72 16 104/61 -- 100 % -- None (Room air) Lying   01/04/24 2000 97.6 °F (36.4 °C) 72 17 113/75 82 100 % -- None (Room air) Lying   01/04/24 1515 97.1 °F (36.2 °C) Abnormal  72 16 133/82 100 100 % -- None (Room air) Sitti      Pertinent Labs/Diagnostic Results:        Results from last 7 days   Lab Units 01/03/24  1558   SARS-COV-2   Negative             Results from last 7 days   Lab Units 01/05/24  0514 01/04/24  0757 01/03/24  1548   WBC Thousand/uL 12.02* 13.15* 10.85*   HEMOGLOBIN g/dL 8.4* 8.2* 7.0*   HEMATOCRIT % 28.8* 27.6* 26.0*   PLATELETS Thousands/uL 564* 554* 661*   NEUTROS ABS Thousands/µL  --  8.77* 8.22*                 Results from last 7 days   Lab Units 01/05/24  0514 01/04/24  0757 01/03/24  1548   SODIUM mmol/L 138 136 135   POTASSIUM mmol/L 4.0 4.0 4.1   CHLORIDE mmol/L 105 102 100   CO2 mmol/L 26 26 26   ANION GAP mmol/L 7 8 9   BUN mg/dL 7 8 6   CREATININE mg/dL 0.82 1.04 1.16   EGFR ml/min/1.73sq m 116 93 82   CALCIUM mg/dL 8.4 8.1* 8.8   MAGNESIUM mg/dL  --   --  2.0           Results from last 7 days   Lab Units 01/03/24  1548   AST U/L 13   ALT U/L 6*   ALK PHOS U/L 80   TOTAL PROTEIN g/dL 8.1   ALBUMIN g/dL 3.7   TOTAL BILIRUBIN mg/dL 0.26                 Results from last 7 days   Lab Units  "01/05/24  0514 01/04/24  0757 01/03/24  1548   GLUCOSE RANDOM mg/dL 91 112 99              No results found for: \"BETA-HYDROXYBUTYRATE\"            Results from last 7 days   Lab Units 01/03/24  1542   PH BHARATHI   7.366   PCO2 BHARATHI mm Hg 48.5   PO2 BHARATHI mm Hg 31.7*   HCO3 BHARATHI mmol/L 27.2   BASE EXC BHARATHI mmol/L 1.5   O2 CONTENT BHARATHI ml/dL 6.0   O2 HGB, VENOUS % 52.6*                           Results from last 7 days   Lab Units 01/03/24  1548   PROTIME seconds 14.2   INR   1.07   PTT seconds 33               Results from last 7 days   Lab Units 01/03/24  1548   PROCALCITONIN ng/ml 0.12            Results from last 7 days   Lab Units 01/03/24  1818 01/03/24  1542   LACTIC ACID mmol/L 0.6 2.2*                       Results from last 7 days   Lab Units 01/04/24  0757   FERRITIN ng/mL 9*   IRON ug/dL 287*   TIBC ug/dL <342               Results from last 7 days   Lab Units 01/04/24  0615   UNIT PRODUCT CODE   [id number]  [id number]   UNIT NUMBER   [id number]  [id number]   UNITABO   A  A   UNITRH   POS  POS   CROSSMATCH   Compatible  Compatible   UNIT DISPENSE STATUS   Presumed Trans  Presumed Trans   UNIT PRODUCT VOL ml 300  300               Results from last 7 days   Lab Units 01/03/24  1548   LIPASE u/L 7*           Results from last 7 days   Lab Units 01/04/24  0757   CRP mg/L 60.1*   SED RATE mm/hour 99*                   Results from last 7 days   Lab Units 01/03/24  1644   CLARITY UA   Slightly Cloudy*   COLOR UA   Tsering*   SPEC GRAV UA   1.025   PH UA   6.0   GLUCOSE UA mg/dl Negative   KETONES UA mg/dl Negative   BLOOD UA   Negative   PROTEIN UA mg/dl 30 (1+)*   NITRITE UA   Negative   BILIRUBIN UA   Negative   UROBILINOGEN UA mg/dL Negative   LEUKOCYTES UA   Negative   WBC UA /hpf 1-2   RBC UA /hpf None Seen   BACTERIA UA /hpf Moderate*   EPITHELIAL CELLS WET PREP /hpf Occasional   MUCUS THREADS   Innumerable*           Results from last 7 days   Lab Units 01/03/24  1558   INFLUENZA A PCR   Negative "   INFLUENZA B PCR   Negative   RSV PCR   Negative                       Results from last 7 days   Lab Units 01/04/24  1107   C DIFF TOXIN B BY PCR   Positive*           Results from last 7 days   Lab Units 01/04/24  1107   SALMONELLA SP PCR   None Detected   SHIGELLA SP/ENTEROINVASIVE E. COLI (EIEC)   None Detected   CAMPYLOBACTER SP (JEJUNI AND COLI)   None Detected   SHIGA TOXIN 1/SHIGA TOXIN 2   None Detected                Results from last 7 days   Lab Units 01/03/24  1558 01/03/24  1548   BLOOD CULTURE   No Growth at 24 hrs. No Growth at 24 hrs.           Results from last 7 days   Lab Units 01/05/24  0514   TOTAL COUNTED   100                 Medications:   Scheduled Medications:  [START ON 1/10/2024] ergocalciferol, 50,000 Units, Oral, Weekly  melatonin, 3 mg, Oral, HS  methocarbamol, 500 mg, Oral, 4x Daily  metroNIDAZOLE, 250 mg, Intravenous, BID  paliperidone, 3 mg, Oral, HS  cefTRIAXone (ROCEPHIN) IVPB (premix in dextrose) 1,000 mg 50 mL  Dose: 1,000 mg  Freq: Every 24 hours Route: IV  Last Dose: 1,000 mg (01/05/24 1109)  Start: 01/04/24 0900 End: 01/05/24 1514         Continuous IV Infusions:  lactated ringers, 50 mL/hr, Intravenous, Continuous        PRN Meds:  acetaminophen, 160 mg, Oral, Q6H PRN           Discharge Plan: D     Network Utilization Review Department  ATTENTION: Please call with any questions or concerns to [phone] and carefully listen to the prompts so that you are directed to the right person. All voicemails are confidential.   For Discharge needs, contact Care Management DC Support Team at [phone] opt. 2  Send all requests for admission clinical reviews, approved or denied determinations and any other requests to dedicated fax number below belonging to the campus where the patient is receiving treatment. List of dedicated fax numbers for the Facilities:  FACILITY NAME UR FAX NUMBER   ADMISSION DENIALS (Administrative/Medical Necessity) [phone]   DISCHARGE SUPPORT  TEAM (NETWORK) [phone]   PARENT CHILD HEALTH (Maternity/NICU/Pediatrics) [phone]   [facility] [phone]   [facility] [phone]   [facility] [phone]   [facility] [phone]   [facility] [phone]   [facility] [phone]   [facility] [phone]   [facility] [phone]   [facility] [phone]   [facility] [phone]   [facility] [phone]                           Matilda Feliciano RN  Registered Nurse  Specialty: Utilization Review     Utilization Review      Signed     Date of Service: 1/6/2024 11:55 AM     Signed         Continued Stay Review     Date:   1/6/24                           Current Patient Class:  Inpatient                 Current Level of Care:  med surg     HPI:33 y.o. male initially admitted on Observation on 01/03 @ 1935 upgraded to Inpatient on 01/05 @ 1352 d/t cont IV abx therapy for cellulitis and abscess of buttock.       Assessment/Plan:   1/6  Continue  CAROLINE.   Start  remicade  once infection is cleared  OP.   Continue  current meds.        Vital Signs:   98.7-78-18     109/60     sats 99 % Ra     Pertinent Labs/Diagnostic Results:        Results from last 7 days   Lab Units 01/03/24  1558   SARS-COV-2   Negative              Results from last 7 days   Lab Units 01/06/24  0554 01/05/24  0514 01/04/24  0757 01/03/24  1548   WBC Thousand/uL 16.19* 12.02* 13.15* 10.85*   HEMOGLOBIN g/dL 8.6* 8.4* 8.2* 7.0*   HEMATOCRIT % 30.0* 28.8* 27.6* 26.0*   PLATELETS Thousands/uL 599* 564* 554* 661*   NEUTROS ABS Thousands/µL 11.50*  --  8.77* 8.22*                 Results from last 7 days   Lab Units 01/05/24  0514 01/04/24  0757 01/03/24  1548   SODIUM mmol/L  138 136 135   POTASSIUM mmol/L 4.0 4.0 4.1   CHLORIDE mmol/L 105 102 100   CO2 mmol/L 26 26 26   ANION GAP mmol/L 7 8 9   BUN mg/dL 7 8 6   CREATININE mg/dL 0.82 1.04 1.16   EGFR ml/min/1.73sq m 116 93 82   CALCIUM mg/dL 8.4 8.1* 8.8   MAGNESIUM mg/dL  --   --  2.0                    Results from last 7 days   Lab Units 01/05/24  0514 01/04/24  0757 01/03/24  1548   GLUCOSE RANDOM mg/dL 91 112 99                            Results from last 7 days   Lab Units 01/04/24  0757   FERRITIN ng/mL 9*   IRON ug/dL 287*   TIBC ug/dL <342               Results from last 7 days   Lab Units 01/04/24  0615   UNIT PRODUCT CODE   [id number]  [id number]   UNIT NUMBER   [id number]  [id number]   UNITABO   A  A   UNITRH   POS  POS   CROSSMATCH   Compatible  Compatible   UNIT DISPENSE STATUS   Presumed Trans  Presumed Trans   UNIT PRODUCT VOL ml 300  300                         Results from last 7 days   Lab Units 01/04/24  1107   C DIFF TOXIN B BY PCR   Positive*           Results from last 7 days   Lab Units 01/04/24  1107   SALMONELLA SP PCR   None Detected   SHIGELLA SP/ENTEROINVASIVE E. COLI (EIEC)   None Detected   CAMPYLOBACTER SP (JEJUNI AND COLI)   None Detected   SHIGA TOXIN 1/SHIGA TOXIN 2   None Detected                Results from last 7 days   Lab Units 01/03/24  1558 01/03/24  1548   BLOOD CULTURE   No Growth at 48 hrs. No Growth at 48 hrs.           Results from last 7 days   Lab Units 01/05/24  0514   TOTAL COUNTED   100                 Medications:   Scheduled Medications:  [START ON 1/10/2024] ergocalciferol, 50,000 Units, Oral, Weekly  melatonin, 3 mg, Oral, HS  methocarbamol, 500 mg, Oral, 4x Daily  metroNIDAZOLE, 250 mg, Intravenous, BID  paliperidone, 3 mg, Oral, HS        Continuous IV Infusions:  lactated ringers, 50 mL/hr, Intravenous, Continuous        PRN Meds:  acetaminophen, 160 mg, Oral, Q6H PRN           Discharge Plan:  TBD     Network Utilization Review Department  ATTENTION: Please call  with any questions or concerns to [phone] and carefully listen to the prompts so that you are directed to the right person. All voicemails are confidential.   For Discharge needs, contact Care Management DC Support Team at [phone] opt. 2  Send all requests for admission clinical reviews, approved or denied determinations and any other requests to dedicated fax number below belonging to the [place] where the patient is receiving treatment. List of dedicated fax numbers for the Facilities:  FACILITY NAME UR FAX NUMBER   ADMISSION DENIALS (Administrative/Medical Necessity) [phone]   DISCHARGE SUPPORT TEAM (NETWORK) [phone]   PARENT CHILD HEALTH (Maternity/NICU/Pediatrics) [phone]   [facility] [phone]   [facility] [phone]   [facility] [phone]   [facility] [phone]   [facility] [phone]   [facility] [phone]   [facility] [phone]   [facility] [phone]   [facility] [phone]   [facility] [phone]   [facility] [phone]

## 2024-01-08 NOTE — PROGRESS NOTES
"  PSYCHIATRY CONSULT SERVICE  PROGRESS NOTE    Carlos Wolf 33 y.o. male MRN: [id number]  Unit/Bed#: 7T [facility] 705-01 Encounter: [id number]     ASSESSMENT / PLAN     Carlos Wolf is a 33 y.o. male, single, lives in room that he rents from stepfather, with medical history of Crohn's disease, in the past pertinent psychiatric history of psychosis unspecified vs schizophrenia vs schizophreniform, who initially presented to [facility] ED for abdominal discomfort and drainage from fistula on buttock.  Patient was admitted to [facility] medical floor for medical treatment of physical complaints.  While being treated on the medical floor, the patient endorsed psychiatric symptoms leading to a psychiatric consult being ordered.  Secondary to patient's current psychotic symptoms, patient is a limited historian decreasing his ability to give a clear and concise history of presenting illness. On initial evaluation , patient  endorsed anxiety, AVH, and paranoia that have been present since 2010 but began to significantly increase and worsen in June 2023. Patient denied depression, mood instability, suicidal ideation, and homicidal ideation. On follow-up examination today, patient continues to endorse AVH, paranoid ideations, anxiety.  Patient also reports \"PTSD\".  However it is unclear if patient really understands what this means. His thought process is still disorganized and tangential with loose associations. Patient continues to deny active/passive SI and HI.  Patient denies significant side effects to Invega thus far. Patient is still in agreement with voluntary admission to an inpatient psychiatric unit.  Patient is medically cleared and bed search is underway at this time.  At this time, we will continue to follow-up with this patient daily to assess whether or not further optimization is needed prior to going to inpatient psychiatric unit.         Principal Psychiatric Problem:  Psychosis " "unspecified  DDx includes but is not limited to schizophrenia versus schizoaffective disorder      Principal Problem:    Cellulitis and abscess of buttock  Active Problems:    Schizophreniform disorder (HCC)    Anemia    Crohn's disease (HCC)      RECOMMENDED TREATMENT     Discussed plan with primary team as follows:  Hospital labs reviewed.  Collaborate with collaterals for further assessment and disposition as indicated.  Patient has signed 201 for voluntary admission, awaiting inpatient psychiatry placement.  Patient was medically cleared today by [facility].  The bed search is underway   Recommend the following psychotropic medication regimen at this time:  Increase Invega to 6 mg nightly  Continue medical management per primary team.  Observation level: Not on observation, currently not indicated  Psychiatry will continue to follow as needed. Please contact our service via [employer] with any additional questions or concerns. If contacting after hours, please call or TigerText the on-call team ([employer]: [phone]) with any questions or concerns.  Please reach out to on-call Psychiatry team via [employer], or if after hours/Fri/Sat/Sunday contact on-call psychiatric service via [employer] ([phone]) with any questions or concerns.         SUBJECTIVE     The patient was seen and evaluated today for continuity of care. On interview, the patient is calm, cooperative, pleasant with this writer.  Patient reports \"pretty good\" mood.   Patient reports \"a little\" anxiety today.  Also reports PTSD but when asked about what, patient was unable to give clear answer.  Therefore it is unclear whether or not patient truly understands the meaning of PTSD.  Patient continues to report auditory and visual hallucinations as well as paranoia.  Patient said he hears voices and sees \"transformers\" across the street.  Patient reports being paranoid about if \"they are going to ignore me\". today, patient denies depression, active/passive SI, " "HI.     Psychiatric Review of Systems:  Behavior over the last 24 hours: unchanged  Sleep: Patient reports taking more \"catnaps\"  Appetite: No issues reported  Medication side effects: denies  ROS: Pain in right buttocks and upper right thigh    OBJECTIVE     Vital signs in last 24 hours:  Temp:  [96.6 °F (35.9 °C)-98.8 °F (37.1 °C)] 96.6 °F (35.9 °C)  HR:  [71-96] 96  Resp:  [18] 18  BP: (107-122)/(74-79) 122/75    Appearance: alert and appears stated age, appears to have adequate hygiene, slightly disheveled, wearing hospital clothes, wearing hospital cap on head  Behavior: Calm, cooperative, pleasant, appropriate eye contact, sitting comfortably in chair watching videos on phone  Motor: no abnormal movements  Gait/Station: normal gait/station, sitting in chair  Speech:normal volume, normal pitch, rate still slightly increased but improved compared to last week, not pressured  Mood: \"Pretty good\"  Affect: slightly brighter, anxious edge slightly decreased  Thought process: disorganized, tangential, concrete  Associations: tangential associations  Thought content: paranoid ideation regarding \"if they are going to ignore me\"  Perceptual disturbances:  Reports continued AVH, appears to be responding to internal stimuli  Risk potential:   Suicidal ideation - None at present  Homicidal ideation - None at present  Potential for aggression -not at present  Cognition: cognition not formally tested  Sensorium: oriented to person and situation  Consciousness: alert and awake  Attention/Concentration: attention span and concentration appear shorter than expected for age  Insight: Limited  Judgment: Limited     Suicide/Homicide Risk Assessment:  Risk of Harm to Self:   Current Specific Risk Factors include: Denies active/passive SI  Risk of Harm to Others:  Current Specific Risk Factors include: Denies HI            ACTIVE MEDICATIONS     Current Medications:  Current Facility-Administered Medications   Medication Dose Route " Frequency Provider Last Rate    acetaminophen  975 mg Oral Q8H PRN Susannah Reed MD      [START ON 1/10/2024] ergocalciferol  50,000 Units Oral Weekly Harrison Mayorga DO      melatonin  3 mg Oral HS Harrison Mayorga DO      methocarbamol  500 mg Oral 4x Daily Harrison Mayorga DO      metroNIDAZOLE  250 mg Intravenous BID Harrisonkristi Mayorga  mg (01/08/24 0925)    paliperidone  6 mg Oral HS Jordan C Holter,          Behavioral Health Medications: I have personally reviewed all current active medications. Changes as in plan section above.      ADDITIONAL DATA     EKG Results: I have personally reviewed pertinent reports.  Results for orders placed or performed during the hospital encounter of 01/03/24 (from the past 1000 hour(s))   ECG 12 lead    Collection Time: 01/03/24  3:38 PM   Result Value    Ventricular Rate 104    Atrial Rate 104    KY Interval 126    QRSD Interval 122    QT Interval 346    QTC Interval 454    P Axis 77    QRS Axis 72    T Wave Axis 47    Narrative    Sinus tachycardia  Right bundle branch block  Abnormal ECG  Confirmed by Carlos Gray ([id number]) on 1/3/2024 4:14:29 PM     *Note: Due to a large number of results and/or encounters for the requested time period, some results have not been displayed. A complete set of results can be found in Results Review.       Radiology Results: I have personally reviewed pertinent reports. I have personally reviewed pertinent films in PACS.  CT abdomen pelvis with contrast    Result Date: 1/3/2024  Impression: 1. Extensive perianal and bilateral gluteal fold cellulitis. Collection extending from the perianal region 1:00 o'clock,  along the left gluteal fold and perineum measuring 3 x 1 x 3 cm, consistent with an abscess and/or fistula. 2. Mild diffuse colitis sparing the sigmoid colon. Mild distal ileitis. 3. Suggestion of mild proctitis. Workstation performed: [id number]     No Chest XR results available for this  patient.     Laboratory Results: I have personally reviewed all pertinent laboratory/tests results.  Recent Results (from the past 48 hour(s))   Hepatitis B surface antigen    Collection Time: 01/06/24  3:44 PM   Result Value Ref Range    Hepatitis B Surface Ag Non-reactive Non-Reactive   Hepatitis B surface antibody    Collection Time: 01/06/24  3:44 PM   Result Value Ref Range    Hep B S Ab 154.00 3-500 mIU/mL mIU/mL   CBC and differential    Collection Time: 01/07/24  5:28 AM   Result Value Ref Range    WBC 12.73 (H) 4.31 - 10.16 Thousand/uL    RBC 4.28 3.88 - 5.62 Million/uL    Hemoglobin 9.0 (L) 12.0 - 17.0 g/dL    Hematocrit 31.5 (L) 36.5 - 49.3 %    MCV 74 (L) 82 - 98 fL    MCH 21.0 (L) 26.8 - 34.3 pg    MCHC 28.6 (L) 31.4 - 37.4 g/dL    RDW 23.9 (H) 11.6 - 15.1 %    MPV 8.8 (L) 8.9 - 12.7 fL    Platelets 520 (H) 149 - 390 Thousands/uL    nRBC 0 /100 WBCs    Neutrophils Relative 69 43 - 75 %    Immat GRANS % 1 0 - 2 %    Lymphocytes Relative 19 14 - 44 %    Monocytes Relative 9 4 - 12 %    Eosinophils Relative 1 0 - 6 %    Basophils Relative 1 0 - 1 %    Neutrophils Absolute 8.91 (H) 1.85 - 7.62 Thousands/µL    Immature Grans Absolute 0.07 0.00 - 0.20 Thousand/uL    Lymphocytes Absolute 2.40 0.60 - 4.47 Thousands/µL    Monocytes Absolute 1.16 0.17 - 1.22 Thousand/µL    Eosinophils Absolute 0.13 0.00 - 0.61 Thousand/µL    Basophils Absolute 0.06 0.00 - 0.10 Thousands/µL        This note was not shared with the patient due to reasonable likelihood of causing patient harm        Cassandra Rodas DO  Department of Psychiatry and Behavioral Health  [facility]

## 2024-01-08 NOTE — PROGRESS NOTES
"Patient Name: Carlos Wolf  Patient MRN: [id number]  Date: 01/08/24  Service: [facility]    Subjective   No AM labs ordered. Vitals stable. Hgb remained stable over the weekend. Patient reports one soft dark brown stool this morning without blood. Eating breakfast without difficulty. All questions answered.     Vitals  Blood pressure 107/74, pulse 71, temperature 97.8 °F (36.6 °C), temperature source Temporal, resp. rate 18, height 5' 6\" (1.676 m), weight 58.7 kg (129 lb 8 oz), SpO2 100%.  Physical Exam:     General Appearance:    Awake, alert, oriented x3, no distress, well developed, well    Nourished. Sitting OOB in chair   Head:    Normocephalic without obvious abnormality   Eyes:    PERRL, conjunctiva/corneas clear, EOM's intact        Neck:   Supple, no adenopathy   Throat:   Mucous membranes moist   Lungs:     Clear to auscultation bilaterally, no wheezing or rhonchi   Heart:    Regular rate and rhythm, S1 and S2 normal, no murmur   Abdomen:     Soft, non-tender, non-distended. bowel sounds active. No    masses, rebound or guarding.    Extremities:   Extremities without edema   Psych  Derm:   Normal mood    No jaundice   Neurologic:   CNII-XII grossly intact. Speech intact         Laboratory Studies  Results from last 7 days   Lab Units 01/07/24  0528 01/06/24  0554 01/05/24  0514 01/04/24  0757 01/03/24  1548   WBC Thousand/uL 12.73* 16.19* 12.02* 13.15* 10.85*   HEMOGLOBIN g/dL 9.0* 8.6* 8.4* 8.2* 7.0*   HEMATOCRIT % 31.5* 30.0* 28.8* 27.6* 26.0*   PLATELETS Thousands/uL 520* 599* 564* 554* 661*   INR   --   --   --   --  1.07     Results from last 7 days   Lab Units 01/05/24  0514 01/04/24  0757 01/03/24  1548   SODIUM mmol/L 138 136 135   POTASSIUM mmol/L 4.0 4.0 4.1   CHLORIDE mmol/L 105 102 100   CO2 mmol/L 26 26 26   BUN mg/dL 7 8 6   CREATININE mg/dL 0.82 1.04 1.16   CALCIUM mg/dL 8.4 8.1* 8.8   ALBUMIN g/dL  --   --  3.7   TOTAL BILIRUBIN mg/dL  --   --  0.26   ALK PHOS U/L  " --   --  80   ALT U/L  --   --  6*   AST U/L  --   --  13         Imaging and Other Studies      Inhouse Medications     Current Facility-Administered Medications:     acetaminophen (TYLENOL) tablet 975 mg, 975 mg, Oral, Q8H PRN, 975 mg at 01/07/24 1039    [START ON 1/10/2024] ergocalciferol (VITAMIN D2) capsule 50,000 Units, 50,000 Units, Oral, Weekly    melatonin tablet 3 mg, 3 mg, Oral, HS, 3 mg at 01/07/24 2156    methocarbamol (ROBAXIN) tablet 500 mg, 500 mg, Oral, 4x Daily, 500 mg at 01/07/24 2156    metroNIDAZOLE (FLAGYL) (HIGH DOSE) IVPB 250 mg, 250 mg, Intravenous, BID, 250 mg at 01/07/24 2158    paliperidone (INVEGA) 24 hr tablet 3 mg, 3 mg, Oral, HS, 3 mg at 01/07/24 2156      Assessment/Plan:    33 year old male with severe ileocolonic and perianal fistulizing Crohn's disease admitted with perianal cellulitis and abscess. Imaging also shows mild diffuse colitis, distal ileitis, and proctitis. Status post EUA, I&D of perianal abscess, fistulectomy and seton placement by Colorectal and General surgery, POD #3. Infliximab to be started as outpatient 2/2 unable to have infusions while on [facility] per notes. Quantiferon gold, Hep B labs pending (note:pt with indeterminate Quant gold x3 results previously, seen by ID). Continue antibiotics (IV Flagyl with plans to transition to PO). Fecal calprotectin pending.  Outpatient follow up with Dr. Rogers in outpatient clinic. Currently on regular diet - consider low fiber/res modifier.  Patient prefers follow up with [facility] after discharge.  Chronic iron deficiency anemia 2/2 Crohns. Status post 2u pRBCs and Venofer since admission. No active gi bleeding reported. Continue to monitor, transfuse as needed.    Schizophreniform disorder, previously untreated. Started on Invega. Plans to transfer to [facility] per primary service.           Bethanie Shepherd PA-C

## 2024-01-08 NOTE — CASE MANAGEMENT
Case Management Discharge Planning Note    Patient name Carlos Wolf  Location 7T [facility] 705/7T 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    Severe protein-calorie malnutrition (HCC) 2023    Esophagitis 2023    Anemia 2023    Crohn's disease (HCC) 2023    Schizophreniform disorder (HCC) 2018    Schizophrenia (HCC) 2018    Pilonidal cyst with abscess 10/07/2015    RBBB 10/07/2015    Tobacco abuse 10/07/2015      LOS (days): 3  Geometric Mean LOS (GMLOS) (days):   Days to GMLOS:     OBJECTIVE:  Risk of Unplanned Readmission Score: 11.19         Current admission status: Inpatient   Preferred Pharmacy:   [id number] - [place] - [address] [address]  Phone: [phone] Fax: [phone]    [id number] - [place] - [address] [facility]  [address] [address]  Phone: [phone] Fax: [phone]    Primary Care Provider: Kaykay Monroe DO    Primary Insurance: MEDICARE  Secondary Insurance: [place] [employer] [email or web address] [facility]    DISCHARGE DETAILS:    Other Referral/Resources/Interventions Provided:  Interventions: Psych Rehab  Referral Comments: 201       Additional Comments: Per medical teams at rounds patient medically cleared for U admission.  201 faxed to [facility].  TT to [facility] and confirmed referral recieved.  Reviewing for admission

## 2024-01-08 NOTE — PROGRESS NOTES
Progress Note    Carlos Wolf 33 y.o. male MRN: [id number]  Unit/Bed#: 7T [facility] 705-01 Encounter: [id number]  Admitting Physician: Wing Hutchinson MD  PCP: Kaykay Monroe DO  Date of Admission:  1/3/2024  3:07 PM    Assessment and Plan    * Cellulitis and abscess of buttock  Assessment & Plan   Seton drain in place. No active bleeding, pain or infectious signs present.  Surgical wound is dressed.   Leukocytosis trending: WBC 13.15> 12.0 >16.19> 12.73   Most recent WBC most likely reactive secondary to surgery.  No growth per blood culture (1 & 2).  Post surgical day #3 I & D and fistulectomy     Plan:   - Metronidazole 250mg IV twice daily 4/5   - Switch to PO if transferred to [facility]  - AM CBC.     Crohn's disease (HCC)  Assessment & Plan  Currently pain 5/10 controlled with Tylenol PRN. No diarrhea or blood in the stool.  S/P surgical I&D and fistulectomy   Infliximab will be started as outpatient per GI recommendations     Plan:   - Hep B labs   - quantiferon gold  Per GI request        Anemia  Assessment & Plan  Hemoglobin today: 9.0 maintaining stable levels    Received Venofer infusion per GI recommendation   Denies dizziness or weakness.    Plan:   -Trend CBC    Schizophrenia (HCC)  Assessment & Plan  Psych recommendations as follows:  Collaborate with collaterals for baseline assessment and disposition.  Introduce Invega 3 mg q.h.s. to address psychosis. Upward titration as necessary and as tolerated.  Presently, patient adheres to criteria for inpatient behavioral health admission by means of voluntary 201 commitment.  Please reconsult psychiatry if patient attempts to leave AGAINST MEDICAL ADVICE to assess for appropriateness of involuntary 302     Plan:  See A/P for schizophreniform    Schizophreniform disorder (HCC)  Assessment & Plan  Admits to active auditory hallucinations this morning. Non commanding or threatening.  Denies SH/SI, homicidal ideations.   On Invega 6 mg HS for management;dose increased  by psych today.    Plan:  Invega 6 mg bedtime  Monitor closely  Avoid Haldol; hx of anaphylactic reaction  On Discharge from 7[facility] tower, will be admitted to U        VTE Pharmacologic Prophylaxis: VTE Score: 2 Low Risk (Score 0-2) - Encourage Ambulation.    Patient Centered Rounds: I have performed bedside rounds with nursing staff today.    Discussions with Specialists or Other Care Team Provider: Psychiatry    Education and Discussions with Family / Patient: N/A  Patient Information Sharing: N/A  Time Spent for Care: 20 minutes.  More than 50% of total time spent on counseling and coordination of care as described above.    Current Length of Stay: 3 day(s)    Current Patient Status: Inpatient   Certification Statement: The patient will continue to require additional inpatient hospital stay due to cellulitis and abscess of buttock    Discharge Plan: None    Code Status: Level 1 - Full Code      Subjective:   Patient seen and assessed this morning.  Overall doing better.  Denies fever, chills, nausea, vomiting, diarrhea.  Endorses mild abdominal tenderness although has improved since admission.  Continues to endorse auditory and visual hallucinations.  Denies SH/SI or homicidal ideations.    Medical management discussed with patient and is amenable.    Objective:     Vitals:   Temp (24hrs), Av.7 °F (36.5 °C), Min:96.6 °F (35.9 °C), Max:98.8 °F (37.1 °C)    Temp:  [96.6 °F (35.9 °C)-98.8 °F (37.1 °C)] 96.6 °F (35.9 °C)  HR:  [71-96] 96  Resp:  [18] 18  BP: (107-122)/(74-79) 122/75  SpO2:  [99 %-100 %] 100 %  Body mass index is 20.9 kg/m².     Input and Output Summary (last 24 hours):       Intake/Output Summary (Last 24 hours) at 2024 1453  Last data filed at 2024 1315  Gross per 24 hour   Intake 1920 ml   Output --   Net 1920 ml       Physical Exam:     Physical Exam  Constitutional:       General: He is not in acute distress.     Appearance: Normal appearance.   HENT:      Head: Normocephalic and  atraumatic.      Nose: Nose normal.   Eyes:      Extraocular Movements: Extraocular movements intact.   Cardiovascular:      Rate and Rhythm: Normal rate and regular rhythm.      Pulses: Normal pulses.      Heart sounds: Normal heart sounds. No murmur heard.  Pulmonary:      Effort: Pulmonary effort is normal.      Breath sounds: Normal breath sounds.   Abdominal:      Tenderness: There is abdominal tenderness (LLQ tenderness).   Musculoskeletal:         General: Normal range of motion.      Cervical back: Normal range of motion and neck supple.   Skin:     General: Skin is warm and dry.      Capillary Refill: Capillary refill takes less than 2 seconds.   Neurological:      General: No focal deficit present.      Mental Status: He is alert.   Psychiatric:         Speech: Speech is rapid and pressured.         Behavior: Behavior is not agitated or aggressive.         Thought Content: Thought content does not include homicidal or suicidal ideation. Thought content does not include homicidal or suicidal plan.      Comments: Auditory visual hallucinations.        Additional Data:     Labs:    Results from last 7 days   Lab Units 01/07/24  0528   WBC Thousand/uL 12.73*   HEMOGLOBIN g/dL 9.0*   HEMATOCRIT % 31.5*   PLATELETS Thousands/uL 520*   NEUTROS PCT % 69   LYMPHS PCT % 19   MONOS PCT % 9   EOS PCT % 1     Results from last 7 days   Lab Units 01/05/24  0514 01/04/24  0757 01/03/24  1548   POTASSIUM mmol/L 4.0   < > 4.1   CHLORIDE mmol/L 105   < > 100   CO2 mmol/L 26   < > 26   BUN mg/dL 7   < > 6   CREATININE mg/dL 0.82   < > 1.16   CALCIUM mg/dL 8.4   < > 8.8   ALK PHOS U/L  --   --  80   ALT U/L  --   --  6*   AST U/L  --   --  13    < > = values in this interval not displayed.     Results from last 7 days   Lab Units 01/03/24  1548   INR  1.07                 * I Have Reviewed All Lab Data Listed Above.  * Additional Pertinent Lab Tests Reviewed: All Labs For Current Hospital Admission  Reviewed    Imaging:    Imaging Reports Reviewed Today Include: N/A  Imaging Personally Reviewed by Myself Includes: N/A    Recent Cultures (last 7 days):     Results from last 7 days   Lab Units 01/04/24  1107 01/03/24  1558 01/03/24  1548   BLOOD CULTURE   --  No Growth After 4 Days. No Growth After 4 Days.   C DIFF TOXIN B BY PCR  Positive*  --   --        Last 24 Hours Medication List:   Current Facility-Administered Medications   Medication Dose Route Frequency Provider Last Rate    acetaminophen  975 mg Oral Q8H PRN Susannah Reed MD      [START ON 1/10/2024] ergocalciferol  50,000 Units Oral Weekly Harrison Mayorga DO      melatonin  3 mg Oral HS Harrison Mayorga DO      methocarbamol  500 mg Oral 4x Daily Harrison Mayorga DO      metroNIDAZOLE  250 mg Intravenous BID Harrison Mayorga  mg (01/08/24 0925)    paliperidone  6 mg Oral HS Jordan C Holter, DO          ** Please Note: Dictation voice to text software may have been used in the creation of this document. **    Wing Hutchinson MD  01/08/24  2:53 PM

## 2024-01-08 NOTE — PLAN OF CARE
Problem: PAIN - ADULT  Goal: Verbalizes/displays adequate comfort level or baseline comfort level  Description: Interventions:  - Encourage patient to monitor pain and request assistance  - Assess pain using appropriate pain scale  - Administer analgesics based on type and severity of pain and evaluate response  - Implement non-pharmacological measures as appropriate and evaluate response  - Consider cultural and social influences on pain and pain management  - Notify physician/advanced practitioner if interventions unsuccessful or patient reports new pain  Outcome: Progressing     Problem: INFECTION - ADULT  Goal: Absence or prevention of progression during hospitalization  Description: INTERVENTIONS:  - Assess and monitor for signs and symptoms of infection  - Monitor lab/diagnostic results  - Monitor all insertion sites, i.e. indwelling lines, tubes, and drains  - Monitor endotracheal if appropriate and nasal secretions for changes in amount and color  - [place] appropriate cooling/warming therapies per order  - Administer medications as ordered  - Instruct and encourage patient and family to use good hand hygiene technique  - Identify and instruct in appropriate isolation precautions for identified infection/condition  Outcome: Progressing     Problem: SKIN/TISSUE INTEGRITY - ADULT  Goal: Incision(s), wounds(s) or drain site(s) healing without S/S of infection  Description: INTERVENTIONS  - Assess and document dressing, incision, wound bed, drain sites and surrounding tissue  - Provide patient and family education  - Perform skin care/dressing changes every   Outcome: Progressing

## 2024-01-09 LAB
BACTERIA BLD CULT: NORMAL
BACTERIA BLD CULT: NORMAL
BASOPHILS # BLD AUTO: 0.07 THOUSANDS/ÂΜL (ref 0–0.1)
BASOPHILS NFR BLD AUTO: 0 % (ref 0–1)
EOSINOPHIL # BLD AUTO: 0.19 THOUSAND/ÂΜL (ref 0–0.61)
EOSINOPHIL NFR BLD AUTO: 1 % (ref 0–6)
ERYTHROCYTE [DISTWIDTH] IN BLOOD BY AUTOMATED COUNT: 24.4 % (ref 11.6–15.1)
HCT VFR BLD AUTO: 29.4 % (ref 36.5–49.3)
HGB BLD-MCNC: 8.3 G/DL (ref 12–17)
IMM GRANULOCYTES # BLD AUTO: 0.05 THOUSAND/UL (ref 0–0.2)
IMM GRANULOCYTES NFR BLD AUTO: 0 % (ref 0–2)
LYMPHOCYTES # BLD AUTO: 2.77 THOUSANDS/ÂΜL (ref 0.6–4.47)
LYMPHOCYTES NFR BLD AUTO: 18 % (ref 14–44)
MCH RBC QN AUTO: 20.7 PG (ref 26.8–34.3)
MCHC RBC AUTO-ENTMCNC: 28.2 G/DL (ref 31.4–37.4)
MCV RBC AUTO: 73 FL (ref 82–98)
MONOCYTES # BLD AUTO: 1.71 THOUSAND/ÂΜL (ref 0.17–1.22)
MONOCYTES NFR BLD AUTO: 11 % (ref 4–12)
NEUTROPHILS # BLD AUTO: 10.89 THOUSANDS/ÂΜL (ref 1.85–7.62)
NEUTS SEG NFR BLD AUTO: 70 % (ref 43–75)
NRBC BLD AUTO-RTO: 0 /100 WBCS
PLATELET # BLD AUTO: 584 THOUSANDS/UL (ref 149–390)
PMV BLD AUTO: 8.2 FL (ref 8.9–12.7)
RBC # BLD AUTO: 4.01 MILLION/UL (ref 3.88–5.62)
WBC # BLD AUTO: 15.68 THOUSAND/UL (ref 4.31–10.16)

## 2024-01-09 PROCEDURE — 85025 COMPLETE CBC W/AUTO DIFF WBC: CPT

## 2024-01-09 PROCEDURE — 99232 SBSQ HOSP IP/OBS MODERATE 35: CPT | Performed by: FAMILY MEDICINE

## 2024-01-09 PROCEDURE — 99232 SBSQ HOSP IP/OBS MODERATE 35: CPT | Performed by: PSYCHIATRY & NEUROLOGY

## 2024-01-09 RX ADMIN — METHOCARBAMOL TABLETS 500 MG: 500 TABLET, COATED ORAL at 21:36

## 2024-01-09 RX ADMIN — METHOCARBAMOL TABLETS 500 MG: 500 TABLET, COATED ORAL at 17:15

## 2024-01-09 RX ADMIN — Medication 250 MG: at 21:35

## 2024-01-09 RX ADMIN — METHOCARBAMOL TABLETS 500 MG: 500 TABLET, COATED ORAL at 09:29

## 2024-01-09 RX ADMIN — METHOCARBAMOL TABLETS 500 MG: 500 TABLET, COATED ORAL at 12:07

## 2024-01-09 RX ADMIN — ACETAMINOPHEN 975 MG: 325 TABLET ORAL at 12:09

## 2024-01-09 RX ADMIN — ACETAMINOPHEN 975 MG: 325 TABLET ORAL at 21:36

## 2024-01-09 RX ADMIN — Medication 250 MG: at 09:29

## 2024-01-09 RX ADMIN — PALIPERIDONE 6 MG: 6 TABLET, EXTENDED RELEASE ORAL at 21:36

## 2024-01-09 RX ADMIN — MELATONIN TAB 3 MG 3 MG: 3 TAB at 21:36

## 2024-01-09 NOTE — PROGRESS NOTES
Progress Note    Carlos Wolf 33 y.o. male MRN: [id number]  Unit/Bed#: 7T [facility] 705-01 Encounter: [id number]  Admitting Physician: Harrison Mayorga DO  PCP: Kaykay Monroe DO  Date of Admission:  1/3/2024  3:07 PM    Assessment and Plan    * Cellulitis and abscess of buttock  Assessment & Plan   Seton drain in place. No active bleeding, pain or infectious signs present.  Surgical wound is dressed.   Leukocytosis trending: WBC 13.15> 12.0 >16.19> 12.73   Most recent WBC most likely reactive secondary to surgery.  No growth per blood culture (1 & 2).  Post surgical day #4 I & D and fistulectomy   Medically cleared for discharge    Plan:   - Metronidazole 250mg IV twice daily 5/5   - AM CBC.     Schizophreniform disorder (HCC)  Assessment & Plan  Admits to active auditory hallucinations this morning. Non commanding or threatening.  Denies SH/SI, homicidal ideations.       Plan:  Invega 6 mg bedtime  Monitor closely  Avoid Haldol; hx of anaphylactic reaction  On Discharge from 7th tower, will be admitted to [facility] waiting for that at this time    Crohn's disease (HCC)  Assessment & Plan  Currently pain 5/10 controlled with Tylenol PRN. No diarrhea or blood in the stool.  S/P surgical I&D and fistulectomy   Infliximab will be started as outpatient per GI recommendations     Anemia  Assessment & Plan  Hemoglobin today: 9.0 maintaining stable levels    Received Venofer infusion per GI recommendation   Denies dizziness or weakness.    Plan:   -Trend CBC        VTE Pharmacologic Prophylaxis: VTE Score: 2 Low Risk (Score 0-2) - Encourage Ambulation.    Patient Centered Rounds: I have performed bedside rounds with nursing staff today.    Discussions with Specialists or Other Care Team Provider: Discussion with psych.  Will continue Invega 6 mg.  Awaiting for PEG placement        Time Spent for Care: 45 minutes.  More than 50% of total time spent on counseling and coordination of care as described above.    Current  Length of Stay: 4 day(s)    Current Patient Status: Inpatient   Certification Statement: The patient will continue to require additional inpatient hospital stay due to requiring additional bed    Discharge Plan: Will discharge the psych service    Code Status: Level 1 - Full Code      Subjective:   Patient doing well this morning.  He has no acute complaints.  States that his buttocks does not cause him pain.  Continues to have auditory and visual hallucinations.  Denies suicidal or homicidal ideation.  Denies fevers or chills    Objective:     Vitals:   Temp (24hrs), Av.3 °F (36.8 °C), Min:97.6 °F (36.4 °C), Max:99 °F (37.2 °C)    Temp:  [97.6 °F (36.4 °C)-99 °F (37.2 °C)] 97.6 °F (36.4 °C)  HR:  [91-96] 95  Resp:  [18] 18  BP: (108-119)/(69-79) 119/79  SpO2:  [98 %-100 %] 99 %  Body mass index is 20.9 kg/m².     Input and Output Summary (last 24 hours):       Intake/Output Summary (Last 24 hours) at 2024 1847  Last data filed at 2024 1700  Gross per 24 hour   Intake 960 ml   Output --   Net 960 ml       Physical Exam:     Physical Exam  Constitutional:       General: He is not in acute distress.     Appearance: Normal appearance.   HENT:      Head: Normocephalic and atraumatic.      Nose: Nose normal.   Eyes:      Extraocular Movements: Extraocular movements intact.   Cardiovascular:      Rate and Rhythm: Normal rate and regular rhythm.      Pulses: Normal pulses.      Heart sounds: Normal heart sounds. No murmur heard.  Pulmonary:      Effort: Pulmonary effort is normal.      Breath sounds: Normal breath sounds.   Abdominal:      Tenderness: There is no abdominal tenderness (LLQ tenderness).   Musculoskeletal:         General: Normal range of motion.      Cervical back: Normal range of motion and neck supple.   Skin:     General: Skin is warm and dry.      Capillary Refill: Capillary refill takes less than 2 seconds.   Neurological:      General: No focal deficit present.      Mental Status: He is  alert.   Psychiatric:         Speech: Speech is rapid and pressured.         Behavior: Behavior is not agitated or aggressive.         Thought Content: Thought content does not include homicidal or suicidal ideation. Thought content does not include homicidal or suicidal plan.      Comments: Auditory visual hallucinations.            Additional Data:     Labs:    Results from last 7 days   Lab Units 01/09/24  0534   WBC Thousand/uL 15.68*   HEMOGLOBIN g/dL 8.3*   HEMATOCRIT % 29.4*   PLATELETS Thousands/uL 584*   NEUTROS PCT % 70   LYMPHS PCT % 18   MONOS PCT % 11   EOS PCT % 1     Results from last 7 days   Lab Units 01/05/24  0514 01/04/24  0757 01/03/24  1548   POTASSIUM mmol/L 4.0   < > 4.1   CHLORIDE mmol/L 105   < > 100   CO2 mmol/L 26   < > 26   BUN mg/dL 7   < > 6   CREATININE mg/dL 0.82   < > 1.16   CALCIUM mg/dL 8.4   < > 8.8   ALK PHOS U/L  --   --  80   ALT U/L  --   --  6*   AST U/L  --   --  13    < > = values in this interval not displayed.     Results from last 7 days   Lab Units 01/03/24  1548   INR  1.07                 * I Have Reviewed All Lab Data Listed Above.  * Additional Pertinent Lab Tests Reviewed: All Labs For Current Hospital Admission Reviewed        Recent Cultures (last 7 days):     Results from last 7 days   Lab Units 01/04/24  1107 01/03/24  1558 01/03/24  1548   BLOOD CULTURE   --  No Growth After 5 Days. No Growth After 5 Days.   C DIFF TOXIN B BY PCR  Positive*  --   --        Last 24 Hours Medication List:   Current Facility-Administered Medications   Medication Dose Route Frequency Provider Last Rate    acetaminophen  975 mg Oral Q8H PRN Susannah Reed MD      [START ON 1/10/2024] ergocalciferol  50,000 Units Oral Weekly Harrison Mayorga DO      melatonin  3 mg Oral HS Harrison Mayorga DO      methocarbamol  500 mg Oral 4x Daily Harrison Mayorga DO      metroNIDAZOLE  250 mg Intravenous BID Harrison Mayorga  mg (01/09/24 0929)     paliperidone  6 mg Oral HS Jordan C Holter, DO          ** Please Note: Dictation voice to text software may have been used in the creation of this document. **    Harrison Mayorga DO  01/09/24  6:47 PM

## 2024-01-09 NOTE — PROGRESS NOTES
PSYCHIATRY CONSULT SERVICE  PROGRESS NOTE    Carlos Wolf 33 y.o. male MRN: [id number]  Unit/Bed#: 7T [facility] 705-01 Encounter: [id number]     ASSESSMENT / PLAN     Carlos Wolf is a 33 y.o. male, single, lives in room that he rents from stepfather, with medical history of Crohn's disease, in the past pertinent psychiatric history of psychosis unspecified vs schizophrenia vs schizophreniform, who initially presented to [facility] ED for abdominal discomfort and drainage from fistula on buttock.  Patient was admitted to [facility] medical floor for medical treatment of physical complaints.  While being treated on the medical floor, the patient endorsed psychiatric symptoms leading to a psychiatric consult being ordered.  Secondary to patient's current psychotic symptoms, patient is a limited historian decreasing his ability to give a clear and concise history of presenting illness. On initial evaluation , patient  endorsed anxiety, AVH, and paranoia that have been present since 2010 but began to significantly increase and worsen in June 2023. Patient denied depression, mood instability, suicidal ideation, and homicidal ideation. On follow-up examination today, patient continues to endorse AVH, paranoid ideations, anxiety regarding AVH.  Patient continues to deny active/passive SI and HI.  Patient denies significant side effects to increased dose of Invega thus far. Patient is still in agreement with voluntary admission to an inpatient psychiatric unit.  Bed search continues.  At this time, we will sign off.  Will defer to inpatient psychiatric team for further medication optimization.  Please reach out to our service via Tiger text if patient's or concerns arise.    Principal Psychiatric Problem:  Psychosis unspecified  DDx includes but is not limited to schizophrenia versus schizoaffective disorder      Principal Problem:    Cellulitis and abscess of buttock  Active Problems:    Schizophreniform  "disorder (HCC)    Anemia    Crohn's disease (HCC)      RECOMMENDED TREATMENT     Discussed plan with primary team as follows:  Hospital labs reviewed.  Collaborate with collaterals for further assessment and disposition as indicated.  Patient has signed 201 for voluntary admission, awaiting inpatient psychiatry placement  Recommend no changes to psychotropic medications at this time  Continue Invega 6 mg nightly  Will defer to inpatient psychiatric team for further medication optimization.  Continue medical management per primary team.  Observation level: Not on observation, currently not indicated  Psychiatry will sign off at this time. Please reach out to our service via [employer] for re-evaluation as needed. If contacting after hours, please call or TigerText the on-call team ([employer]: [phone]) with any questions or concerns.  Please reach out to on-call Psychiatry team via [employer]Kind, or if after hours/Fri/Sat/Sunday contact on-call psychiatric service via [employer] ([phone]) with any questions or concerns.          SUBJECTIVE     The patient was seen and evaluated today for continuity of care. On interview, the patient is calm and cooperative with interview.  Patient reports his mood as \"anxious\" today.  Patient report \"a little\" anxiety related to his continued paranoid ideations and AVH.  Patient reports that the voices \"keep yelling\" at him.  He states that he has not experienced any improvement in paranoia or AVH since increasing Invega to 6 mg yesterday evening.  Patient denies active/passive SI, HI, depression.    Psychiatric Review of Systems:  Behavior over the last 24 hours: unchanged.  Patient has remained in behavioral control.  Sleep: Patient reports that he did not fall asleep last night until 3 or 4 AM.  Patient says that he was \"up last night thinking about the voices\"  Appetite: Denies issues or changes   Medication side effects: denies  ROS: Reports slight headache, continued pain in right " "buttocks and upper thigh secondary to surgery    OBJECTIVE     Vital signs in last 24 hours:  Temp:  [98.2 °F (36.8 °C)-99 °F (37.2 °C)] 99 °F (37.2 °C)  HR:  [91-97] 91  Resp:  [18] 18  BP: (108-119)/(69-76) 108/76    Appearance: Male, alert, appears stated age, continues to wear hospital attire and surgical cap on head, appears to have appropriate hygiene, slightly disheveled  Behavior: watching videos on phone upon approach, calm , cooperative, limited eye contact, sitting in chair during interview   Motor: no abnormal movements  Gait/Station:  unable to assess, sitting in chair   Speech: Rate of speech more increased today, bordering on pressured, slightly more difficult to interrupt, normal volume, normal pitch  Mood: \"Anxious\"  Affect: constricted with a slightly more anxious edge compared to yesterday  Thought process: disorganized, tangential, flight of ideas, concrete  Associations: Loose associations  Thought content: paranoid ideation  Perceptual disturbances:  Reports continued AVH, easily distractible, appears to be responding to internal stimuli  Risk potential:   Suicidal ideation - None  Homicidal ideation - None  Potential for aggression - Not at present  Cognition/sensorium: oriented to self and situation and cognition not formally tested  Consciousness: alert and awake  Attention/Concentration: attention span and concentration appear shorter than expected for age.  Patient is easily distracted.  Insight: Limited  Judgment: Limited       ACTIVE MEDICATIONS     Current Medications:  Current Facility-Administered Medications   Medication Dose Route Frequency Provider Last Rate    acetaminophen  975 mg Oral Q8H PRN Susannah Reed MD      [START ON 1/10/2024] ergocalciferol  50,000 Units Oral Weekly Harrison Mayorga DO      melatonin  3 mg Oral HS Harrison Mayorga DO      methocarbamol  500 mg Oral 4x Daily Harrison Mayorga DO      metroNIDAZOLE  250 mg Intravenous BID Harrison " Chetna Mayorga  mg (01/09/24 0929)    paliperidone  6 mg Oral HS Jordan C Holter, DO         Behavioral Health Medications: I have personally reviewed all current active medications. Changes as in plan section above.      ADDITIONAL DATA     EKG Results: I have personally reviewed pertinent reports.  Results for orders placed or performed during the hospital encounter of 01/03/24 (from the past 1000 hour(s))   ECG 12 lead    Collection Time: 01/03/24  3:38 PM   Result Value    Ventricular Rate 104    Atrial Rate 104    DE Interval 126    QRSD Interval 122    QT Interval 346    QTC Interval 454    P Axis 77    QRS Axis 72    T Wave Axis 47    Narrative    Sinus tachycardia  Right bundle branch block  Abnormal ECG  Confirmed by Carlos Gray ([id number]) on 1/3/2024 4:14:29 PM     *Note: Due to a large number of results and/or encounters for the requested time period, some results have not been displayed. A complete set of results can be found in Results Review.       Radiology Results: I have personally reviewed pertinent reports. I have personally reviewed pertinent films in PACS.  CT abdomen pelvis with contrast    Result Date: 1/3/2024  Impression: 1. Extensive perianal and bilateral gluteal fold cellulitis. Collection extending from the perianal region 1:00 o'clock,  along the left gluteal fold and perineum measuring 3 x 1 x 3 cm, consistent with an abscess and/or fistula. 2. Mild diffuse colitis sparing the sigmoid colon. Mild distal ileitis. 3. Suggestion of mild proctitis. Workstation performed: [id number]     No Chest XR results available for this patient.     Laboratory Results: I have personally reviewed all pertinent laboratory/tests results.  Recent Results (from the past 48 hour(s))   CBC and differential    Collection Time: 01/09/24  5:34 AM   Result Value Ref Range    WBC 15.68 (H) 4.31 - 10.16 Thousand/uL    RBC 4.01 3.88 - 5.62 Million/uL    Hemoglobin 8.3 (L) 12.0 - 17.0 g/dL    Hematocrit 29.4  (L) 36.5 - 49.3 %    MCV 73 (L) 82 - 98 fL    MCH 20.7 (L) 26.8 - 34.3 pg    MCHC 28.2 (L) 31.4 - 37.4 g/dL    RDW 24.4 (H) 11.6 - 15.1 %    MPV 8.2 (L) 8.9 - 12.7 fL    Platelets 584 (H) 149 - 390 Thousands/uL    nRBC 0 /100 WBCs    Neutrophils Relative 70 43 - 75 %    Immat GRANS % 0 0 - 2 %    Lymphocytes Relative 18 14 - 44 %    Monocytes Relative 11 4 - 12 %    Eosinophils Relative 1 0 - 6 %    Basophils Relative 0 0 - 1 %    Neutrophils Absolute 10.89 (H) 1.85 - 7.62 Thousands/µL    Immature Grans Absolute 0.05 0.00 - 0.20 Thousand/uL    Lymphocytes Absolute 2.77 0.60 - 4.47 Thousands/µL    Monocytes Absolute 1.71 (H) 0.17 - 1.22 Thousand/µL    Eosinophils Absolute 0.19 0.00 - 0.61 Thousand/µL    Basophils Absolute 0.07 0.00 - 0.10 Thousands/µL        This note was not shared with the patient due to reasonable likelihood of causing patient harm        Cassandra Rodas DO  Department of Psychiatry and Behavioral Health  [facility]

## 2024-01-09 NOTE — PLAN OF CARE
Problem: INFECTION - ADULT  Goal: Absence or prevention of progression during hospitalization  Description: INTERVENTIONS:  - Assess and monitor for signs and symptoms of infection  - Monitor lab/diagnostic results  - Monitor all insertion sites, i.e. indwelling lines, tubes, and drains  - Monitor endotracheal if appropriate and nasal secretions for changes in amount and color  - Douglas appropriate cooling/warming therapies per order  - Administer medications as ordered  - Instruct and encourage patient and family to use good hand hygiene technique  - Identify and instruct in appropriate isolation precautions for identified infection/condition  Outcome: Progressing     Problem: PAIN - ADULT  Goal: Verbalizes/displays adequate comfort level or baseline comfort level  Description: Interventions:  - Encourage patient to monitor pain and request assistance  - Assess pain using appropriate pain scale  - Administer analgesics based on type and severity of pain and evaluate response  - Implement non-pharmacological measures as appropriate and evaluate response  - Consider cultural and social influences on pain and pain management  - Notify physician/advanced practitioner if interventions unsuccessful or patient reports new pain  Outcome: Progressing     Problem: SKIN/TISSUE INTEGRITY - ADULT  Goal: Incision(s), wounds(s) or drain site(s) healing without S/S of infection  Description: INTERVENTIONS  - Assess and document dressing, incision, wound bed, drain sites and surrounding tissue  - Provide patient and family education  - Perform skin care/dressing changes every   Outcome: Progressing

## 2024-01-10 ENCOUNTER — HOSPITAL ENCOUNTER (INPATIENT)
Facility: HOSPITAL | Age: 34
LOS: 20 days | Discharge: HOME HEALTH | DRG: 580 | End: 2024-01-30
Attending: STUDENT IN AN ORGANIZED HEALTH CARE EDUCATION/TRAINING PROGRAM | Admitting: PSYCHIATRY & NEUROLOGY
Payer: MEDICARE

## 2024-01-10 VITALS
RESPIRATION RATE: 16 BRPM | TEMPERATURE: 97.6 F | BODY MASS INDEX: 20.81 KG/M2 | HEART RATE: 84 BPM | OXYGEN SATURATION: 98 % | HEIGHT: 66 IN | SYSTOLIC BLOOD PRESSURE: 112 MMHG | WEIGHT: 129.5 LBS | DIASTOLIC BLOOD PRESSURE: 80 MMHG

## 2024-01-10 DIAGNOSIS — K21.9 GERD (GASTROESOPHAGEAL REFLUX DISEASE): ICD-10-CM

## 2024-01-10 DIAGNOSIS — L03.317 CELLULITIS AND ABSCESS OF BUTTOCK: ICD-10-CM

## 2024-01-10 DIAGNOSIS — Z00.8 MEDICAL CLEARANCE FOR PSYCHIATRIC ADMISSION: ICD-10-CM

## 2024-01-10 DIAGNOSIS — K50.90 CROHN'S DISEASE (HCC): ICD-10-CM

## 2024-01-10 DIAGNOSIS — E55.9 VITAMIN D DEFICIENCY: ICD-10-CM

## 2024-01-10 DIAGNOSIS — F20.0 SCHIZOPHRENIA, PARANOID TYPE (HCC): Primary | Chronic | ICD-10-CM

## 2024-01-10 DIAGNOSIS — K20.90 ESOPHAGITIS: ICD-10-CM

## 2024-01-10 DIAGNOSIS — K60.3 PERIANAL FISTULA: ICD-10-CM

## 2024-01-10 DIAGNOSIS — L05.01 PILONIDAL CYST WITH ABSCESS: ICD-10-CM

## 2024-01-10 DIAGNOSIS — G89.29 CHRONIC PAIN OF LEFT KNEE: ICD-10-CM

## 2024-01-10 DIAGNOSIS — L02.31 CELLULITIS AND ABSCESS OF BUTTOCK: ICD-10-CM

## 2024-01-10 DIAGNOSIS — M25.562 CHRONIC PAIN OF LEFT KNEE: ICD-10-CM

## 2024-01-10 LAB
ALBUMIN SERPL BCP-MCNC: 3.2 G/DL (ref 3.5–5)
ALP SERPL-CCNC: 50 U/L (ref 34–104)
ALT SERPL W P-5'-P-CCNC: 5 U/L (ref 7–52)
ANION GAP SERPL CALCULATED.3IONS-SCNC: 10 MMOL/L
AST SERPL W P-5'-P-CCNC: 8 U/L (ref 13–39)
BASOPHILS # BLD AUTO: 0.07 THOUSANDS/ÂΜL (ref 0–0.1)
BASOPHILS NFR BLD AUTO: 1 % (ref 0–1)
BILIRUB SERPL-MCNC: 0.13 MG/DL (ref 0.2–1)
BUN SERPL-MCNC: 9 MG/DL (ref 5–25)
CALCIUM ALBUM COR SERPL-MCNC: 8.6 MG/DL (ref 8.3–10.1)
CALCIUM SERPL-MCNC: 8 MG/DL (ref 8.4–10.2)
CALPROTECTIN STL-MCNT: 472 UG/G (ref 0–120)
CHLORIDE SERPL-SCNC: 102 MMOL/L (ref 96–108)
CO2 SERPL-SCNC: 24 MMOL/L (ref 21–32)
CREAT SERPL-MCNC: 0.8 MG/DL (ref 0.6–1.3)
EOSINOPHIL # BLD AUTO: 0.22 THOUSAND/ÂΜL (ref 0–0.61)
EOSINOPHIL NFR BLD AUTO: 2 % (ref 0–6)
ERYTHROCYTE [DISTWIDTH] IN BLOOD BY AUTOMATED COUNT: 24.7 % (ref 11.6–15.1)
GFR SERPL CREATININE-BSD FRML MDRD: 117 ML/MIN/1.73SQ M
GLUCOSE SERPL-MCNC: 82 MG/DL (ref 65–140)
HCT VFR BLD AUTO: 28 % (ref 36.5–49.3)
HGB BLD-MCNC: 8.3 G/DL (ref 12–17)
IMM GRANULOCYTES # BLD AUTO: 0.05 THOUSAND/UL (ref 0–0.2)
IMM GRANULOCYTES NFR BLD AUTO: 0 % (ref 0–2)
LYMPHOCYTES # BLD AUTO: 2.64 THOUSANDS/ÂΜL (ref 0.6–4.47)
LYMPHOCYTES NFR BLD AUTO: 18 % (ref 14–44)
MCH RBC QN AUTO: 21.8 PG (ref 26.8–34.3)
MCHC RBC AUTO-ENTMCNC: 29.6 G/DL (ref 31.4–37.4)
MCV RBC AUTO: 74 FL (ref 82–98)
MONOCYTES # BLD AUTO: 1.7 THOUSAND/ÂΜL (ref 0.17–1.22)
MONOCYTES NFR BLD AUTO: 11 % (ref 4–12)
NEUTROPHILS # BLD AUTO: 10.41 THOUSANDS/ÂΜL (ref 1.85–7.62)
NEUTS SEG NFR BLD AUTO: 68 % (ref 43–75)
NRBC BLD AUTO-RTO: 0 /100 WBCS
PLATELET # BLD AUTO: 575 THOUSANDS/UL (ref 149–390)
PMV BLD AUTO: 8.2 FL (ref 8.9–12.7)
POTASSIUM SERPL-SCNC: 4.1 MMOL/L (ref 3.5–5.3)
PROT SERPL-MCNC: 7 G/DL (ref 6.4–8.4)
RBC # BLD AUTO: 3.81 MILLION/UL (ref 3.88–5.62)
SODIUM SERPL-SCNC: 136 MMOL/L (ref 135–147)
WBC # BLD AUTO: 15.09 THOUSAND/UL (ref 4.31–10.16)

## 2024-01-10 PROCEDURE — NC001 PR NO CHARGE: Performed by: FAMILY MEDICINE

## 2024-01-10 PROCEDURE — 80053 COMPREHEN METABOLIC PANEL: CPT | Performed by: STUDENT IN AN ORGANIZED HEALTH CARE EDUCATION/TRAINING PROGRAM

## 2024-01-10 PROCEDURE — 93005 ELECTROCARDIOGRAM TRACING: CPT

## 2024-01-10 PROCEDURE — 88304 TISSUE EXAM BY PATHOLOGIST: CPT | Performed by: SPECIALIST

## 2024-01-10 PROCEDURE — 85025 COMPLETE CBC W/AUTO DIFF WBC: CPT | Performed by: STUDENT IN AN ORGANIZED HEALTH CARE EDUCATION/TRAINING PROGRAM

## 2024-01-10 PROCEDURE — 99238 HOSP IP/OBS DSCHRG MGMT 30/<: CPT | Performed by: FAMILY MEDICINE

## 2024-01-10 RX ORDER — DIPHENHYDRAMINE HYDROCHLORIDE 50 MG/ML
50 INJECTION INTRAMUSCULAR; INTRAVENOUS EVERY 6 HOURS PRN
Status: DISCONTINUED | OUTPATIENT
Start: 2024-01-10 | End: 2024-01-30 | Stop reason: HOSPADM

## 2024-01-10 RX ORDER — METHOCARBAMOL 500 MG/1
500 TABLET, FILM COATED ORAL 4 TIMES DAILY
Status: DISCONTINUED | OUTPATIENT
Start: 2024-01-10 | End: 2024-01-30 | Stop reason: HOSPADM

## 2024-01-10 RX ORDER — OLANZAPINE 5 MG/1
5 TABLET ORAL
Status: CANCELLED | OUTPATIENT
Start: 2024-01-10

## 2024-01-10 RX ORDER — HYDROXYZINE HYDROCHLORIDE 25 MG/1
25 TABLET, FILM COATED ORAL
Status: DISCONTINUED | OUTPATIENT
Start: 2024-01-10 | End: 2024-01-30 | Stop reason: HOSPADM

## 2024-01-10 RX ORDER — DIPHENHYDRAMINE HYDROCHLORIDE 50 MG/ML
50 INJECTION INTRAMUSCULAR; INTRAVENOUS EVERY 6 HOURS PRN
Status: CANCELLED | OUTPATIENT
Start: 2024-01-10

## 2024-01-10 RX ORDER — HYDROXYZINE HYDROCHLORIDE 25 MG/1
25 TABLET, FILM COATED ORAL
Status: CANCELLED | OUTPATIENT
Start: 2024-01-10

## 2024-01-10 RX ORDER — ACETAMINOPHEN 325 MG/1
650 TABLET ORAL EVERY 4 HOURS PRN
Status: CANCELLED | OUTPATIENT
Start: 2024-01-10

## 2024-01-10 RX ORDER — BENZTROPINE MESYLATE 1 MG/1
1 TABLET ORAL
Status: CANCELLED | OUTPATIENT
Start: 2024-01-10

## 2024-01-10 RX ORDER — METHOCARBAMOL 500 MG/1
500 TABLET, FILM COATED ORAL 4 TIMES DAILY
Status: CANCELLED | OUTPATIENT
Start: 2024-01-10

## 2024-01-10 RX ORDER — HYDROXYZINE 50 MG/1
100 TABLET, FILM COATED ORAL
Status: DISCONTINUED | OUTPATIENT
Start: 2024-01-10 | End: 2024-01-30 | Stop reason: HOSPADM

## 2024-01-10 RX ORDER — HYDROXYZINE 50 MG/1
100 TABLET, FILM COATED ORAL
Status: CANCELLED | OUTPATIENT
Start: 2024-01-10

## 2024-01-10 RX ORDER — PREDNISONE 10 MG/1
10 TABLET ORAL DAILY
Status: DISCONTINUED | OUTPATIENT
Start: 2024-01-11 | End: 2024-01-17

## 2024-01-10 RX ORDER — ERGOCALCIFEROL 1.25 MG/1
50000 CAPSULE ORAL WEEKLY
Status: DISCONTINUED | OUTPATIENT
Start: 2024-01-17 | End: 2024-01-30 | Stop reason: HOSPADM

## 2024-01-10 RX ORDER — ACETAMINOPHEN 325 MG/1
650 TABLET ORAL EVERY 4 HOURS PRN
Status: DISCONTINUED | OUTPATIENT
Start: 2024-01-10 | End: 2024-01-30 | Stop reason: HOSPADM

## 2024-01-10 RX ORDER — OLANZAPINE 10 MG/1
10 TABLET ORAL
Status: CANCELLED | OUTPATIENT
Start: 2024-01-10

## 2024-01-10 RX ORDER — PALIPERIDONE 6 MG/1
6 TABLET, EXTENDED RELEASE ORAL
Status: DISCONTINUED | OUTPATIENT
Start: 2024-01-10 | End: 2024-01-12

## 2024-01-10 RX ORDER — OLANZAPINE 10 MG/2ML
10 INJECTION, POWDER, FOR SOLUTION INTRAMUSCULAR
Status: CANCELLED | OUTPATIENT
Start: 2024-01-10

## 2024-01-10 RX ORDER — PROPRANOLOL HYDROCHLORIDE 10 MG/1
10 TABLET ORAL EVERY 8 HOURS PRN
Status: DISCONTINUED | OUTPATIENT
Start: 2024-01-10 | End: 2024-01-30 | Stop reason: HOSPADM

## 2024-01-10 RX ORDER — OLANZAPINE 5 MG/1
5 TABLET ORAL
Status: DISCONTINUED | OUTPATIENT
Start: 2024-01-10 | End: 2024-01-30 | Stop reason: HOSPADM

## 2024-01-10 RX ORDER — ACETAMINOPHEN 325 MG/1
650 TABLET ORAL EVERY 6 HOURS PRN
Status: DISCONTINUED | OUTPATIENT
Start: 2024-01-10 | End: 2024-01-30 | Stop reason: HOSPADM

## 2024-01-10 RX ORDER — OLANZAPINE 10 MG/2ML
5 INJECTION, POWDER, FOR SOLUTION INTRAMUSCULAR
Status: CANCELLED | OUTPATIENT
Start: 2024-01-10

## 2024-01-10 RX ORDER — OLANZAPINE 2.5 MG/1
2.5 TABLET, FILM COATED ORAL
Status: CANCELLED | OUTPATIENT
Start: 2024-01-10

## 2024-01-10 RX ORDER — BENZTROPINE MESYLATE 1 MG/ML
1 INJECTION INTRAMUSCULAR; INTRAVENOUS
Status: CANCELLED | OUTPATIENT
Start: 2024-01-10

## 2024-01-10 RX ORDER — BENZTROPINE MESYLATE 1 MG/ML
1 INJECTION INTRAMUSCULAR; INTRAVENOUS
Status: DISCONTINUED | OUTPATIENT
Start: 2024-01-10 | End: 2024-01-30 | Stop reason: HOSPADM

## 2024-01-10 RX ORDER — OLANZAPINE 2.5 MG/1
2.5 TABLET, FILM COATED ORAL
Status: DISCONTINUED | OUTPATIENT
Start: 2024-01-10 | End: 2024-01-30 | Stop reason: HOSPADM

## 2024-01-10 RX ORDER — OLANZAPINE 10 MG/2ML
10 INJECTION, POWDER, FOR SOLUTION INTRAMUSCULAR
Status: DISCONTINUED | OUTPATIENT
Start: 2024-01-10 | End: 2024-01-30 | Stop reason: HOSPADM

## 2024-01-10 RX ORDER — HYDROXYZINE 50 MG/1
50 TABLET, FILM COATED ORAL
Status: CANCELLED | OUTPATIENT
Start: 2024-01-10

## 2024-01-10 RX ORDER — PREDNISONE 10 MG/1
10 TABLET ORAL DAILY
Status: DISCONTINUED | OUTPATIENT
Start: 2024-01-10 | End: 2024-01-10 | Stop reason: HOSPADM

## 2024-01-10 RX ORDER — ERGOCALCIFEROL 1.25 MG/1
50000 CAPSULE ORAL WEEKLY
Status: CANCELLED | OUTPATIENT
Start: 2024-01-17

## 2024-01-10 RX ORDER — LORAZEPAM 2 MG/ML
2 INJECTION INTRAMUSCULAR EVERY 6 HOURS PRN
Status: CANCELLED | OUTPATIENT
Start: 2024-01-10

## 2024-01-10 RX ORDER — HYDROXYZINE 50 MG/1
50 TABLET, FILM COATED ORAL
Status: DISCONTINUED | OUTPATIENT
Start: 2024-01-10 | End: 2024-01-30 | Stop reason: HOSPADM

## 2024-01-10 RX ORDER — OLANZAPINE 10 MG/1
10 TABLET ORAL
Status: DISCONTINUED | OUTPATIENT
Start: 2024-01-10 | End: 2024-01-30 | Stop reason: HOSPADM

## 2024-01-10 RX ORDER — ACETAMINOPHEN 325 MG/1
975 TABLET ORAL EVERY 6 HOURS PRN
Status: DISCONTINUED | OUTPATIENT
Start: 2024-01-10 | End: 2024-01-30 | Stop reason: HOSPADM

## 2024-01-10 RX ORDER — ACETAMINOPHEN 325 MG/1
650 TABLET ORAL EVERY 6 HOURS PRN
Status: CANCELLED | OUTPATIENT
Start: 2024-01-10

## 2024-01-10 RX ORDER — OLANZAPINE 10 MG/2ML
5 INJECTION, POWDER, FOR SOLUTION INTRAMUSCULAR
Status: DISCONTINUED | OUTPATIENT
Start: 2024-01-10 | End: 2024-01-30 | Stop reason: HOSPADM

## 2024-01-10 RX ORDER — BENZTROPINE MESYLATE 1 MG/1
1 TABLET ORAL
Status: DISCONTINUED | OUTPATIENT
Start: 2024-01-10 | End: 2024-01-30 | Stop reason: HOSPADM

## 2024-01-10 RX ORDER — LANOLIN ALCOHOL/MO/W.PET/CERES
3 CREAM (GRAM) TOPICAL
Status: CANCELLED | OUTPATIENT
Start: 2024-01-10

## 2024-01-10 RX ORDER — ACETAMINOPHEN 325 MG/1
975 TABLET ORAL EVERY 6 HOURS PRN
Status: CANCELLED | OUTPATIENT
Start: 2024-01-10

## 2024-01-10 RX ORDER — PALIPERIDONE 6 MG/1
6 TABLET, EXTENDED RELEASE ORAL
Status: CANCELLED | OUTPATIENT
Start: 2024-01-10

## 2024-01-10 RX ORDER — LORAZEPAM 2 MG/ML
2 INJECTION INTRAMUSCULAR EVERY 6 HOURS PRN
Status: DISCONTINUED | OUTPATIENT
Start: 2024-01-10 | End: 2024-01-30 | Stop reason: HOSPADM

## 2024-01-10 RX ORDER — LANOLIN ALCOHOL/MO/W.PET/CERES
3 CREAM (GRAM) TOPICAL
Status: DISCONTINUED | OUTPATIENT
Start: 2024-01-10 | End: 2024-01-12

## 2024-01-10 RX ORDER — PREDNISONE 10 MG/1
10 TABLET ORAL DAILY
Status: CANCELLED | OUTPATIENT
Start: 2024-01-11

## 2024-01-10 RX ORDER — PROPRANOLOL HYDROCHLORIDE 10 MG/1
10 TABLET ORAL EVERY 8 HOURS PRN
Status: CANCELLED | OUTPATIENT
Start: 2024-01-10

## 2024-01-10 RX ADMIN — PALIPERIDONE 6 MG: 6 TABLET, EXTENDED RELEASE ORAL at 21:10

## 2024-01-10 RX ADMIN — ACETAMINOPHEN 975 MG: 325 TABLET ORAL at 21:10

## 2024-01-10 RX ADMIN — ERGOCALCIFEROL 50000 UNITS: 1.25 CAPSULE ORAL at 09:03

## 2024-01-10 RX ADMIN — ACETAMINOPHEN 975 MG: 325 TABLET ORAL at 09:03

## 2024-01-10 RX ADMIN — Medication 250 MG: at 09:04

## 2024-01-10 RX ADMIN — METHOCARBAMOL TABLETS 500 MG: 500 TABLET, COATED ORAL at 09:03

## 2024-01-10 RX ADMIN — PREDNISONE 10 MG: 10 TABLET ORAL at 12:05

## 2024-01-10 RX ADMIN — MELATONIN TAB 3 MG 3 MG: 3 TAB at 21:10

## 2024-01-10 RX ADMIN — METHOCARBAMOL TABLETS 500 MG: 500 TABLET, COATED ORAL at 17:54

## 2024-01-10 RX ADMIN — METHOCARBAMOL TABLETS 500 MG: 500 TABLET, COATED ORAL at 21:10

## 2024-01-10 RX ADMIN — METHOCARBAMOL TABLETS 500 MG: 500 TABLET, COATED ORAL at 12:05

## 2024-01-10 NOTE — PROGRESS NOTES
Progress Note    Carlos Wolf 33 y.o. male MRN: [id number]  Unit/Bed#: 7T [facility] 705-01 Encounter: [id number]  Admitting Physician: Wing Hutchinson MD  PCP: Kaykay Monroe DO  Date of Admission:  1/3/2024  3:07 PM    Assessment and Plan    * Cellulitis and abscess of buttock  Assessment & Plan   Seton drain in place. No active bleeding, pain or infectious signs present.  Surgical wound is dressed.   Leukocytosis trending: WBC 13.15> 12.0 >16.19> 12.73>15.68> 15.09  Most recent WBC most likely reactive secondary to surgery.  No growth per blood culture (1 & 2).  Post surgical day #5 I & D and fistulectomy   Medically cleared for discharge    Plan:   - Metronidazole 250mg IV twice daily 5/5   - AM CBC.     Crohn's disease (HCC)  Assessment & Plan  Pain well-controlled with Tylenol as needed. No diarrhea or blood in the stool.  S/P surgical I&D and fistulectomy   Infliximab will be started as outpatient per GI recommendations     Anemia  Assessment & Plan  Hemoglobin today: 8.3 maintaining stable levels    Received Venofer infusion per GI recommendation   Denies dizziness or weakness.    Plan:   -Trend CBC    Schizophreniform disorder (HCC)  Assessment & Plan  Admits to active auditory hallucinations this morning. Non commanding or threatening.  Denies SH/SI, homicidal ideations.       Plan:  Invega 6 mg bedtime  Monitor closely  Avoid Haldol; hx of anaphylactic reaction  On Discharge from 7th tower, will be admitted to [facility] waiting for that at this time        VTE Pharmacologic Prophylaxis: VTE Score: 2 Low Risk (Score 0-2) - Encourage Ambulation.    Patient Centered Rounds: I have performed bedside rounds with nursing staff today.    Discussions with Specialists or Other Care Team Provider: None    Education and Discussions with Family / Patient: N/A  Patient Information Sharing: N/A    Time Spent for Care: 20 minutes.  More than 50% of total time spent on counseling and coordination of care as described  above.    Current Length of Stay: 5 day(s)    Current Patient Status: Inpatient   Certification Statement: The patient will continue to require additional inpatient hospital stay due to cellulitis and abscess of the buttock.    Discharge Plan: N/A    Code Status: Level 1 - Full Code      Subjective:   Patient seen and assessed at bedside this morning.  Continues to endorse auditory and visual hallucination.  Admits to seeing shadows and faint voices with last occurrence being last night.  Denies SH/SI, homicidal ideations.  Denies abdominal pain, dizziness, shortness of breath, dyspnea with exertion, chest pain.    Medical management and treatment plan discussed with patient and is amenable.  All questions answered.    Objective:     Vitals:   Temp (24hrs), Av.5 °F (36.4 °C), Min:97.3 °F (36.3 °C), Max:97.6 °F (36.4 °C)    Temp:  [97.3 °F (36.3 °C)-97.6 °F (36.4 °C)] 97.3 °F (36.3 °C)  HR:  [89-95] 89  Resp:  [18] 18  BP: (112-125)/(78-81) 112/81  SpO2:  [98 %-99 %] 98 %  Body mass index is 20.9 kg/m².     Input and Output Summary (last 24 hours):       Intake/Output Summary (Last 24 hours) at 1/10/2024 0801  Last data filed at 2024 1700  Gross per 24 hour   Intake 960 ml   Output --   Net 960 ml       Physical Exam:     Physical Exam  Constitutional:       Appearance: Normal appearance.   HENT:      Head: Normocephalic and atraumatic.      Nose: Nose normal.   Eyes:      Extraocular Movements: Extraocular movements intact.   Cardiovascular:      Rate and Rhythm: Normal rate and regular rhythm.      Pulses: Normal pulses.      Heart sounds: Normal heart sounds. No murmur heard.  Pulmonary:      Effort: Pulmonary effort is normal.      Breath sounds: Normal breath sounds.   Abdominal:      Palpations: Abdomen is soft.      Tenderness: There is no abdominal tenderness.   Musculoskeletal:         General: Normal range of motion.      Cervical back: Normal range of motion and neck supple. No rigidity.   Skin:      General: Skin is warm and dry.      Capillary Refill: Capillary refill takes less than 2 seconds.   Neurological:      General: No focal deficit present.      Mental Status: He is alert.   Psychiatric:         Thought Content: Thought content is paranoid. Thought content does not include homicidal or suicidal ideation. Thought content does not include homicidal or suicidal plan.      Comments: Auditory and visual hallucinations         Additional Data:     Labs:    Results from last 7 days   Lab Units 01/10/24  0438   WBC Thousand/uL 15.09*   HEMOGLOBIN g/dL 8.3*   HEMATOCRIT % 28.0*   PLATELETS Thousands/uL 575*   NEUTROS PCT % 68   LYMPHS PCT % 18   MONOS PCT % 11   EOS PCT % 2     Results from last 7 days   Lab Units 01/10/24  0438   POTASSIUM mmol/L 4.1   CHLORIDE mmol/L 102   CO2 mmol/L 24   BUN mg/dL 9   CREATININE mg/dL 0.80   CALCIUM mg/dL 8.0*   ALK PHOS U/L 50   ALT U/L 5*   AST U/L 8*     Results from last 7 days   Lab Units 01/03/24  1548   INR  1.07                 * I Have Reviewed All Lab Data Listed Above.  * Additional Pertinent Lab Tests Reviewed: All Labs For Current Hospital Admission Reviewed    Imaging:    Imaging Reports Reviewed Today Include: N/A  Imaging Personally Reviewed by Myself Includes: N/A    Recent Cultures (last 7 days):     Results from last 7 days   Lab Units 01/04/24  1107 01/03/24  1558 01/03/24  1548   BLOOD CULTURE   --  No Growth After 5 Days. No Growth After 5 Days.   C DIFF TOXIN B BY PCR  Positive*  --   --        Last 24 Hours Medication List:   Current Facility-Administered Medications   Medication Dose Route Frequency Provider Last Rate    acetaminophen  975 mg Oral Q8H PRN Susannah Reed MD      ergocalciferol  50,000 Units Oral Weekly Harrison Mayorga DO      melatonin  3 mg Oral HS Harrison Mayorga DO      methocarbamol  500 mg Oral 4x Daily Harrison Mayorga DO      metroNIDAZOLE  250 mg Intravenous BID Harrison Mayorga  mg  (01/09/24 9815)    paliperidone  6 mg Oral HS Jordan C Holter, DO          ** Please Note: Dictation voice to text software may have been used in the creation of this document. **    Wing Hutchinson MD  01/10/24  8:01 AM

## 2024-01-10 NOTE — UTILIZATION REVIEW
Continued Stay Review    Date: 01/10/24                          Current Patient Class: IP  Current Level of Care: Med/Surg    HPI:33 y.o. male initially admitted on 1/3.      Assessment/Plan: Continues to endorse auditory and visual hallucination.  Admits to seeing shadows and faint voices with last occurrence being last night. WBC 15.09. Plan: IV flagyl (last dose today), start ergocalciferol once weekly, Trend labs, replete electrolytes as needed; continue Invega 6 mg. Pending bed availability on inpatient psychiatric unit for voluntary inpatient psychiatric admission.    Vital Signs:   Date/Time Temp Pulse Resp BP MAP (mmHg) SpO2 O2 Device   01/10/24 0750 97.3 °F (36.3 °C) Abnormal  89 18 112/81 86 98 % None (Room air)   01/09/24 2327 97.5 °F (36.4 °C) 91 18 125/78 86 99 % None (Room air)   01/09/24 1525 97.6 °F (36.4 °C) 95 18 119/79 88 99 % None (Room air)   01/09/24 0742 99 °F (37.2 °C) 91 18 108/76 82 100 % None (Room air)   01/08/24 2322 98.2 °F (36.8 °C) 96 18 119/69 79 98 % None (Room air)   01/08/24 1535 98.6 °F (37 °C) 97 18 109/69 80 98 % None (Room air)   01/08/24 0757 96.6 °F (35.9 °C) Abnormal  96 18 122/75 -- 100 % None (Room air)       Pertinent Labs/Diagnostic Results:   Results from last 7 days   Lab Units 01/03/24  1558   SARS-COV-2  Negative     Results from last 7 days   Lab Units 01/10/24  0438 01/09/24  0534 01/07/24  0528 01/06/24  0554 01/05/24  0514   WBC Thousand/uL 15.09* 15.68* 12.73* 16.19* 12.02*   HEMOGLOBIN g/dL 8.3* 8.3* 9.0* 8.6* 8.4*   HEMATOCRIT % 28.0* 29.4* 31.5* 30.0* 28.8*   PLATELETS Thousands/uL 575* 584* 520* 599* 564*   NEUTROS ABS Thousands/µL 10.41* 10.89* 8.91* 11.50*  --          Results from last 7 days   Lab Units 01/10/24  0438 01/05/24  0514 01/04/24  0757 01/03/24  1548   SODIUM mmol/L 136 138 136 135   POTASSIUM mmol/L 4.1 4.0 4.0 4.1   CHLORIDE mmol/L 102 105 102 100   CO2 mmol/L 24 26 26 26   ANION GAP mmol/L 10 7 8 9   BUN mg/dL 9 7 8 6   CREATININE mg/dL  0.80 0.82 1.04 1.16   EGFR ml/min/1.73sq m 117 116 93 82   CALCIUM mg/dL 8.0* 8.4 8.1* 8.8   MAGNESIUM mg/dL  --   --   --  2.0     Results from last 7 days   Lab Units 01/10/24  0438 01/03/24  1548   AST U/L 8* 13   ALT U/L 5* 6*   ALK PHOS U/L 50 80   TOTAL PROTEIN g/dL 7.0 8.1   ALBUMIN g/dL 3.2* 3.7   TOTAL BILIRUBIN mg/dL 0.13* 0.26         Results from last 7 days   Lab Units 01/10/24  0438 01/05/24  0514 01/04/24  0757 01/03/24  1548   GLUCOSE RANDOM mg/dL 82 91 112 99      Results from last 7 days   Lab Units 01/03/24  1542   PH BHARATHI  7.366   PCO2 BHARATHI mm Hg 48.5   PO2 BHARATHI mm Hg 31.7*   HCO3 BHARATHI mmol/L 27.2   BASE EXC BHARATHI mmol/L 1.5   O2 CONTENT BHARATHI ml/dL 6.0   O2 HGB, VENOUS % 52.6*        Results from last 7 days   Lab Units 01/03/24  1548   PROTIME seconds 14.2   INR  1.07   PTT seconds 33         Results from last 7 days   Lab Units 01/03/24  1548   PROCALCITONIN ng/ml 0.12     Results from last 7 days   Lab Units 01/03/24  1818 01/03/24  1542   LACTIC ACID mmol/L 0.6 2.2*      Results from last 7 days   Lab Units 01/04/24  0757   FERRITIN ng/mL 9*   IRON ug/dL 287*   TIBC ug/dL <342      Results from last 7 days   Lab Units 01/06/24  1544   HEP B S AG  Non-reactive   HEP B C TOTAL AB  Non-reactive     Results from last 7 days   Lab Units 01/03/24  1548   LIPASE u/L 7*     Results from last 7 days   Lab Units 01/04/24  0757   CRP mg/L 60.1*   SED RATE mm/hour 99*      Results from last 7 days   Lab Units 01/03/24  1644   CLARITY UA  Slightly Cloudy*   COLOR UA  Tsering*   SPEC GRAV UA  1.025   PH UA  6.0   GLUCOSE UA mg/dl Negative   KETONES UA mg/dl Negative   BLOOD UA  Negative   PROTEIN UA mg/dl 30 (1+)*   NITRITE UA  Negative   BILIRUBIN UA  Negative   UROBILINOGEN UA mg/dL Negative   LEUKOCYTES UA  Negative   WBC UA /hpf 1-2   RBC UA /hpf None Seen   BACTERIA UA /hpf Moderate*   EPITHELIAL CELLS WET PREP /hpf Occasional   MUCUS THREADS  Innumerable*     Results from last 7 days   Lab Units  01/03/24  1558   INFLUENZA A PCR  Negative   INFLUENZA B PCR  Negative   RSV PCR  Negative      Results from last 7 days   Lab Units 01/04/24  1107   C DIFF TOXIN B BY PCR  Positive*     Results from last 7 days   Lab Units 01/04/24  1107   SALMONELLA SP PCR  None Detected   SHIGELLA SP/ENTEROINVASIVE E. COLI (EIEC)  None Detected   CAMPYLOBACTER SP (JEJUNI AND COLI)  None Detected   SHIGA TOXIN 1/SHIGA TOXIN 2  None Detected         Results from last 7 days   Lab Units 01/03/24  1558 01/03/24  1548   BLOOD CULTURE  No Growth After 5 Days. No Growth After 5 Days.     Results from last 7 days   Lab Units 01/05/24  0514   TOTAL COUNTED  100       Medications:   Scheduled Medications:  ergocalciferol, 50,000 Units, Oral, Weekly  melatonin, 3 mg, Oral, HS  methocarbamol, 500 mg, Oral, 4x Daily  metroNIDAZOLE, 250 mg, Intravenous, BID VLADIMIR  paliperidone, 6 mg, Oral, HS    Continuous IV Infusions: none    PRN Meds:  acetaminophen, 975 mg, Oral, Q8H PRN; 1/9 x2, 1/10 x1        Discharge Plan: TBD, pending placement for inpatient psychiatric admission    Network Utilization Review Department  ATTENTION: Please call with any questions or concerns to [phone] and carefully listen to the prompts so that you are directed to the right person. All voicemails are confidential.   For Discharge needs, contact Care Management DC Support Team at [phone] opt. 2  Send all requests for admission clinical reviews, approved or denied determinations and any other requests to dedicated fax number below belonging to the campus where the patient is receiving treatment. List of dedicated fax numbers for the Facilities:  FACILITY NAME UR FAX NUMBER   ADMISSION DENIALS (Administrative/Medical Necessity) [phone]   DISCHARGE SUPPORT TEAM (NETWORK) [phone]   PARENT CHILD HEALTH (Maternity/NICU/Pediatrics) [phone]   [facility] [phone]   [facility] [phone]   [facility]  [facility] [phone]   [facility] [phone]   [facility] [phone]   [facility] [phone]   [facility] [phone]   [facility] [phone]   [facility] [phone]   [facility] [phone]   [facility] [phone]

## 2024-01-10 NOTE — DISCHARGE INSTR - OTHER ORDERS
Wound care   Bilateral buttocks wounds - cleanse with soap and water pat dry apply calcium alginate ag ( meglisorb ag ) then top with a ABD and secure with the mesh panties change bid     You will be discharged to [address] [place] [address], [address]    Your meds will be sent to preferred pharmacy at [place] [address], [place] [address]    After discharge, if you find your coping skills are not as effective and you continue to feel distressed please call [facility] Crisis Intervention services are available 24 hours a day by calling    [facility] Crisis at [phone], and [facility] [phone]      [facility] Crisis Call: [phone]    [facility] Toll Free: [phone]     National Suicide Hotline : 1 925.669.1595    Crisis Text Line : 307018     If you feel you are a danger to yourself or others please contact 911 or go to nearest Emergency room to seek immediate help. Mayda, or Gayle, our Behavioral Health Nurse Navigators, will be calling you after your discharge, on the phone number that you provided.  They will be available as an additional support, if needed.   If you wish to speak with one of them, you may contact Mayda at [phone] or Gayle at [phone].

## 2024-01-10 NOTE — PLAN OF CARE
Problem: PAIN - ADULT  Goal: Verbalizes/displays adequate comfort level or baseline comfort level  Description: Interventions:  - Encourage patient to monitor pain and request assistance  - Assess pain using appropriate pain scale  - Administer analgesics based on type and severity of pain and evaluate response  - Implement non-pharmacological measures as appropriate and evaluate response  - Consider cultural and social influences on pain and pain management  - Notify physician/advanced practitioner if interventions unsuccessful or patient reports new pain  Outcome: Progressing     Problem: Knowledge Deficit  Goal: Patient/family/caregiver demonstrates understanding of disease process, treatment plan, medications, and discharge instructions  Description: Complete learning assessment and assess knowledge base.  Interventions:  - Provide teaching at level of understanding  - Provide teaching via preferred learning methods  Outcome: Progressing     Problem: SKIN/TISSUE INTEGRITY - ADULT  Goal: Incision(s), wounds(s) or drain site(s) healing without S/S of infection  Description: INTERVENTIONS  - Assess and document dressing, incision, wound bed, drain sites and surrounding tissue  - Provide patient and family education  - Perform skin care/dressing changes every day  Outcome: Progressing

## 2024-01-10 NOTE — PLAN OF CARE
Problem: Alteration in Thoughts and Perception  Goal: Treatment Goal: Gain control of psychotic behaviors/thinking, reduce/eliminate presenting symptoms and demonstrate improved reality functioning upon discharge  Outcome: Not Progressing  Goal: Verbalize thoughts and feelings  Description: Interventions:  - Promote a nonjudgmental and trusting relationship with the patient through active listening and therapeutic communication  - Assess patient's level of functioning, behavior and potential for risk  - Engage patient in 1 on 1 interactions  - Encourage patient to express fears, feelings, frustrations, and discuss symptoms    - [place] patient to reality, help patient recognize reality-based thinking   - Administer medications as ordered and assess for potential side effects  - Provide the patient education related to the signs and symptoms of the illness and desired effects of prescribed medications  Outcome: Not Progressing  Goal: Refrain from acting on delusional thinking/internal stimuli  Description: Interventions:  - Monitor patient closely, per order   - Utilize least restrictive measures   - Set reasonable limits, give positive feedback for acceptable   - Administer medications as ordered and monitor of potential side effects  Outcome: Not Progressing  Goal: Agree to be compliant with medication regime, as prescribed and report medication side effects  Description: Interventions:  - Offer appropriate PRN medication and supervise ingestion; conduct AIMS, as needed   Outcome: Not Progressing  Goal: Attend and participate in unit activities, including therapeutic, recreational, and educational groups  Description: Interventions:  -Encourage Visitation and family involvement in care  Outcome: Not Progressing  Goal: Recognize dysfunctional thoughts, communicate reality-based thoughts at the time of discharge  Description: Interventions:  - Provide medication and psycho-education to assist patient in compliance  and developing insight into his/her illness   Outcome: Not Progressing  Goal: Complete daily ADLs, including personal hygiene independently, as able  Description: Interventions:  - Observe, teach, and assist patient with ADLS  - Monitor and promote a balance of rest/activity, with adequate nutrition and elimination   Outcome: Not Progressing     Problem: Ineffective Coping  Goal: Identifies ineffective coping skills  Outcome: Not Progressing  Goal: Identifies healthy coping skills  Outcome: Not Progressing  Goal: Demonstrates healthy coping skills  Outcome: Not Progressing     Problem: Anxiety  Goal: Anxiety is at manageable level  Description: Interventions:  - Assess and monitor patient's anxiety level.   - Monitor for signs and symptoms (heart palpitations, chest pain, shortness of breath, headaches, nausea, feeling jumpy, restlessness, irritable, apprehensive).   - Collaborate with interdisciplinary team and initiate plan and interventions as ordered.  - [place] patient to unit/surroundings  - Explain treatment plan  - Encourage participation in care  - Encourage verbalization of concerns/fears  - Identify coping mechanisms  - Assist in developing anxiety-reducing skills  - Administer/offer alternative therapies  - Limit or eliminate stimulants  Outcome: Not Progressing     Problem: Alteration in Orientation  Goal: Treatment Goal: Demonstrate a reduction of confusion and improved orientation to person, place, time and/or situation upon discharge, according to optimum baseline/ability  Outcome: Not Progressing  Goal: Allow medical examinations, as recommended  Description: Interventions:  - Provide physical/neurological exams and/or referrals, per provider   Outcome: Not Progressing  Goal: Cooperate with recommended testing/procedures  Description: Interventions:  - Determine need for ancillary testing  - Observe for mental status changes  - Implement falls/precaution protocol   Outcome: Not Progressing

## 2024-01-10 NOTE — CASE MANAGEMENT
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    Severe protein-calorie malnutrition (HCC) 2023    Esophagitis 2023    Anemia 2023    Crohn's disease (HCC) 2023    Schizophreniform disorder (HCC) 2018    Pilonidal cyst with abscess 10/07/2015    RBBB 10/07/2015    Tobacco abuse 10/07/2015      LOS (days): 5  Geometric Mean LOS (GMLOS) (days):   Days to GMLOS:     OBJECTIVE:  Risk of Unplanned Readmission Score: 14.48         Current admission status: Inpatient   Preferred Pharmacy:   [id number] - [place] - [address] [address]  Phone: [phone] Fax: [phone]    [id number] [place] - [address]  [address] [address]  Phone: [phone] Fax: [phone]    Primary Care Provider: Kaykay Monroe DO    Primary Insurance: MEDICARE  Secondary Insurance: [place] [employer] AND [employer] [facility]    DISCHARGE DETAILS:     Additional Comments: Accepted at [facility] 3P attending Dr Rodriguez.  Call to [place] [facility] [phone] and [facility] precert line has a message due to unforseen circumstannces no one is available.  Message to [facility] network intake same.  Report needs called to [facility] at 1630 nurse Rosario holbrook.  Message to medical team [facility] bed update

## 2024-01-10 NOTE — WOUND OSTOMY CARE
Consult Note - Wound   Carlos Wolf 33 y.o. male MRN: [id number]  Unit/Bed#: [facility] 376-02 Encounter: [id number]        History and Present Illness: Patient is seen for wound care consult today . He is a a 33 year old male that is admitted with cellulitis and abscess of the buttocks . Patient with a seton drain in place by surgical team for a I& D and fistulectomy . Patient with connecting tracts when procedure performed . He is alert and pleasant for the assessment of the area for a suggested dressing treatment to the area . PMH of crohn's disease , anemia , schizophreniform disorder .       Assessment Findings:   Bilateral buttocks area - measured as 1 area .  Wound beds with mixed tissue of red and yellow present and the drain in place . Patient with noted DSD on that he changes himself . Reports changing it around lunch today and noted small serosanguinous drainage on the old dressing . Discussed with the patient to place the calcium alginate ag that will be antimicrobial at the site and then the ABD and secure with the panties . He is agreeable for the change in the dressing to this option.     Wound care   Bilateral buttocks wounds - cleanse with soap and water pat dry apply calcium alginate ag ( meglisorb ag ) then top with a ABD and secure with the mesh panties change bid           Wounds:  Wound 01/05/24 Buttocks N/A (Active)   Wound Image   01/10/24 1430   Wound Description Beefy red;Slough 01/10/24 1700   Telma-wound Assessment Clean;Dry;Intact;Induration 01/10/24 1700   Wound Length (cm) 5 cm 01/10/24 1430   Wound Width (cm) 3 cm 01/10/24 1430   Wound Depth (cm) 0.3 cm 01/10/24 1430   Wound Surface Area (cm^2) 15 cm^2 01/10/24 1430   Wound Volume (cm^3) 4.5 cm^3 01/10/24 1430   Calculated Wound Volume (cm^3) 4.5 cm^3 01/10/24 1430   Wound Site Closure Sutures 01/09/24 1406   Drainage Amount Small 01/10/24 1700   Drainage Description Serous 01/10/24 1700   Non-staged Wound Description Full thickness  01/10/24 1700   Treatments Cleansed;Site care 01/10/24 1700   Dressing Calcium Alginate with Silver;ABD 01/10/24 1700   Dressing Changed Changed 01/10/24 1700   Patient Tolerance Tolerated well 01/10/24 1700   Dressing Status Clean;Dry;Intact 01/10/24 0930       Wound care will follow weekly call or tiger text with questions or concerns .     Jaimee Pinto RN BSN CWOCN

## 2024-01-10 NOTE — NURSING NOTE
Pt presents from  7T as 33yr old male 201 with past hx of Schizophrenia, anemia, crohns disease, C. diff, cellulitis of buttocks. Arrived at hospital originally for abdominal discomfort; while under care, was observed experiencing audio hallucinations which included responding to internal stimuli, paranoid/persecutory delusions pertaining to surveillance, increased suspiciousness of staff. Pt endorsed only audio hallucinations of negative voices and anxiety. Was taking invega to treat symptoms. Pt has been non-violent since admission.      Medically, while on 7T, pt was treated medically for the abdominal discomfort and yellow drainage from magda-anal fistula. Wound care orders were since placed; pt was able to perform independently. Pt was originally positive with C.Diff but recently cleared with non-infectious status and removed from isolation.      While admitted to , pt presented as polite, but significantly disorganized, tangential, preoccupied, occasionally responding to internal stimulation, easily distracted and hyperverbal. Pt is redirectable with multiple attempts, displays no agitation; racing thoughts have patient often overwhelming nursing station with non-medical questions and requests. Pt states the voices have him paranoid at times; described voices as distracting; cites voices existing since 2010. OLGA accuracy of pt's recollection of historical events.

## 2024-01-11 PROBLEM — F41.1 GAD (GENERALIZED ANXIETY DISORDER): Chronic | Status: ACTIVE | Noted: 2024-01-11

## 2024-01-11 LAB
ALBUMIN SERPL BCP-MCNC: 3.3 G/DL (ref 3.5–5)
ALP SERPL-CCNC: 47 U/L (ref 34–104)
ALT SERPL W P-5'-P-CCNC: 6 U/L (ref 7–52)
ANION GAP SERPL CALCULATED.3IONS-SCNC: 8 MMOL/L
AST SERPL W P-5'-P-CCNC: 9 U/L (ref 13–39)
ATRIAL RATE: 72 BPM
ATRIAL RATE: 74 BPM
ATRIAL RATE: 80 BPM
ATRIAL RATE: 82 BPM
BASOPHILS # BLD AUTO: 0.06 THOUSANDS/ÂΜL (ref 0–0.1)
BASOPHILS NFR BLD AUTO: 1 % (ref 0–1)
BILIRUB SERPL-MCNC: 0.17 MG/DL (ref 0.2–1)
BUN SERPL-MCNC: 7 MG/DL (ref 5–25)
CALCIUM ALBUM COR SERPL-MCNC: 9.6 MG/DL (ref 8.3–10.1)
CALCIUM SERPL-MCNC: 9 MG/DL (ref 8.4–10.2)
CHLORIDE SERPL-SCNC: 103 MMOL/L (ref 96–108)
CHOLEST SERPL-MCNC: 128 MG/DL
CO2 SERPL-SCNC: 27 MMOL/L (ref 21–32)
CREAT SERPL-MCNC: 0.74 MG/DL (ref 0.6–1.3)
EOSINOPHIL # BLD AUTO: 0.09 THOUSAND/ÂΜL (ref 0–0.61)
EOSINOPHIL NFR BLD AUTO: 1 % (ref 0–6)
ERYTHROCYTE [DISTWIDTH] IN BLOOD BY AUTOMATED COUNT: 24.8 % (ref 11.6–15.1)
EST. AVERAGE GLUCOSE BLD GHB EST-MCNC: 114 MG/DL
GFR SERPL CREATININE-BSD FRML MDRD: 121 ML/MIN/1.73SQ M
GLUCOSE P FAST SERPL-MCNC: 75 MG/DL (ref 65–99)
GLUCOSE SERPL-MCNC: 75 MG/DL (ref 65–140)
HBA1C MFR BLD: 5.6 %
HCT VFR BLD AUTO: 29 % (ref 36.5–49.3)
HDLC SERPL-MCNC: 54 MG/DL
HGB BLD-MCNC: 8.6 G/DL (ref 12–17)
IMM GRANULOCYTES # BLD AUTO: 0.05 THOUSAND/UL (ref 0–0.2)
IMM GRANULOCYTES NFR BLD AUTO: 0 % (ref 0–2)
LDLC SERPL CALC-MCNC: 64 MG/DL (ref 0–100)
LYMPHOCYTES # BLD AUTO: 2.81 THOUSANDS/ÂΜL (ref 0.6–4.47)
LYMPHOCYTES NFR BLD AUTO: 23 % (ref 14–44)
MCH RBC QN AUTO: 21.8 PG (ref 26.8–34.3)
MCHC RBC AUTO-ENTMCNC: 29.7 G/DL (ref 31.4–37.4)
MCV RBC AUTO: 74 FL (ref 82–98)
MONOCYTES # BLD AUTO: 1.32 THOUSAND/ÂΜL (ref 0.17–1.22)
MONOCYTES NFR BLD AUTO: 11 % (ref 4–12)
NEUTROPHILS # BLD AUTO: 7.74 THOUSANDS/ÂΜL (ref 1.85–7.62)
NEUTS SEG NFR BLD AUTO: 64 % (ref 43–75)
NONHDLC SERPL-MCNC: 74 MG/DL
NRBC BLD AUTO-RTO: 0 /100 WBCS
P AXIS: 72 DEGREES
P AXIS: 74 DEGREES
P AXIS: 77 DEGREES
P AXIS: 81 DEGREES
PLATELET # BLD AUTO: 603 THOUSANDS/UL (ref 149–390)
PMV BLD AUTO: 8.2 FL (ref 8.9–12.7)
POTASSIUM SERPL-SCNC: 4.2 MMOL/L (ref 3.5–5.3)
PR INTERVAL: 118 MS
PR INTERVAL: 118 MS
PR INTERVAL: 120 MS
PR INTERVAL: 126 MS
PROT SERPL-MCNC: 7.2 G/DL (ref 6.4–8.4)
QRS AXIS: 60 DEGREES
QRS AXIS: 64 DEGREES
QRS AXIS: 65 DEGREES
QRS AXIS: 67 DEGREES
QRSD INTERVAL: 118 MS
QRSD INTERVAL: 120 MS
QRSD INTERVAL: 122 MS
QRSD INTERVAL: 130 MS
QT INTERVAL: 388 MS
QT INTERVAL: 388 MS
QT INTERVAL: 410 MS
QT INTERVAL: 410 MS
QTC INTERVAL: 447 MS
QTC INTERVAL: 448 MS
QTC INTERVAL: 453 MS
QTC INTERVAL: 455 MS
RBC # BLD AUTO: 3.94 MILLION/UL (ref 3.88–5.62)
SODIUM SERPL-SCNC: 138 MMOL/L (ref 135–147)
T WAVE AXIS: 35 DEGREES
T WAVE AXIS: 36 DEGREES
T WAVE AXIS: 48 DEGREES
T WAVE AXIS: 55 DEGREES
TREPONEMA PALLIDUM IGG+IGM AB [PRESENCE] IN SERUM OR PLASMA BY IMMUNOASSAY: NORMAL
TRIGL SERPL-MCNC: 48 MG/DL
TSH SERPL DL<=0.05 MIU/L-ACNC: 1.49 UIU/ML (ref 0.45–4.5)
VENTRICULAR RATE: 72 BPM
VENTRICULAR RATE: 74 BPM
VENTRICULAR RATE: 80 BPM
VENTRICULAR RATE: 82 BPM
WBC # BLD AUTO: 12.07 THOUSAND/UL (ref 4.31–10.16)

## 2024-01-11 PROCEDURE — 84443 ASSAY THYROID STIM HORMONE: CPT

## 2024-01-11 PROCEDURE — 99253 IP/OBS CNSLTJ NEW/EST LOW 45: CPT | Performed by: NURSE PRACTITIONER

## 2024-01-11 PROCEDURE — 80061 LIPID PANEL: CPT

## 2024-01-11 PROCEDURE — 85025 COMPLETE CBC W/AUTO DIFF WBC: CPT

## 2024-01-11 PROCEDURE — 80053 COMPREHEN METABOLIC PANEL: CPT

## 2024-01-11 PROCEDURE — 99223 1ST HOSP IP/OBS HIGH 75: CPT | Performed by: PSYCHIATRY & NEUROLOGY

## 2024-01-11 PROCEDURE — 93005 ELECTROCARDIOGRAM TRACING: CPT

## 2024-01-11 PROCEDURE — 93010 ELECTROCARDIOGRAM REPORT: CPT | Performed by: INTERNAL MEDICINE

## 2024-01-11 PROCEDURE — 83036 HEMOGLOBIN GLYCOSYLATED A1C: CPT | Performed by: PSYCHIATRY & NEUROLOGY

## 2024-01-11 PROCEDURE — 86780 TREPONEMA PALLIDUM: CPT

## 2024-01-11 PROCEDURE — 99222 1ST HOSP IP/OBS MODERATE 55: CPT | Performed by: FAMILY MEDICINE

## 2024-01-11 RX ORDER — GABAPENTIN 100 MG/1
100 CAPSULE ORAL 3 TIMES DAILY
Status: DISCONTINUED | OUTPATIENT
Start: 2024-01-11 | End: 2024-01-12

## 2024-01-11 RX ADMIN — GABAPENTIN 100 MG: 100 CAPSULE ORAL at 16:51

## 2024-01-11 RX ADMIN — GABAPENTIN 100 MG: 100 CAPSULE ORAL at 10:47

## 2024-01-11 RX ADMIN — METHOCARBAMOL TABLETS 500 MG: 500 TABLET, COATED ORAL at 08:13

## 2024-01-11 RX ADMIN — ACETAMINOPHEN 650 MG: 325 TABLET ORAL at 21:10

## 2024-01-11 RX ADMIN — METHOCARBAMOL TABLETS 500 MG: 500 TABLET, COATED ORAL at 11:39

## 2024-01-11 RX ADMIN — MELATONIN TAB 3 MG 3 MG: 3 TAB at 21:11

## 2024-01-11 RX ADMIN — PALIPERIDONE 6 MG: 6 TABLET, EXTENDED RELEASE ORAL at 21:10

## 2024-01-11 RX ADMIN — METHOCARBAMOL TABLETS 500 MG: 500 TABLET, COATED ORAL at 21:10

## 2024-01-11 RX ADMIN — METHOCARBAMOL TABLETS 500 MG: 500 TABLET, COATED ORAL at 17:40

## 2024-01-11 RX ADMIN — ACETAMINOPHEN 975 MG: 325 TABLET ORAL at 08:19

## 2024-01-11 RX ADMIN — GABAPENTIN 100 MG: 100 CAPSULE ORAL at 21:10

## 2024-01-11 NOTE — CONSULTS
[facility]  Consult  Name: Carlos Wolf 33 y.o. male I MRN: [id number]  Unit/Bed#: -02 I Date of Admission: 1/10/2024   Date of Service: 1/11/2024 I Hospital Day: 1    Inpatient consult for Medical Clearance for  patient  Consult performed by: EDISON Tejeda  Consult ordered by: Keo Huertas MD          Assessment/Plan   No new Assessment & Plan notes have been filed under this hospital service since the last note was generated.  Service: Hospitalist         Patient follows with family medicine practice. A medical clearance consult was placed to their service.

## 2024-01-11 NOTE — ASSESSMENT & PLAN NOTE
On prednisone for management.  Pain well-controlled with Tylenol as needed. No diarrhea or blood in the stool.  S/P surgical I&D and fistulectomy with seton placement on 1/05/24.    Plan:  Prednisone 10 mg daily.  Tylenol 650 mg every 4 hours as needed

## 2024-01-11 NOTE — TREATMENT PLAN
TREATMENT PLAN REVIEW - Behavioral Health Carlos Wolf 33 y.o. 1990 male MRN: [id number]    [facility] 3P BEHAVIORAL HLTH Room / Bed: [facility] 376/[facility] 376-02 Encounter: [id number]        Admit Date/Time:  1/10/2024  4:58 PM    Treatment Team:   MD Janine Dunn LPC Isaiah Ortiz Catherine E Conolly, RN Myesha Molitorisz Danielle Merk Mariah Melendez Karissa Marie Kormandy, CRNP    Diagnosis: Principal Problem:    Schizophrenia, paranoid type (HCC)  Active Problems:    ROBER (generalized anxiety disorder)      Patient Strengths/Assets: negotiates basic needs, patient is on a voluntary commitment, stable housing, supportive mother and sister      Patient Barriers/Limitations: chronic mental illness, difficulty adapting, medical problems, noncompliant with medication, noncompliant with treatment    Short Term Goals: decrease in anxiety symptoms, decrease in psychotic symptoms, improvement in insight, improvement in reality testing, improvement in reasoning ability    Long Term Goals: resolution of psychotic symptoms, improved reality testing, improved reasoning ability, improved insight    Progress Towards Goals: restarting psychiatric medications as prescribed    Recommended Treatment: medication management, patient medication education, group therapy, milieu therapy, continued Behavioral Health psychiatric evaluation/assessment process     Treatment Frequency: daily medication monitoring, group and milieu therapy daily, monitoring through interdisciplinary rounds, monitoring through weekly patient care conferences    Expected Discharge Date: 10 days - 1/21/2024    Discharge Plan: referral for outpatient medication management with a psychiatrist, referral for outpatient psychotherapy, return to previous living arrangement    Treatment Plan Created/Updated By: Ann Rodriguez MD

## 2024-01-11 NOTE — H&P
"Psychiatric Evaluation - Behavioral Health     Identification Data:Carlos Wolf 33 y.o. male MRN: [id number]  Unit/Bed#: [facility] 376-02 Encounter: [id number]    Chief Complaint: auditory hallucinations, visual halluciantions, and delusional thoughts    History of Present Illness       Carlos Wolf is a 33 y.o. male with a history of Schizophrenia who was admitted to the inpatient psychiatric unit on a voluntary 201 commitment basis due to auditory hallucinations, visual hallucinations, delusional thoughts, and disorganized behavior.    Symptoms prior to admission included poor concentration, poor appetite, weight loss, difficulty sleeping, mood swings, auditory hallucinations of \"sounds\" and of \"whispers\", visual hallucinations of \"shadows\", delusional thinking with persecutory delusions, disorganized behavior, disorganized thinking process, anxiety symptoms, difficulty attending to activities of daily living, poor self-care, noncompliance with treatment, and noncompliance with medications. Onset of symptoms was gradual starting several weeks ago with progressively worsening course since that time. Stressors preceding admission included health issues, chronic mental illness, and noncompliance with treatment. 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 He was admitted to the medical floor for medical and surgical management of perianal cellulitis and abscess of buttocks. Psychiatric consultation was requested due to psychotic symptoms and inpatient psychiatric admission was recommended once Carlos was stabilized medically.    On initial evaluation after admission to the inpatient psychiatric unit Carlos had disorganized thinking process, also seemed very paranoid and preoccupied with thoughts that people were chasing him \"Someone was trying to desiree me. I was not keeping up with all the court cases " "here. You don't have to really be in it. Why would they use my name? It is like anxiety, PTSD and paranoia\". He reported anxiety symptoms, but denied significant depression and denied suicidal or homicidal thoughts. He was agreeable to continue Invega that was started by Psychiatric Consultation Service when he was on medical floor.    Psychiatric Review Of Systems:    Sleep changes: yes, decreased  Appetite changes: yes, decreased  Weight changes: yes, weight loss   Energy/anergy: yes, decreased  Interest/pleasure/anhedonia: yes, decreased  Somatic symptoms: yes  Anxiety/panic: yes, worrying  Dang: mood swings, but no clear history of full hypomanic, manic or mixed episodes  Guilty/hopeless: yes  Self injurious behavior/risky behavior: no  Suicidal ideation: no  Homicidal ideation: no  Auditory hallucinations: yes, auditory hallucinations of \"sounds\" and of \"whispers\"  Visual hallucinations: yes, visual hallucinations of \"shadows\"  Other hallucinations: no  Delusional thinking: yes, persecutory delusions  Eating disorder history: no  Obsessive/compulsive symptoms: no    Historical Information       Past Psychiatric History:     Past Inpatient Psychiatric Treatment:   Multiple past inpatient psychiatric admissions at [facility] and other facilities  Past Outpatient Psychiatric Treatment:    Was in outpatient psychiatric treatment in the past with a psychiatrist  Noncompliant with outpatient psychiatric treatment prior to admission  Past Suicide Attempts: no  Past Violent Behavior: no  Past Psychiatric Medication Trials: multiple psychiatric medication trials, but he only remembered Invega, Zyprexa and Haldol     Substance Abuse History:    Social History       Tobacco History       Smoking Status  Light Smoker Smoking Start Date  1/1/2009 Current Packs/Day  0.0 packs/day Smoking Tobacco Type  Cigarettes, Cigars since 1/1/2009   Pack Year History     Packs/Day From To Years    0.01   0.0      Smokeless " Tobacco Use  Never      Tobacco Comments  [employer] says 7 cigarettes per day              Alcohol History       Alcohol Use Status  Not Currently Drinks/Week  0 Glasses of wine, 0 Cans of beer, 0 Shots of liquor, 0 Standard drinks or equivalent per week Comment  Socially              Drug Use       Drug Use Status  No              Sexual Activity       Sexually Active  Yes Partners  Female              Activities of Daily Living    Not Asked                 Additional Substance Use Detail       Questions Responses    Problems Due to Past Use of Alcohol? No    Problems Due to Past Use of Substances? No    Substance Use Assessment Denies substance use within the past 12 months    Alcohol Use Frequency Denies use in past 12 months    Cannabis frequency Never used    Comment:  Never used on 1/10/2024     Heroin Frequency Denies use in past 12 months    Cocaine frequency Never used    Comment:  Never used on 1/10/2024     Crack Cocaine Frequency Denies use in past 12 months    Methamphetamine Frequency Denies use in past 12 months    Narcotic Frequency Denies use in past 12 months    Benzodiazepine Frequency Denies use in past 12 months    Amphetamine frequency Denies use in past 12 months    Barbiturate Frequency Denies use in past 12 months    Inhalant frequency Never used    Comment:  Never used on 1/10/2024     Hallucinogen frequency Never used    Comment:  Never used on 1/10/2024     Ecstasy frequency Never used    Comment:  Never used on 1/10/2024     Other drug frequency Never used    Comment:  Never used on 1/10/2024     Opiate frequency Denies use in past 12 months    Last reviewed by Emiliano Kline RN on 1/10/2024          I have assessed this patient for substance use within the past 12 months    Alcohol use: denies use  Recreational drug use:   Cocaine:  denies use  Heroin:  denies use  Marijuana:  denies current use, history of past use, used occasionally  Other drugs: denies use  Longest clean time: many  years  History of Inpatient/Outpatient rehabilitation program: no  Smoking history: 3 cigarettes per day  Use of caffeine: 4 cups of coffee per day and 4 cups of tea per week    Family Psychiatric History:     Psychiatric Illness:  no family history of psychiatric illness  Substance Abuse:  no family history of substance abuse  Suicide Attempts:  no family history of suicide attempts    Social History:    Education: high school graduate and some college  Learning Disabilities: none  Marital History: single  Children: none  Living Arrangement: lives alone in a room he rents from his stepfather  Occupational History: worked in a [employer] in the past, currently unemployed  Functioning Relationships: mother and sister are supportive  Legal History: none   History: None    Traumatic History:     Abuse: none  Other Traumatic Events: none     Past Medical History:    History of Seizures: yes, history of a seizure  History of Head injury with loss of consciousness: no    Past Medical History:   Diagnosis Date    Anemia 2004    hospitalization/transfusion    Cellulitis     Chronic knee pain     Crohn's disease (HCC)     Esophagitis     Leukocytosis     Perianal fistula     Pilonidal cyst     RBBB     Schizophrenia (HCC)     Seizure (HCC)      Past Surgical History:   Procedure Laterality Date    INCISION AND DRAINAGE OF WOUND N/A 1/5/2024    Procedure: INCISION AND DRAINAGE (I&D) PERIANAL ABSCESS, SETON PLACEMENT,EUA, FISTULECTOMY;  Surgeon: Carl Pace MD;  Location:  MAIN OR;  Service: General    [facility] ANRCT XM SURG REQ ANES GENERAL SPI/EDRL DX N/A 4/7/2016    Procedure: EXAM UNDER ANESTHESIA (EUA); possible REMOVAL OF FOREIGN BODY anoscopy ;  Surgeon: Reymundo Araujo MD;  Location:  MAIN OR;  Service: Colorectal    VA SURG TX ANAL FISTULA INTERSPHINCTERIC N/A 4/7/2016    Procedure: superficial FISTULOTOMY; SETON PROCEDURE drainage of chronic ischiofistula abscess and debridement;  Surgeon: Reymundo  "MD Gayle;  Location: [facility];  Service: Colorectal       Medical Review Of Systems:    A comprehensive review of systems was negative except for: Gastrointestinal: positive for abdominal discomfort  Behavioral/Psych: positive for appetite disturbance, auditory hallucinations, delusional thoughts, poor self care, sleep disturbance, and visual hallucinations    Allergies:    Allergies   Allergen Reactions    Haldol Decanoate [Haloperidol] Anaphylaxis    Oxycodone-Acetaminophen Rash     \"swollon red rash\"    Sulfamethoxazole-Trimethoprim      \"never really worked\"       Medications: All current active medications have been reviewed.    Medications prior to admission:    Prior to Admission Medications   Prescriptions Last Dose Informant Patient Reported? Taking?   acetaminophen (TYLENOL) 650 mg CR tablet  Self No No   Sig: Take 1 tablet (650 mg total) by mouth every 8 (eight) hours as needed for mild pain   budesonide (ENTOCORT EC) 3 MG capsule   Yes No   Sig: Take 3 mg by mouth daily   ergocalciferol (VITAMIN D2) 50,000 units   No No   Sig: Take 1 capsule (50,000 Units total) by mouth once a week , For 8 weeks   ferrous sulfate 325 (65 Fe) mg tablet   No No   Sig: Take 1 tablet (325 mg total) by mouth 2 (two) times a day with meals   lidocaine (LIDODERM) 5 %   No No   Sig: Apply 1 patch topically over 12 hours every 24 hours for 15 days Remove & Discard patch within 12 hours or as directed by MD   methocarbamol (ROBAXIN) 500 mg tablet   No No   Sig: Take 1 tablet (500 mg total) by mouth 4 (four) times a day   predniSONE 5 mg/5 mL solution   Yes No   Sig: Take 5 mg by mouth daily   Patient not taking: Reported on 1/3/2024      Facility-Administered Medications: None       OBJECTIVE:    Vital signs in last 24 hours:    Temp:  [97.2 °F (36.2 °C)-97.9 °F (36.6 °C)] 97.6 °F (36.4 °C)  HR:  [] 81  Resp:  [16] 16  BP: (110-130)/(66-80) 112/77    No intake or output data in the 24 hours ending 01/11/24 1100   " "  Mental Status Evaluation:    Appearance:  disheveled, dressed in hospital attire, looks older than stated age   Behavior:  cooperative, poor eye contact   Speech:  disorganized, perseverative   Mood:  dysphoric   Affect:  blunted   Language: naming objects and repeating phrases   Thought Process:  disorganized, illogical   Associations: loose associations   Thought Content:  persecutory delusions   Perceptual Disturbances: auditory hallucinations of \"sounds\" and of \"whispers\" still present, but less frequent, visual hallucinations of \"shadows\", appears distracted   Risk Potential: Suicidal ideation - None  Homicidal ideation - None  Potential for aggression - No   Sensorium:  oriented to person, place, and time/date   Memory:  recent memory mildly impaired, remote memory intact   Consciousness:  alert and awake   Attention/Concentration: poor concentration and poor attention span   Intellect: average   Fund of Knowledge: awareness of current events: yes  past history: yes  vocabulary: normal   Insight:  poor   Judgment: poor   Muscle Strength:   Muscle Tone: normal  normal   Gait/Station: normal gait/station, normal balance   Motor Activity: no abnormal movements       Laboratory Results: I have personally reviewed all pertinent laboratory/tests results    Admission Labs:   Admission on 01/10/2024   Component Date Value    Sodium 01/11/2024 138     Potassium 01/11/2024 4.2     Chloride 01/11/2024 103     CO2 01/11/2024 27     ANION GAP 01/11/2024 8     BUN 01/11/2024 7     Creatinine 01/11/2024 0.74     Glucose 01/11/2024 75     Glucose, Fasting 01/11/2024 75     Calcium 01/11/2024 9.0     Corrected Calcium 01/11/2024 9.6     AST 01/11/2024 9 (L)     ALT 01/11/2024 6 (L)     Alkaline Phosphatase 01/11/2024 47     Total Protein 01/11/2024 7.2     Albumin 01/11/2024 3.3 (L)     Total Bilirubin 01/11/2024 0.17 (L)     eGFR 01/11/2024 121     WBC 01/11/2024 12.07 (H)     RBC 01/11/2024 3.94     Hemoglobin 01/11/2024 " 8.6 (L)     Hematocrit 01/11/2024 29.0 (L)     MCV 01/11/2024 74 (L)     MCH 01/11/2024 21.8 (L)     MCHC 01/11/2024 29.7 (L)     RDW 01/11/2024 24.8 (H)     MPV 01/11/2024 8.2 (L)     Platelets 01/11/2024 603 (H)     nRBC 01/11/2024 0     Neutrophils Relative 01/11/2024 64     Immat GRANS % 01/11/2024 0     Lymphocytes Relative 01/11/2024 23     Monocytes Relative 01/11/2024 11     Eosinophils Relative 01/11/2024 1     Basophils Relative 01/11/2024 1     Neutrophils Absolute 01/11/2024 7.74 (H)     Immature Grans Absolute 01/11/2024 0.05     Lymphocytes Absolute 01/11/2024 2.81     Monocytes Absolute 01/11/2024 1.32 (H)     Eosinophils Absolute 01/11/2024 0.09     Basophils Absolute 01/11/2024 0.06     TSH 3RD GENERATON 01/11/2024 1.486     Cholesterol 01/11/2024 128     Triglycerides 01/11/2024 48     HDL, Direct 01/11/2024 54     LDL Calculated 01/11/2024 64     Non-HDL-Chol (CHOL-HDL) 01/11/2024 74     Ventricular Rate 01/10/2024 80     Atrial Rate 01/10/2024 80     NV Interval 01/10/2024 118     QRSD Interval 01/10/2024 118     QT Interval 01/10/2024 388     QTC Interval 01/10/2024 447     P Axis 01/10/2024 74     QRS [place] 01/10/2024 65     T Wave [place] 01/10/2024 36     Ventricular Rate 01/10/2024 82     Atrial Rate 01/10/2024 82     NV Interval 01/10/2024 126     QRSD Interval 01/10/2024 130     QT Interval 01/10/2024 388     QTC Interval 01/10/2024 453     P Axis 01/10/2024 77     QRS Axis 01/10/2024 67     T Wave [place] 01/10/2024 48        Imaging Studies:     CT abdomen pelvis with contrast; Result Date: 1/3/2024; Narrative: CT ABDOMEN AND PELVIS WITH IV CONTRAST INDICATION:   Perianal fistula and abdominal pain. COMPARISON: 7/30/2023   Impression: 1. Extensive perianal and bilateral gluteal fold cellulitis. Collection extending from the perianal region 1:00 o'clock,  along the left gluteal fold and perineum measuring 3 x 1 x 3 cm, consistent with an abscess and/or fistula. 2. Mild diffuse colitis sparing  the sigmoid colon. Mild distal ileitis. 3. Suggestion of mild proctitis.       Code Status: Level 1 - Full Code  Advance Directive and Living Will: <no information>    Suicide/Homicide Risk Assessment:    Risk of Harm to Self:   Nursing Suicide Risk Assessment Last 24 hours: C-SSRS Risk (Since Last Contact)  Calculated C-SSRS Risk Score (Since Last Contact): No Risk Indicated  Demographic risk factors include: never , male  Historical Risk Factors include: chronic psychiatric problems  Current Specific Risk Factors include: mental illness diagnosis, current psychotic symptoms, presence of hallucinations, presence of delusions, poor reasoning, health problems  Protective Factors: no current suicidal ideation, ability to communicate with staff on the unit, taking medications as ordered on the unit, stable housing, supportive mother and sister  Weapons/Firearms: none. The following steps have been taken to ensure weapons are properly secured: not applicable  Based on today's assessment, Carlos presents the following risk of harm to self: low    Risk of Harm to Others:  Nursing Homicide Risk Assessment: Violence Risk to Others: Denies within past 6 months  Demographic Risk Factors include: male, unemployed, under age 40.  Historical Risk Factors include: none.  Current Specific Risk Factors include: current psychotic symptoms, concomitant thought disorder  Protective Factors: no current homicidal ideation, able to communicate with staff on the unit, compliant with medications on the unit as ordered, compliant with unit milieu, follows staff redirection, stable living environment, supportive mother and sister  Based on today's assessment, Carlos presents the following risk of harm to others: minimal    The following interventions are recommended: behavioral checks every 7 minutes, continued hospitalization on locked unit     Assessment/Plan   Principal Problem:    Schizophrenia, paranoid type (HCC)  Active  Problems:    ROBER (generalized anxiety disorder)      Patient Strengths: negotiates basic needs, patient is on a voluntary commitment, stable housing, supportive mother and sister     Patient Barriers: chronic mental illness, difficulty adapting, medical problems, noncompliant with medication, noncompliant with treatment    Treatment Plan:     Planned Treatment and Medication Changes:    All current active medications have been reviewed  Encourage group therapy, milieu therapy and occupational therapy  Behavioral Health checks every 7 minutes  Medical service to follow for medical issues  Continue Invega 6 mg at bedtime and titrate dose gradually to help with psychotic symptoms  Add Neurontin 100 mg tid to help with anxiety symptoms and mood    Continue all other medications:    Current Facility-Administered Medications   Medication Dose Route Frequency Provider Last Rate    acetaminophen  650 mg Oral Q6H PRN Keomanjit Huertas MD      acetaminophen  650 mg Oral Q4H PRN Keo Huertas MD      acetaminophen  975 mg Oral Q6H PRN Keomanjit Huertas MD      benztropine  1 mg Intramuscular Q4H PRN Max 6/day Keo Huertas MD      benztropine  1 mg Oral Q4H PRN Max 6/day Keo Huertas MD      hydrOXYzine HCL  50 mg Oral Q6H PRN Max 4/day Keomanjit Huertas MD      Or    diphenhydrAMINE  50 mg Intramuscular Q6H PRN Keomanjit Huertas MD      [START ON 1/17/2024] ergocalciferol  50,000 Units Oral Weekly Keomanjit Huertas MD      gabapentin  100 mg Oral TID Ann Rodriguez MD      glycerin-hypromellose-  1 drop Both Eyes Q3H PRN Keo Huertas MD      hydrOXYzine HCL  100 mg Oral Q6H PRN Max 4/day Keo Huertas MD      Or    LORazepam  2 mg Intramuscular Q6H PRN Keomanjit Huertas MD      hydrOXYzine HCL  25 mg Oral Q6H PRN Max 4/day Keo Huertas MD      melatonin  3 mg Oral HS Keo Huertas MD      methocarbamol  500 mg Oral 4x Daily Keomanjit Huertas MD      OLANZapine  10 mg  Oral Q3H PRN Max 3/day Keo Huertas MD      Or    OLANZapine  10 mg Intramuscular Q3H PRN Max 3/day Keo Huertas MD      OLANZapine  5 mg Oral Q3H PRN Max 6/day Keo Huertas MD      Or    OLANZapine  5 mg Intramuscular Q3H PRN Max 6/day Keo Huertas MD      OLANZapine  2.5 mg Oral Q3H PRN Max 8/day Keo Huertas MD      paliperidone  6 mg Oral HS Keo Huertas MD      predniSONE  10 mg Oral Daily Keo Huertas MD      propranolol  10 mg Oral Q8H PRN Keo Huertas MD       Risks / Benefits of Treatment:    Risks, benefits, and possible side effects of medications explained to patient including risk of parkinsonian symptoms, Tardive Dyskinesia and metabolic syndrome related to treatment with antipsychotic medications. The patient verbalizes understanding and agreement for treatment.    Counseling / Coordination of Care:    Patient's presentation on admission and proposed treatment plan discussed with treatment team.  Diagnosis, medication changes and treatment plan reviewed with patient.  Stressors preceding admission discussed with patient including health issues, chronic mental illness, and noncompliance with treatment.  Events leading to admission reviewed with patient.    Inpatient Psychiatric Certification:    Estimated length of stay: 10 midnights    Based upon physical, mental and social evaluations, I certify that inpatient psychiatric services are medically necessary for this patient for a duration of 10 midnights for the treatment of Schizophrenia, paranoid type (HCC)  Available alternative community resources do not meet the patient's mental health care needs.  I further attest that an established written individualized plan of care has been implemented and is outlined in the patient's medical records.  The patient has been released from the Emergency Department and medically cleared as per Emergency Department documentation for psychiatric admission for  Schizophrenia, paranoid type (HCC)    Ann Rodriguez MD 01/11/24

## 2024-01-11 NOTE — ASSESSMENT & PLAN NOTE
Hemoglobin today: 8.6 maintaining stable levels    Received Venofer infusion per GI recommendation   S/p 2 units PRBC transfusion on admission.    Plan:   CBC

## 2024-01-11 NOTE — PROGRESS NOTES
01/11/24 1541   Team Meeting   Meeting Type Daily Rounds   Team Members Present   Team Members Present Physician;Nurse;   Physician Team Member Jennifer   Nursing Team Member Lesvia   Care Management Team Member Humberto   Patient/Family Present   Patient Present No   Patient's Family Present No     Patient currently holds 201 status. Patient reports AH. Patient is medication and meal compliant. Patient to start on gabapentin 100 mg TID, Invega 6mg HS and Melatonin 3 mg HS. Patient discharge date and time to be determined.

## 2024-01-11 NOTE — PROGRESS NOTES
"Admission Self Assessment form was given to patient to complete, sign and return to this therapist.    Admission Self  Assessment form was completed, signed and returned to this therapist.  As per form, upon discharge, patient will be able to: \"understand more about my illness\"  "

## 2024-01-11 NOTE — NURSING NOTE
Patient is calm and cooperative on the unit. Denies SI, HI, SIB, auditory and visual hallucinations. Patient is responding to internal stimuli, however, and is disorganized with tangential speech. Patient is visible on the unit, spending most of the day in the small group room. Denies any unmet needs at this time. Q7 safety checks to continue.

## 2024-01-11 NOTE — NURSING NOTE
Pt pleasant and cooperative with staff. Endorses AH, RIS. Tangential, disorganized, rambling rapid speech. Pt denies SI/HI. Pt showered and completed wound care independently. Continued q7m checks for safety     Pt c/o 8/10 buttock pain. Prn tylenol administered. Pt reports relief of pain. Med effective.

## 2024-01-11 NOTE — DISCHARGE SUMMARY
Discharge Summary - [facility]    Patient Information: Carlos Wolf 33 y.o. male MRN: [id number]  Unit/Bed#: U 376-02 Encounter: [id number]    Discharging Physician / Practitioner: Kayli Day  PCP: Kaykay Monroe DO  Admission Date:   Admission Orders (From admission, onward)       Ordered        01/10/24 1727  ED TO DIFFERENT CAMPUS [facility] UNIT or INPATIENT MEDICAL UNIT to [facility] UNIT (using Discharge Readmit Navigator) - Admit Patient to  Behavioral Health Unit  Once                          Discharge Date: 1/11/2024    Reason for Admission: Cellulitis and abscess of buttock    Discharge Diagnoses:     Principal Problem:    Schizophrenia, paranoid type (HCC)  Active Problems:    Cellulitis and abscess of buttock    Crohn's disease (HCC)    Anemia  Resolved Problems:    * No resolved hospital problems. *        Cellulitis and abscess of buttock  Assessment & Plan  Seton drain in place. No active bleeding, pain or infectious signs present.  Surgical wound is dressed.   Leukocytosis trending: WBC 13.15> 12.0 >16.19> 12.73>15.68> 15.09> 12.07  Most recent WBC most likely reactive secondary to surgery.  No growth per blood culture (1 & 2).  Post surgical day #6 I & D and fistulectomy     Plan:  CBC    Crohn's disease (HCC)  Assessment & Plan  On prednisone for management.  Pain well-controlled with Tylenol as needed. No diarrhea or blood in the stool.  S/P surgical I&D and fistulectomy with seton placement on 1/05/24.    Plan:  Prednisone 10 mg daily.  Tylenol 650 mg every 4 hours as needed    Anemia  Assessment & Plan  Hemoglobin today: 8.6 maintaining stable levels    Received Venofer infusion per GI recommendation   S/p 2 units PRBC transfusion on admission.    Plan:   CBC    * Schizophrenia, paranoid type (HCC)  Assessment & Plan  On Invega 6 mg at bedtime for management.  Admits to active auditory hallucinations this morning. Non commanding or threatening.  Denies SH/SI, homicidal  ideations.     Plan:  Per primary team         Consultations During Hospital Stay:  Psychiatry  Gastroenterology  Surgery  Colorectal    Procedures Performed:   I&D on 1/5/2024    Significant Findings / Test Results:   WBC 10.85 > 12.07  Hemoglobin 10.85>  Lactic 2.2> 0.6    Incidental Findings:   None    Test Results Pending at Discharge (will require follow up):   None     Outpatient Tests Requested:  None    Outpatient follow-up Requested:  None    Complications: None    Hospital Course:     Carlos Wolf is a 33 y.o. male patient with past medical history of Crohn's disease, paranoid schizophrenia, anemia, ROBER who originally presented to the hospital on 1/10/2024 due to chronic lower abdominal pain and perianal abscess.  During ED course, patient's vitals was notable for tachycardia.  Diagnostic workup was notable for leukocytosis, lactic acidosis, and anemia.  CT abdomen pelvis was notable for extensive perianal and bilateral gluteal fold cellulitis.  EKG was notable for Sinus tachycardia right bundle branch block. Patient was administered 2 units of PRBC, administered Zosyn and was admitted under family medicine services for management of cellulitis and abscess of buttock.        Hospital course:  During hospital course patient was started Rocephin and Flagyl but was eventually kept on Flagyl  for remainder of antibiotic course for cellulitis and abscess coverage.  Venofer transfusion was administered on 1/06 for his persistent anemia.  Patient was started on Invega by psych for management of his auditory and visual hallucinations.  Patient denied SI/SH/HI during hospital course.      On 1/5/2024, EUA, I&D of perianal abscess, fistulectomy and seton placement  was done.  Patient was started on prednisone for his Crohn's after being medically cleared.    Patient medical cleared and is transferred to [facility].    Review of Systems   Constitutional:  Negative for chills and fever.   Respiratory:   "Negative for chest tightness and shortness of breath.    Cardiovascular:  Negative for chest pain and palpitations.   Gastrointestinal:  Positive for abdominal pain. Negative for abdominal distention (LLQ with palpation), blood in stool, diarrhea, nausea and vomiting.   Endocrine: Negative.    Genitourinary:  Negative for dysuria.   Musculoskeletal: Negative.    Skin:  Positive for wound (Perianal abcess).   Allergic/Immunologic: Negative.    Neurological:  Negative for dizziness, weakness, light-headedness and headaches.   Hematological: Negative.    Psychiatric/Behavioral:  Positive for hallucinations. Negative for self-injury and suicidal ideas.         Auditory and visual hallucinations       Condition at Discharge: stable     Discharge Day Visit / Exam:     Vitals: Blood Pressure: 128/80 (01/15/24 0726)  Pulse: 98 (01/15/24 0726)  Temperature: 97.6 °F (36.4 °C) (01/15/24 0726)  Temp Source: Temporal (01/15/24 0726)  Respirations: 16 (01/15/24 0726)  Height: 5' 6\" (167.6 cm) (01/10/24 1658)  Weight - Scale: 60.4 kg (133 lb 3.2 oz) (01/14/24 0734)  SpO2: 100 % (01/15/24 0726)    Exam:   Physical Exam  Constitutional:       Appearance: He is not toxic-appearing.   HENT:      Head: Normocephalic and atraumatic.      Nose: Nose normal.   Eyes:      Extraocular Movements: Extraocular movements intact.   Cardiovascular:      Rate and Rhythm: Normal rate and regular rhythm.      Pulses: Normal pulses.      Heart sounds: Normal heart sounds. No murmur heard.  Pulmonary:      Effort: Pulmonary effort is normal.      Breath sounds: Normal breath sounds.   Abdominal:      Palpations: Abdomen is soft.      Tenderness: There is abdominal tenderness (LLQ).   Musculoskeletal:         General: Normal range of motion.      Cervical back: Normal range of motion and neck supple. No rigidity.   Skin:     General: Skin is warm and dry.      Capillary Refill: Capillary refill takes less than 2 seconds.      Comments: Perianal " abscess.  Seton placement intact.   Neurological:      General: No focal deficit present.      Mental Status: He is alert.   Psychiatric:         Attention and Perception: He perceives auditory and visual hallucinations.       Discussion with Family: N/A  Patient Information Sharing: N/A    Discharge instructions/Information to patient and family:   See after visit summary for information provided to patient and family.      Discharge Medications:  Current Outpatient Medications   Medication Instructions    acetaminophen (TYLENOL) 650 mg, Oral, Every 8 hours PRN    budesonide (ENTOCORT EC) 3 mg, Oral, Daily    ergocalciferol (VITAMIN D2) 50,000 Units, Oral, Weekly, , For 8 weeks    ferrous sulfate 325 mg, Oral, 2 times daily with meals    lidocaine (LIDODERM) 5 % 1 patch, Topical, Every 24 hours, Remove & Discard patch within 12 hours or as directed by MD    methocarbamol (ROBAXIN) 500 mg, Oral, 4 times daily    predniSONE 5 mg, Daily        Provisions for Follow-Up Care:  See after visit summary for information related to follow-up care and any pertinent home health orders.      Disposition:     Inpatient Psychiatry at [facility]    For Discharges to [facility]:   Not Applicable to this Patient - Not Applicable to this Patient    Planned Readmission: No     Discharge Statement:  I spent 20 minutes discharging the patient. This time was spent on the day of discharge. I had direct contact with the patient on the day of discharge. Greater than 50% of the total time was spent examining patient, answering all patient questions, arranging and discussing plan of care with patient as well as directly providing post-discharge instructions.  Additional time then spent on discharge activities.    ** Please Note: This note has been constructed using a voice recognition system **    Wing Hutchinson MD  01/15/24  12:04 PM

## 2024-01-11 NOTE — DISCHARGE SUMMARY
Discharge Summary - [facility]    Patient Information: Carlos Wolf 33 y.o. male MRN: [id number]  Unit/Bed#: 7T [facility] 705-01 Encounter: [id number]    Discharging Physician / Practitioner: Kayli Day  PCP: Kaykay Monroe DO  Admission Date:   Admission Orders (From admission, onward)       Ordered        01/05/24 1352  Inpatient Admission  Once            01/03/24 1935  Place in Observation  Once            Signed and Held  ED TO DIFFERENT CAMPUS [facility] UNIT or INPATIENT MEDICAL UNIT to [facility] UNIT (using Discharge Readmit Navigator) - Admit Patient to  Behavioral Health Unit  Once,   Status:  Canceled                          Discharge Date: 1/11/2024    Reason for Admission: Cellulitis and abscess of buttock    Discharge Diagnoses:     Principal Problem:    Cellulitis and abscess of buttock  Active Problems:    Anemia    Crohn's disease (HCC)    Schizophrenia, paranoid type (HCC)  Resolved Problems:    * No resolved hospital problems. *        * Cellulitis and abscess of buttock  Assessment & Plan   Seton drain in place. No active bleeding, pain or infectious signs present.  Surgical wound is dressed.   Leukocytosis trending: WBC 13.15> 12.0 >16.19> 12.73>15.68> 15.09  Most recent WBC most likely reactive secondary to surgery.  No growth per blood culture (1 & 2).  Post surgical day #5 I & D and fistulectomy   Medically cleared for discharge    Plan:   - Metronidazole 250mg IV twice daily 5/5   - AM CBC.     Crohn's disease (HCC)  Assessment & Plan  Pain well-controlled with Tylenol as needed. No diarrhea or blood in the stool.  S/P surgical I&D and fistulectomy   Infliximab will be started as outpatient per GI recommendations     Anemia  Assessment & Plan  Hemoglobin today: 8.3 maintaining stable levels    Received Venofer infusion per GI recommendation   Denies dizziness or weakness.    Plan:   -Trend CBC    Schizophrenia, paranoid type (HCC)  Assessment & Plan  Admits to active  auditory hallucinations this morning. Non commanding or threatening.  Denies SH/SI, homicidal ideations.       Plan:  Invega 6 mg bedtime  Monitor closely  Avoid Haldol; hx of anaphylactic reaction  On Discharge from [facility] [facility], will be admitted to U waiting for that at this time         Consultations During Hospital Stay:  Psychiatry  Gastroenterology  Surgery  Colorectal    Procedures Performed:   I&D on 1/5/2024    Significant Findings / Test Results:   WBC 10.85 > 12.07  Hemoglobin 10.85>  Lactic 2.2> 0.6    Incidental Findings:   None    Test Results Pending at Discharge (will require follow up):   None     Outpatient Tests Requested:  None    Outpatient follow-up Requested:  None    Complications: None    Hospital Course:     Carlos Wolf is a 33 y.o. male patient with past medical history of Crohn's disease, paranoid schizophrenia, anemia, ROBER who originally presented to the hospital on 1/3/2024 due to chronic lower abdominal pain and perianal abscess.  During ED course, patient's vitals was notable for tachycardia.  Diagnostic workup was notable for leukocytosis, lactic acidosis, and anemia.  CT abdomen pelvis was notable for extensive perianal and bilateral gluteal fold cellulitis.  EKG was notable for Sinus tachycardia right bundle branch block. Patient was administered 2 units of PRBC, administered Zosyn and was admitted under family medicine services for management of cellulitis and abscess of buttock.        Hospital course:  During hospital course patient was started Rocephin and Flagyl but was eventually kept on Flagyl  for remainder of antibiotic course for cellulitis and abscess coverage.  Venofer transfusion was administered on 1/06 for his persistent anemia.  Patient was started on Invega by psych for management of his auditory and visual hallucinations.  Patient denied SI/SH/HI during hospital course.      On 1/5/2024, EUA, I&D of perianal abscess, fistulectomy and seton placement  was  "done.  Patient was started on prednisone for his Crohn's after being medically cleared.    Patient medical cleared and is transferred to [facility].    Review of Systems   Constitutional:  Negative for chills and fever.   Respiratory:  Negative for chest tightness and shortness of breath.    Cardiovascular:  Negative for chest pain and palpitations.   Gastrointestinal:  Positive for abdominal pain. Negative for abdominal distention (LLQ with palpation), blood in stool, diarrhea, nausea and vomiting.   Endocrine: Negative.    Genitourinary:  Negative for dysuria.   Musculoskeletal: Negative.    Skin:  Positive for wound (Perianal abcess).   Allergic/Immunologic: Negative.    Neurological:  Negative for dizziness, weakness, light-headedness and headaches.   Hematological: Negative.    Psychiatric/Behavioral:  Positive for hallucinations. Negative for self-injury and suicidal ideas.         Auditory and visual hallucinations       Condition at Discharge: stable     Discharge Day Visit / Exam:     Vitals: Blood Pressure: 112/80 (01/10/24 1500)  Pulse: 84 (01/10/24 1500)  Temperature: 97.6 °F (36.4 °C) (01/10/24 1500)  Temp Source: Temporal (01/10/24 1500)  Respirations: 16 (01/10/24 1500)  Height: 5' 6\" (167.6 cm) (01/04/24 0642)  Weight - Scale: 58.7 kg (129 lb 8 oz) (01/04/24 0642)  SpO2: 98 % (01/10/24 0750)    Exam:   Physical Exam  Constitutional:       Appearance: He is not toxic-appearing.   HENT:      Head: Normocephalic and atraumatic.      Nose: Nose normal.   Eyes:      Extraocular Movements: Extraocular movements intact.   Cardiovascular:      Rate and Rhythm: Normal rate and regular rhythm.      Pulses: Normal pulses.      Heart sounds: Normal heart sounds. No murmur heard.  Pulmonary:      Effort: Pulmonary effort is normal.      Breath sounds: Normal breath sounds.   Abdominal:      Palpations: Abdomen is soft.      Tenderness: There is abdominal tenderness (LLQ).   Musculoskeletal:         General: " Normal range of motion.      Cervical back: Normal range of motion and neck supple. No rigidity.   Skin:     General: Skin is warm and dry.      Capillary Refill: Capillary refill takes less than 2 seconds.      Comments: Perianal abscess.  Seton placement intact.   Neurological:      General: No focal deficit present.      Mental Status: He is alert.   Psychiatric:         Attention and Perception: He perceives auditory and visual hallucinations.       Discussion with Family: N/A  Patient Information Sharing: N/A    Discharge instructions/Information to patient and family:   See after visit summary for information provided to patient and family.      Discharge Medications:  Current Outpatient Medications   Medication Instructions    acetaminophen (TYLENOL) 650 mg, Oral, Every 8 hours PRN    budesonide (ENTOCORT EC) 3 mg, Oral, Daily    ergocalciferol (VITAMIN D2) 50,000 Units, Oral, Weekly, , For 8 weeks    ferrous sulfate 325 mg, Oral, 2 times daily with meals    lidocaine (LIDODERM) 5 % 1 patch, Topical, Every 24 hours, Remove & Discard patch within 12 hours or as directed by MD    methocarbamol (ROBAXIN) 500 mg, Oral, 4 times daily    predniSONE 5 mg, Daily        Provisions for Follow-Up Care:  See after visit summary for information related to follow-up care and any pertinent home health orders.      Disposition:     Inpatient Psychiatry at [facility]    For Discharges to [facility]:   Not Applicable to this Patient - Not Applicable to this Patient    Planned Readmission: No     Discharge Statement:  I spent 20 minutes discharging the patient. This time was spent on the day of discharge. I had direct contact with the patient on the day of discharge. Greater than 50% of the total time was spent examining patient, answering all patient questions, arranging and discussing plan of care with patient as well as directly providing post-discharge instructions.  Additional time then spent on  discharge activities.    ** Please Note: This note has been constructed using a voice recognition system **    Wing Hutchinson MD  01/11/24  2:01 PM

## 2024-01-11 NOTE — CONSULTS
Inpatient Consultation - [facility]    Patient Information: Carlos Wolf 33 y.o. male MRN: [id number]  Unit/Bed#: U 376-02 Encounter: [id number]  PCP: Kaykay Monroe DO  Date of Admission:  1/10/2024  Date of Consultation: 01/11/24  Requesting Physician: Ann Rodriguez MD    Reason For Consultation:   Medical clearance    Assessment/Plan:    Cellulitis and abscess of buttock  Assessment & Plan  Seton drain in place. No active bleeding, pain or infectious signs present.  Surgical wound is dressed.   Leukocytosis trending: WBC 13.15> 12.0 >16.19> 12.73>15.68> 15.09> 12.07  Most recent WBC most likely reactive secondary to surgery.  No growth per blood culture (1 & 2).  Post surgical day #6 I & D and fistulectomy     Plan:  CBC    Crohn's disease (HCC)  Assessment & Plan  On prednisone for management.  Pain well-controlled with Tylenol as needed. No diarrhea or blood in the stool.  S/P surgical I&D and fistulectomy with seton placement on 1/05/24.    Plan:  Prednisone 10 mg daily.  Tylenol 650 mg every 4 hours as needed    Anemia  Assessment & Plan  Hemoglobin today: 8.6 maintaining stable levels    Received Venofer infusion per GI recommendation   S/p 2 units PRBC transfusion on admission.    Plan:   CBC    * Schizophrenia, paranoid type (HCC)  Assessment & Plan  On Invega 6 mg at bedtime for management.  Admits to active auditory hallucinations this morning. Non commanding or threatening.  Denies SH/SI, homicidal ideations.     Plan:  Per primary team        VTE Prophylaxis: Reason for no pharmacologic prophylaxis patient able to ambulate   / reason for no mechanical VTE prophylaxis patient able to ambulate      Recommendations for Discharge:  None    Counseling / Coordination of Care Time: 20 minutes.  Greater than 50% of total time spent on patient counseling and coordination of care.    Collaboration of Care: Were Recommendations Directly Discussed with Primary Treatment Team? - Yes      Patient Information Sharing: N/A  History of Present Illness:    Carlos Wolf is a 33 y.o. male who is originally admitted to the psychiatric service on 1/10/2024 due to Schizophrenia, paranoid type.   We are consulted for medical clearance.    Review of Systems:    Review of Systems   Constitutional:  Negative for chills and fever.   HENT: Negative.     Eyes:  Negative for visual disturbance.   Respiratory:  Negative for cough, shortness of breath and wheezing.    Gastrointestinal:  Positive for abdominal pain (LLQ). Negative for diarrhea, nausea and vomiting.   Endocrine: Negative.    Genitourinary:  Negative for dysuria.   Musculoskeletal: Negative.    Skin:  Positive for wound (perianal fistula).   Allergic/Immunologic: Negative.    Neurological:  Negative for dizziness, weakness and numbness.   Hematological: Negative.    Psychiatric/Behavioral:  Positive for hallucinations. Negative for self-injury and suicidal ideas.        Past Medical and Surgical History:   Past Medical History:   Diagnosis Date    Anemia 2004    hospitalization/transfusion    Cellulitis     Chronic knee pain     Crohn's disease (HCC)     Esophagitis     Leukocytosis     Perianal fistula     Pilonidal cyst     RBBB     Schizophrenia (HCC)     Seizure (HCC)      Past Surgical History:   Procedure Laterality Date    INCISION AND DRAINAGE OF WOUND N/A 1/5/2024    Procedure: INCISION AND DRAINAGE (I&D) PERIANAL ABSCESS, SETON PLACEMENT,EUA, FISTULECTOMY;  Surgeon: Carl Pace MD;  Location:  MAIN OR;  Service: General    UT ANRCT XM SURG REQ ANES GENERAL SPI/EDRL DX N/A 4/7/2016    Procedure: EXAM UNDER ANESTHESIA (EUA); possible REMOVAL OF FOREIGN BODY anoscopy ;  Surgeon: Reymundo Araujo MD;  Location:  MAIN OR;  Service: Colorectal    UT SURG TX ANAL FISTULA INTERSPHINCTERIC N/A 4/7/2016    Procedure: superficial FISTULOTOMY; SETON PROCEDURE drainage of chronic ischiofistula abscess and debridement;  Surgeon: Reymundo  "MD Gayle;  Location: [facility];  Service: Colorectal     Meds/Allergies:  Allergies:   Allergies   Allergen Reactions    Haldol Decanoate [Haloperidol] Anaphylaxis    Oxycodone-Acetaminophen Rash     \"swollon red rash\"    Sulfamethoxazole-Trimethoprim      \"never really worked\"     Prior to Admission Medications   Prescriptions Last Dose Informant Patient Reported? Taking?   acetaminophen (TYLENOL) 650 mg CR tablet  Self No No   Sig: Take 1 tablet (650 mg total) by mouth every 8 (eight) hours as needed for mild pain   budesonide (ENTOCORT EC) 3 MG capsule   Yes No   Sig: Take 3 mg by mouth daily   ergocalciferol (VITAMIN D2) 50,000 units   No No   Sig: Take 1 capsule (50,000 Units total) by mouth once a week , For 8 weeks   ferrous sulfate 325 (65 Fe) mg tablet   No No   Sig: Take 1 tablet (325 mg total) by mouth 2 (two) times a day with meals   lidocaine (LIDODERM) 5 %   No No   Sig: Apply 1 patch topically over 12 hours every 24 hours for 15 days Remove & Discard patch within 12 hours or as directed by MD   methocarbamol (ROBAXIN) 500 mg tablet   No No   Sig: Take 1 tablet (500 mg total) by mouth 4 (four) times a day   predniSONE 5 mg/5 mL solution   Yes No   Sig: Take 5 mg by mouth daily   Patient not taking: Reported on 1/3/2024      Facility-Administered Medications: None     Social History:     Social History     Socioeconomic History    Marital status: Single     Spouse name: Not on file    Number of children: Not on file    Years of education: Not on file    Highest education level: Not on file   Occupational History    Not on file   Tobacco Use    Smoking status: Light Smoker     Current packs/day: 0.01     Types: Cigarettes, Cigars     Start date: 1/1/2009    Smokeless tobacco: Never    Tobacco comments:     Codie says 7 cigarettes per day   Vaping Use    Vaping status: Never Used   Substance and Sexual Activity    Alcohol use: Not Currently     Comment: Socially    Drug use: No    Sexual " "activity: Yes     Partners: Female   Other Topics Concern    Not on file   Social History Narrative    Not on file     Social Determinants of Health     Financial Resource Strain: Not on file   Food Insecurity: No Food Insecurity (1/4/2024)    Hunger Vital Sign     Worried About Running Out of Food in the Last Year: Never true     Ran Out of Food in the Last Year: Never true   Transportation Needs: No Transportation Needs (1/4/2024)    PRAPARE - Transportation     Lack of Transportation (Medical): No     Lack of Transportation (Non-Medical): No   Physical Activity: Not on file   Stress: Not on file   Social Connections: Not on file   Intimate Partner Violence: Not on file   Housing Stability: Low Risk  (1/4/2024)    Housing Stability Vital Sign     Unable to Pay for Housing in the Last Year: No     Number of Places Lived in the Last Year: 2     Unstable Housing in the Last Year: No       Family History:  History reviewed. No pertinent family history.    Physical Exam:     Vitals:   Blood Pressure: 112/77 (01/11/24 0728)  Pulse: 81 (01/11/24 0728)  Temperature: 97.6 °F (36.4 °C) (01/11/24 0728)  Temp Source: Temporal (01/11/24 0728)  Respirations: 16 (01/11/24 0728)  Height: 5' 6\" (167.6 cm) (01/10/24 1658)  Weight - Scale: 61.7 kg (136 lb) (01/10/24 1658)  SpO2: 100 % (01/11/24 0728)    Physical Exam  Constitutional:       Appearance: Normal appearance.   HENT:      Head: Normocephalic and atraumatic.      Nose: Nose normal.   Eyes:      Extraocular Movements: Extraocular movements intact.   Cardiovascular:      Rate and Rhythm: Normal rate and regular rhythm.      Pulses: Normal pulses.      Heart sounds: No murmur heard.  Pulmonary:      Effort: Pulmonary effort is normal.      Breath sounds: Normal breath sounds.   Abdominal:      Palpations: Abdomen is soft.      Tenderness: There is abdominal tenderness (LLQ).   Musculoskeletal:         General: Normal range of motion.      Cervical back: Normal range of motion " and neck supple.   Skin:     General: Skin is warm and dry.      Capillary Refill: Capillary refill takes less than 2 seconds.   Neurological:      General: No focal deficit present.      Mental Status: He is alert.   Psychiatric:         Behavior: Behavior normal. Behavior is cooperative.         Thought Content: Thought content does not include homicidal or suicidal ideation. Thought content does not include homicidal or suicidal plan.      Comments: Visual and auditory hallucination       Additional Data:     Lab Results: I have personally reviewed pertinent reports.      Results from last 7 days   Lab Units 01/11/24  0618   WBC Thousand/uL 12.07*   HEMOGLOBIN g/dL 8.6*   HEMATOCRIT % 29.0*   PLATELETS Thousands/uL 603*   NEUTROS PCT % 64   LYMPHS PCT % 23   MONOS PCT % 11   EOS PCT % 1     Results from last 7 days   Lab Units 01/11/24  0618   POTASSIUM mmol/L 4.2   CHLORIDE mmol/L 103   CO2 mmol/L 27   BUN mg/dL 7   CREATININE mg/dL 0.74   CALCIUM mg/dL 9.0   ALK PHOS U/L 47   ALT U/L 6*   AST U/L 9*           Imaging: I have personally reviewed pertinent reports.      CT abdomen pelvis with contrast    Result Date: 1/3/2024  Narrative: CT ABDOMEN AND PELVIS WITH IV CONTRAST INDICATION:   Perianal fistula and abdominal pain. COMPARISON: 7/30/2023 TECHNIQUE:  CT examination of the abdomen and pelvis was performed. Multiplanar 2D reformatted images were created from the source data. This examination, like all CT scans performed in the [facility], was performed utilizing techniques to minimize radiation dose exposure, including the use of iterative reconstruction and automated exposure control. Radiation dose length product (DLP) for this visit:  280 mGy-cm IV Contrast:  100 mL of iohexol (OMNIPAQUE) Enteric Contrast:  Enteric contrast was not administered. FINDINGS: ABDOMEN LOWER CHEST: Clear lung bases. LIVER/BILIARY TREE: Stable hepatomegaly. GALLBLADDER:  No calcified gallstones. No  pericholecystic inflammatory change. SPLEEN:  Unremarkable. PANCREAS:  Unremarkable. ADRENAL GLANDS:  Unremarkable. KIDNEYS/URETERS: Symmetric nephrographic phase enhancement of the kidneys. No obstructive uropathy STOMACH AND BOWEL: Possible mild wall thickening at the gastroesophageal junction. Mild wall thickening of the terminal ileum and throughout the colon, sparing the sigmoid colon. Mild wall thickening in the rectum. No evidence of bowel obstruction. APPENDIX: Normal appendix. ABDOMINOPELVIC CAVITY:  No free intraperitoneal air, fluid collection or lymphadenopathy. VESSELS: Patent portal, splenic and mesenteric veins. PELVIS REPRODUCTIVE ORGANS: No prostate enlargement. URINARY BLADDER:  Unremarkable. ABDOMINAL WALL/INGUINAL REGIONS: Significant cutaneous thickening and subcutaneous inflammation along the bilateral gluteal folds and perianal region. Fluid collection in the left perineum and gluteal fold extending from the perianal soft tissues at approximately 1:00 o'clock, measuring 3 x 1 x 3 cm. OSSEOUS STRUCTURES:  No acute fracture or destructive osseous lesion.     Impression: 1. Extensive perianal and bilateral gluteal fold cellulitis. Collection extending from the perianal region 1:00 o'clock,  along the left gluteal fold and perineum measuring 3 x 1 x 3 cm, consistent with an abscess and/or fistula. 2. Mild diffuse colitis sparing the sigmoid colon. Mild distal ileitis. 3. Suggestion of mild proctitis. Workstation performed: [id number]       EKG, Pathology, and Other Studies Reviewed on Admission:   EKG  Result Date: 01/11/24  Impression: Normal sinus rhythm with sinus arrhythmia.  Right bundle branch block.    ** Please Note: This note has been constructed using a voice recognition system. **    Wing Hutchinson MD  01/11/24  2:30 PM

## 2024-01-11 NOTE — PLAN OF CARE
Problem: Alteration in Thoughts and Perception  Goal: Verbalize thoughts and feelings  Description: Interventions:  - Promote a nonjudgmental and trusting relationship with the patient through active listening and therapeutic communication  - Assess patient's level of functioning, behavior and potential for risk  - Engage patient in 1 on 1 interactions  - Encourage patient to express fears, feelings, frustrations, and discuss symptoms    - [place] patient to reality, help patient recognize reality-based thinking   - Administer medications as ordered and assess for potential side effects  - Provide the patient education related to the signs and symptoms of the illness and desired effects of prescribed medications  Outcome: Progressing  Goal: Refrain from acting on delusional thinking/internal stimuli  Description: Interventions:  - Monitor patient closely, per order   - Utilize least restrictive measures   - Set reasonable limits, give positive feedback for acceptable   - Administer medications as ordered and monitor of potential side effects  Outcome: Progressing  Goal: Agree to be compliant with medication regime, as prescribed and report medication side effects  Description: Interventions:  - Offer appropriate PRN medication and supervise ingestion; conduct AIMS, as needed   Outcome: Progressing  Goal: Complete daily ADLs, including personal hygiene independently, as able  Description: Interventions:  - Observe, teach, and assist patient with ADLS  - Monitor and promote a balance of rest/activity, with adequate nutrition and elimination   Outcome: Progressing     Problem: Ineffective Coping  Goal: Identifies ineffective coping skills  Outcome: Progressing  Goal: Identifies healthy coping skills  Outcome: Progressing  Goal: Demonstrates healthy coping skills  Outcome: Progressing     Problem: Anxiety  Goal: Anxiety is at manageable level  Description: Interventions:  - Assess and monitor patient's anxiety level.    - Monitor for signs and symptoms (heart palpitations, chest pain, shortness of breath, headaches, nausea, feeling jumpy, restlessness, irritable, apprehensive).   - Collaborate with interdisciplinary team and initiate plan and interventions as ordered.  - [place] patient to unit/surroundings  - Explain treatment plan  - Encourage participation in care  - Encourage verbalization of concerns/fears  - Identify coping mechanisms  - Assist in developing anxiety-reducing skills  - Administer/offer alternative therapies  - Limit or eliminate stimulants  Outcome: Progressing     Problem: Alteration in Orientation  Goal: Treatment Goal: Demonstrate a reduction of confusion and improved orientation to person, place, time and/or situation upon discharge, according to optimum baseline/ability  Outcome: Progressing  Goal: Allow medical examinations, as recommended  Description: Interventions:  - Provide physical/neurological exams and/or referrals, per provider   Outcome: Progressing  Goal: Cooperate with recommended testing/procedures  Description: Interventions:  - Determine need for ancillary testing  - Observe for mental status changes  - Implement falls/precaution protocol   Outcome: Progressing     Problem: Alteration in Thoughts and Perception  Goal: Treatment Goal: Gain control of psychotic behaviors/thinking, reduce/eliminate presenting symptoms and demonstrate improved reality functioning upon discharge  Outcome: Not Progressing  Goal: Attend and participate in unit activities, including therapeutic, recreational, and educational groups  Description: Interventions:  -Encourage Visitation and family involvement in care  Outcome: Not Progressing  Goal: Recognize dysfunctional thoughts, communicate reality-based thoughts at the time of discharge  Description: Interventions:  - Provide medication and psycho-education to assist patient in compliance and developing insight into his/her illness   Outcome: Not Progressing

## 2024-01-11 NOTE — ASSESSMENT & PLAN NOTE
Seton drain in place. No active bleeding, pain or infectious signs present.  Surgical wound is dressed.   Leukocytosis trending: WBC 13.15> 12.0 >16.19> 12.73>15.68> 15.09> 12.07  Most recent WBC most likely reactive secondary to surgery.  No growth per blood culture (1 & 2).  Post surgical day #6 I & D and fistulectomy     Plan:  CBC

## 2024-01-11 NOTE — ASSESSMENT & PLAN NOTE
On Invega 6 mg at bedtime for management.  Admits to active auditory hallucinations this morning. Non commanding or threatening.  Denies SH/SI, homicidal ideations.     Plan:  Per primary team

## 2024-01-11 NOTE — PLAN OF CARE
Problem: Alteration in Thoughts and Perception  Goal: Verbalize thoughts and feelings  Description: Interventions:  - Promote a nonjudgmental and trusting relationship with the patient through active listening and therapeutic communication  - Assess patient's level of functioning, behavior and potential for risk  - Engage patient in 1 on 1 interactions  - Encourage patient to express fears, feelings, frustrations, and discuss symptoms    - [place] patient to reality, help patient recognize reality-based thinking   - Administer medications as ordered and assess for potential side effects  - Provide the patient education related to the signs and symptoms of the illness and desired effects of prescribed medications  1/11/2024 0049 by Wesley Fierro RN  Outcome: Progressing  1/11/2024 0048 by Wesley Fierro RN  Outcome: Progressing  Goal: Refrain from acting on delusional thinking/internal stimuli  Description: Interventions:  - Monitor patient closely, per order   - Utilize least restrictive measures   - Set reasonable limits, give positive feedback for acceptable   - Administer medications as ordered and monitor of potential side effects  1/11/2024 0049 by Wesley Fierro RN  Outcome: Progressing  1/11/2024 0048 by Wesley Fierro RN  Outcome: Progressing  Goal: Agree to be compliant with medication regime, as prescribed and report medication side effects  Description: Interventions:  - Offer appropriate PRN medication and supervise ingestion; conduct AIMS, as needed   1/11/2024 0049 by Wesley Fierro RN  Outcome: Progressing  1/11/2024 0048 by Wesley Fierro RN  Outcome: Progressing  Goal: Complete daily ADLs, including personal hygiene independently, as able  Description: Interventions:  - Observe, teach, and assist patient with ADLS  - Monitor and promote a balance of rest/activity, with adequate nutrition and elimination   1/11/2024 0049 by Wesley Fierro RN  Outcome: Progressing  1/11/2024 0048 by Wesley  ADELE Fierro  Outcome: Progressing     Problem: Ineffective Coping  Goal: Identifies ineffective coping skills  1/11/2024 0049 by Wesley Fierro RN  Outcome: Progressing  1/11/2024 0048 by Wesley Fierro RN  Outcome: Progressing  Goal: Identifies healthy coping skills  1/11/2024 0049 by Wesley Fierro RN  Outcome: Progressing  1/11/2024 0048 by Wesley Fierro RN  Outcome: Progressing  Goal: Demonstrates healthy coping skills  1/11/2024 0049 by Wesley Fierro RN  Outcome: Progressing  1/11/2024 0048 by Wesley Fierro RN  Outcome: Progressing     Problem: Anxiety  Goal: Anxiety is at manageable level  Description: Interventions:  - Assess and monitor patient's anxiety level.   - Monitor for signs and symptoms (heart palpitations, chest pain, shortness of breath, headaches, nausea, feeling jumpy, restlessness, irritable, apprehensive).   - Collaborate with interdisciplinary team and initiate plan and interventions as ordered.  - [place] patient to unit/surroundings  - Explain treatment plan  - Encourage participation in care  - Encourage verbalization of concerns/fears  - Identify coping mechanisms  - Assist in developing anxiety-reducing skills  - Administer/offer alternative therapies  - Limit or eliminate stimulants  1/11/2024 0049 by Wesley Fierro RN  Outcome: Progressing  1/11/2024 0048 by Wesley Fierro RN  Outcome: Progressing     Problem: Alteration in Orientation  Goal: Treatment Goal: Demonstrate a reduction of confusion and improved orientation to person, place, time and/or situation upon discharge, according to optimum baseline/ability  1/11/2024 0049 by Wesley Fierro RN  Outcome: Progressing  1/11/2024 0048 by Wesley Fierro RN  Outcome: Progressing  Goal: Allow medical examinations, as recommended  Description: Interventions:  - Provide physical/neurological exams and/or referrals, per provider   1/11/2024 0049 by Wesley Fierro RN  Outcome: Progressing  1/11/2024 0048 by Wesley Fierro  RN  Outcome: Progressing  Goal: Cooperate with recommended testing/procedures  Description: Interventions:  - Determine need for ancillary testing  - Observe for mental status changes  - Implement falls/precaution protocol   1/11/2024 0049 by Wesley Fierro RN  Outcome: Progressing  1/11/2024 0048 by Wesley Fierro RN  Outcome: Progressing     Problem: Alteration in Thoughts and Perception  Goal: Treatment Goal: Gain control of psychotic behaviors/thinking, reduce/eliminate presenting symptoms and demonstrate improved reality functioning upon discharge  1/11/2024 0049 by Wesley Fierro RN  Outcome: Not Progressing  1/11/2024 0049 by Wesley Fierro RN  Outcome: Progressing  1/11/2024 0048 by Wesley Fierro RN  Outcome: Not Progressing  Goal: Attend and participate in unit activities, including therapeutic, recreational, and educational groups  Description: Interventions:  -Encourage Visitation and family involvement in care  Outcome: Not Progressing  Goal: Recognize dysfunctional thoughts, communicate reality-based thoughts at the time of discharge  Description: Interventions:  - Provide medication and psycho-education to assist patient in compliance and developing insight into his/her illness   1/11/2024 0049 by Wesley Fierro RN  Outcome: Not Progressing  1/11/2024 0048 by Wesley Fierro RN  Outcome: Not Progressing

## 2024-01-11 NOTE — PROGRESS NOTES
Patient Intake     Age / Race / Sex / Living Arrangement / Marital Status / Children 33/ unknown / M / Lives alone / S / No children   Is patient a [place]?  No    Education / Income Source / Type of work / Rep Payee    High School / unemployed   Patient's Telephone Number   [phone]   Emergency Contacts Brittney Koch (Mother)- [phone]     Can patient return home if yes to what address Yes      Discharge transportation? Sister (Cortney bAraham)   Health Insurance / Prescription Coverage MEDICARE MEDICARE PART A ONLY     Payor Plan Address Payor Plan Phone Number Payor Plan Fax Number Effective Dates   [address]   6/1/2012 - None Entered   [facility] [id number]      Subscriber Name Subscriber Birth Date Member ID    SLIM AGUILERA 1990        Preferred Pharmacy  [id number], [place] [address]     Admission Status      Status of admission 201   [facility]   Advance Directives Denied     Patient History   Stressors / Triggers to current admission   Auditory Hallucinations    Treatment History 1 prior admission   Hx of Suicide Attempts Denied   Family history of Mental illness Denied   Trauma History Denied   Eating Disorder Denied   Substance Abuse     AUDIT Scor -  0    ,  PAWSS -0   Inpatient Rehab Tx History denied   Legal Issues denied   Access to firearms denied   Support system   Current Psychiatrist / Therapist [facility]    ACT / ICM-BCM-TCM /     Denied    PCP  [facility]   Family-friends supports  Mom / Sister    Hobbies / Interests / Coping skills Video Games    Goals To go home healthy   Releases of Information  Sister / Providers   Referrals [facility]

## 2024-01-12 ENCOUNTER — TRANSITIONAL CARE MANAGEMENT (OUTPATIENT)
Dept: FAMILY MEDICINE CLINIC | Facility: CLINIC | Age: 34
End: 2024-01-12

## 2024-01-12 LAB
BASOPHILS # BLD AUTO: 0.06 THOUSANDS/ÂΜL (ref 0–0.1)
BASOPHILS NFR BLD AUTO: 0 % (ref 0–1)
EOSINOPHIL # BLD AUTO: 0.12 THOUSAND/ÂΜL (ref 0–0.61)
EOSINOPHIL NFR BLD AUTO: 1 % (ref 0–6)
ERYTHROCYTE [DISTWIDTH] IN BLOOD BY AUTOMATED COUNT: 25.2 % (ref 11.6–15.1)
HCT VFR BLD AUTO: 31.9 % (ref 36.5–49.3)
HGB BLD-MCNC: 9.1 G/DL (ref 12–17)
IMM GRANULOCYTES # BLD AUTO: 0.06 THOUSAND/UL (ref 0–0.2)
IMM GRANULOCYTES NFR BLD AUTO: 0 % (ref 0–2)
LYMPHOCYTES # BLD AUTO: 2.63 THOUSANDS/ÂΜL (ref 0.6–4.47)
LYMPHOCYTES NFR BLD AUTO: 19 % (ref 14–44)
MCH RBC QN AUTO: 21.1 PG (ref 26.8–34.3)
MCHC RBC AUTO-ENTMCNC: 28.5 G/DL (ref 31.4–37.4)
MCV RBC AUTO: 74 FL (ref 82–98)
MONOCYTES # BLD AUTO: 1.71 THOUSAND/ÂΜL (ref 0.17–1.22)
MONOCYTES NFR BLD AUTO: 12 % (ref 4–12)
NEUTROPHILS # BLD AUTO: 9.17 THOUSANDS/ÂΜL (ref 1.85–7.62)
NEUTS SEG NFR BLD AUTO: 68 % (ref 43–75)
NRBC BLD AUTO-RTO: 0 /100 WBCS
PLATELET # BLD AUTO: 712 THOUSANDS/UL (ref 149–390)
PMV BLD AUTO: 8.2 FL (ref 8.9–12.7)
RBC # BLD AUTO: 4.31 MILLION/UL (ref 3.88–5.62)
WBC # BLD AUTO: 13.75 THOUSAND/UL (ref 4.31–10.16)

## 2024-01-12 PROCEDURE — 99232 SBSQ HOSP IP/OBS MODERATE 35: CPT | Performed by: PSYCHIATRY & NEUROLOGY

## 2024-01-12 PROCEDURE — 85025 COMPLETE CBC W/AUTO DIFF WBC: CPT

## 2024-01-12 RX ORDER — GABAPENTIN 100 MG/1
200 CAPSULE ORAL 3 TIMES DAILY
Status: DISCONTINUED | OUTPATIENT
Start: 2024-01-12 | End: 2024-01-13

## 2024-01-12 RX ORDER — FAMOTIDINE 20 MG/1
10 TABLET, FILM COATED ORAL DAILY
Status: DISCONTINUED | OUTPATIENT
Start: 2024-01-12 | End: 2024-01-30 | Stop reason: HOSPADM

## 2024-01-12 RX ORDER — LANOLIN ALCOHOL/MO/W.PET/CERES
6 CREAM (GRAM) TOPICAL
Status: DISCONTINUED | OUTPATIENT
Start: 2024-01-12 | End: 2024-01-15

## 2024-01-12 RX ORDER — PALIPERIDONE 9 MG/1
9 TABLET, EXTENDED RELEASE ORAL
Status: DISCONTINUED | OUTPATIENT
Start: 2024-01-12 | End: 2024-01-17

## 2024-01-12 RX ADMIN — FAMOTIDINE 10 MG: 20 TABLET ORAL at 12:51

## 2024-01-12 RX ADMIN — GABAPENTIN 100 MG: 100 CAPSULE ORAL at 08:01

## 2024-01-12 RX ADMIN — MELATONIN TAB 3 MG 6 MG: 3 TAB at 21:54

## 2024-01-12 RX ADMIN — ACETAMINOPHEN 975 MG: 325 TABLET ORAL at 17:36

## 2024-01-12 RX ADMIN — GABAPENTIN 200 MG: 100 CAPSULE ORAL at 21:54

## 2024-01-12 RX ADMIN — METHOCARBAMOL TABLETS 500 MG: 500 TABLET, COATED ORAL at 12:51

## 2024-01-12 RX ADMIN — PREDNISONE 10 MG: 10 TABLET ORAL at 08:01

## 2024-01-12 RX ADMIN — METHOCARBAMOL TABLETS 500 MG: 500 TABLET, COATED ORAL at 21:54

## 2024-01-12 RX ADMIN — ACETAMINOPHEN 975 MG: 325 TABLET ORAL at 08:02

## 2024-01-12 RX ADMIN — PALIPERIDONE 9 MG: 9 TABLET, EXTENDED RELEASE ORAL at 21:54

## 2024-01-12 RX ADMIN — METHOCARBAMOL TABLETS 500 MG: 500 TABLET, COATED ORAL at 08:01

## 2024-01-12 RX ADMIN — METHOCARBAMOL TABLETS 500 MG: 500 TABLET, COATED ORAL at 17:34

## 2024-01-12 RX ADMIN — GABAPENTIN 200 MG: 100 CAPSULE ORAL at 17:34

## 2024-01-12 NOTE — NURSING NOTE
"Patient denies SI and HI. Reports AHs of shadows and VHs, which he described as \"they're snooping.\" Patient actively responding to internal stimuli while standing in hallway by self. Reports anxiety related to pain, given tylenol. Denies depression. Patient is medication and meal compliant. Visible on the unit and social with peers. He denies any needs at this time.  "

## 2024-01-12 NOTE — QUICK NOTE
Paged by RN 7:37pm: Sen[place], patient Carlos Wolf MRN#[id number] has an order for wound care to be done BID but needs the Calcium Alginate Ag (meglisord ag) cream ordered for pharmacy to dispense it, please.  Patient was a transfer from [facility] yesterday.      Nursing order for calcium alginate ag cream placed

## 2024-01-12 NOTE — UTILIZATION REVIEW
NOTIFICATION OF ADMISSION DISCHARGE   This is a Notification of Discharge from [facility]. Please be advised that this patient has been discharge from our facility. Below you will find the admission and discharge date and time including the patient’s disposition.   UTILIZATION REVIEW CONTACT:  Criselda Brown MA  Utilization   Network Utilization Review Department  Phone: [phone] x carefully listen to the prompts. All voicemails are confidential.  Email: [email or web address]     ADMISSION INFORMATION  PRESENTATION DATE: 1/3/2024  3:07 PM  OBERVATION ADMISSION DATE:  INPATIENT ADMISSION DATE: 1/5/24  1:52 PM   DISCHARGE DATE: 1/10/2024  4:51 PM   DISPOSITION:[facility] Utilization Review Department  ATTENTION: Please call with any questions or concerns to [phone] and carefully listen to the prompts so that you are directed to the right person. All voicemails are confidential.   For Discharge needs, contact Care Management DC Support Team at [phone] opt. [phone]  Send all requests for admission clinical reviews, approved or denied determinations and any other requests to dedicated fax number below belonging to the campus where the patient is receiving treatment. List of dedicated fax numbers for the Facilities:  FACILITY NAME UR FAX NUMBER   ADMISSION DENIALS (Administrative/Medical Necessity) [phone]   DISCHARGE SUPPORT TEAM ([facility]) [phone]   PARENT CHILD HEALTH (Maternity/NICU/Pediatrics) [phone]   [facility] [phone]   [facility] [phone]   [facility] [phone]   [facility] [phone]   [facility] [phone]   [facility] [phone]   [facility] [phone]   [facility]  [phone]   [facility] [phone]   [facility] [phone]   [facility] [phone]

## 2024-01-12 NOTE — PROGRESS NOTES
01/12/24 1546   Team Meeting   Meeting Type Daily Rounds   Team Members Present   Team Members Present Nurse;;Physician   Physician Team Member Jennifer   Nursing Team Member [facility] Team Member Humberto   Patient/Family Present   Patient Present No   Patient's Family Present No     Patient currently holds 201 status. Patient reports AH. Patient presents with disorganized thoughts. Patient is medication and meal compliant. Patient to continue on gabapentin 200 mg, and  invega 9 mg HS. Patient discharge date and time to be determined.

## 2024-01-12 NOTE — PLAN OF CARE
Problem: Alteration in Thoughts and Perception  Goal: Treatment Goal: Gain control of psychotic behaviors/thinking, reduce/eliminate presenting symptoms and demonstrate improved reality functioning upon discharge  Outcome: Progressing  Goal: Verbalize thoughts and feelings  Description: Interventions:  - Promote a nonjudgmental and trusting relationship with the patient through active listening and therapeutic communication  - Assess patient's level of functioning, behavior and potential for risk  - Engage patient in 1 on 1 interactions  - Encourage patient to express fears, feelings, frustrations, and discuss symptoms    - [place] patient to reality, help patient recognize reality-based thinking   - Administer medications as ordered and assess for potential side effects  - Provide the patient education related to the signs and symptoms of the illness and desired effects of prescribed medications  Outcome: Progressing  Goal: Refrain from acting on delusional thinking/internal stimuli  Description: Interventions:  - Monitor patient closely, per order   - Utilize least restrictive measures   - Set reasonable limits, give positive feedback for acceptable   - Administer medications as ordered and monitor of potential side effects  Outcome: Progressing  Goal: Agree to be compliant with medication regime, as prescribed and report medication side effects  Description: Interventions:  - Offer appropriate PRN medication and supervise ingestion; conduct AIMS, as needed   Outcome: Progressing  Goal: Attend and participate in unit activities, including therapeutic, recreational, and educational groups  Description: Interventions:  -Encourage Visitation and family involvement in care  Outcome: Progressing  Goal: Recognize dysfunctional thoughts, communicate reality-based thoughts at the time of discharge  Description: Interventions:  - Provide medication and psycho-education to assist patient in compliance and developing  insight into his/her illness   Outcome: Progressing  Goal: Complete daily ADLs, including personal hygiene independently, as able  Description: Interventions:  - Observe, teach, and assist patient with ADLS  - Monitor and promote a balance of rest/activity, with adequate nutrition and elimination   Outcome: Progressing     Problem: Anxiety  Goal: Anxiety is at manageable level  Description: Interventions:  - Assess and monitor patient's anxiety level.   - Monitor for signs and symptoms (heart palpitations, chest pain, shortness of breath, headaches, nausea, feeling jumpy, restlessness, irritable, apprehensive).   - Collaborate with interdisciplinary team and initiate plan and interventions as ordered.  - [place] patient to unit/surroundings  - Explain treatment plan  - Encourage participation in care  - Encourage verbalization of concerns/fears  - Identify coping mechanisms  - Assist in developing anxiety-reducing skills  - Administer/offer alternative therapies  - Limit or eliminate stimulants  Outcome: Progressing     Problem: Alteration in Orientation  Goal: Treatment Goal: Demonstrate a reduction of confusion and improved orientation to person, place, time and/or situation upon discharge, according to optimum baseline/ability  Outcome: Progressing  Goal: Allow medical examinations, as recommended  Description: Interventions:  - Provide physical/neurological exams and/or referrals, per provider   Outcome: Progressing  Goal: Cooperate with recommended testing/procedures  Description: Interventions:  - Determine need for ancillary testing  - Observe for mental status changes  - Implement falls/precaution protocol   Outcome: Progressing     Problem: Ineffective Coping  Goal: Participates in unit activities  Description: Interventions:  - Provide therapeutic environment   - Provide required programming   - Redirect inappropriate behaviors   Outcome: Progressing      within normal limits

## 2024-01-12 NOTE — PROGRESS NOTES
"Progress Note - Behavioral Health   Carlos Wolf 33 y.o. male MRN: [id number]  Unit/Bed#: [facility] 376-02 Encounter: [id number]    Assessment/Plan   Principal Problem:    Schizophrenia, paranoid type (HCC)  Active Problems:    Anemia    Crohn's disease (HCC)    Cellulitis and abscess of buttock      Recommended Treatment:   Increase gabapentin to 200 mg 3 times daily for anxiety.  Continue melatonin for sleep.  Increase paliperidone to 9 mg p.o. nightly for psychosis.  Per family medicine recommendation will start famotidine 10 mg QD for stomach upset associated with prednisone while inpatient.      Continue with pharmacotherapy, group therapy, milieu therapy and occupational therapy.  Continue to assess for adverse medication side effects.  Encourage Carlos Wolf to participate in nonverbal forms of therapy including journaling and art/music therapy.  Continue frequent safety checks and vitals per unit protocol.  Continue to engage CM/SW to assist with collateral, disposition planning, and the implementation of an individualized, patient-centered plan of care.  Continue medical management by medical team.  Case discussed with treatment team.    Legal Status: 201  ------------------------------------------------------------    Subjective: All documentation including nursing notes, medication history to ensure medication adherence on the unit, labs, and vitals were reviewed. Carlos was evaluated this morning for continuity of care and no acute distress noted throughout the evaluation. Over the past 24 hours per nursing report, Carlos has been cooperative on the unit and compliant with medications although refused topical medication ordered for wound.    Today, Carlos is consenting for safety on the unit. Carlos reports feeling \"better.\" Carlos notes having difficulty sleeping. Carlos states having a good appetite. Carlos has been  taking the medications as prescribed and reporting no side effects. " " Patient reports having gone to therapy today and feeling better.  He reports difficulty sleeping due to anxiety.  He describes auditory hallucinations as \"13 phone calls.\"  He describes visual hallucinations as shadows \"fixing stuff.\"  He describes his hallucinations also as \"snooping.\"    Carlos denies suicidal ideations. Carlos denies homicidal ideations.    PRNs overnight: Acetaminophen  VS: Reviewed, within normal limits    Progress Toward Goals: slow improvement    Psychiatric Review of Systems:  Behavior over the last 24 hours:  unchanged  Sleep: decreased  Appetite: normal  Medication side effects: No   ROS: denies any abdominal pain, abdominal discomfort, or nausea, all other systems are negative    Vital signs in last 24 hours:  Temp:  [97.1 °F (36.2 °C)-98.6 °F (37 °C)] 98.6 °F (37 °C)  HR:  [87-99] 99  Resp:  [16] 16  BP: (119-121)/(68-74) 121/68    Laboratory results:  I have personally reviewed all pertinent laboratory/tests results.  Recent Results (from the past 48 hour(s))   ECG 12 lead    Collection Time: 01/10/24  7:36 PM   Result Value Ref Range    Ventricular Rate 80 BPM    Atrial Rate 80 BPM    OH Interval 118 ms    QRSD Interval 118 ms    QT Interval 388 ms    QTC Interval 447 ms    P Axis 74 degrees    QRS Axis 65 degrees    T Wave Axis 36 degrees   ECG 12 lead    Collection Time: 01/10/24  7:37 PM   Result Value Ref Range    Ventricular Rate 82 BPM    Atrial Rate 82 BPM    OH Interval 126 ms    QRSD Interval 130 ms    QT Interval 388 ms    QTC Interval 453 ms    P [place] 77 degrees    QRS Axis 67 degrees    T Wave Axis 48 degrees   Total Syphilis (IgG/IgM) Screening    Collection Time: 01/11/24  6:17 AM   Result Value Ref Range    Syphilis Total Antibody Non-reactive Non-Reactive   Comprehensive metabolic panel    Collection Time: 01/11/24  6:18 AM   Result Value Ref Range    Sodium 138 135 - 147 mmol/L    Potassium 4.2 3.5 - 5.3 mmol/L    Chloride 103 96 - 108 mmol/L    CO2 27 21 - 32 " mmol/L    ANION GAP 8 mmol/L    BUN 7 5 - 25 mg/dL    Creatinine 0.74 0.60 - 1.30 mg/dL    Glucose 75 65 - 140 mg/dL    Glucose, Fasting 75 65 - 99 mg/dL    Calcium 9.0 8.4 - 10.2 mg/dL    Corrected Calcium 9.6 8.3 - 10.1 mg/dL    AST 9 (L) 13 - 39 U/L    ALT 6 (L) 7 - 52 U/L    Alkaline Phosphatase 47 34 - 104 U/L    Total Protein 7.2 6.4 - 8.4 g/dL    Albumin 3.3 (L) 3.5 - 5.0 g/dL    Total Bilirubin 0.17 (L) 0.20 - 1.00 mg/dL    eGFR 121 ml/min/1.73sq m   CBC and differential    Collection Time: 01/11/24  6:18 AM   Result Value Ref Range    WBC 12.07 (H) 4.31 - 10.16 Thousand/uL    RBC 3.94 3.88 - 5.62 Million/uL    Hemoglobin 8.6 (L) 12.0 - 17.0 g/dL    Hematocrit 29.0 (L) 36.5 - 49.3 %    MCV 74 (L) 82 - 98 fL    MCH 21.8 (L) 26.8 - 34.3 pg    MCHC 29.7 (L) 31.4 - 37.4 g/dL    RDW 24.8 (H) 11.6 - 15.1 %    MPV 8.2 (L) 8.9 - 12.7 fL    Platelets 603 (H) 149 - 390 Thousands/uL    nRBC 0 /100 WBCs    Neutrophils Relative 64 43 - 75 %    Immat GRANS % 0 0 - 2 %    Lymphocytes Relative 23 14 - 44 %    Monocytes Relative 11 4 - 12 %    Eosinophils Relative 1 0 - 6 %    Basophils Relative 1 0 - 1 %    Neutrophils Absolute 7.74 (H) 1.85 - 7.62 Thousands/µL    Immature Grans Absolute 0.05 0.00 - 0.20 Thousand/uL    Lymphocytes Absolute 2.81 0.60 - 4.47 Thousands/µL    Monocytes Absolute 1.32 (H) 0.17 - 1.22 Thousand/µL    Eosinophils Absolute 0.09 0.00 - 0.61 Thousand/µL    Basophils Absolute 0.06 0.00 - 0.10 Thousands/µL   TSH, 3rd generation with Free T4 reflex    Collection Time: 01/11/24  6:18 AM   Result Value Ref Range    TSH 3RD GENERATON 1.486 0.450 - 4.500 uIU/mL   Lipid panel    Collection Time: 01/11/24  6:18 AM   Result Value Ref Range    Cholesterol 128 See Comment mg/dL    Triglycerides 48 See Comment mg/dL    HDL, Direct 54 >=40 mg/dL    LDL Calculated 64 0 - 100 mg/dL    Non-HDL-Chol (CHOL-HDL) 74 mg/dl   Hemoglobin A1C    Collection Time: 01/11/24  6:18 AM   Result Value Ref Range    Hemoglobin A1C 5.6  "Normal 4.0-5.6%; PreDiabetic 5.7-6.4%; Diabetic >=6.5%; Glycemic control for adults with diabetes <7.0% %     mg/dl   ECG 12 lead    Collection Time: 01/11/24 11:58 AM   Result Value Ref Range    Ventricular Rate 72 BPM    Atrial Rate 72 BPM    IL Interval 118 ms    QRSD Interval 122 ms    QT Interval 410 ms    QTC Interval 448 ms    P Axis 81 degrees    QRS Axis 64 degrees    T Wave Axis 55 degrees   ECG 12 lead    Collection Time: 01/11/24 11:59 AM   Result Value Ref Range    Ventricular Rate 74 BPM    Atrial Rate 74 BPM    IL Interval 120 ms    QRSD Interval 120 ms    QT Interval 410 ms    QTC Interval 455 ms    P Axis 72 degrees    QRS Axis 60 degrees    T Wave Axis 35 degrees   CBC and differential    Collection Time: 01/12/24  6:40 AM   Result Value Ref Range    WBC 13.75 (H) 4.31 - 10.16 Thousand/uL    RBC 4.31 3.88 - 5.62 Million/uL    Hemoglobin 9.1 (L) 12.0 - 17.0 g/dL    Hematocrit 31.9 (L) 36.5 - 49.3 %    MCV 74 (L) 82 - 98 fL    MCH 21.1 (L) 26.8 - 34.3 pg    MCHC 28.5 (L) 31.4 - 37.4 g/dL    RDW 25.2 (H) 11.6 - 15.1 %    MPV 8.2 (L) 8.9 - 12.7 fL    Platelets 712 (H) 149 - 390 Thousands/uL    nRBC 0 /100 WBCs    Neutrophils Relative 68 43 - 75 %    Immat GRANS % 0 0 - 2 %    Lymphocytes Relative 19 14 - 44 %    Monocytes Relative 12 4 - 12 %    Eosinophils Relative 1 0 - 6 %    Basophils Relative 0 0 - 1 %    Neutrophils Absolute 9.17 (H) 1.85 - 7.62 Thousands/µL    Immature Grans Absolute 0.06 0.00 - 0.20 Thousand/uL    Lymphocytes Absolute 2.63 0.60 - 4.47 Thousands/µL    Monocytes Absolute 1.71 (H) 0.17 - 1.22 Thousand/µL    Eosinophils Absolute 0.12 0.00 - 0.61 Thousand/µL    Basophils Absolute 0.06 0.00 - 0.10 Thousands/µL         Mental Status Evaluation:    Appearance:  Dark facial hair unkept, small stature, head shaved  Intermittent eye contact   Behavior:  Cooperative   Speech:  Some perseverating, poverty of content of speech   Mood:  \"Better; good\"   Affect:  Constricted, anxious; " suspicious at times   Thought Process:  tangential   Associations: tangential associations   Thought Content:  paranoid delusions   Perceptual Disturbances: Auditory hallucinations and visual hallucinations  Appears to be internally preoccupied   Risk Potential: Suicidal ideation -denies  Homicidal ideation -denies  Potential for aggression -not at present   Sensorium:  Oriented   Memory:  recent and remote memory grossly intact   Consciousness:  alert and awake   Attention/Concentration: poor concentration and poor attention span   Insight:  poor   Judgment: poor   Gait/Station: normal gait/station   Motor Activity: no abnormal movements       Current Medications:  Current Facility-Administered Medications   Medication Dose Route Frequency Provider Last Rate    acetaminophen  650 mg Oral Q6H PRN Keo Eldon Huertas MD      acetaminophen  650 mg Oral Q4H PRN Keo Eldon Huertas MD      acetaminophen  975 mg Oral Q6H PRN Keo Eldon Huertas MD      benztropine  1 mg Intramuscular Q4H PRN Max 6/day Keo Eldon Huertas MD      benztropine  1 mg Oral Q4H PRN Max 6/day Keo Eldon Huertas MD      hydrOXYzine HCL  50 mg Oral Q6H PRN Max 4/day Keo Eldon Huertas MD      Or    diphenhydrAMINE  50 mg Intramuscular Q6H PRN Keo Eldon Huertas MD      [START ON 1/17/2024] ergocalciferol  50,000 Units Oral Weekly Keo Eldon Huertas MD      gabapentin  100 mg Oral TID Ann Rodriguez MD      glycerin-hypromellose-  1 drop Both Eyes Q3H PRN [facility] Eldon Huertas MD      hydrOXYzine HCL  100 mg Oral Q6H PRN Max 4/day Keo Eldon Huertas MD      Or    LORazepam  2 mg Intramuscular Q6H PRN Keo Eldon Huertas MD      hydrOXYzine HCL  25 mg Oral Q6H PRN Max 4/day Keo Eldon Huertas MD      melatonin  3 mg Oral HS Keo Eldon Huertas MD      methocarbamol  500 mg Oral 4x Daily Keo Eldon Huertas MD      OLANZapine  10 mg Oral Q3H PRN Max 3/day Keomanjit Huertas MD      Or    OLANZapine  10 mg Intramuscular Q3H PRN Max 3/day Keo Eldon  MD Kiya      OLANZapine  5 mg Oral Q3H PRN Max 6/day Keo Huertas MD      Or    OLANZapine  5 mg Intramuscular Q3H PRN Max 6/day Keo Huertas MD      OLANZapine  2.5 mg Oral Q3H PRN Max 8/day Keo Huertas MD      paliperidone  6 mg Oral HS Keo Huertas MD      predniSONE  10 mg Oral Daily Keo Huertas MD      propranolol  10 mg Oral Q8H PRN Keo Huertas MD           Behavioral Health Medications: All current active meds have been reviewed. Changes as in plan section above.  Risks, benefits and possible side effects of Medications:   Risks, benefits, and possible side effects of medications explained to patient and patient verbalizes understanding.        Keo Huertas MD 01/12/24  Psychiatry Resident, PGY-II    This note was completed in part utilizing Dragon dictation Software. Grammatical, translation, syntax errors, random word insertions, spelling mistakes, and incomplete sentences may be an occasional consequence of this system secondary to software limitations with voice recognition, ambient noise, and hardware issues. If you have any questions or concerns about the content, text, or information contained within the body of this dictation, please contact the provider for clarification.

## 2024-01-12 NOTE — PROGRESS NOTES
"Progress Note - Behavioral Health     Carlos Wolf 33 y.o. male MRN: [id number]   Unit/Bed#: [facility] 376-02 Encounter: [id number]    Behavior over the last 24 hours: minimal improvement.     Carlos continues to report auditory hallucinations of \"voices saying that I am not Ravi\" and visual hallucinations of \"shadows\". He also still has paranoid thoughts \"the voice is chasing me. It could be a radio\". He seems distracted, preoccupied and anxious. He denies current suicidal or homicidal thoughts. Has been taking medications. Attends some groups.    Sleep: normal  Appetite: normal  Medication side effects: No   ROS: reports pain in incision side, denies any shortness of breath or chest pain, all other systems are negative    Mental Status Evaluation:    Appearance:  disheveled   Behavior:  cooperative, limited eye contact   Speech:  soft, tangential, disorganized   Mood:  dysphoric, anxious   Affect:  blunted   Thought Process:  disorganized, tangential, concrete   Associations: tangential associations   Thought Content:  persecutory delusions   Perceptual Disturbances: auditory hallucinations of \"voices saying that I am not Ravi\", visual hallucinations of \"shadows\", appears responding to internal stimuli   Risk Potential: Suicidal ideation - None at present  Homicidal ideation - None at present  Potential for aggression - Not at present   Sensorium:  oriented to person, place, and time/date   Memory:  recent and remote memory grossly intact   Consciousness:  alert and awake   Attention/Concentration: poor concentration and poor attention span   Insight:  poor   Judgment: poor   Gait/Station: normal gait/station, normal balance   Motor Activity: no abnormal movements     Vital signs in last 24 hours:    Temp:  [97 °F (36.1 °C)-97.4 °F (36.3 °C)] 97.2 °F (36.2 °C)  HR:  [91-98] 98  Resp:  [16-18] 16  BP: (110-119)/(70-73) 110/72    Laboratory results: I have personally reviewed all pertinent laboratory/tests " results    CBC:   Lab Results   Component Value Date    WBC 12.55 (H) 01/13/2024    RBC 3.81 (L) 01/13/2024    HGB 8.1 (L) 01/13/2024    HCT 27.9 (L) 01/13/2024    MCV 73 (L) 01/13/2024     (H) 01/13/2024    MCH 21.3 (L) 01/13/2024    MCHC 29.0 (L) 01/13/2024    RDW 24.5 (H) 01/13/2024    MPV 8.2 (L) 01/13/2024    NEUTROABS 8.27 (H) 01/13/2024    TOTANEUTABS 8.53 (H) 01/05/2024       Suicide/Homicide Risk Assessment:    Risk of Harm to Self:   Nursing Suicide Risk Assessment Last 24 hours: C-SSRS Risk (Since Last Contact)  Calculated C-SSRS Risk Score (Since Last Contact): No Risk Indicated  Current Specific Risk Factors include: mental illness diagnosis, current anxiety symptoms, current psychotic symptoms  Protective Factors: no current suicidal ideation, ability to communicate with staff on the unit, taking medications as ordered on the unit  Based on today's assessment, Carlos presents the following risk of harm to self: low    Risk of Harm to Others:  Nursing Homicide Risk Assessment: Violence Risk to Others: Denies within past 6 months  Based on today's assessment, Carlos presents the following risk of harm to others: minimal    The following interventions are recommended: behavioral checks every 7 minutes, continued hospitalization on locked unit    Progress Toward Goals: minimal progress, still anxious, poor reality testing, denies current suicidal thoughts, still reports psychotic symptoms    Assessment/Plan   Principal Problem:    Schizophrenia, paranoid type (HCC)  Active Problems:    Anemia    Crohn's disease (HCC)    Cellulitis and abscess of buttock      Recommended Treatment:     Planned medication and treatment changes:    All current active medications have been reviewed  Encourage group therapy, milieu therapy and occupational therapy  Behavioral Health checks every 7 minutes  Medical service follows for medical issues  Increase Neurontin to 300 mg tid to help with mood and anxiety  symptoms    Continue all other medications:    Current Facility-Administered Medications   Medication Dose Route Frequency Provider Last Rate    acetaminophen  650 mg Oral Q6H PRN Keomanjit Huertas MD      acetaminophen  650 mg Oral Q4H PRN Keo Eldon Huertas MD      acetaminophen  975 mg Oral Q6H PRN Keo Eldon Huertas MD      benztropine  1 mg Intramuscular Q4H PRN Max 6/day Keo Eldon Huertas MD      benztropine  1 mg Oral Q4H PRN Max 6/day Keo Eldon Huertas MD      hydrOXYzine HCL  50 mg Oral Q6H PRN Max 4/day Keo Eldon Huertas MD      Or    diphenhydrAMINE  50 mg Intramuscular Q6H PRN [facility] Eldon Huertas MD      [START ON 1/17/2024] ergocalciferol  50,000 Units Oral Weekly Keo Eldon Huertas MD      famotidine  10 mg Oral Daily Keo Eldon Huertas MD      gabapentin  300 mg Oral TID Ann Rodriguez MD      glycerin-hypromellose-  1 drop Both Eyes Q3H PRN [facility] Eldon Huertas MD      hydrOXYzine HCL  100 mg Oral Q6H PRN Max 4/day Keo Eldon Huertas MD      Or    LORazepam  2 mg Intramuscular Q6H PRN [facility] Eldon Huertas MD      hydrOXYzine HCL  25 mg Oral Q6H PRN Max 4/day [facility] Eldon Huertas MD      melatonin  6 mg Oral HS Ann Rodriguez MD      methocarbamol  500 mg Oral 4x Daily Keo Eldon Huertas MD      OLANZapine  10 mg Oral Q3H PRN Max 3/day Keo Eldon Huertas MD      Or    OLANZapine  10 mg Intramuscular Q3H PRN Max 3/day Keo Eldon Huertas MD      OLANZapine  5 mg Oral Q3H PRN Max 6/day Keo Eldon Huertas MD      Or    OLANZapine  5 mg Intramuscular Q3H PRN Max 6/day Keo Eldon Huertas MD      OLANZapine  2.5 mg Oral Q3H PRN Max 8/day Keo Eldon Huertas MD      paliperidone  9 mg Oral HS Ann Rodriguez MD      predniSONE  10 mg Oral Daily Keo Eldon Huertas MD      propranolol  10 mg Oral Q8H PRN Keo Eldon Huertas MD       Risks / Benefits of Treatment:    Risks, benefits, and possible side effects of medications explained to patient. Patient has limited understanding of risks and  benefits of treatment at this time, but agrees to take medications as prescribed.    Counseling / Coordination of Care:    Patient's progress discussed with staff in treatment team meeting.  Medications, treatment progress and treatment plan reviewed with patient.  Medication changes discussed with patient.    Ann Rodriguez MD 01/13/24

## 2024-01-12 NOTE — TREATMENT TEAM
01/12/24 1546   Team Meeting   Meeting Type Tx Team Meeting   Team Members Present   Team Members Present Nurse;;Physician   Physician Team Member Jennifer   Nursing Team Member [facility] Team Member Humberto   Patient/Family Present   Patient Present No   Patient's Family Present No     Staff met with patient to discuss treatment plan. Patient signed he was in agreement with plan. Writer provided patient with copy per his request.

## 2024-01-12 NOTE — NURSING NOTE
Patient is visible on unit, walking the hallway, and watching tv in the dayroom. Patient is calm, cooperative, and compliant with medications and meals. Patient denies current SI/HI/AVH. Patient took a shower, tended to ADLs, and refused Calcium Alginate cream for wound, Abd dressing applied. Patient c/o pain on buttock rated 6/10 and received PRN Tylenol 650mg PO at 2110 with good effect. No further complaints or unmet needs voiced at this time. Q 7 minute safety checks maintained.

## 2024-01-13 LAB
BASOPHILS # BLD AUTO: 0.06 THOUSANDS/ÂΜL (ref 0–0.1)
BASOPHILS NFR BLD AUTO: 1 % (ref 0–1)
EOSINOPHIL # BLD AUTO: 0.12 THOUSAND/ÂΜL (ref 0–0.61)
EOSINOPHIL NFR BLD AUTO: 1 % (ref 0–6)
ERYTHROCYTE [DISTWIDTH] IN BLOOD BY AUTOMATED COUNT: 24.5 % (ref 11.6–15.1)
HCT VFR BLD AUTO: 27.9 % (ref 36.5–49.3)
HGB BLD-MCNC: 8.1 G/DL (ref 12–17)
IMM GRANULOCYTES # BLD AUTO: 0.07 THOUSAND/UL (ref 0–0.2)
IMM GRANULOCYTES NFR BLD AUTO: 1 % (ref 0–2)
LYMPHOCYTES # BLD AUTO: 2.29 THOUSANDS/ÂΜL (ref 0.6–4.47)
LYMPHOCYTES NFR BLD AUTO: 18 % (ref 14–44)
MCH RBC QN AUTO: 21.3 PG (ref 26.8–34.3)
MCHC RBC AUTO-ENTMCNC: 29 G/DL (ref 31.4–37.4)
MCV RBC AUTO: 73 FL (ref 82–98)
MONOCYTES # BLD AUTO: 1.74 THOUSAND/ÂΜL (ref 0.17–1.22)
MONOCYTES NFR BLD AUTO: 14 % (ref 4–12)
NEUTROPHILS # BLD AUTO: 8.27 THOUSANDS/ÂΜL (ref 1.85–7.62)
NEUTS SEG NFR BLD AUTO: 65 % (ref 43–75)
NRBC BLD AUTO-RTO: 0 /100 WBCS
PLATELET # BLD AUTO: 627 THOUSANDS/UL (ref 149–390)
PMV BLD AUTO: 8.2 FL (ref 8.9–12.7)
RBC # BLD AUTO: 3.81 MILLION/UL (ref 3.88–5.62)
WBC # BLD AUTO: 12.55 THOUSAND/UL (ref 4.31–10.16)

## 2024-01-13 PROCEDURE — 85025 COMPLETE CBC W/AUTO DIFF WBC: CPT

## 2024-01-13 PROCEDURE — 99232 SBSQ HOSP IP/OBS MODERATE 35: CPT | Performed by: PSYCHIATRY & NEUROLOGY

## 2024-01-13 RX ORDER — GABAPENTIN 300 MG/1
300 CAPSULE ORAL 3 TIMES DAILY
Status: DISCONTINUED | OUTPATIENT
Start: 2024-01-13 | End: 2024-01-15

## 2024-01-13 RX ADMIN — METHOCARBAMOL TABLETS 500 MG: 500 TABLET, COATED ORAL at 11:49

## 2024-01-13 RX ADMIN — METHOCARBAMOL TABLETS 500 MG: 500 TABLET, COATED ORAL at 17:02

## 2024-01-13 RX ADMIN — GABAPENTIN 300 MG: 300 CAPSULE ORAL at 21:37

## 2024-01-13 RX ADMIN — PREDNISONE 10 MG: 10 TABLET ORAL at 08:11

## 2024-01-13 RX ADMIN — METHOCARBAMOL TABLETS 500 MG: 500 TABLET, COATED ORAL at 08:11

## 2024-01-13 RX ADMIN — PALIPERIDONE 9 MG: 9 TABLET, EXTENDED RELEASE ORAL at 21:37

## 2024-01-13 RX ADMIN — ACETAMINOPHEN 650 MG: 325 TABLET ORAL at 21:36

## 2024-01-13 RX ADMIN — MELATONIN TAB 3 MG 6 MG: 3 TAB at 21:37

## 2024-01-13 RX ADMIN — METHOCARBAMOL TABLETS 500 MG: 500 TABLET, COATED ORAL at 21:36

## 2024-01-13 RX ADMIN — GABAPENTIN 300 MG: 300 CAPSULE ORAL at 17:02

## 2024-01-13 RX ADMIN — GABAPENTIN 200 MG: 100 CAPSULE ORAL at 08:11

## 2024-01-13 RX ADMIN — FAMOTIDINE 10 MG: 20 TABLET ORAL at 08:11

## 2024-01-13 RX ADMIN — ACETAMINOPHEN 650 MG: 325 TABLET ORAL at 03:44

## 2024-01-13 NOTE — PROGRESS NOTES
"Progress Note - Behavioral Health     Carlos Wolf 33 y.o. male MRN: [id number]   Unit/Bed#: [facility] 376-02 Encounter: [id number]    Behavior over the last 24 hours: unchanged.     Carlos still reports auditory hallucinations with negative comments \"they say that I don't drive and some other weird stuff\". He still has disorganized thoughts and disorganized speech with loose associations. Distressed, paranoid, poor eye contact \"I still feel anxiety and I am paranoid. Somebody is playing hocPoshVine pocus with me. It is weird\". Limited participation in milieu. Compliant with medications.    Sleep: normal  Appetite: normal  Medication side effects: No   ROS: reports abdominal discomfort, denies any shortness of breath or chest pain, all other systems are negative    Mental Status Evaluation:    Appearance:  disheveled   Behavior:  bizarre, poor eye contact   Speech:  soft, disorganized   Mood:  dysphoric, anxious   Affect:  blunted   Thought Process:  disorganized, tangential, concrete   Associations: tangential associations   Thought Content:  persecutory delusions   Perceptual Disturbances: auditory hallucinations with negative comments, visual hallucinations of \"shadows\"   Risk Potential: Suicidal ideation - None at present  Homicidal ideation - None at present  Potential for aggression - Not at present   Sensorium:  oriented to person, place, and time/date   Memory:  recent and remote memory grossly intact   Consciousness:  alert and awake   Attention/Concentration: poor concentration and poor attention span   Insight:  poor   Judgment: poor   Gait/Station: normal gait/station, normal balance   Motor Activity: no abnormal movements     Vital signs in last 24 hours:    Temp:  [97.4 °F (36.3 °C)] 97.4 °F (36.3 °C)  HR:  [] 103  Resp:  [18] 18  BP: (123-131)/(70-73) 123/73    Laboratory results: I have personally reviewed all pertinent laboratory/tests results    Results from the past 24 hours: No results found " for this or any previous visit (from the past 24 hour(s)).    Suicide/Homicide Risk Assessment:    Risk of Harm to Self:   Nursing Suicide Risk Assessment Last 24 hours: C-SSRS Risk (Since Last Contact)  Calculated C-SSRS Risk Score (Since Last Contact): No Risk Indicated  Current Specific Risk Factors include: mental illness diagnosis, current psychotic symptoms, presence of paranoid ideation, poor self care  Protective Factors: no current suicidal ideation, ability to communicate with staff on the unit, taking medications as ordered on the unit  Based on today's assessment, Carlos presents the following risk of harm to self: low    Risk of Harm to Others:  Nursing Homicide Risk Assessment: Violence Risk to Others: Denies within past 6 months  Based on today's assessment, Carlos presents the following risk of harm to others: minimal    The following interventions are recommended: behavioral checks every 7 minutes, continued hospitalization on locked unit    Progress Toward Goals: no significant improvement today, poor reality testing, denies current suicidal thoughts, still has psychotic symptoms    Assessment/Plan   Principal Problem:    Schizophrenia, paranoid type (HCC)  Active Problems:    Anemia    Crohn's disease (HCC)    Cellulitis and abscess of buttock      Recommended Treatment:     Planned medication and treatment changes:    All current active medications have been reviewed  Encourage group therapy, milieu therapy and occupational therapy  Behavioral Health checks every 7 minutes  Medical service follows for medical issues  Recheck CBC/diff in the morning    Continue current medications:    Current Facility-Administered Medications   Medication Dose Route Frequency Provider Last Rate    acetaminophen  650 mg Oral Q6H PRN Keo Huertas MD      acetaminophen  650 mg Oral Q4H PRN Keo Huertas MD      acetaminophen  975 mg Oral Q6H PRN Keo Huertas MD      benztropine  1 mg Intramuscular  Q4H PRN Max 6/day Keo Eldon Huertas MD      benztropine  1 mg Oral Q4H PRN Max 6/day Keo Eldon Huertas MD      hydrOXYzine HCL  50 mg Oral Q6H PRN Max 4/day Keo Eldon Huertas MD      Or    diphenhydrAMINE  50 mg Intramuscular Q6H PRN Keo Eldon Huertas MD      [START ON 1/17/2024] ergocalciferol  50,000 Units Oral Weekly Keo Eldon Huertas MD      famotidine  10 mg Oral Daily Keo Eldon Huertas MD      gabapentin  300 mg Oral TID Ann Rodriguez MD      glycerin-hypromellose-  1 drop Both Eyes Q3H PRN Keo Eldon Huertas MD      hydrOXYzine HCL  100 mg Oral Q6H PRN Max 4/day Keo Eldon Huertas MD      Or    LORazepam  2 mg Intramuscular Q6H PRN Keo Eldon Huertas MD      hydrOXYzine HCL  25 mg Oral Q6H PRN Max 4/day Keo Eldon Huertas MD      melatonin  6 mg Oral HS Ann Rodriguez MD      methocarbamol  500 mg Oral 4x Daily Keo Eldon Huertas MD      OLANZapine  10 mg Oral Q3H PRN Max 3/day Keo Eldon Huertas MD      Or    OLANZapine  10 mg Intramuscular Q3H PRN Max 3/day Keo Eldon Huertas MD      OLANZapine  5 mg Oral Q3H PRN Max 6/day Keo Eldon Huertas MD      Or    OLANZapine  5 mg Intramuscular Q3H PRN Max 6/day Keo Eldon Huertas MD      OLANZapine  2.5 mg Oral Q3H PRN Max 8/day [facility] Eldon Huertas MD      paliperidone  9 mg Oral HS Ann Rodriguez MD      predniSONE  10 mg Oral Daily Keo Eldon Huertas MD      propranolol  10 mg Oral Q8H PRN Keo Eldon Huertas MD       Risks / Benefits of Treatment:    Risks, benefits, and possible side effects of medications explained to patient. Patient has limited understanding of risks and benefits of treatment at this time, but agrees to take medications as prescribed.    Counseling / Coordination of Care:    Patient's progress discussed with staff in treatment team meeting.  Medications, treatment progress and treatment plan reviewed with patient.    Ann Rodriguez MD 01/14/24

## 2024-01-13 NOTE — PLAN OF CARE
Problem: Alteration in Thoughts and Perception  Goal: Treatment Goal: Gain control of psychotic behaviors/thinking, reduce/eliminate presenting symptoms and demonstrate improved reality functioning upon discharge  Outcome: Progressing  Goal: Verbalize thoughts and feelings  Description: Interventions:  - Promote a nonjudgmental and trusting relationship with the patient through active listening and therapeutic communication  - Assess patient's level of functioning, behavior and potential for risk  - Engage patient in 1 on 1 interactions  - Encourage patient to express fears, feelings, frustrations, and discuss symptoms    - [employer] patient to reality, help patient recognize reality-based thinking   - Administer medications as ordered and assess for potential side effects  - Provide the patient education related to the signs and symptoms of the illness and desired effects of prescribed medications  Outcome: Progressing  Goal: Refrain from acting on delusional thinking/internal stimuli  Description: Interventions:  - Monitor patient closely, per order   - Utilize least restrictive measures   - Set reasonable limits, give positive feedback for acceptable   - Administer medications as ordered and monitor of potential side effects  Outcome: Progressing  Goal: Agree to be compliant with medication regime, as prescribed and report medication side effects  Description: Interventions:  - Offer appropriate PRN medication and supervise ingestion; conduct AIMS, as needed   Outcome: Progressing  Goal: Recognize dysfunctional thoughts, communicate reality-based thoughts at the time of discharge  Description: Interventions:  - Provide medication and psycho-education to assist patient in compliance and developing insight into his/her illness   Outcome: Progressing  Goal: Complete daily ADLs, including personal hygiene independently, as able  Description: Interventions:  - Observe, teach, and assist patient with ADLS  - Monitor and  promote a balance of rest/activity, with adequate nutrition and elimination   Outcome: Progressing     Problem: Ineffective Coping  Goal: Identifies ineffective coping skills  Outcome: Progressing  Goal: Identifies healthy coping skills  Outcome: Progressing  Goal: Demonstrates healthy coping skills  Outcome: Progressing     Problem: Anxiety  Goal: Anxiety is at manageable level  Description: Interventions:  - Assess and monitor patient's anxiety level.   - Monitor for signs and symptoms (heart palpitations, chest pain, shortness of breath, headaches, nausea, feeling jumpy, restlessness, irritable, apprehensive).   - Collaborate with interdisciplinary team and initiate plan and interventions as ordered.  - [place] patient to unit/surroundings  - Explain treatment plan  - Encourage participation in care  - Encourage verbalization of concerns/fears  - Identify coping mechanisms  - Assist in developing anxiety-reducing skills  - Administer/offer alternative therapies  - Limit or eliminate stimulants  Outcome: Progressing     Problem: Alteration in Orientation  Goal: Treatment Goal: Demonstrate a reduction of confusion and improved orientation to person, place, time and/or situation upon discharge, according to optimum baseline/ability  Outcome: Progressing  Goal: Allow medical examinations, as recommended  Description: Interventions:  - Provide physical/neurological exams and/or referrals, per provider   Outcome: Progressing  Goal: Cooperate with recommended testing/procedures  Description: Interventions:  - Determine need for ancillary testing  - Observe for mental status changes  - Implement falls/precaution protocol   Outcome: Progressing

## 2024-01-13 NOTE — NURSING NOTE
"Patient is pleasant on approach, calm and cooperative. Patient completed ADLs. Patient denies all psych symptoms at this time. Patient reports some discomfort at incision site stating, \"It's uncomfortable because there is like a wire piece.\" This writer advised patient to not touch area as it's healing and patient receptive. Patient is compliant with scheduled medications. Staff maintained continuous rounding for safety and support.  "

## 2024-01-13 NOTE — NURSING NOTE
"Patient reports AH of voices telling him \"you're not Ravi.\" He also reports VH, which he describes as \"silver linings.. I'm not really sure what to call them.\" He reports some anxiety related to \"just waiting for something to happen.. go to art therapy or something.\" He denies depression. Patient is medication and meal compliant. Continues to talk when not engaged in conversation with others. He is visible on the unit, social with select peers. Denies any needs at this time.  "

## 2024-01-14 PROCEDURE — 99232 SBSQ HOSP IP/OBS MODERATE 35: CPT | Performed by: PSYCHIATRY & NEUROLOGY

## 2024-01-14 RX ADMIN — METHOCARBAMOL TABLETS 500 MG: 500 TABLET, COATED ORAL at 21:15

## 2024-01-14 RX ADMIN — ACETAMINOPHEN 650 MG: 325 TABLET ORAL at 21:15

## 2024-01-14 RX ADMIN — METHOCARBAMOL TABLETS 500 MG: 500 TABLET, COATED ORAL at 08:06

## 2024-01-14 RX ADMIN — GABAPENTIN 300 MG: 300 CAPSULE ORAL at 17:33

## 2024-01-14 RX ADMIN — MELATONIN TAB 3 MG 6 MG: 3 TAB at 21:15

## 2024-01-14 RX ADMIN — PALIPERIDONE 9 MG: 9 TABLET, EXTENDED RELEASE ORAL at 21:16

## 2024-01-14 RX ADMIN — PREDNISONE 10 MG: 10 TABLET ORAL at 08:06

## 2024-01-14 RX ADMIN — ACETAMINOPHEN 650 MG: 325 TABLET ORAL at 08:15

## 2024-01-14 RX ADMIN — METHOCARBAMOL TABLETS 500 MG: 500 TABLET, COATED ORAL at 17:33

## 2024-01-14 RX ADMIN — GABAPENTIN 300 MG: 300 CAPSULE ORAL at 08:06

## 2024-01-14 RX ADMIN — FAMOTIDINE 10 MG: 20 TABLET ORAL at 08:06

## 2024-01-14 RX ADMIN — GABAPENTIN 300 MG: 300 CAPSULE ORAL at 21:19

## 2024-01-14 RX ADMIN — METHOCARBAMOL TABLETS 500 MG: 500 TABLET, COATED ORAL at 13:01

## 2024-01-14 NOTE — NURSING NOTE
Patient is in the community, calm and cooperative. Patient responding to internal stimuli. Patient denied SI. Patient is compliant with scheduled medications. Staff maintained continuous rounding for safety and support.

## 2024-01-14 NOTE — PLAN OF CARE
Problem: Alteration in Thoughts and Perception  Goal: Treatment Goal: Gain control of psychotic behaviors/thinking, reduce/eliminate presenting symptoms and demonstrate improved reality functioning upon discharge  Outcome: Progressing  Goal: Verbalize thoughts and feelings  Description: Interventions:  - Promote a nonjudgmental and trusting relationship with the patient through active listening and therapeutic communication  - Assess patient's level of functioning, behavior and potential for risk  - Engage patient in 1 on 1 interactions  - Encourage patient to express fears, feelings, frustrations, and discuss symptoms    - [place] patient to reality, help patient recognize reality-based thinking   - Administer medications as ordered and assess for potential side effects  - Provide the patient education related to the signs and symptoms of the illness and desired effects of prescribed medications  Outcome: Progressing  Goal: Refrain from acting on delusional thinking/internal stimuli  Description: Interventions:  - Monitor patient closely, per order   - Utilize least restrictive measures   - Set reasonable limits, give positive feedback for acceptable   - Administer medications as ordered and monitor of potential side effects  Outcome: Progressing  Goal: Agree to be compliant with medication regime, as prescribed and report medication side effects  Description: Interventions:  - Offer appropriate PRN medication and supervise ingestion; conduct AIMS, as needed   Outcome: Progressing  Goal: Recognize dysfunctional thoughts, communicate reality-based thoughts at the time of discharge  Description: Interventions:  - Provide medication and psycho-education to assist patient in compliance and developing insight into his/her illness   Outcome: Progressing  Goal: Complete daily ADLs, including personal hygiene independently, as able  Description: Interventions:  - Observe, teach, and assist patient with ADLS  - Monitor and  promote a balance of rest/activity, with adequate nutrition and elimination   Outcome: Progressing     Problem: Ineffective Coping  Goal: Identifies ineffective coping skills  Outcome: Progressing  Goal: Identifies healthy coping skills  Outcome: Progressing  Goal: Demonstrates healthy coping skills  Outcome: Progressing     Problem: Anxiety  Goal: Anxiety is at manageable level  Description: Interventions:  - Assess and monitor patient's anxiety level.   - Monitor for signs and symptoms (heart palpitations, chest pain, shortness of breath, headaches, nausea, feeling jumpy, restlessness, irritable, apprehensive).   - Collaborate with interdisciplinary team and initiate plan and interventions as ordered.  - [place] patient to unit/surroundings  - Explain treatment plan  - Encourage participation in care  - Encourage verbalization of concerns/fears  - Identify coping mechanisms  - Assist in developing anxiety-reducing skills  - Administer/offer alternative therapies  - Limit or eliminate stimulants  Outcome: Progressing     Problem: Alteration in Orientation  Goal: Treatment Goal: Demonstrate a reduction of confusion and improved orientation to person, place, time and/or situation upon discharge, according to optimum baseline/ability  Outcome: Progressing  Goal: Allow medical examinations, as recommended  Description: Interventions:  - Provide physical/neurological exams and/or referrals, per provider   Outcome: Progressing  Goal: Cooperate with recommended testing/procedures  Description: Interventions:  - Determine need for ancillary testing  - Observe for mental status changes  - Implement falls/precaution protocol   Outcome: Progressing     Problem: DISCHARGE PLANNING - CARE MANAGEMENT  Goal: Discharge to post-acute care or home with appropriate resources  Description: INTERVENTIONS:  - Conduct assessment to determine patient/family and health care team treatment goals, and need for post-acute services based on  payer coverage, community resources, and patient preferences, and barriers to discharge  - Address psychosocial, clinical, and financial barriers to discharge as identified in assessment in conjunction with the patient/family and health care team  - Arrange appropriate level of post-acute services according to patient’s   needs and preference and payer coverage in collaboration with the physician and health care team  - Communicate with and update the patient/family, physician, and health care team regarding progress on the discharge plan  - Arrange appropriate transportation to post-acute venues  Outcome: Progressing

## 2024-01-14 NOTE — NURSING NOTE
"Patient reports AH, stating \"they're saying that's not the boss.\" Patient was unable to elaborate further, \"I don't know what they're talking about.\" Patient does appear to be responding to internal stimuli. He reports mild anxiety related to waiting for TV to be turned on. He is medication and meal compliant. Visible on the unit, social with peers. He denies any needs at this time.    Completed wound care independently, under observation of nursing staff.  "

## 2024-01-15 LAB
BASOPHILS # BLD AUTO: 0.06 THOUSANDS/ÂΜL (ref 0–0.1)
BASOPHILS NFR BLD AUTO: 1 % (ref 0–1)
EOSINOPHIL # BLD AUTO: 0.15 THOUSAND/ÂΜL (ref 0–0.61)
EOSINOPHIL NFR BLD AUTO: 1 % (ref 0–6)
ERYTHROCYTE [DISTWIDTH] IN BLOOD BY AUTOMATED COUNT: 24.2 % (ref 11.6–15.1)
HCT VFR BLD AUTO: 28.6 % (ref 36.5–49.3)
HGB BLD-MCNC: 8.3 G/DL (ref 12–17)
IMM GRANULOCYTES # BLD AUTO: 0.05 THOUSAND/UL (ref 0–0.2)
IMM GRANULOCYTES NFR BLD AUTO: 0 % (ref 0–2)
LYMPHOCYTES # BLD AUTO: 3.04 THOUSANDS/ÂΜL (ref 0.6–4.47)
LYMPHOCYTES NFR BLD AUTO: 25 % (ref 14–44)
MCH RBC QN AUTO: 21.4 PG (ref 26.8–34.3)
MCHC RBC AUTO-ENTMCNC: 29 G/DL (ref 31.4–37.4)
MCV RBC AUTO: 74 FL (ref 82–98)
MONOCYTES # BLD AUTO: 1.77 THOUSAND/ÂΜL (ref 0.17–1.22)
MONOCYTES NFR BLD AUTO: 14 % (ref 4–12)
NEUTROPHILS # BLD AUTO: 7.24 THOUSANDS/ÂΜL (ref 1.85–7.62)
NEUTS SEG NFR BLD AUTO: 59 % (ref 43–75)
NRBC BLD AUTO-RTO: 0 /100 WBCS
PLATELET # BLD AUTO: 654 THOUSANDS/UL (ref 149–390)
PMV BLD AUTO: 8.1 FL (ref 8.9–12.7)
RBC # BLD AUTO: 3.87 MILLION/UL (ref 3.88–5.62)
WBC # BLD AUTO: 12.31 THOUSAND/UL (ref 4.31–10.16)

## 2024-01-15 PROCEDURE — 85025 COMPLETE CBC W/AUTO DIFF WBC: CPT | Performed by: PSYCHIATRY & NEUROLOGY

## 2024-01-15 PROCEDURE — 99232 SBSQ HOSP IP/OBS MODERATE 35: CPT | Performed by: PSYCHIATRY & NEUROLOGY

## 2024-01-15 RX ORDER — DICYCLOMINE HYDROCHLORIDE 10 MG/1
10 CAPSULE ORAL
Status: DISCONTINUED | OUTPATIENT
Start: 2024-01-15 | End: 2024-01-30 | Stop reason: HOSPADM

## 2024-01-15 RX ORDER — GABAPENTIN 400 MG/1
400 CAPSULE ORAL 3 TIMES DAILY
Status: DISCONTINUED | OUTPATIENT
Start: 2024-01-15 | End: 2024-01-16

## 2024-01-15 RX ORDER — LANOLIN ALCOHOL/MO/W.PET/CERES
9 CREAM (GRAM) TOPICAL
Status: DISCONTINUED | OUTPATIENT
Start: 2024-01-15 | End: 2024-01-16

## 2024-01-15 RX ADMIN — METHOCARBAMOL TABLETS 500 MG: 500 TABLET, COATED ORAL at 11:59

## 2024-01-15 RX ADMIN — METHOCARBAMOL TABLETS 500 MG: 500 TABLET, COATED ORAL at 21:24

## 2024-01-15 RX ADMIN — DICYCLOMINE HYDROCHLORIDE 10 MG: 10 CAPSULE ORAL at 16:50

## 2024-01-15 RX ADMIN — METHOCARBAMOL TABLETS 500 MG: 500 TABLET, COATED ORAL at 17:46

## 2024-01-15 RX ADMIN — PREDNISONE 10 MG: 10 TABLET ORAL at 08:00

## 2024-01-15 RX ADMIN — GABAPENTIN 400 MG: 400 CAPSULE ORAL at 21:24

## 2024-01-15 RX ADMIN — MELATONIN TAB 3 MG 9 MG: 3 TAB at 21:24

## 2024-01-15 RX ADMIN — GABAPENTIN 400 MG: 400 CAPSULE ORAL at 16:50

## 2024-01-15 RX ADMIN — FAMOTIDINE 10 MG: 20 TABLET ORAL at 08:00

## 2024-01-15 RX ADMIN — METHOCARBAMOL TABLETS 500 MG: 500 TABLET, COATED ORAL at 08:00

## 2024-01-15 RX ADMIN — DICYCLOMINE HYDROCHLORIDE 10 MG: 10 CAPSULE ORAL at 13:30

## 2024-01-15 RX ADMIN — DICYCLOMINE HYDROCHLORIDE 10 MG: 10 CAPSULE ORAL at 21:24

## 2024-01-15 RX ADMIN — ACETAMINOPHEN 975 MG: 325 TABLET ORAL at 21:27

## 2024-01-15 RX ADMIN — PALIPERIDONE 9 MG: 9 TABLET, EXTENDED RELEASE ORAL at 21:24

## 2024-01-15 RX ADMIN — GABAPENTIN 300 MG: 300 CAPSULE ORAL at 08:00

## 2024-01-15 NOTE — NURSING NOTE
"Patient is calm and cooperative on the unit. Denies SI, HI, SIB. Endorses auditory hallucinations of voices saying \"I'm not Ravi, he's not Ravi...pop...pop.\" Patient also endorses visual hallucinations of shadows. Reports that they are \"annoying\" but tolerable. Patient is constantly responding to internal stimuli. Med and meal compliant. Visible on the unit, keeps to self in small TV room. Patient completed his own wound care this morning. Denies any unmet needs at this time. Q7 safety checks to continue.  "

## 2024-01-15 NOTE — NURSING NOTE
Patient is in the community, calm and cooperative. Patient reports AH, but did not require any additional PRN medications. Patient observed responding to internal stimuli. Patient denies SI/HI. Patient was compliant with scheduled medications. Patient completed ADLs and put fresh ABD pad on wound. Minimal drainage on used pad. Staff maintained continuous rounding for safety and support.

## 2024-01-15 NOTE — PROGRESS NOTES
"Progress Note - Behavioral Health     Carlos Wolf 33 y.o. male MRN: [id number]   Unit/Bed#: [facility] 376-02 Encounter: [id number]    Behavior over the last 24 hours: unchanged.     Carlos was seen today in psychiatric follow-up.  Per staff, has been visible in the milieu however evident that he is still responding to internal stimuli.  Otherwise has been fairly pleasant but remains disorganized.  Today he is still endorsing auditory hallucinations specifically saying that he is not, \"Ravi\" and endorsing visual hallucinations of, \"shadows.\" He is seen in small TV room. He bright and pleasant but is still very disorganized and rambling in conversation with sexual, Yazidism and ethnic context. He states this is all related to, \"anxiety and PTSD.\" He still feels paranoid but overall cooperative with interview.     Sleep: normal  Appetite: normal  Medication side effects: No   ROS: no complaints, all other systems are negative    Mental Status Evaluation:    Appearance:  age appropriate, casually dressed, bearded   Behavior:  cooperative, bizarre   Speech:  increased rate, pressured, hypertalkative   Mood:  \"I still hear voices\"   Affect:  overbright, mood-congruent   Thought Process:  disorganized, increased rate of thoughts   Associations: loose associations   Thought Content:  paranoid ideation, Yazidism preoccupation, preservative on anxiety and PTSD   Perceptual Disturbances: auditory hallucinations, visual hallucinations   Risk Potential: Suicidal ideation - None at present  Homicidal ideation - None at present  Potential for aggression - No   Sensorium:  oriented to person, place, and time/date   Memory:  recent and remote memory: unable to assess due to lack of cooperation   Consciousness:  alert and awake   Attention/Concentration: attention span and concentration appear shorter than expected for age   Insight:  poor   Judgment: poor   Gait/Station: normal gait/station   Motor Activity: no abnormal " movements     Vital signs in last 24 hours:    Temp:  [97.5 °F (36.4 °C)-97.6 °F (36.4 °C)] 97.6 °F (36.4 °C)  HR:  [91-98] 98  Resp:  [16] 16  BP: (117-128)/(66-80) 128/80    Laboratory results: I have personally reviewed all pertinent laboratory/tests results    Results from the past 24 hours:   Recent Results (from the past 24 hour(s))   CBC and differential    Collection Time: 01/15/24  5:52 AM   Result Value Ref Range    WBC 12.31 (H) 4.31 - 10.16 Thousand/uL    RBC 3.87 (L) 3.88 - 5.62 Million/uL    Hemoglobin 8.3 (L) 12.0 - 17.0 g/dL    Hematocrit 28.6 (L) 36.5 - 49.3 %    MCV 74 (L) 82 - 98 fL    MCH 21.4 (L) 26.8 - 34.3 pg    MCHC 29.0 (L) 31.4 - 37.4 g/dL    RDW 24.2 (H) 11.6 - 15.1 %    MPV 8.1 (L) 8.9 - 12.7 fL    Platelets 654 (H) 149 - 390 Thousands/uL    nRBC 0 /100 WBCs    Neutrophils Relative 59 43 - 75 %    Immat GRANS % 0 0 - 2 %    Lymphocytes Relative 25 14 - 44 %    Monocytes Relative 14 (H) 4 - 12 %    Eosinophils Relative 1 0 - 6 %    Basophils Relative 1 0 - 1 %    Neutrophils Absolute 7.24 1.85 - 7.62 Thousands/µL    Immature Grans Absolute 0.05 0.00 - 0.20 Thousand/uL    Lymphocytes Absolute 3.04 0.60 - 4.47 Thousands/µL    Monocytes Absolute 1.77 (H) 0.17 - 1.22 Thousand/µL    Eosinophils Absolute 0.15 0.00 - 0.61 Thousand/µL    Basophils Absolute 0.06 0.00 - 0.10 Thousands/µL     Most Recent Labs:   Lab Results   Component Value Date    WBC 12.31 (H) 01/15/2024    RBC 3.87 (L) 01/15/2024    HGB 8.3 (L) 01/15/2024    HCT 28.6 (L) 01/15/2024     (H) 01/15/2024    RDW 24.2 (H) 01/15/2024    NEUTROABS 7.24 01/15/2024    TOTANEUTABS 8.53 (H) 01/05/2024    SODIUM 138 01/11/2024    K 4.2 01/11/2024     01/11/2024    CO2 27 01/11/2024    BUN 7 01/11/2024    CREATININE 0.74 01/11/2024    GLUC 75 01/11/2024    CALCIUM 9.0 01/11/2024    AST 9 (L) 01/11/2024    ALT 6 (L) 01/11/2024    ALKPHOS 47 01/11/2024    TP 7.2 01/11/2024    ALB 3.3 (L) 01/11/2024    TBILI 0.17 (L) 01/11/2024     CHOLESTEROL 128 01/11/2024    HDL 54 01/11/2024    TRIG 48 01/11/2024    LDLCALC 64 01/11/2024    NONHDLC 74 01/11/2024    CNE4DBEQDQCW 1.486 01/11/2024    SYPHILISAB Non-reactive 01/11/2024    HGBA1C 5.6 01/11/2024     01/11/2024       Progress Toward Goals: progressing    Assessment/Plan   Principal Problem:    Schizophrenia, paranoid type (HCC)  Active Problems:    Anemia    Crohn's disease (HCC)    Cellulitis and abscess of buttock      Recommended Treatment:     Planned medication and treatment changes:    All current active medications have been reviewed  Encourage group therapy, milieu therapy and occupational therapy  Behavioral Health checks every 7 minutes    Continue current medications - consider increase in invega  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, patient has been participating in his own wound care    Current Facility-Administered Medications   Medication Dose Route Frequency Provider Last Rate    acetaminophen  650 mg Oral Q6H PRN Keo Huertas MD      acetaminophen  650 mg Oral Q4H PRN Keomanjit Huertas MD      acetaminophen  975 mg Oral Q6H PRN Keo Eldon Huertas MD      benztropine  1 mg Intramuscular Q4H PRN Max 6/day Keo Eldon Huertas MD      benztropine  1 mg Oral Q4H PRN Max 6/day Keo Huertas MD      hydrOXYzine HCL  50 mg Oral Q6H PRN Max 4/day Keomanjit Huertas MD      Or    diphenhydrAMINE  50 mg Intramuscular Q6H PRN Keomanjit Huertas MD      [START ON 1/17/2024] ergocalciferol  50,000 Units Oral Weekly Keomanjit Huertas MD      famotidine  10 mg Oral Daily Keo Eldon Huertas MD      gabapentin  400 mg Oral TID Ann Rodriguez MD      glycerin-hypromellose-  1 drop Both Eyes Q3H PRN Keo Huertas MD      hydrOXYzine HCL  100 mg Oral Q6H PRN Max 4/day Keo Huertas MD      Or    LORazepam  2 mg Intramuscular Q6H PRN Keomanjit Huertas MD      hydrOXYzine HCL  25 mg Oral Q6H PRN Max 4/day Keo Huertas MD       melatonin  9 mg Oral HS Ann Rodriguez MD      methocarbamol  500 mg Oral 4x Daily Keo Eldon Huertas MD      OLANZapine  10 mg Oral Q3H PRN Max 3/day Keomanjit Huertas MD      Or    OLANZapine  10 mg Intramuscular Q3H PRN Max 3/day Keo Huertas MD      OLANZapine  5 mg Oral Q3H PRN Max 6/day Keo Huertas MD      Or    OLANZapine  5 mg Intramuscular Q3H PRN Max 6/day Keomanjit Huertas MD      OLANZapine  2.5 mg Oral Q3H PRN Max 8/day Keomanjit Huertas MD      paliperidone  9 mg Oral HS Ann Rodriguez MD      predniSONE  10 mg Oral Daily Keomanjit Huertas MD      propranolol  10 mg Oral Q8H PRN Keo Eldon Huertas MD       Risks / Benefits of Treatment:    Risks, benefits, and possible side effects of medications explained to patient and patient verbalizes understanding and agreement for treatment.    Counseling / Coordination of Care:    Total floor / unit time spent today 15 minutes. Greater than 50% of total time was spent with the patient and / or family counseling and / or coordination of care. A description of counseling / coordination of care:  Patient's progress discussed with staff in treatment team meeting.  Medications, treatment progress and treatment plan reviewed with patient.    Gena Edwards PA-C 01/15/24

## 2024-01-15 NOTE — PROGRESS NOTES
01/15/24 1443   Team Meeting   Meeting Type Daily Rounds   Team Members Present   Team Members Present Physician;Nurse;   Physician Team Member Jennifer   Nursing Team Member Lesvia   Care Management Team Member Humberto   Patient/Family Present   Patient Present No   Patient's Family Present No     Patient currently holds 201 status. Patient reports auditory hallucinations. Patient appears to be responding to internal stimuli. Patient to continue on gabapentin 400 TID and Invega 9 mg HS. Patient discharge date and time to be determined.

## 2024-01-15 NOTE — PLAN OF CARE
Problem: Alteration in Thoughts and Perception  Goal: Treatment Goal: Gain control of psychotic behaviors/thinking, reduce/eliminate presenting symptoms and demonstrate improved reality functioning upon discharge  Outcome: Progressing  Goal: Verbalize thoughts and feelings  Description: Interventions:  - Promote a nonjudgmental and trusting relationship with the patient through active listening and therapeutic communication  - Assess patient's level of functioning, behavior and potential for risk  - Engage patient in 1 on 1 interactions  - Encourage patient to express fears, feelings, frustrations, and discuss symptoms    - [place] patient to reality, help patient recognize reality-based thinking   - Administer medications as ordered and assess for potential side effects  - Provide the patient education related to the signs and symptoms of the illness and desired effects of prescribed medications  Outcome: Progressing  Goal: Refrain from acting on delusional thinking/internal stimuli  Description: Interventions:  - Monitor patient closely, per order   - Utilize least restrictive measures   - Set reasonable limits, give positive feedback for acceptable   - Administer medications as ordered and monitor of potential side effects  Outcome: Progressing  Goal: Agree to be compliant with medication regime, as prescribed and report medication side effects  Description: Interventions:  - Offer appropriate PRN medication and supervise ingestion; conduct AIMS, as needed   Outcome: Progressing  Goal: Attend and participate in unit activities, including therapeutic, recreational, and educational groups  Description: Interventions:  -Encourage Visitation and family involvement in care  Outcome: Progressing  Goal: Recognize dysfunctional thoughts, communicate reality-based thoughts at the time of discharge  Description: Interventions:  - Provide medication and psycho-education to assist patient in compliance and developing  insight into his/her illness   Outcome: Progressing  Goal: Complete daily ADLs, including personal hygiene independently, as able  Description: Interventions:  - Observe, teach, and assist patient with ADLS  - Monitor and promote a balance of rest/activity, with adequate nutrition and elimination   Outcome: Progressing     Problem: Anxiety  Goal: Anxiety is at manageable level  Description: Interventions:  - Assess and monitor patient's anxiety level.   - Monitor for signs and symptoms (heart palpitations, chest pain, shortness of breath, headaches, nausea, feeling jumpy, restlessness, irritable, apprehensive).   - Collaborate with interdisciplinary team and initiate plan and interventions as ordered.  - [place] patient to unit/surroundings  - Explain treatment plan  - Encourage participation in care  - Encourage verbalization of concerns/fears  - Identify coping mechanisms  - Assist in developing anxiety-reducing skills  - Administer/offer alternative therapies  - Limit or eliminate stimulants  Outcome: Progressing     Problem: Alteration in Orientation  Goal: Treatment Goal: Demonstrate a reduction of confusion and improved orientation to person, place, time and/or situation upon discharge, according to optimum baseline/ability  Outcome: Progressing  Goal: Allow medical examinations, as recommended  Description: Interventions:  - Provide physical/neurological exams and/or referrals, per provider   Outcome: Progressing  Goal: Cooperate with recommended testing/procedures  Description: Interventions:  - Determine need for ancillary testing  - Observe for mental status changes  - Implement falls/precaution protocol   Outcome: Progressing

## 2024-01-16 LAB
ANISOCYTOSIS BLD QL SMEAR: PRESENT
BASOPHILS # BLD MANUAL: 0.15 THOUSAND/UL (ref 0–0.1)
BASOPHILS NFR MAR MANUAL: 1 % (ref 0–1)
EOSINOPHIL # BLD MANUAL: 0.15 THOUSAND/UL (ref 0–0.4)
EOSINOPHIL NFR BLD MANUAL: 1 % (ref 0–6)
ERYTHROCYTE [DISTWIDTH] IN BLOOD BY AUTOMATED COUNT: 25.2 % (ref 11.6–15.1)
HCT VFR BLD AUTO: 35.7 % (ref 36.5–49.3)
HGB BLD-MCNC: 8.8 G/DL (ref 12–17)
HYPERCHROMIA BLD QL SMEAR: PRESENT
LYMPHOCYTES # BLD AUTO: 21 % (ref 14–44)
LYMPHOCYTES # BLD AUTO: 3.09 THOUSAND/UL (ref 0.6–4.47)
MCH RBC QN AUTO: 22.4 PG (ref 26.8–34.3)
MCHC RBC AUTO-ENTMCNC: 29 G/DL (ref 31.4–37.4)
MCV RBC AUTO: 75 FL (ref 82–98)
MONOCYTES # BLD AUTO: 2.06 THOUSAND/UL (ref 0–1.22)
MONOCYTES NFR BLD: 14 % (ref 4–12)
NEUTROPHILS # BLD MANUAL: 9.27 THOUSAND/UL (ref 1.85–7.62)
NEUTS BAND NFR BLD MANUAL: 1 % (ref 0–8)
NEUTS SEG NFR BLD AUTO: 62 % (ref 43–75)
PLATELET # BLD AUTO: 615 THOUSANDS/UL (ref 149–390)
PLATELET BLD QL SMEAR: ABNORMAL
PMV BLD AUTO: 9.5 FL (ref 8.9–12.7)
RBC # BLD AUTO: 3.93 MILLION/UL (ref 3.88–5.62)
RBC MORPH BLD: PRESENT
TARGETS BLD QL SMEAR: PRESENT
WBC # BLD AUTO: 14.71 THOUSAND/UL (ref 4.31–10.16)

## 2024-01-16 PROCEDURE — 85007 BL SMEAR W/DIFF WBC COUNT: CPT

## 2024-01-16 PROCEDURE — 99232 SBSQ HOSP IP/OBS MODERATE 35: CPT | Performed by: PSYCHIATRY & NEUROLOGY

## 2024-01-16 PROCEDURE — 85027 COMPLETE CBC AUTOMATED: CPT

## 2024-01-16 RX ORDER — TRAZODONE HYDROCHLORIDE 50 MG/1
50 TABLET ORAL
Status: DISCONTINUED | OUTPATIENT
Start: 2024-01-16 | End: 2024-01-17

## 2024-01-16 RX ORDER — LANOLIN ALCOHOL/MO/W.PET/CERES
9 CREAM (GRAM) TOPICAL
Status: DISCONTINUED | OUTPATIENT
Start: 2024-01-16 | End: 2024-01-30 | Stop reason: HOSPADM

## 2024-01-16 RX ADMIN — GABAPENTIN 500 MG: 400 CAPSULE ORAL at 21:20

## 2024-01-16 RX ADMIN — ACETAMINOPHEN 975 MG: 325 TABLET ORAL at 16:09

## 2024-01-16 RX ADMIN — DICYCLOMINE HYDROCHLORIDE 10 MG: 10 CAPSULE ORAL at 12:25

## 2024-01-16 RX ADMIN — TRAZODONE HYDROCHLORIDE 50 MG: 50 TABLET ORAL at 21:20

## 2024-01-16 RX ADMIN — DICYCLOMINE HYDROCHLORIDE 10 MG: 10 CAPSULE ORAL at 16:53

## 2024-01-16 RX ADMIN — ACETAMINOPHEN 650 MG: 325 TABLET ORAL at 05:50

## 2024-01-16 RX ADMIN — DICYCLOMINE HYDROCHLORIDE 10 MG: 10 CAPSULE ORAL at 06:00

## 2024-01-16 RX ADMIN — METHOCARBAMOL TABLETS 500 MG: 500 TABLET, COATED ORAL at 08:18

## 2024-01-16 RX ADMIN — METHOCARBAMOL TABLETS 500 MG: 500 TABLET, COATED ORAL at 17:38

## 2024-01-16 RX ADMIN — PALIPERIDONE 9 MG: 9 TABLET, EXTENDED RELEASE ORAL at 21:20

## 2024-01-16 RX ADMIN — GABAPENTIN 400 MG: 400 CAPSULE ORAL at 08:18

## 2024-01-16 RX ADMIN — FAMOTIDINE 10 MG: 20 TABLET ORAL at 08:18

## 2024-01-16 RX ADMIN — METHOCARBAMOL TABLETS 500 MG: 500 TABLET, COATED ORAL at 12:42

## 2024-01-16 RX ADMIN — METHOCARBAMOL TABLETS 500 MG: 500 TABLET, COATED ORAL at 21:20

## 2024-01-16 RX ADMIN — DICYCLOMINE HYDROCHLORIDE 10 MG: 10 CAPSULE ORAL at 21:19

## 2024-01-16 RX ADMIN — GABAPENTIN 500 MG: 400 CAPSULE ORAL at 16:53

## 2024-01-16 NOTE — PLAN OF CARE
Problem: Alteration in Thoughts and Perception  Goal: Attend and participate in unit activities, including therapeutic, recreational, and educational groups  Description: Interventions:  -Encourage Visitation and family involvement in care  Outcome: Progressing     Problem: Ineffective Coping  Goal: Participates in unit activities  Description: Interventions:  - Provide therapeutic environment   - Provide required programming   - Redirect inappropriate behaviors   Outcome: Progressing

## 2024-01-16 NOTE — PROGRESS NOTES
01/16/24 1149   Team Meeting   Meeting Type Daily Rounds   Team Members Present   Team Members Present Physician;Nurse;   Physician Team Member Jennifer   Nursing Team Member Atilio   Care Management Team Member Humberto   Patient/Family Present   Patient Present No   Patient's Family Present No     Patient currently holds 201 status. Patient reports AH but denies SI/HI. Patient is medication and meal compliant. Patient to continue on gabapentin 500 mg TID, Invega 9 mg HS, and Trazodone 50 mg HS. Patient discharge date and time to be determined.

## 2024-01-16 NOTE — WOUND OSTOMY CARE
Progress Note - Wound   Carlos Wolf 33 y.o. male MRN: [id number]  Unit/Bed#: [facility] [place] 376-02 Encounter: [id number]    History and Present Illness: 33 year old male with cellulitis and abscess of the buttocks . Patient with a seton drain in place by surgical team for a I& D and fistulectomy . Patient with connecting tracts when procedure performed . PMH of crohn's disease , anemia , schizophreniform disorder .      Assessment:   1)Photo of wounds of bilateral buttocks, pt removed dressing small bloody drainage, no  malodor,cleansed and alginate and abd applied using mesh underwear to secure.Independent with cleansing  Wounds measured as one. Mixed pink intact dry tissue. Alginate to absorb drainage of fistula.      Wound care   1)Bilateral buttocks wounds - cleanse with soap and water pat dry apply calcium alginate ag ( melgisorb ag ) then top with a ABD and secure with the mesh panties change bid       Wound 01/05/24 Buttocks N/A (Active)   Wound Image   01/16/24 1043   Wound Description Drainage 01/16/24 1043   Telma-wound Assessment Dry;Intact 01/16/24 1043   Wound Length (cm) 5 cm 01/16/24 1043   Wound Width (cm) 3 cm 01/16/24 1043   Wound Depth (cm) 0.3 cm 01/16/24 1043   Wound Surface Area (cm^2) 15 cm^2 01/16/24 1043   Wound Volume (cm^3) 4.5 cm^3 01/16/24 1043   Calculated Wound Volume (cm^3) 4.5 cm^3 01/16/24 1043   Change in Wound Size % 0 01/16/24 1043   Wound Site Closure Sutures 01/16/24 1043   Drainage Amount Small 01/16/24 1043   Drainage Description Bloody 01/16/24 1043   Non-staged Wound Description Full thickness 01/16/24 1043   Treatments Site care 01/16/24 1043   Dressing Calcium Alginate with Silver;ABD 01/16/24 1043   Dressing Changed Changed 01/16/24 1043   Patient Tolerance Tolerated well 01/16/24 1043   Dressing Status Clean;Dry;Intact 01/16/24 1043         Call or tigertext with any questions  Wound Care will continue to follow    Vale LOPEZN RN CWON

## 2024-01-16 NOTE — PLAN OF CARE
Problem: Alteration in Thoughts and Perception  Goal: Treatment Goal: Gain control of psychotic behaviors/thinking, reduce/eliminate presenting symptoms and demonstrate improved reality functioning upon discharge  Outcome: Progressing  Goal: Verbalize thoughts and feelings  Description: Interventions:  - Promote a nonjudgmental and trusting relationship with the patient through active listening and therapeutic communication  - Assess patient's level of functioning, behavior and potential for risk  - Engage patient in 1 on 1 interactions  - Encourage patient to express fears, feelings, frustrations, and discuss symptoms    - [place] patient to reality, help patient recognize reality-based thinking   - Administer medications as ordered and assess for potential side effects  - Provide the patient education related to the signs and symptoms of the illness and desired effects of prescribed medications  Outcome: Progressing  Goal: Refrain from acting on delusional thinking/internal stimuli  Description: Interventions:  - Monitor patient closely, per order   - Utilize least restrictive measures   - Set reasonable limits, give positive feedback for acceptable   - Administer medications as ordered and monitor of potential side effects  Outcome: Progressing  Goal: Agree to be compliant with medication regime, as prescribed and report medication side effects  Description: Interventions:  - Offer appropriate PRN medication and supervise ingestion; conduct AIMS, as needed   Outcome: Progressing  Goal: Attend and participate in unit activities, including therapeutic, recreational, and educational groups  Description: Interventions:  -Encourage Visitation and family involvement in care  Outcome: Progressing  Goal: Recognize dysfunctional thoughts, communicate reality-based thoughts at the time of discharge  Description: Interventions:  - Provide medication and psycho-education to assist patient in compliance and developing  insight into his/her illness   Outcome: Progressing  Goal: Complete daily ADLs, including personal hygiene independently, as able  Description: Interventions:  - Observe, teach, and assist patient with ADLS  - Monitor and promote a balance of rest/activity, with adequate nutrition and elimination   Outcome: Progressing     Problem: Ineffective Coping  Goal: Identifies ineffective coping skills  Outcome: Progressing  Goal: Identifies healthy coping skills  Outcome: Progressing  Goal: Demonstrates healthy coping skills  Outcome: Progressing     Problem: Anxiety  Goal: Anxiety is at manageable level  Description: Interventions:  - Assess and monitor patient's anxiety level.   - Monitor for signs and symptoms (heart palpitations, chest pain, shortness of breath, headaches, nausea, feeling jumpy, restlessness, irritable, apprehensive).   - Collaborate with interdisciplinary team and initiate plan and interventions as ordered.  - [place] patient to unit/surroundings  - Explain treatment plan  - Encourage participation in care  - Encourage verbalization of concerns/fears  - Identify coping mechanisms  - Assist in developing anxiety-reducing skills  - Administer/offer alternative therapies  - Limit or eliminate stimulants  Outcome: Progressing     Problem: Alteration in Orientation  Goal: Treatment Goal: Demonstrate a reduction of confusion and improved orientation to person, place, time and/or situation upon discharge, according to optimum baseline/ability  Outcome: Progressing  Goal: Allow medical examinations, as recommended  Description: Interventions:  - Provide physical/neurological exams and/or referrals, per provider   Outcome: Progressing  Goal: Cooperate with recommended testing/procedures  Description: Interventions:  - Determine need for ancillary testing  - Observe for mental status changes  - Implement falls/precaution protocol   Outcome: Progressing     Problem: Ineffective Coping  Goal: Participates in unit  activities  Description: Interventions:  - Provide therapeutic environment   - Provide required programming   - Redirect inappropriate behaviors   Outcome: Progressing

## 2024-01-16 NOTE — NURSING NOTE
Pt is visible, calm, cooperative, social. Pt appears internally preoccupied and distracted. Pt endorses 7/10 R-leg pain, given PRN tylenol 975mg @ 21:27 for severe pain. Pt denies other current needs. Pt denies current SI/HI/AH/VH when asked by nurse.

## 2024-01-16 NOTE — PROGRESS NOTES
"Progress Note - Behavioral Health     Carlos Wolf 33 y.o. male MRN: [id number]   Unit/Bed#: U 376-02 Encounter: [id number]    Behavior over the last 24 hours: unchanged.     Carlos was seen in follow-up. Per staff, no acute events reported overnight. Has been complaining of ongoing ACH and appears to be RIS in the milieu. He is seen eating breakfast this morning. He reports to feeling, \"anxious\" today, secondary to ongoing AH which are bothersome to him, \"they are making me more paranoid.\" He still endorses poor sleep as well, mostly surrounding sleep initiation. He is agreeable to increase his gabapentin and trazodone for anxiety and sleep respectfully. Despite ongoing thought disorganization, is pleasant and calm. Invega increased a few days ago and will consider increase this upcoming week.     Sleep: difficulty falling asleep  Appetite: normal  Medication side effects: No   ROS: no complaints, all other systems are negative    Mental Status Evaluation:    Appearance:  age appropriate, casually dressed, dressed in hospital attire, bearded   Behavior:  cooperative, overbright   Speech:  increased rate   Mood:  anxious   Affect:  brighter, mood-incongruent   Thought Process:  disorganized   Associations: loose associations, flight of ideas   Thought Content:  paranoid ideation   Perceptual Disturbances: auditory hallucinations   Risk Potential: Suicidal ideation - None at present  Homicidal ideation - None at present  Potential for aggression - No   Sensorium:  oriented to person, place, and time/date   Memory:  recent and remote memory grossly intact   Consciousness:  alert and awake   Attention/Concentration: attention span and concentration appear shorter than expected for age   Insight:  limited   Judgment: limited   Gait/Station: normal gait/station   Motor Activity: no abnormal movements     Vital signs in last 24 hours:    Temp:  [97.4 °F (36.3 °C)-97.6 °F (36.4 °C)] 97.6 °F (36.4 °C)  HR:  " [83-85] 85  Resp:  [16] 16  BP: (116-132)/(67-74) 132/74    Laboratory results: I have personally reviewed all pertinent laboratory/tests results    Results from the past 24 hours:   Recent Results (from the past 24 hour(s))   CBC and differential    Collection Time: 01/16/24  5:42 AM   Result Value Ref Range    WBC 14.71 (H) 4.31 - 10.16 Thousand/uL    RBC 3.93 3.88 - 5.62 Million/uL    Hemoglobin 8.8 (L) 12.0 - 17.0 g/dL    Hematocrit 35.7 (L) 36.5 - 49.3 %    MCV 75 (L) 82 - 98 fL    MCH 22.4 (L) 26.8 - 34.3 pg    MCHC 29.0 (L) 31.4 - 37.4 g/dL    RDW 25.2 (H) 11.6 - 15.1 %    MPV 9.5 8.9 - 12.7 fL    Platelets 615 (H) 149 - 390 Thousands/uL   Manual Differential(PHLEBS Do Not Order)    Collection Time: 01/16/24  5:42 AM   Result Value Ref Range    Segmented % 62 43 - 75 %    Bands % 1 0 - 8 %    Lymphocytes % 21 14 - 44 %    Monocytes % 14 (H) 4 - 12 %    Eosinophils, % 1 0 - 6 %    Basophils % 1 0 - 1 %    Absolute Neutrophils 9.27 (H) 1.85 - 7.62 Thousand/uL    Lymphocytes Absolute 3.09 0.60 - 4.47 Thousand/uL    Monocytes Absolute 2.06 (H) 0.00 - 1.22 Thousand/uL    Eosinophils Absolute 0.15 0.00 - 0.40 Thousand/uL    Basophils Absolute 0.15 (H) 0.00 - 0.10 Thousand/uL    Total Counted      RBC Morphology Present     Platelet Estimate Increased (A) Adequate    Anisocytosis Present     Hypochromia Present     Target Cells Present      Most Recent Labs:   Lab Results   Component Value Date    WBC 14.71 (H) 01/16/2024    RBC 3.93 01/16/2024    HGB 8.8 (L) 01/16/2024    HCT 35.7 (L) 01/16/2024     (H) 01/16/2024    RDW 25.2 (H) 01/16/2024    NEUTROABS 7.24 01/15/2024    TOTANEUTABS 9.27 (H) 01/16/2024    SODIUM 138 01/11/2024    K 4.2 01/11/2024     01/11/2024    CO2 27 01/11/2024    BUN 7 01/11/2024    CREATININE 0.74 01/11/2024    GLUC 75 01/11/2024    CALCIUM 9.0 01/11/2024    AST 9 (L) 01/11/2024    ALT 6 (L) 01/11/2024    ALKPHOS 47 01/11/2024    TP 7.2 01/11/2024    ALB 3.3 (L) 01/11/2024     TBILI 0.17 (L) 01/11/2024    CHOLESTEROL 128 01/11/2024    HDL 54 01/11/2024    TRIG 48 01/11/2024    LDLCALC 64 01/11/2024    NONHDLC 74 01/11/2024    CAT1FNDMRLFA 1.486 01/11/2024    SYPHILISAB Non-reactive 01/11/2024    HGBA1C 5.6 01/11/2024     01/11/2024       Progress Toward Goals: progressing    Assessment/Plan   Principal Problem:    Schizophrenia, paranoid type (HCC)  Active Problems:    Anemia    Crohn's disease (HCC)    Cellulitis and abscess of buttock      Recommended Treatment:     Planned medication and treatment changes:    All current active medications have been reviewed  Encourage group therapy, milieu therapy and occupational therapy  Behavioral Health checks every 7 minutes    Increase gabapentin to 500 mg TID to help with anxiety  Start Trazodone 50 mg QHS to assist with sleep  Continue Invega, will most likely increase later this week  Medical following - started bentyl yesterday, repeated CBC/diff    Current Facility-Administered Medications   Medication Dose Route Frequency Provider Last Rate    acetaminophen  650 mg Oral Q6H PRN Keo Huertas MD      acetaminophen  650 mg Oral Q4H PRN Keomanjit Huertas MD      acetaminophen  975 mg Oral Q6H PRN Keomanjit Huertas MD      benztropine  1 mg Intramuscular Q4H PRN Max 6/day Keo Eldon Huertas MD      benztropine  1 mg Oral Q4H PRN Max 6/day Keo Eldon Huertas MD      dicyclomine  10 mg Oral 4x Daily (AC & HS) Pepe Terrell MD      hydrOXYzine HCL  50 mg Oral Q6H PRN Max 4/day Keomanjit Huertas MD      Or    diphenhydrAMINE  50 mg Intramuscular Q6H PRN Keo Eldon Huertas MD      [START ON 1/17/2024] ergocalciferol  50,000 Units Oral Weekly Keo Huertas MD      famotidine  10 mg Oral Daily Keo Huertas MD      gabapentin  500 mg Oral TID Gena Edwards PA-C      glycerin-hypromellose-  1 drop Both Eyes Q3H PRN Keo Huertas MD      hydrOXYzine HCL  100 mg Oral Q6H PRN Max 4/day Keo Colón  MD Kiya      Or    LORazepam  2 mg Intramuscular Q6H PRN Keo Eldon Huertas MD      hydrOXYzine HCL  25 mg Oral Q6H PRN Max 4/day Keo Eldon Huertas MD      melatonin  9 mg Oral HS PRN Ann Rodriguez MD      methocarbamol  500 mg Oral 4x Daily Keo Eldon Huertas MD      OLANZapine  10 mg Oral Q3H PRN Max 3/day Keo Eldon Huertas MD      Or    OLANZapine  10 mg Intramuscular Q3H PRN Max 3/day [facility] Eldon Huertas MD      OLANZapine  5 mg Oral Q3H PRN Max 6/day Keo Eldon Huertas MD      Or    OLANZapine  5 mg Intramuscular Q3H PRN Max 6/day [facility] Eldon Huertas MD      OLANZapine  2.5 mg Oral Q3H PRN Max 8/day [facility] Eldon Huertas MD      paliperidone  9 mg Oral HS Ann Rodriguez MD      predniSONE  10 mg Oral Daily Keo Eldon Huertas MD      propranolol  10 mg Oral Q8H PRN [facility] Eldon Huertas MD      traZODone  50 mg Oral HS Ann Rodriguez MD       Risks / Benefits of Treatment:    Risks, benefits, and possible side effects of medications explained to patient and patient verbalizes understanding and agreement for treatment.    Counseling / Coordination of Care:    Total floor / unit time spent today 20 minutes. Greater than 50% of total time was spent with the patient and / or family counseling and / or coordination of care. A description of counseling / coordination of care:  Patient's progress discussed with staff in treatment team meeting.  Medications, treatment progress and treatment plan reviewed with patient.    Gena Edwards PA-C 01/16/24

## 2024-01-16 NOTE — QUICK NOTE
Received TT from nurse stating patient requesting increase in Robaxin from 500 mg QID to 750 mg QID due to abdominal cramping as well as lab review.   Upon assessing the patient, patient reported he takes 750 mg at home as needed up to 4 times daily as he was told by his previous PCP this might help with the abdominal cramping and gassiness due to his Chron's.  Patient Hgb stable, as well as leukocytosis - likely in the setting of Prednisone therapy causing demarginalization of Neutrophils.     Physical Exam  Constitutional:       General: He is not in acute distress.     Appearance: He is normal weight. He is not toxic-appearing.   HENT:      Head: Normocephalic and atraumatic.      Right Ear: External ear normal.      Left Ear: External ear normal.      Nose: Nose normal. No congestion or rhinorrhea.      Mouth/Throat:      Mouth: Mucous membranes are moist.      Pharynx: Oropharynx is clear.   Cardiovascular:      Rate and Rhythm: Normal rate and regular rhythm.      Pulses: Normal pulses.      Heart sounds: Normal heart sounds. No murmur heard.     No friction rub.   Pulmonary:      Effort: Pulmonary effort is normal. No respiratory distress.      Breath sounds: Normal breath sounds. No stridor. No wheezing.   Abdominal:      General: Abdomen is flat. Bowel sounds are normal. There is no distension.      Palpations: Abdomen is soft. There is no mass.      Tenderness: There is no abdominal tenderness. There is no guarding or rebound.      Hernia: No hernia is present.   Musculoskeletal:         General: Normal range of motion.      Cervical back: Normal range of motion and neck supple.      Right lower leg: No edema.      Left lower leg: No edema.   Neurological:      Mental Status: He is alert.       VS stable, /67, afebrile, respirations and pulse within normal limits.     Plan:   Added Bentyl to regimen to help with cramping abdominal pain as it is most likely contributed by gassiness   Continue  Prednisone 10 mg daily   Monitor with daily CBC and VS    Plan for outpatient follow up with GI for initiation of Infliximab   Remain available for any questions of concerns

## 2024-01-16 NOTE — NURSING NOTE
"Patient is calm and cooperative on the unit.   Denies SI, HI, SIB, visual hallucinations at this time. Patient endorses auditory hallucinations, states \"I don't think they're ever going away.\" Patient is med and meal compliant, except prednisone, which he refused this morning. Patient is visible on the unit and social with peers. Denies any unmet needs at this time. Q7 safety checks to continue.  "

## 2024-01-17 LAB
BASOPHILS # BLD AUTO: 0.06 THOUSANDS/ÂΜL (ref 0–0.1)
BASOPHILS NFR BLD AUTO: 1 % (ref 0–1)
EOSINOPHIL # BLD AUTO: 0.15 THOUSAND/ÂΜL (ref 0–0.61)
EOSINOPHIL NFR BLD AUTO: 1 % (ref 0–6)
ERYTHROCYTE [DISTWIDTH] IN BLOOD BY AUTOMATED COUNT: 24.1 % (ref 11.6–15.1)
HCT VFR BLD AUTO: 28.9 % (ref 36.5–49.3)
HGB BLD-MCNC: 8.3 G/DL (ref 12–17)
IMM GRANULOCYTES # BLD AUTO: 0.05 THOUSAND/UL (ref 0–0.2)
IMM GRANULOCYTES NFR BLD AUTO: 0 % (ref 0–2)
LYMPHOCYTES # BLD AUTO: 2.97 THOUSANDS/ÂΜL (ref 0.6–4.47)
LYMPHOCYTES NFR BLD AUTO: 23 % (ref 14–44)
MCH RBC QN AUTO: 21.1 PG (ref 26.8–34.3)
MCHC RBC AUTO-ENTMCNC: 28.7 G/DL (ref 31.4–37.4)
MCV RBC AUTO: 74 FL (ref 82–98)
MONOCYTES # BLD AUTO: 1.71 THOUSAND/ÂΜL (ref 0.17–1.22)
MONOCYTES NFR BLD AUTO: 13 % (ref 4–12)
NEUTROPHILS # BLD AUTO: 8.08 THOUSANDS/ÂΜL (ref 1.85–7.62)
NEUTS SEG NFR BLD AUTO: 62 % (ref 43–75)
NRBC BLD AUTO-RTO: 0 /100 WBCS
PLATELET # BLD AUTO: 659 THOUSANDS/UL (ref 149–390)
PMV BLD AUTO: 8.2 FL (ref 8.9–12.7)
RBC # BLD AUTO: 3.93 MILLION/UL (ref 3.88–5.62)
WBC # BLD AUTO: 13.02 THOUSAND/UL (ref 4.31–10.16)

## 2024-01-17 PROCEDURE — 85025 COMPLETE CBC W/AUTO DIFF WBC: CPT

## 2024-01-17 PROCEDURE — 99232 SBSQ HOSP IP/OBS MODERATE 35: CPT | Performed by: PSYCHIATRY & NEUROLOGY

## 2024-01-17 RX ORDER — TRAZODONE HYDROCHLORIDE 100 MG/1
100 TABLET ORAL
Status: DISCONTINUED | OUTPATIENT
Start: 2024-01-17 | End: 2024-01-30 | Stop reason: HOSPADM

## 2024-01-17 RX ORDER — PALIPERIDONE 6 MG/1
12 TABLET, EXTENDED RELEASE ORAL
Status: DISCONTINUED | OUTPATIENT
Start: 2024-01-17 | End: 2024-01-19

## 2024-01-17 RX ORDER — BUDESONIDE 3 MG/1
3 CAPSULE, COATED PELLETS ORAL DAILY
Status: DISCONTINUED | OUTPATIENT
Start: 2024-01-17 | End: 2024-01-30 | Stop reason: HOSPADM

## 2024-01-17 RX ADMIN — DICYCLOMINE HYDROCHLORIDE 10 MG: 10 CAPSULE ORAL at 06:37

## 2024-01-17 RX ADMIN — GABAPENTIN 500 MG: 400 CAPSULE ORAL at 08:15

## 2024-01-17 RX ADMIN — PALIPERIDONE 12 MG: 6 TABLET, EXTENDED RELEASE ORAL at 22:00

## 2024-01-17 RX ADMIN — DICYCLOMINE HYDROCHLORIDE 10 MG: 10 CAPSULE ORAL at 11:46

## 2024-01-17 RX ADMIN — TRAZODONE HYDROCHLORIDE 100 MG: 100 TABLET ORAL at 22:00

## 2024-01-17 RX ADMIN — METHOCARBAMOL TABLETS 500 MG: 500 TABLET, COATED ORAL at 11:45

## 2024-01-17 RX ADMIN — METHOCARBAMOL TABLETS 500 MG: 500 TABLET, COATED ORAL at 17:03

## 2024-01-17 RX ADMIN — DICYCLOMINE HYDROCHLORIDE 10 MG: 10 CAPSULE ORAL at 22:00

## 2024-01-17 RX ADMIN — METHOCARBAMOL TABLETS 500 MG: 500 TABLET, COATED ORAL at 08:15

## 2024-01-17 RX ADMIN — ACETAMINOPHEN 650 MG: 325 TABLET ORAL at 06:39

## 2024-01-17 RX ADMIN — GABAPENTIN 500 MG: 400 CAPSULE ORAL at 16:55

## 2024-01-17 RX ADMIN — BUDESONIDE 3 MG: 3 CAPSULE ORAL at 11:50

## 2024-01-17 RX ADMIN — ERGOCALCIFEROL 50000 UNITS: 1.25 CAPSULE ORAL at 08:16

## 2024-01-17 RX ADMIN — ACETAMINOPHEN 650 MG: 325 TABLET ORAL at 17:03

## 2024-01-17 RX ADMIN — DICYCLOMINE HYDROCHLORIDE 10 MG: 10 CAPSULE ORAL at 16:55

## 2024-01-17 RX ADMIN — GABAPENTIN 500 MG: 400 CAPSULE ORAL at 22:00

## 2024-01-17 RX ADMIN — METHOCARBAMOL TABLETS 500 MG: 500 TABLET, COATED ORAL at 22:00

## 2024-01-17 RX ADMIN — FAMOTIDINE 10 MG: 20 TABLET ORAL at 08:16

## 2024-01-17 NOTE — PLAN OF CARE
Problem: Alteration in Thoughts and Perception  Goal: Verbalize thoughts and feelings  Description: Interventions:  - Promote a nonjudgmental and trusting relationship with the patient through active listening and therapeutic communication  - Assess patient's level of functioning, behavior and potential for risk  - Engage patient in 1 on 1 interactions  - Encourage patient to express fears, feelings, frustrations, and discuss symptoms    - [place] patient to reality, help patient recognize reality-based thinking   - Administer medications as ordered and assess for potential side effects  - Provide the patient education related to the signs and symptoms of the illness and desired effects of prescribed medications  Outcome: Progressing  Goal: Agree to be compliant with medication regime, as prescribed and report medication side effects  Description: Interventions:  - Offer appropriate PRN medication and supervise ingestion; conduct AIMS, as needed   Outcome: Progressing  Goal: Attend and participate in unit activities, including therapeutic, recreational, and educational groups  Description: Interventions:  -Encourage Visitation and family involvement in care  Outcome: Progressing  Goal: Recognize dysfunctional thoughts, communicate reality-based thoughts at the time of discharge  Description: Interventions:  - Provide medication and psycho-education to assist patient in compliance and developing insight into his/her illness   Outcome: Progressing  Goal: Complete daily ADLs, including personal hygiene independently, as able  Description: Interventions:  - Observe, teach, and assist patient with ADLS  - Monitor and promote a balance of rest/activity, with adequate nutrition and elimination   Outcome: Progressing     Problem: Ineffective Coping  Goal: Identifies ineffective coping skills  Outcome: Progressing  Goal: Identifies healthy coping skills  Outcome: Progressing  Goal: Demonstrates healthy coping  skills  Outcome: Progressing     Problem: Anxiety  Goal: Anxiety is at manageable level  Description: Interventions:  - Assess and monitor patient's anxiety level.   - Monitor for signs and symptoms (heart palpitations, chest pain, shortness of breath, headaches, nausea, feeling jumpy, restlessness, irritable, apprehensive).   - Collaborate with interdisciplinary team and initiate plan and interventions as ordered.  - [place] patient to unit/surroundings  - Explain treatment plan  - Encourage participation in care  - Encourage verbalization of concerns/fears  - Identify coping mechanisms  - Assist in developing anxiety-reducing skills  - Administer/offer alternative therapies  - Limit or eliminate stimulants  Outcome: Progressing     Problem: Alteration in Orientation  Goal: Treatment Goal: Demonstrate a reduction of confusion and improved orientation to person, place, time and/or situation upon discharge, according to optimum baseline/ability  Outcome: Progressing  Goal: Allow medical examinations, as recommended  Description: Interventions:  - Provide physical/neurological exams and/or referrals, per provider   Outcome: Progressing  Goal: Cooperate with recommended testing/procedures  Description: Interventions:  - Determine need for ancillary testing  - Observe for mental status changes  - Implement falls/precaution protocol   Outcome: Progressing     Problem: Ineffective Coping  Goal: Participates in unit activities  Description: Interventions:  - Provide therapeutic environment   - Provide required programming   - Redirect inappropriate behaviors   Outcome: Progressing     Problem: Alteration in Thoughts and Perception  Goal: Treatment Goal: Gain control of psychotic behaviors/thinking, reduce/eliminate presenting symptoms and demonstrate improved reality functioning upon discharge  Outcome: Not Progressing  Goal: Refrain from acting on delusional thinking/internal stimuli  Description: Interventions:  - Monitor  patient closely, per order   - Utilize least restrictive measures   - Set reasonable limits, give positive feedback for acceptable   - Administer medications as ordered and monitor of potential side effects  Outcome: Not Progressing

## 2024-01-17 NOTE — PROGRESS NOTES
"Progress Note - Behavioral Health     Carlos Wolf 33 y.o. male MRN: [id number]   Unit/Bed#: U 376-02 Encounter: [id number]    Behavior over the last 24 hours: unchanged.     Carlos ws seen today in follow-up. Per staff, no acute issues reported overnight. Remains pleasant but disorganized and rambling. He is seen today in group room, agreeable to speak with writer. He is bright and pleasant but still very disorganized. Often discussing various topics, naming various people, states, presidents. He does recognize however this is a result of ongoing voices which often disturb his train of thought. He still notes poor sleep at night due to the voices being too, \"loud.\" He was questioning his medications if they were treating his, \"PTSD and anxiety\" however I explained that we are adjusting his invega due to ongoing hallucinations which he understood.     Sleep: difficulty falling asleep  Appetite: normal  Medication side effects: No   ROS: no complaints, all other systems are negative    Mental Status Evaluation:    Appearance:  age appropriate, marginal hygiene, dressed in hospital attire, bearded   Behavior:  cooperative, calm, hyper talkative   Speech:  increased rate, pressured, (slightly more redirectable)   Mood:  anxious   Affect:  brighter   Thought Process:  disorganized   Associations: loose associations, flight of ideas   Thought Content:  some paranoia   Perceptual Disturbances: auditory hallucinations   Risk Potential: Suicidal ideation - None at present  Homicidal ideation - None at present  Potential for aggression - No   Sensorium:  oriented to person, place, and time/date   Memory:  recent and remote memory grossly intact   Consciousness:  alert and awake   Attention/Concentration: attention span and concentration are age appropriate   Insight:  limited   Judgment: limited   Gait/Station: normal gait/station   Motor Activity: no abnormal movements     Vital signs in last 24 hours:    Temp:  " [97.4 °F (36.3 °C)-98.5 °F (36.9 °C)] 98.5 °F (36.9 °C)  HR:  [65-94] 65  Resp:  [16] 16  BP: (104-108)/(67) 104/67    Laboratory results: I have personally reviewed all pertinent laboratory/tests results    Results from the past 24 hours:   Recent Results (from the past 24 hour(s))   CBC and differential    Collection Time: 01/17/24  6:34 AM   Result Value Ref Range    WBC 13.02 (H) 4.31 - 10.16 Thousand/uL    RBC 3.93 3.88 - 5.62 Million/uL    Hemoglobin 8.3 (L) 12.0 - 17.0 g/dL    Hematocrit 28.9 (L) 36.5 - 49.3 %    MCV 74 (L) 82 - 98 fL    MCH 21.1 (L) 26.8 - 34.3 pg    MCHC 28.7 (L) 31.4 - 37.4 g/dL    RDW 24.1 (H) 11.6 - 15.1 %    MPV 8.2 (L) 8.9 - 12.7 fL    Platelets 659 (H) 149 - 390 Thousands/uL    nRBC 0 /100 WBCs    Neutrophils Relative 62 43 - 75 %    Immat GRANS % 0 0 - 2 %    Lymphocytes Relative 23 14 - 44 %    Monocytes Relative 13 (H) 4 - 12 %    Eosinophils Relative 1 0 - 6 %    Basophils Relative 1 0 - 1 %    Neutrophils Absolute 8.08 (H) 1.85 - 7.62 Thousands/µL    Immature Grans Absolute 0.05 0.00 - 0.20 Thousand/uL    Lymphocytes Absolute 2.97 0.60 - 4.47 Thousands/µL    Monocytes Absolute 1.71 (H) 0.17 - 1.22 Thousand/µL    Eosinophils Absolute 0.15 0.00 - 0.61 Thousand/µL    Basophils Absolute 0.06 0.00 - 0.10 Thousands/µL     Most Recent Labs:   Lab Results   Component Value Date    WBC 13.02 (H) 01/17/2024    RBC 3.93 01/17/2024    HGB 8.3 (L) 01/17/2024    HCT 28.9 (L) 01/17/2024     (H) 01/17/2024    RDW 24.1 (H) 01/17/2024    NEUTROABS 8.08 (H) 01/17/2024    TOTANEUTABS 9.27 (H) 01/16/2024    SODIUM 138 01/11/2024    K 4.2 01/11/2024     01/11/2024    CO2 27 01/11/2024    BUN 7 01/11/2024    CREATININE 0.74 01/11/2024    GLUC 75 01/11/2024    CALCIUM 9.0 01/11/2024    AST 9 (L) 01/11/2024    ALT 6 (L) 01/11/2024    ALKPHOS 47 01/11/2024    TP 7.2 01/11/2024    ALB 3.3 (L) 01/11/2024    TBILI 0.17 (L) 01/11/2024    CHOLESTEROL 128 01/11/2024    HDL 54 01/11/2024    TRIG 48  01/11/2024    LDLCALC 64 01/11/2024    NONHDLC 74 01/11/2024    USR1OYGQDDDK 1.486 01/11/2024    SYPHILISAB Non-reactive 01/11/2024    HGBA1C 5.6 01/11/2024     01/11/2024       Progress Toward Goals: slight improvement    Assessment/Plan   Principal Problem:    Schizophrenia, paranoid type (HCC)  Active Problems:    Anemia    Crohn's disease (HCC)    Cellulitis and abscess of buttock      Recommended Treatment:     Planned medication and treatment changes:    All current active medications have been reviewed  Encourage group therapy, milieu therapy and occupational therapy  Behavioral Health checks every 7 minutes    Increase Invega to 12 mg QHS starting tonight  Increase Trazodone to 100 mg QHS to assist with sleep  Medical following regarding [place] - did reach out regarding restarting budesonide daily    Current Facility-Administered Medications   Medication Dose Route Frequency Provider Last Rate    acetaminophen  650 mg Oral Q6H PRN Keomanjit Huertas MD      acetaminophen  650 mg Oral Q4H PRN Keo Eldon Huertas MD      acetaminophen  975 mg Oral Q6H PRN Keo Eldon Huertas MD      benztropine  1 mg Intramuscular Q4H PRN Max 6/day Keo Eldon Huertas MD      benztropine  1 mg Oral Q4H PRN Max 6/day Keo Eldon Huertas MD      budesonide  3 mg Oral Daily Pepe Terrell MD      dicyclomine  10 mg Oral 4x Daily (AC & HS) Pepe Terrell MD      hydrOXYzine HCL  50 mg Oral Q6H PRN Max 4/day Keo Eldon Huertas MD      Or    diphenhydrAMINE  50 mg Intramuscular Q6H PRN Keo Eldon Huertas MD      ergocalciferol  50,000 Units Oral Weekly Keo Eldon Huertas MD      famotidine  10 mg Oral Daily Keo Eldon Huertas MD      gabapentin  500 mg Oral TID Gena Edwards PA-C      glycerin-hypromellose-  1 drop Both Eyes Q3H PRN Keo Huertas MD      hydrOXYzine HCL  100 mg Oral Q6H PRN Max 4/day Keo Eldon Huertas MD      Or    LORazepam  2 mg Intramuscular Q6H PRN Keomanjit Huertas MD       hydrOXYzine HCL  25 mg Oral Q6H PRN Max 4/day Keo Eldon Huertas MD      melatonin  9 mg Oral HS PRN Ann Rodriguez MD      methocarbamol  500 mg Oral 4x Daily [facility] Eldon Huertas MD      OLANZapine  10 mg Oral Q3H PRN Max 3/day [facility] Eldon Huertas MD      Or    OLANZapine  10 mg Intramuscular Q3H PRN Max 3/day [facility] Eldon Huertas MD      OLANZapine  5 mg Oral Q3H PRN Max 6/day [facility] Eldon Huertas MD      Or    OLANZapine  5 mg Intramuscular Q3H PRN Max 6/day [facility] Eldon Huertas MD      OLANZapine  2.5 mg Oral Q3H PRN Max 8/day [facility] Eldon Huertas MD      paliperidone  12 mg Oral HS Gena Edwards PA-C      propranolol  10 mg Oral Q8H PRN [facility] Eldon Huertas MD      traZODone  100 mg Oral HS Gena Edwards PA-C       Risks / Benefits of Treatment:    Risks, benefits, and possible side effects of medications explained to patient and patient verbalizes understanding and agreement for treatment.    Counseling / Coordination of Care:    Total floor / unit time spent today 20 minutes. Greater than 50% of total time was spent with the patient and / or family counseling and / or coordination of care. A description of counseling / coordination of care:  Patient's progress discussed with staff in treatment team meeting.  Medications, treatment progress and treatment plan reviewed with patient.    Gena Edwards PA-C 01/17/24

## 2024-01-17 NOTE — NURSING NOTE
Pt verbally denies SI and HI. Currently observed in small TV room eating breakfast at time of assessment. Denies anxiety or agitation. Does report ongoing voices that pt feels have not yet improved with medication management. Pt overheard mumbling and responding to voices frequently. Discussed budesonide which is pt's preferred medication. Appearance in personal attire by choice. Selective social interactions with others. Pleasant on approach with RN.

## 2024-01-17 NOTE — NURSING NOTE
"Pt visible throughout evening, social with approach. Pt continues to be pleasant, blunted, disorganized. Pt endorses AH of \"two voices with space between them, one thinks they're my mom.\" Pt medication adherent, denies SI/HI/VH.  "

## 2024-01-17 NOTE — PROGRESS NOTES
01/17/24 1301   Team Meeting   Meeting Type Daily Rounds   Team Members Present   Team Members Present Physician;Nurse;   Physician Team Member Jennifer   Nursing Team Member Atilio   Care Management Team Member Humberto   Patient/Family Present   Patient Present No   Patient's Family Present No     Patient currently holds 201 status. Patient reports AH but denies VH/SI/HI. Patient is medication and meal compliant. Provider to increase dose of Invega, patient to continue on gabapentin 500 mg TID. Patient discharge date and time to be determined.

## 2024-01-17 NOTE — NURSING NOTE
"Pt refused scheduled Prednisone. Pt was educated in importance of order and risks associated with refusal as well as how refusal may impact treatment. Pt verbalizes understanding and confirms refusal. Pt is requesting budesonide instead, states \"Prednisone immediately makes me vomit.\" Pt informed prednisone is only alternative to budesonide that hospital carries at this time.    "

## 2024-01-18 PROCEDURE — 99232 SBSQ HOSP IP/OBS MODERATE 35: CPT | Performed by: PSYCHIATRY & NEUROLOGY

## 2024-01-18 RX ORDER — GABAPENTIN 300 MG/1
600 CAPSULE ORAL 3 TIMES DAILY
Status: DISCONTINUED | OUTPATIENT
Start: 2024-01-18 | End: 2024-01-30 | Stop reason: HOSPADM

## 2024-01-18 RX ADMIN — PALIPERIDONE 12 MG: 6 TABLET, EXTENDED RELEASE ORAL at 21:32

## 2024-01-18 RX ADMIN — METHOCARBAMOL TABLETS 500 MG: 500 TABLET, COATED ORAL at 08:07

## 2024-01-18 RX ADMIN — METHOCARBAMOL TABLETS 500 MG: 500 TABLET, COATED ORAL at 17:36

## 2024-01-18 RX ADMIN — FAMOTIDINE 10 MG: 20 TABLET ORAL at 08:08

## 2024-01-18 RX ADMIN — TRAZODONE HYDROCHLORIDE 100 MG: 100 TABLET ORAL at 21:32

## 2024-01-18 RX ADMIN — DICYCLOMINE HYDROCHLORIDE 10 MG: 10 CAPSULE ORAL at 11:44

## 2024-01-18 RX ADMIN — METHOCARBAMOL TABLETS 500 MG: 500 TABLET, COATED ORAL at 11:44

## 2024-01-18 RX ADMIN — DICYCLOMINE HYDROCHLORIDE 10 MG: 10 CAPSULE ORAL at 16:53

## 2024-01-18 RX ADMIN — GABAPENTIN 600 MG: 300 CAPSULE ORAL at 21:32

## 2024-01-18 RX ADMIN — BUDESONIDE 3 MG: 3 CAPSULE ORAL at 08:10

## 2024-01-18 RX ADMIN — GABAPENTIN 500 MG: 400 CAPSULE ORAL at 08:07

## 2024-01-18 RX ADMIN — METHOCARBAMOL TABLETS 500 MG: 500 TABLET, COATED ORAL at 21:32

## 2024-01-18 RX ADMIN — DICYCLOMINE HYDROCHLORIDE 10 MG: 10 CAPSULE ORAL at 06:47

## 2024-01-18 RX ADMIN — GABAPENTIN 600 MG: 300 CAPSULE ORAL at 16:53

## 2024-01-18 RX ADMIN — ACETAMINOPHEN 650 MG: 325 TABLET ORAL at 06:47

## 2024-01-18 RX ADMIN — DICYCLOMINE HYDROCHLORIDE 10 MG: 10 CAPSULE ORAL at 21:32

## 2024-01-18 RX ADMIN — ACETAMINOPHEN 650 MG: 325 TABLET ORAL at 21:34

## 2024-01-18 NOTE — CMS CERTIFICATION NOTE
Recertification: Based upon physical, mental and social evaluations, I certify that inpatient psychiatric services continue to be medically necessary for this patient for a duration of 20 midnights for the treatment of  Schizophrenia, paranoid type (HCC) Available alternative community resources still do not meet the patient's mental health care needs. I further attest that an established written individualized plan of care has been updated and is outlined in the patient's medical records.

## 2024-01-18 NOTE — NURSING NOTE
Pt reported to this writer c/o 6/10 buttocks pain. Requested and given PRN tylenol 650mg for moderate pain.

## 2024-01-18 NOTE — NURSING NOTE
"Patient is pleasant, calm and cooperative on the unit. Denies SI, HI, SIB. Endorsing auditory hallucinations and visual hallucinations of shadows \"sometimes\". Patient is med and meal compliant. Attends groups and is social with peers. Denies any unmet needs at this time. Q7 safety checks to continue.  "

## 2024-01-18 NOTE — PROGRESS NOTES
"Progress Note - Behavioral Health     Carlos Wolf 33 y.o. male MRN: [id number]   Unit/Bed#: [facility] 376-02 Encounter: [id number]    Behavior over the last 24 hours: unchanged.     Carlos was seen today in follow-up. Per staff, no significant changes, still remains disorganized and is complaining of poor sleep & auditory hallucinations. He was seen today in [place]. Is very preservative on getting Xanax or Nyquil for sleep aide and amoxicillin. I made an attempt to discuss the option of Clozaril with him, however is still very distracted in thought. He explains how he hears, \"5,000 [place] in my head\" and will often respond to them during interview and was describing different patients races as they passed by. At points does mention how he does not feel like Invega has been helpful for him. We discussed option of switching to Clozaril, however he states he does not agree with the labwork which would be required to start the medication. May need to consider something similar such as loxapine.    Sleep: slept off and on, difficulty falling asleep  Appetite: normal  Medication side effects: No   ROS: no complaints, all other systems are negative    Mental Status Evaluation:    Appearance:  age appropriate, casually dressed, dressed in hospital attire, bearded   Behavior:  pleasant, cooperative, bizarre   Speech:  increased rate, hypertalkative   Mood:  anxious   Affect:  constricted   Thought Process:  disorganized, illogical, increased rate of thoughts, perseverative   Associations: loose associations, flight of ideas, circumstantial   Thought Content:  some paranoia   Perceptual Disturbances: auditory hallucinations   Risk Potential: Suicidal ideation - None at present  Homicidal ideation - None at present  Potential for aggression - No   Sensorium:  oriented to person, place, and time/date   Memory:  recent and remote memory grossly intact   Consciousness:  alert and awake   Attention/Concentration: attention " span and concentration appear shorter than expected for age   Insight:  limited   Judgment: limited   Gait/Station: normal gait/station   Motor Activity: no abnormal movements     Vital signs in last 24 hours:    Temp:  [97.9 °F (36.6 °C)-98.7 °F (37.1 °C)] 98.7 °F (37.1 °C)  HR:  [93-99] 99  Resp:  [16] 16  BP: (115-120)/(69-79) 120/79    Laboratory results: I have personally reviewed all pertinent laboratory/tests results    Results from the past 24 hours: No results found for this or any previous visit (from the past 24 hour(s)).  Most Recent Labs:   Lab Results   Component Value Date    WBC 13.02 (H) 01/17/2024    RBC 3.93 01/17/2024    HGB 8.3 (L) 01/17/2024    HCT 28.9 (L) 01/17/2024     (H) 01/17/2024    RDW 24.1 (H) 01/17/2024    NEUTROABS 8.08 (H) 01/17/2024    TOTANEUTABS 9.27 (H) 01/16/2024    SODIUM 138 01/11/2024    K 4.2 01/11/2024     01/11/2024    CO2 27 01/11/2024    BUN 7 01/11/2024    CREATININE 0.74 01/11/2024    GLUC 75 01/11/2024    CALCIUM 9.0 01/11/2024    AST 9 (L) 01/11/2024    ALT 6 (L) 01/11/2024    ALKPHOS 47 01/11/2024    TP 7.2 01/11/2024    ALB 3.3 (L) 01/11/2024    TBILI 0.17 (L) 01/11/2024    CHOLESTEROL 128 01/11/2024    HDL 54 01/11/2024    TRIG 48 01/11/2024    LDLCALC 64 01/11/2024    NONHDLC 74 01/11/2024    ZCS4CQMEIKFE 1.486 01/11/2024    SYPHILISAB Non-reactive 01/11/2024    HGBA1C 5.6 01/11/2024     01/11/2024       Progress Toward Goals: limited improvement    Assessment/Plan   Principal Problem:    Schizophrenia, paranoid type (HCC)  Active Problems:    Anemia    Crohn's disease (HCC)    Cellulitis and abscess of buttock      Recommended Treatment:     Planned medication and treatment changes:    All current active medications have been reviewed  Encourage group therapy, milieu therapy and occupational therapy  Behavioral Health checks every 7 minutes    Continue current medications  Recently increased Invega (1/17/24), if there is no response to this  dosage will need to consider medication change. He's refusing clozapine due to labwork, Loxapine may be a good option (per chart review allergy to haldol & states Zyprexa was not helpful)  Increase gabapentin to 600 mg TID for anxiety symptoms      Current Facility-Administered Medications   Medication Dose Route Frequency Provider Last Rate    acetaminophen  650 mg Oral Q6H PRN Keomanjit Huertas MD      acetaminophen  650 mg Oral Q4H PRN Keomanjit Huertas MD      acetaminophen  975 mg Oral Q6H PRN Keo Eldon Huertas MD      benztropine  1 mg Intramuscular Q4H PRN Max 6/day Keo Eldon Huertas MD      benztropine  1 mg Oral Q4H PRN Max 6/day Keo Eldon Huertas MD      budesonide  3 mg Oral Daily Pepe Terrell MD      dicyclomine  10 mg Oral 4x Daily (AC & HS) Pepe Terrell MD      hydrOXYzine HCL  50 mg Oral Q6H PRN Max 4/day Keo Eldon Huertas MD      Or    diphenhydrAMINE  50 mg Intramuscular Q6H PRN Keo Eldon Huertas MD      ergocalciferol  50,000 Units Oral Weekly Keo Eldon Huertas MD      famotidine  10 mg Oral Daily Keo Eldon Huertas MD      gabapentin  600 mg Oral TID Ann Rodriguez MD      glycerin-hypromellose-  1 drop Both Eyes Q3H PRN Keo Eldon Huertas MD      hydrOXYzine HCL  100 mg Oral Q6H PRN Max 4/day Keo Eldon Huertas MD      Or    LORazepam  2 mg Intramuscular Q6H PRN Keo Eldon Huertas MD      hydrOXYzine HCL  25 mg Oral Q6H PRN Max 4/day Keo Eldon Huertas MD      melatonin  9 mg Oral HS PRN Ann Rodriguez MD      methocarbamol  500 mg Oral 4x Daily Keo Eldon Huertas MD      OLANZapine  10 mg Oral Q3H PRN Max 3/day Keomanjit Huertas MD      Or    OLANZapine  10 mg Intramuscular Q3H PRN Max 3/day Keo Eldon Huertas MD      OLANZapine  5 mg Oral Q3H PRN Max 6/day Keo Eldon Huertas MD      Or    OLANZapine  5 mg Intramuscular Q3H PRN Max 6/day Keo Eldon Huertas MD      OLANZapine  2.5 mg Oral Q3H PRN Max 8/day Keo Eldon Huertas MD      paliperidone  12  mg Oral HS Gena Edwards PA-C      propranolol  10 mg Oral Q8H PRN Keo Eldon Huertas MD      traZODone  100 mg Oral HS Gena Edwards PA-C       Risks / Benefits of Treatment:    Risks, benefits, and possible side effects of medications explained to patient and patient verbalizes understanding and agreement for treatment.    Counseling / Coordination of Care:    Total floor / unit time spent today 20 minutes. Greater than 50% of total time was spent with the patient and / or family counseling and / or coordination of care. A description of counseling / coordination of care:  Patient's progress discussed with staff in treatment team meeting.  Medications, treatment progress and treatment plan reviewed with patient.    Gena Edwards PA-C 01/18/24

## 2024-01-18 NOTE — NURSING NOTE
"Pt calm, cooperative, pleasant, social intermittently. Pt actively RTIS of voices, does not verbalize or appear in any distress. Pt has impaired source monitoring, believes that AH of voices are real people and responds to them as such, engaging in full conversations. Pt denies VH/SI/HI. Pt preoccupied with \"antibiotics\" and tells nurse \"I have 5 amoxicillin at home.\" Pt educated on dangers of antibiotic misuse, receptive. Pt is medication adherent.   "

## 2024-01-18 NOTE — TREATMENT TEAM
01/18/24 1538   Team Meeting   Meeting Type Daily Rounds   Team Members Present   Team Members Present Physician;Nurse;   Physician Team Member Jennifer   Nursing Team Member Lesvia   Care Management Team Member Humberto   Patient/Family Present   Patient Present No   Patient's Family Present No     Patient currently holds 201 status. Patient reports AH but denies SI/HI/VH. Patient is medication and meal compliant. Patient to continue on current scheduled medications. Patient discharge date and plan to be determined.

## 2024-01-18 NOTE — PLAN OF CARE
Problem: Alteration in Thoughts and Perception  Goal: Treatment Goal: Gain control of psychotic behaviors/thinking, reduce/eliminate presenting symptoms and demonstrate improved reality functioning upon discharge  Outcome: Progressing  Goal: Verbalize thoughts and feelings  Description: Interventions:  - Promote a nonjudgmental and trusting relationship with the patient through active listening and therapeutic communication  - Assess patient's level of functioning, behavior and potential for risk  - Engage patient in 1 on 1 interactions  - Encourage patient to express fears, feelings, frustrations, and discuss symptoms    - [place] patient to reality, help patient recognize reality-based thinking   - Administer medications as ordered and assess for potential side effects  - Provide the patient education related to the signs and symptoms of the illness and desired effects of prescribed medications  Outcome: Progressing  Goal: Agree to be compliant with medication regime, as prescribed and report medication side effects  Description: Interventions:  - Offer appropriate PRN medication and supervise ingestion; conduct AIMS, as needed   Outcome: Progressing  Goal: Attend and participate in unit activities, including therapeutic, recreational, and educational groups  Description: Interventions:  -Encourage Visitation and family involvement in care  Outcome: Progressing  Goal: Recognize dysfunctional thoughts, communicate reality-based thoughts at the time of discharge  Description: Interventions:  - Provide medication and psycho-education to assist patient in compliance and developing insight into his/her illness   Outcome: Progressing  Goal: Complete daily ADLs, including personal hygiene independently, as able  Description: Interventions:  - Observe, teach, and assist patient with ADLS  - Monitor and promote a balance of rest/activity, with adequate nutrition and elimination   Outcome: Progressing     Problem:  Ineffective Coping  Goal: Identifies ineffective coping skills  Outcome: Progressing  Goal: Identifies healthy coping skills  Outcome: Progressing  Goal: Demonstrates healthy coping skills  Outcome: Progressing     Problem: Anxiety  Goal: Anxiety is at manageable level  Description: Interventions:  - Assess and monitor patient's anxiety level.   - Monitor for signs and symptoms (heart palpitations, chest pain, shortness of breath, headaches, nausea, feeling jumpy, restlessness, irritable, apprehensive).   - Collaborate with interdisciplinary team and initiate plan and interventions as ordered.  - [place] patient to unit/surroundings  - Explain treatment plan  - Encourage participation in care  - Encourage verbalization of concerns/fears  - Identify coping mechanisms  - Assist in developing anxiety-reducing skills  - Administer/offer alternative therapies  - Limit or eliminate stimulants  Outcome: Progressing     Problem: Alteration in Orientation  Goal: Treatment Goal: Demonstrate a reduction of confusion and improved orientation to person, place, time and/or situation upon discharge, according to optimum baseline/ability  Outcome: Progressing  Goal: Allow medical examinations, as recommended  Description: Interventions:  - Provide physical/neurological exams and/or referrals, per provider   Outcome: Progressing  Goal: Cooperate with recommended testing/procedures  Description: Interventions:  - Determine need for ancillary testing  - Observe for mental status changes  - Implement falls/precaution protocol   Outcome: Progressing     Problem: Ineffective Coping  Goal: Participates in unit activities  Description: Interventions:  - Provide therapeutic environment   - Provide required programming   - Redirect inappropriate behaviors   Outcome: Progressing     Problem: Alteration in Thoughts and Perception  Goal: Refrain from acting on delusional thinking/internal stimuli  Description: Interventions:  - Monitor patient  closely, per order   - Utilize least restrictive measures   - Set reasonable limits, give positive feedback for acceptable   - Administer medications as ordered and monitor of potential side effects  Outcome: Not Progressing

## 2024-01-19 LAB
ATRIAL RATE: 101 BPM
ATRIAL RATE: 87 BPM
ATRIAL RATE: 92 BPM
P AXIS: 74 DEGREES
P AXIS: 74 DEGREES
P AXIS: 75 DEGREES
PR INTERVAL: 122 MS
PR INTERVAL: 124 MS
PR INTERVAL: 124 MS
QRS AXIS: 60 DEGREES
QRS AXIS: 60 DEGREES
QRS AXIS: 62 DEGREES
QRSD INTERVAL: 120 MS
QRSD INTERVAL: 126 MS
QRSD INTERVAL: 132 MS
QT INTERVAL: 374 MS
QT INTERVAL: 382 MS
QT INTERVAL: 382 MS
QTC INTERVAL: 459 MS
QTC INTERVAL: 472 MS
QTC INTERVAL: 484 MS
T WAVE AXIS: 49 DEGREES
T WAVE AXIS: 54 DEGREES
T WAVE AXIS: 62 DEGREES
VENTRICULAR RATE: 101 BPM
VENTRICULAR RATE: 87 BPM
VENTRICULAR RATE: 92 BPM

## 2024-01-19 PROCEDURE — 93005 ELECTROCARDIOGRAM TRACING: CPT

## 2024-01-19 PROCEDURE — 93010 ELECTROCARDIOGRAM REPORT: CPT | Performed by: STUDENT IN AN ORGANIZED HEALTH CARE EDUCATION/TRAINING PROGRAM

## 2024-01-19 PROCEDURE — 99232 SBSQ HOSP IP/OBS MODERATE 35: CPT | Performed by: PSYCHIATRY & NEUROLOGY

## 2024-01-19 RX ORDER — LOXAPINE SUCCINATE 10 MG/1
20 TABLET ORAL 2 TIMES DAILY
Status: DISCONTINUED | OUTPATIENT
Start: 2024-01-19 | End: 2024-01-30 | Stop reason: HOSPADM

## 2024-01-19 RX ORDER — PALIPERIDONE 9 MG/1
9 TABLET, EXTENDED RELEASE ORAL
Status: DISCONTINUED | OUTPATIENT
Start: 2024-01-19 | End: 2024-01-23

## 2024-01-19 RX ADMIN — DICYCLOMINE HYDROCHLORIDE 10 MG: 10 CAPSULE ORAL at 08:04

## 2024-01-19 RX ADMIN — DICYCLOMINE HYDROCHLORIDE 10 MG: 10 CAPSULE ORAL at 15:45

## 2024-01-19 RX ADMIN — BUDESONIDE 3 MG: 3 CAPSULE ORAL at 08:04

## 2024-01-19 RX ADMIN — ACETAMINOPHEN 650 MG: 325 TABLET ORAL at 21:24

## 2024-01-19 RX ADMIN — GABAPENTIN 600 MG: 300 CAPSULE ORAL at 21:24

## 2024-01-19 RX ADMIN — METHOCARBAMOL TABLETS 500 MG: 500 TABLET, COATED ORAL at 11:52

## 2024-01-19 RX ADMIN — METHOCARBAMOL TABLETS 500 MG: 500 TABLET, COATED ORAL at 08:04

## 2024-01-19 RX ADMIN — ACETAMINOPHEN 650 MG: 325 TABLET ORAL at 05:17

## 2024-01-19 RX ADMIN — FAMOTIDINE 10 MG: 20 TABLET ORAL at 08:04

## 2024-01-19 RX ADMIN — METHOCARBAMOL TABLETS 500 MG: 500 TABLET, COATED ORAL at 18:13

## 2024-01-19 RX ADMIN — DICYCLOMINE HYDROCHLORIDE 10 MG: 10 CAPSULE ORAL at 11:52

## 2024-01-19 RX ADMIN — GABAPENTIN 600 MG: 300 CAPSULE ORAL at 15:45

## 2024-01-19 RX ADMIN — PALIPERIDONE 9 MG: 9 TABLET, EXTENDED RELEASE ORAL at 21:24

## 2024-01-19 RX ADMIN — LOXAPINE 20 MG: 10 CAPSULE ORAL at 18:13

## 2024-01-19 RX ADMIN — TRAZODONE HYDROCHLORIDE 100 MG: 100 TABLET ORAL at 21:24

## 2024-01-19 RX ADMIN — DICYCLOMINE HYDROCHLORIDE 10 MG: 10 CAPSULE ORAL at 21:24

## 2024-01-19 RX ADMIN — GABAPENTIN 600 MG: 300 CAPSULE ORAL at 08:04

## 2024-01-19 RX ADMIN — METHOCARBAMOL TABLETS 500 MG: 500 TABLET, COATED ORAL at 21:24

## 2024-01-19 NOTE — NURSING NOTE
"PT observed in day room, cooperative and calm. PT denies SI/HI/VH. AH of \"Just voices\" he said. PRN Tylenol 650mg for pain of 5/10 to buttocks, self wound care done. Complaint with HS meds.     "

## 2024-01-19 NOTE — NURSING NOTE
Patient c/o pain in buttocks rated 5/10 and received PRN Tylenol 650mg PO at 0517 with good effect. Patient currently resting comfortably in room no further complaints at this time.

## 2024-01-19 NOTE — PROGRESS NOTES
01/19/24 1446   Team Meeting   Meeting Type Daily Rounds   Team Members Present   Team Members Present Physician;Nurse;   Physician Team Member Jennifer   Nursing Team Member [facility] Team Member Humberto   Patient/Family Present   Patient Present No   Patient's Family Present No     Patient currently holds 201 status. Patient reports continued AH. Patient denies all other symptoms. Patient to continue on current scheduled medications. Patient discharge date and time to be determined.

## 2024-01-19 NOTE — NURSING NOTE
Patient denies SI, HI, AHs and VHs. However he continues to ramble in speech and told this writer a story about watching a cotton ball turn into a mouse and run away. He is medication and meal compliant. Visible on the unit and social with peers. Denies any needs at this time.    Completed wound care independently, under observation of nursing staff.

## 2024-01-19 NOTE — PLAN OF CARE
Problem: Alteration in Thoughts and Perception  Goal: Treatment Goal: Gain control of psychotic behaviors/thinking, reduce/eliminate presenting symptoms and demonstrate improved reality functioning upon discharge  Outcome: Progressing  Goal: Verbalize thoughts and feelings  Description: Interventions:  - Promote a nonjudgmental and trusting relationship with the patient through active listening and therapeutic communication  - Assess patient's level of functioning, behavior and potential for risk  - Engage patient in 1 on 1 interactions  - Encourage patient to express fears, feelings, frustrations, and discuss symptoms    - [place] patient to reality, help patient recognize reality-based thinking   - Administer medications as ordered and assess for potential side effects  - Provide the patient education related to the signs and symptoms of the illness and desired effects of prescribed medications  Outcome: Progressing  Goal: Refrain from acting on delusional thinking/internal stimuli  Description: Interventions:  - Monitor patient closely, per order   - Utilize least restrictive measures   - Set reasonable limits, give positive feedback for acceptable   - Administer medications as ordered and monitor of potential side effects  Outcome: Progressing  Goal: Agree to be compliant with medication regime, as prescribed and report medication side effects  Description: Interventions:  - Offer appropriate PRN medication and supervise ingestion; conduct AIMS, as needed   Outcome: Progressing  Goal: Attend and participate in unit activities, including therapeutic, recreational, and educational groups  Description: Interventions:  -Encourage Visitation and family involvement in care  Outcome: Progressing  Goal: Complete daily ADLs, including personal hygiene independently, as able  Description: Interventions:  - Observe, teach, and assist patient with ADLS  - Monitor and promote a balance of rest/activity, with adequate  nutrition and elimination   Outcome: Progressing     Problem: Ineffective Coping  Goal: Identifies healthy coping skills  Outcome: Progressing  Goal: Demonstrates healthy coping skills  Outcome: Progressing     Problem: Anxiety  Goal: Anxiety is at manageable level  Description: Interventions:  - Assess and monitor patient's anxiety level.   - Monitor for signs and symptoms (heart palpitations, chest pain, shortness of breath, headaches, nausea, feeling jumpy, restlessness, irritable, apprehensive).   - Collaborate with interdisciplinary team and initiate plan and interventions as ordered.  - [place] patient to unit/surroundings  - Explain treatment plan  - Encourage participation in care  - Encourage verbalization of concerns/fears  - Identify coping mechanisms  - Assist in developing anxiety-reducing skills  - Administer/offer alternative therapies  - Limit or eliminate stimulants  Outcome: Progressing     Problem: Alteration in Orientation  Goal: Treatment Goal: Demonstrate a reduction of confusion and improved orientation to person, place, time and/or situation upon discharge, according to optimum baseline/ability  Outcome: Progressing

## 2024-01-19 NOTE — PROGRESS NOTES
"Progress Note - Behavioral Health     Carlos Wolf 33 y.o. male MRN: [id number]   Unit/Bed#: [facility] 376-02 Encounter: [id number]    Behavior over the last 24 hours: unchanged.     Carlos was seen in follow-up. Per staff, no behavioral issues overnight. Still continues to respond to internal stimuli. Still visible on unit, pleasant and willing to participate with interview. He is still disorganized and tangential in thought. He often makes reference to voices that are \"outside\" of his head due to \"outside influence.\" Often makes comments related to ideas of reference, \"theres people in a warehouse putting thoughts into my head making me sound crazy.\" He states that he needs to leave the hospital because he is worried about his medications for Chron's running out (pharmacy confirmed this is not the case) and provided reassurance this is not the case. I did explain we will start cross titration between invega and loxapine which he was agreeable to . He did report improved sleep last night. No SI/HI.     Sleep: slept better  Appetite: normal  Medication side effects: No   ROS: no complaints, all other systems are negative    Mental Status Evaluation:    Appearance:  age appropriate, casually dressed, dressed in hospital attire, bearded   Behavior:  pleasant, cooperative, calm, bizarre   Speech:  hypertalkative   Mood:  anxious   Affect:  constricted   Thought Process:  disorganized, illogical   Associations: tangential associations, perseverative   Thought Content:  persecutory and fixed delusions, ideas of reference, somaptic preoccupation   Perceptual Disturbances: appears responding to internal stimuli   Risk Potential: Suicidal ideation - None at present  Homicidal ideation - None at present  Potential for aggression - No   Sensorium:  oriented to person, place, and time/date   Memory:  recent and remote memory grossly intact   Consciousness:  alert and awake   Attention/Concentration: attention span and " concentration appear shorter than expected for age   Insight:  limited   Judgment: limited   Gait/Station: normal gait/station   Motor Activity: no abnormal movements     Vital signs in last 24 hours:    Temp:  [97 °F (36.1 °C)-97.1 °F (36.2 °C)] 97.1 °F (36.2 °C)  HR:  [91-99] 99  Resp:  [16] 16  BP: (110-116)/(62-67) 110/67    Laboratory results: I have personally reviewed all pertinent laboratory/tests results    Results from the past 24 hours: No results found for this or any previous visit (from the past 24 hour(s)).  Most Recent Labs:   Lab Results   Component Value Date    WBC 13.02 (H) 01/17/2024    RBC 3.93 01/17/2024    HGB 8.3 (L) 01/17/2024    HCT 28.9 (L) 01/17/2024     (H) 01/17/2024    RDW 24.1 (H) 01/17/2024    NEUTROABS 8.08 (H) 01/17/2024    TOTANEUTABS 9.27 (H) 01/16/2024    SODIUM 138 01/11/2024    K 4.2 01/11/2024     01/11/2024    CO2 27 01/11/2024    BUN 7 01/11/2024    CREATININE 0.74 01/11/2024    GLUC 75 01/11/2024    CALCIUM 9.0 01/11/2024    AST 9 (L) 01/11/2024    ALT 6 (L) 01/11/2024    ALKPHOS 47 01/11/2024    TP 7.2 01/11/2024    ALB 3.3 (L) 01/11/2024    TBILI 0.17 (L) 01/11/2024    CHOLESTEROL 128 01/11/2024    HDL 54 01/11/2024    TRIG 48 01/11/2024    LDLCALC 64 01/11/2024    NONHDLC 74 01/11/2024    [id number] 1.486 01/11/2024    SYPHILISAB Non-reactive 01/11/2024    HGBA1C 5.6 01/11/2024     01/11/2024       Progress Toward Goals: limited improvement    Assessment/Plan   Principal Problem:    Schizophrenia, paranoid type (HCC)  Active Problems:    Anemia    Crohn's disease (HCC)    Cellulitis and abscess of buttock      Recommended Treatment:     Planned medication and treatment changes:    All current active medications have been reviewed  Encourage group therapy, milieu therapy and occupational therapy  Behavioral Health checks every 7 minutes    Decrease Invega to 9 mg for tonight  Start Loxapine 20 mg BID starting tonight    Current  Facility-Administered Medications   Medication Dose Route Frequency Provider Last Rate    acetaminophen  650 mg Oral Q6H PRN Keo Eldon Huertas MD      acetaminophen  650 mg Oral Q4H PRN Keo Eldon Huertas MD      acetaminophen  975 mg Oral Q6H PRN Keo Eldon Huertas MD      benztropine  1 mg Intramuscular Q4H PRN Max 6/day Keo Eldon Huertas MD      benztropine  1 mg Oral Q4H PRN Max 6/day Keo Eldon Huertas MD      budesonide  3 mg Oral Daily Pepe Terrell MD      dicyclomine  10 mg Oral 4x Daily (AC & HS) Pepe Terrell MD      hydrOXYzine HCL  50 mg Oral Q6H PRN Max 4/day Keo Eldon Huertas MD      Or    diphenhydrAMINE  50 mg Intramuscular Q6H PRN Keo Eldon Huertas MD      ergocalciferol  50,000 Units Oral Weekly Keo Eldon Huertas MD      famotidine  10 mg Oral Daily Keo Eldon Huertas MD      gabapentin  600 mg Oral TID Ann Rodriguez MD      glycerin-hypromellose-  1 drop Both Eyes Q3H PRN Keo Eldon Huertas MD      hydrOXYzine HCL  100 mg Oral Q6H PRN Max 4/day Keo Eldon Huertas MD      Or    LORazepam  2 mg Intramuscular Q6H PRN Keo Eldon Huertas MD      hydrOXYzine HCL  25 mg Oral Q6H PRN Max 4/day Keo Eldon Huertas MD      melatonin  9 mg Oral HS PRN Ann Rodriguez MD      methocarbamol  500 mg Oral 4x Daily Keo Eldon Huertas MD      OLANZapine  10 mg Oral Q3H PRN Max 3/day Keo Eldon Huertas MD      Or    OLANZapine  10 mg Intramuscular Q3H PRN Max 3/day Keo Eldon Huertas MD      OLANZapine  5 mg Oral Q3H PRN Max 6/day Keo Eldon Huertas MD      Or    OLANZapine  5 mg Intramuscular Q3H PRN Max 6/day Keo Eldon Huertas MD      OLANZapine  2.5 mg Oral Q3H PRN Max 8/day Keo Eldon Huertas MD      paliperidone  12 mg Oral HS Gena Edwards PA-C      propranolol  10 mg Oral Q8H PRN Keo Eldon Huertas MD      traZODone  100 mg Oral HS Gena Edwards PA-C       Risks / Benefits of Treatment:    Risks, benefits, and possible side effects of medications  explained to patient and patient verbalizes understanding and agreement for treatment.    Counseling / Coordination of Care:    Total floor / unit time spent today 20 minutes. Greater than 50% of total time was spent with the patient and / or family counseling and / or coordination of care. A description of counseling / coordination of care:  Patient's progress discussed with staff in treatment team meeting.  Medications, treatment progress and treatment plan reviewed with patient.    Gena Edwards PA-C 01/19/24

## 2024-01-20 PROCEDURE — 99232 SBSQ HOSP IP/OBS MODERATE 35: CPT | Performed by: PSYCHIATRY & NEUROLOGY

## 2024-01-20 RX ADMIN — ACETAMINOPHEN 650 MG: 325 TABLET ORAL at 21:16

## 2024-01-20 RX ADMIN — GABAPENTIN 600 MG: 300 CAPSULE ORAL at 08:32

## 2024-01-20 RX ADMIN — BUDESONIDE 3 MG: 3 CAPSULE ORAL at 08:33

## 2024-01-20 RX ADMIN — TRAZODONE HYDROCHLORIDE 100 MG: 100 TABLET ORAL at 21:17

## 2024-01-20 RX ADMIN — GABAPENTIN 600 MG: 300 CAPSULE ORAL at 16:08

## 2024-01-20 RX ADMIN — DICYCLOMINE HYDROCHLORIDE 10 MG: 10 CAPSULE ORAL at 21:17

## 2024-01-20 RX ADMIN — DICYCLOMINE HYDROCHLORIDE 10 MG: 10 CAPSULE ORAL at 16:08

## 2024-01-20 RX ADMIN — GABAPENTIN 600 MG: 300 CAPSULE ORAL at 21:16

## 2024-01-20 RX ADMIN — METHOCARBAMOL TABLETS 500 MG: 500 TABLET, COATED ORAL at 08:33

## 2024-01-20 RX ADMIN — METHOCARBAMOL TABLETS 500 MG: 500 TABLET, COATED ORAL at 17:15

## 2024-01-20 RX ADMIN — LOXAPINE 20 MG: 10 CAPSULE ORAL at 17:15

## 2024-01-20 RX ADMIN — METHOCARBAMOL TABLETS 500 MG: 500 TABLET, COATED ORAL at 21:17

## 2024-01-20 RX ADMIN — FAMOTIDINE 10 MG: 20 TABLET ORAL at 08:33

## 2024-01-20 RX ADMIN — DICYCLOMINE HYDROCHLORIDE 10 MG: 10 CAPSULE ORAL at 12:03

## 2024-01-20 RX ADMIN — DICYCLOMINE HYDROCHLORIDE 10 MG: 10 CAPSULE ORAL at 08:32

## 2024-01-20 RX ADMIN — ACETAMINOPHEN 650 MG: 325 TABLET ORAL at 07:40

## 2024-01-20 RX ADMIN — LOXAPINE 20 MG: 10 CAPSULE ORAL at 08:32

## 2024-01-20 RX ADMIN — PALIPERIDONE 9 MG: 9 TABLET, EXTENDED RELEASE ORAL at 21:17

## 2024-01-20 RX ADMIN — METHOCARBAMOL TABLETS 500 MG: 500 TABLET, COATED ORAL at 12:03

## 2024-01-20 RX ADMIN — ACETAMINOPHEN 650 MG: 325 TABLET ORAL at 16:25

## 2024-01-20 NOTE — PLAN OF CARE
Problem: Alteration in Thoughts and Perception  Goal: Treatment Goal: Gain control of psychotic behaviors/thinking, reduce/eliminate presenting symptoms and demonstrate improved reality functioning upon discharge  Outcome: Progressing  Goal: Verbalize thoughts and feelings  Description: Interventions:  - Promote a nonjudgmental and trusting relationship with the patient through active listening and therapeutic communication  - Assess patient's level of functioning, behavior and potential for risk  - Engage patient in 1 on 1 interactions  - Encourage patient to express fears, feelings, frustrations, and discuss symptoms    - [place] patient to reality, help patient recognize reality-based thinking   - Administer medications as ordered and assess for potential side effects  - Provide the patient education related to the signs and symptoms of the illness and desired effects of prescribed medications  Outcome: Progressing  Goal: Refrain from acting on delusional thinking/internal stimuli  Description: Interventions:  - Monitor patient closely, per order   - Utilize least restrictive measures   - Set reasonable limits, give positive feedback for acceptable   - Administer medications as ordered and monitor of potential side effects  Outcome: Progressing  Goal: Agree to be compliant with medication regime, as prescribed and report medication side effects  Description: Interventions:  - Offer appropriate PRN medication and supervise ingestion; conduct AIMS, as needed   Outcome: Progressing  Goal: Recognize dysfunctional thoughts, communicate reality-based thoughts at the time of discharge  Description: Interventions:  - Provide medication and psycho-education to assist patient in compliance and developing insight into his/her illness   Outcome: Progressing  Goal: Complete daily ADLs, including personal hygiene independently, as able  Description: Interventions:  - Observe, teach, and assist patient with ADLS  - Monitor and  promote a balance of rest/activity, with adequate nutrition and elimination   Outcome: Progressing     Problem: Ineffective Coping  Goal: Identifies ineffective coping skills  Outcome: Progressing  Goal: Identifies healthy coping skills  Outcome: Progressing  Goal: Demonstrates healthy coping skills  Outcome: Progressing     Problem: Anxiety  Goal: Anxiety is at manageable level  Description: Interventions:  - Assess and monitor patient's anxiety level.   - Monitor for signs and symptoms (heart palpitations, chest pain, shortness of breath, headaches, nausea, feeling jumpy, restlessness, irritable, apprehensive).   - Collaborate with interdisciplinary team and initiate plan and interventions as ordered.  - [place] patient to unit/surroundings  - Explain treatment plan  - Encourage participation in care  - Encourage verbalization of concerns/fears  - Identify coping mechanisms  - Assist in developing anxiety-reducing skills  - Administer/offer alternative therapies  - Limit or eliminate stimulants  Outcome: Progressing     Problem: Alteration in Orientation  Goal: Treatment Goal: Demonstrate a reduction of confusion and improved orientation to person, place, time and/or situation upon discharge, according to optimum baseline/ability  Outcome: Progressing  Goal: Allow medical examinations, as recommended  Description: Interventions:  - Provide physical/neurological exams and/or referrals, per provider   Outcome: Progressing  Goal: Cooperate with recommended testing/procedures  Description: Interventions:  - Determine need for ancillary testing  - Observe for mental status changes  - Implement falls/precaution protocol   Outcome: Progressing     Problem: DISCHARGE PLANNING - CARE MANAGEMENT  Goal: Discharge to post-acute care or home with appropriate resources  Description: INTERVENTIONS:  - Conduct assessment to determine patient/family and health care team treatment goals, and need for post-acute services based on  payer coverage, community resources, and patient preferences, and barriers to discharge  - Address psychosocial, clinical, and financial barriers to discharge as identified in assessment in conjunction with the patient/family and health care team  - Arrange appropriate level of post-acute services according to patient’s   needs and preference and payer coverage in collaboration with the physician and health care team  - Communicate with and update the patient/family, physician, and health care team regarding progress on the discharge plan  - Arrange appropriate transportation to post-acute venues  Outcome: Progressing

## 2024-01-20 NOTE — NURSING NOTE
"PT observed in the unit participating in activity, cooperative and calm upon approach. PT denies SI/HI/VH/AH on assessment stating \"not now I am fine\".  PT requested and received PRN Tylenol 650mg rated 5/10 pain to his (L) side buttocks.at 2124.  PT compliant with HS medication with no further needs at this time.   "

## 2024-01-20 NOTE — NURSING NOTE
Patient denies SI, HI, AHs and VHs. Denies anxiety and depression. Patient is medication and meal compliant. Visible on the unit and social with peers. Patient was not observed responding to internal stimuli or rambling during medication administration or assessment today. He did express beliefs about staff members being related to each  other, but seems to accept when it was explained that this was not the case. He denies any needs at this time.    Completed wound care independently, under observation of nursing staff.

## 2024-01-20 NOTE — PROGRESS NOTES
"Progress Note - Behavioral Health   Carlos Wolf 33 y.o. male MRN: [id number]  Unit/Bed#: [facility] 376-02 Encounter: [id number]      Subjective:  Patient evaluated this morning for continuity of care. Patient was discussed at length with nursing and treatment team. Per nursing, patient has been visible and social on the unit. Patient was calm and cooperative during interview.  He reports feeling \"good\". Carlos Wolf denies suicidal or homicidal ideation, plan, or intent.  Denies having physical complaints.  Denies adverse effects to medications.  Denies auditory or visual hallucinations.      Current Medications:  Current Facility-Administered Medications   Medication Dose Route Frequency Provider Last Rate    acetaminophen  650 mg Oral Q6H PRN Keo Huertas MD      acetaminophen  650 mg Oral Q4H PRN Keomanjit Huertas MD      acetaminophen  975 mg Oral Q6H PRN Keomanjit Huertas MD      benztropine  1 mg Intramuscular Q4H PRN Max 6/day Keo Huertas MD      benztropine  1 mg Oral Q4H PRN Max 6/day Keomanjit Huertas MD      budesonide  3 mg Oral Daily Pepe Terrell MD      dicyclomine  10 mg Oral 4x Daily (AC & HS) Pepe Terrell MD      hydrOXYzine HCL  50 mg Oral Q6H PRN Max 4/day Keomanjit Huertas MD      Or    diphenhydrAMINE  50 mg Intramuscular Q6H PRN Keomanjit Huertas MD      ergocalciferol  50,000 Units Oral Weekly Keomanjit Huertas MD      famotidine  10 mg Oral Daily Keo Eldon Huertas MD      gabapentin  600 mg Oral TID Ann Rodriguez MD      glycerin-hypromellose-  1 drop Both Eyes Q3H PRN Keomanjit Huertas MD      hydrOXYzine HCL  100 mg Oral Q6H PRN Max 4/day Keo Huertas MD      Or    LORazepam  2 mg Intramuscular Q6H PRN Keomanjit Huertas MD      hydrOXYzine HCL  25 mg Oral Q6H PRN Max 4/day Keomanjit Huertas MD      loxapine  20 mg Oral BID Gena Edwards PA-C      melatonin  9 mg Oral HS PRN Ann Rodriguez MD      " "methocarbamol  500 mg Oral 4x Daily Keo Eldon Huertas MD      OLANZapine  10 mg Oral Q3H PRN Max 3/day Keo Eldon Huertas MD      Or    OLANZapine  10 mg Intramuscular Q3H PRN Max 3/day Keomanjit Huertas MD      OLANZapine  5 mg Oral Q3H PRN Max 6/day Keo Eldon Huertas MD      Or    OLANZapine  5 mg Intramuscular Q3H PRN Max 6/day Keo Eldon Huertas MD      OLANZapine  2.5 mg Oral Q3H PRN Max 8/day Keo Eldon Huertas MD      paliperidone  9 mg Oral HS Gena Edwards PA-C      propranolol  10 mg Oral Q8H PRN Keomanjit Huertas MD      traZODone  100 mg Oral HS Gena Edwards PA-C           Vital signs in last 24 hours:  Temp:  [97.4 °F (36.3 °C)-97.6 °F (36.4 °C)] 97.6 °F (36.4 °C)  HR:  [94-99] 99  Resp:  [16] 16  BP: (113-129)/(60-78) 129/78    Laboratory results:  I have personally reviewed all pertinent laboratory/tests results.    EKG   Lab Results   Component Value Date    VENTRATE 101 01/19/2024    ATRIALRATE 101 01/19/2024    PRINT 124 01/19/2024    QRSDINT 120 01/19/2024    QTINT 374 01/19/2024    QTCINT 484 01/19/2024    PAXIS 74 01/19/2024    QRSAXIS 60 01/19/2024    TWAVEAXIS 62 01/19/2024           Mental Status Evaluation:  Appearance:  appears stated age and laying in bed   Behavior:  calm and cooperative   Speech:  soft and scant   Mood:  \"good\"   Affect:  Constricted   Thought Process:  organized   Thought Content:  no delusions elicited   Perceptual Disturbances: Denies auditory or visual hallucinations and Does not appear to be responding to internal stimuli   Risk Potential: Denies suicidal or homicidal ideation, plan, or intent   Sensorium:  person, place, and situation   Cognition:  appears grossly intact   Consciousness:  alert and awake   Attention: attention span and concentration were age appropriate   Insight:  limited   Judgment: limited   Gait/Station: not assessed, patient in bed   Motor Activity: no abnormal movements         Assessment/Plan   Principal Problem:    " Schizophrenia, paranoid type (HCC)  Active Problems:    Anemia    Crohn's disease (HCC)    Cellulitis and abscess of buttock        Recommended Treatment:   Continue to promote patient participation in therapeutic milieu.  Continue medical management per medicine.  Continue previously prescribed psychotropic medication regimen; see above.  Continue behavioral health checks q.7 minutes.   Continue vitals per behavioral health unit protocol.  Discharge date per primary team.      Risks, benefits and possible side effects of Medications:   Risks, benefits, and possible side effects of medications have been previously explained to the patient.  No new medication changes today.        Moo Long,       This note has been constructed using a voice recognition system.    There may be translation, syntax,  or grammatical errors. If you have any questions, please contact the dictating provider.    This note was not shared with the patient due to reasonable likelihood of causing patient harm

## 2024-01-21 PROCEDURE — 99232 SBSQ HOSP IP/OBS MODERATE 35: CPT | Performed by: PSYCHIATRY & NEUROLOGY

## 2024-01-21 RX ADMIN — BUDESONIDE 3 MG: 3 CAPSULE ORAL at 08:05

## 2024-01-21 RX ADMIN — PALIPERIDONE 9 MG: 9 TABLET, EXTENDED RELEASE ORAL at 21:11

## 2024-01-21 RX ADMIN — GABAPENTIN 600 MG: 300 CAPSULE ORAL at 08:05

## 2024-01-21 RX ADMIN — ACETAMINOPHEN 650 MG: 325 TABLET ORAL at 08:12

## 2024-01-21 RX ADMIN — GABAPENTIN 600 MG: 300 CAPSULE ORAL at 15:50

## 2024-01-21 RX ADMIN — LOXAPINE 20 MG: 10 CAPSULE ORAL at 17:08

## 2024-01-21 RX ADMIN — METHOCARBAMOL TABLETS 500 MG: 500 TABLET, COATED ORAL at 08:04

## 2024-01-21 RX ADMIN — GABAPENTIN 600 MG: 300 CAPSULE ORAL at 21:11

## 2024-01-21 RX ADMIN — DICYCLOMINE HYDROCHLORIDE 10 MG: 10 CAPSULE ORAL at 15:50

## 2024-01-21 RX ADMIN — ACETAMINOPHEN 650 MG: 325 TABLET ORAL at 21:11

## 2024-01-21 RX ADMIN — DICYCLOMINE HYDROCHLORIDE 10 MG: 10 CAPSULE ORAL at 21:11

## 2024-01-21 RX ADMIN — DICYCLOMINE HYDROCHLORIDE 10 MG: 10 CAPSULE ORAL at 08:05

## 2024-01-21 RX ADMIN — METHOCARBAMOL TABLETS 500 MG: 500 TABLET, COATED ORAL at 17:08

## 2024-01-21 RX ADMIN — METHOCARBAMOL TABLETS 500 MG: 500 TABLET, COATED ORAL at 11:43

## 2024-01-21 RX ADMIN — TRAZODONE HYDROCHLORIDE 100 MG: 100 TABLET ORAL at 21:11

## 2024-01-21 RX ADMIN — FAMOTIDINE 10 MG: 20 TABLET ORAL at 08:04

## 2024-01-21 RX ADMIN — DICYCLOMINE HYDROCHLORIDE 10 MG: 10 CAPSULE ORAL at 11:43

## 2024-01-21 RX ADMIN — METHOCARBAMOL TABLETS 500 MG: 500 TABLET, COATED ORAL at 21:12

## 2024-01-21 RX ADMIN — ACETAMINOPHEN 650 MG: 325 TABLET ORAL at 15:45

## 2024-01-21 RX ADMIN — LOXAPINE 20 MG: 10 CAPSULE ORAL at 08:05

## 2024-01-21 NOTE — NURSING NOTE
"PT observed visible, cooperative and calm upon approach, interacting with other peers appropriately. PT denies SI/HI/VH/AH, with no depression nor anxiety on assessment. Stated \"It was a slow and quiet day, nothing much to do\". PT compliant with HS meds, requested for PRN Tylenol 650mg for moderate pain of 5/10. No further concerns during this shift.    "

## 2024-01-21 NOTE — NURSING NOTE
Patient denies SI, HI, AHs and VHs. Denies anxiety and depression. Patient is medication and meal compliant. Visible on the unit and social with peers. He denies any needs at this time.    Completed wound care independently, under observation of nursing staff.

## 2024-01-21 NOTE — PLAN OF CARE
Problem: Alteration in Thoughts and Perception  Goal: Treatment Goal: Gain control of psychotic behaviors/thinking, reduce/eliminate presenting symptoms and demonstrate improved reality functioning upon discharge  Outcome: Progressing  Goal: Verbalize thoughts and feelings  Description: Interventions:  - Promote a nonjudgmental and trusting relationship with the patient through active listening and therapeutic communication  - Assess patient's level of functioning, behavior and potential for risk  - Engage patient in 1 on 1 interactions  - Encourage patient to express fears, feelings, frustrations, and discuss symptoms    - [place] patient to reality, help patient recognize reality-based thinking   - Administer medications as ordered and assess for potential side effects  - Provide the patient education related to the signs and symptoms of the illness and desired effects of prescribed medications  Outcome: Progressing  Goal: Refrain from acting on delusional thinking/internal stimuli  Description: Interventions:  - Monitor patient closely, per order   - Utilize least restrictive measures   - Set reasonable limits, give positive feedback for acceptable   - Administer medications as ordered and monitor of potential side effects  Outcome: Progressing  Goal: Agree to be compliant with medication regime, as prescribed and report medication side effects  Description: Interventions:  - Offer appropriate PRN medication and supervise ingestion; conduct AIMS, as needed   Outcome: Progressing  Goal: Recognize dysfunctional thoughts, communicate reality-based thoughts at the time of discharge  Description: Interventions:  - Provide medication and psycho-education to assist patient in compliance and developing insight into his/her illness   Outcome: Progressing  Goal: Complete daily ADLs, including personal hygiene independently, as able  Description: Interventions:  - Observe, teach, and assist patient with ADLS  - Monitor and  promote a balance of rest/activity, with adequate nutrition and elimination   Outcome: Progressing     Problem: Ineffective Coping  Goal: Identifies ineffective coping skills  Outcome: Progressing  Goal: Identifies healthy coping skills  Outcome: Progressing  Goal: Demonstrates healthy coping skills  Outcome: Progressing     Problem: Anxiety  Goal: Anxiety is at manageable level  Description: Interventions:  - Assess and monitor patient's anxiety level.   - Monitor for signs and symptoms (heart palpitations, chest pain, shortness of breath, headaches, nausea, feeling jumpy, restlessness, irritable, apprehensive).   - Collaborate with interdisciplinary team and initiate plan and interventions as ordered.  - [place] patient to unit/surroundings  - Explain treatment plan  - Encourage participation in care  - Encourage verbalization of concerns/fears  - Identify coping mechanisms  - Assist in developing anxiety-reducing skills  - Administer/offer alternative therapies  - Limit or eliminate stimulants  Outcome: Progressing     Problem: Alteration in Orientation  Goal: Treatment Goal: Demonstrate a reduction of confusion and improved orientation to person, place, time and/or situation upon discharge, according to optimum baseline/ability  Outcome: Progressing  Goal: Allow medical examinations, as recommended  Description: Interventions:  - Provide physical/neurological exams and/or referrals, per provider   Outcome: Progressing  Goal: Cooperate with recommended testing/procedures  Description: Interventions:  - Determine need for ancillary testing  - Observe for mental status changes  - Implement falls/precaution protocol   Outcome: Progressing

## 2024-01-21 NOTE — PROGRESS NOTES
"Progress Note - Behavioral Health   Carlos Wolf 33 y.o. male MRN: [id number]  Unit/Bed#: [facility] 376-02 Encounter: [id number]      Subjective:  Patient evaluated this morning for continuity of care. Patient was discussed at length with nursing and treatment team. Per nursing, patient has been disorganized around lunchtime, pleasant, social, calm and cooperative. Patient was calm and cooperative during interview.  He was discussing that he enjoys watching movies about biographies. Carlos Wolf denies suicidal or homicidal ideation, plan, or intent.  He reports that he feels that the medications are helping and that he feels \"calm\".  Reports that he has been sleeping and eating.      Current Medications:  Current Facility-Administered Medications   Medication Dose Route Frequency Provider Last Rate    acetaminophen  650 mg Oral Q6H PRN Keo Huertas MD      acetaminophen  650 mg Oral Q4H PRN Keomanjit Huertas MD      acetaminophen  975 mg Oral Q6H PRN Keomanjit Huertas MD      benztropine  1 mg Intramuscular Q4H PRN Max 6/day Keomanjit Huertas MD      benztropine  1 mg Oral Q4H PRN Max 6/day Keomanjit Huertas MD      budesonide  3 mg Oral Daily Pepe Terrell MD      dicyclomine  10 mg Oral 4x Daily (AC & HS) Pepe Terrell MD      hydrOXYzine HCL  50 mg Oral Q6H PRN Max 4/day Keomanjit Huertas MD      Or    diphenhydrAMINE  50 mg Intramuscular Q6H PRN Keomanjit Huertas MD      ergocalciferol  50,000 Units Oral Weekly Keomanjit Huertas MD      famotidine  10 mg Oral Daily Keo Eldon Huertas MD      gabapentin  600 mg Oral TID Ann Rodriguez MD      glycerin-hypromellose-  1 drop Both Eyes Q3H PRN Keo Huertas MD      hydrOXYzine HCL  100 mg Oral Q6H PRN Max 4/day Keomanjit Huertas MD      Or    LORazepam  2 mg Intramuscular Q6H PRN Keomanjit Huertas MD      hydrOXYzine HCL  25 mg Oral Q6H PRN Max 4/day Keomanjit Huertas MD      loxapine  20 mg Oral BID " "Gena Edwards PA-C      melatonin  9 mg Oral HS PRN Ann Rodriguez MD      methocarbamol  500 mg Oral 4x Daily Keo Eldon Huertas MD      OLANZapine  10 mg Oral Q3H PRN Max 3/day Keo Eldon Huertas MD      Or    OLANZapine  10 mg Intramuscular Q3H PRN Max 3/day Keo Eldon Huertas MD      OLANZapine  5 mg Oral Q3H PRN Max 6/day Keo Eldon Huertas MD      Or    OLANZapine  5 mg Intramuscular Q3H PRN Max 6/day Keo Eldon Huertas MD      OLANZapine  2.5 mg Oral Q3H PRN Max 8/day Keo Eldon Huertas MD      paliperidone  9 mg Oral HS Gena Edwards PA-C      propranolol  10 mg Oral Q8H PRN Keo Eldon Huertas MD      traZODone  100 mg Oral HS Gena Edwards PA-C           Vital signs in last 24 hours:  Temp:  [97.1 °F (36.2 °C)-97.2 °F (36.2 °C)] 97.1 °F (36.2 °C)  HR:  [98] 98  Resp:  [16] 16  BP: (105-116)/(67-74) 105/74    Laboratory results:  I have personally reviewed all pertinent laboratory/tests results.          Mental Status Evaluation:  Appearance:  appears stated age   Behavior:  calm and cooperative   Speech:  soft   Mood:  \"Calm\"   Affect:  Constricted   Thought Process:  organized   Thought Content:  no delusions elicited   Perceptual Disturbances: No hallucinations verbalized.  Does not appear to be responding to internal stimuli   Risk Potential: Denies suicidal or homicidal ideation, plan, or intent   Sensorium:  person, place, and situation   Cognition:  appears grossly intact   Consciousness:  alert and awake   Attention: attention span and concentration were age appropriate   Insight:  limited   Judgment: limited   Gait/Station: not assessed, patient in bed   Motor Activity: no abnormal movements         Assessment/Plan   Principal Problem:    Schizophrenia, paranoid type (HCC)  Active Problems:    Anemia    Crohn's disease (HCC)    Cellulitis and abscess of buttock        Recommended Treatment:   Continue to promote patient participation in therapeutic milieu.  Continue medical " management per medicine.  Continue previously prescribed psychotropic medication regimen; see above.  Continue behavioral health checks q.7 minutes.   Continue vitals per behavioral health unit protocol.  Discharge date per primary team.      Risks, benefits and possible side effects of Medications:   Risks, benefits, and possible side effects of medications have been previously explained to the patient.  No new medication changes today.        Moo Long, DO      This note has been constructed using a voice recognition system.    There may be translation, syntax,  or grammatical errors. If you have any questions, please contact the dictating provider.    This note was not shared with the patient due to reasonable likelihood of causing patient harm

## 2024-01-22 PROCEDURE — 99232 SBSQ HOSP IP/OBS MODERATE 35: CPT | Performed by: PSYCHIATRY & NEUROLOGY

## 2024-01-22 RX ADMIN — GABAPENTIN 600 MG: 300 CAPSULE ORAL at 16:53

## 2024-01-22 RX ADMIN — LOXAPINE 20 MG: 10 CAPSULE ORAL at 08:12

## 2024-01-22 RX ADMIN — ACETAMINOPHEN 650 MG: 325 TABLET ORAL at 08:14

## 2024-01-22 RX ADMIN — LOXAPINE 20 MG: 10 CAPSULE ORAL at 17:01

## 2024-01-22 RX ADMIN — PALIPERIDONE 9 MG: 9 TABLET, EXTENDED RELEASE ORAL at 21:26

## 2024-01-22 RX ADMIN — METHOCARBAMOL TABLETS 500 MG: 500 TABLET, COATED ORAL at 17:01

## 2024-01-22 RX ADMIN — ACETAMINOPHEN 650 MG: 325 TABLET ORAL at 16:53

## 2024-01-22 RX ADMIN — METHOCARBAMOL TABLETS 500 MG: 500 TABLET, COATED ORAL at 21:25

## 2024-01-22 RX ADMIN — TRAZODONE HYDROCHLORIDE 100 MG: 100 TABLET ORAL at 21:25

## 2024-01-22 RX ADMIN — DICYCLOMINE HYDROCHLORIDE 10 MG: 10 CAPSULE ORAL at 16:53

## 2024-01-22 RX ADMIN — FAMOTIDINE 10 MG: 20 TABLET ORAL at 08:12

## 2024-01-22 RX ADMIN — DICYCLOMINE HYDROCHLORIDE 10 MG: 10 CAPSULE ORAL at 06:06

## 2024-01-22 RX ADMIN — BUDESONIDE 3 MG: 3 CAPSULE ORAL at 08:12

## 2024-01-22 RX ADMIN — GABAPENTIN 600 MG: 300 CAPSULE ORAL at 21:25

## 2024-01-22 RX ADMIN — ACETAMINOPHEN 650 MG: 325 TABLET ORAL at 21:25

## 2024-01-22 RX ADMIN — GABAPENTIN 600 MG: 300 CAPSULE ORAL at 08:12

## 2024-01-22 RX ADMIN — DICYCLOMINE HYDROCHLORIDE 10 MG: 10 CAPSULE ORAL at 21:25

## 2024-01-22 RX ADMIN — METHOCARBAMOL TABLETS 500 MG: 500 TABLET, COATED ORAL at 08:12

## 2024-01-22 RX ADMIN — DICYCLOMINE HYDROCHLORIDE 10 MG: 10 CAPSULE ORAL at 11:51

## 2024-01-22 RX ADMIN — METHOCARBAMOL TABLETS 500 MG: 500 TABLET, COATED ORAL at 11:51

## 2024-01-22 NOTE — PROGRESS NOTES
"Progress Note - Behavioral Health     Carlos Wolf 33 y.o. male MRN: [id number]   Unit/Bed#: [facility] 376-02 Encounter: [id number]    Behavior over the last 24 hours: slowly improving.     Carlos is doing better today. He is cooperative, pleasant, and more calm, his thinking process and speech are more organized and he reports less frequent auditory hallucinations over the weekend and today. He has been compliant with medications and feels that his current regimen is helping. Attends some groups. He has been socializing more with people \"there is more people I can relate to\".    Sleep: normal  Appetite: improving  Medication side effects: No   ROS: no complaints, denies any shortness of breath, chest pain, or abdominal pain, all other systems are negative    Mental Status Evaluation:    Appearance:  casually dressed   Behavior:  cooperative, more calm, limited eye contact   Speech:  soft, more organized   Mood:  less anxious, less depressed   Affect:  constricted   Thought Process:  concrete, more organized   Associations: concrete associations   Thought Content:  some paranoia   Perceptual Disturbances: auditory hallucinations still present, but less frequent, no visual hallucinations, still somewhat preoccupied   Risk Potential: Suicidal ideation - None at present  Homicidal ideation - None  Potential for aggression - No   Sensorium:  oriented to person, place, and time/date   Memory:  recent and remote memory grossly intact   Consciousness:  alert and awake   Attention/Concentration: decreased concentration and decreased attention span   Insight:  limited   Judgment: limited   Gait/Station: normal gait/station, normal balance   Motor Activity: no abnormal movements     Vital signs in last 24 hours:    Temp:  [97 °F (36.1 °C)-97.4 °F (36.3 °C)] 97.4 °F (36.3 °C)  HR:  [86-97] 86  Resp:  [16] 16  BP: (110-113)/(60-68) 113/60    Laboratory results: I have personally reviewed all pertinent laboratory/tests " results    ECG with report:   Results for orders placed or performed during the hospital encounter of 01/10/24 (from the past 1000 hour(s))   ECG 12 lead    Collection Time: 01/19/24  2:22 PM   Result Value    Ventricular Rate 101    Atrial Rate 101    WI Interval 124    QRSD Interval 120    QT Interval 374    QTC Interval 484    P Axis 74    QRS Axis 60    T Wave Axis 62    Narrative    Sinus tachycardia  Right bundle branch block  T wave abnormality, consider lateral ischemia  Abnormal ECG  When compared with ECG of 19-JAN-2024 14:21, (unconfirmed)  No significant change was found  Confirmed by Howie Brooks ([id number]) on 1/19/2024 3:48:18 PM     *Note: Due to a large number of results and/or encounters for the requested time period, some results have not been displayed. A complete set of results can be found in Results Review.       Suicide/Homicide Risk Assessment:    Risk of Harm to Self:   Nursing Suicide Risk Assessment Last 24 hours: C-SSRS Risk (Since Last Contact)  Calculated C-SSRS Risk Score (Since Last Contact): No Risk Indicated  Current Specific Risk Factors include: mental illness diagnosis  Protective Factors: no current suicidal ideation, ability to communicate with staff on the unit, taking medications as ordered on the unit  Based on today's assessment, Carlos presents the following risk of harm to self: low    Risk of Harm to Others:  Nursing Homicide Risk Assessment: Violence Risk to Others: Denies within past 6 months  Based on today's assessment, Carlos presents the following risk of harm to others: low    The following interventions are recommended: behavioral checks every 7 minutes, continued hospitalization on locked unit    Progress Toward Goals: some progress, less anxious, less depressed, working on coping skills, denies current suicidal thoughts, reports less prominent psychotic symptoms    Assessment/Plan   Principal Problem:    Schizophrenia, paranoid type (HCC)  Active Problems:     Anemia    Crohn's disease (HCC)    Cellulitis and abscess of buttock      Recommended Treatment:     Planned medication and treatment changes:    All current active medications have been reviewed  Encourage group therapy, milieu therapy and occupational therapy  Behavioral Health checks every 7 minutes  Observe on current medication regimen today. Improvement in psychotic symptoms noted on a combination of Invega and low dose Loxapine. Consider increasing Invega back to 12 mg per day if psychotic symptoms are not fully controlled. Reviewed with Clinical Pharmacy    Continue all other medications:    Current Facility-Administered Medications   Medication Dose Route Frequency Provider Last Rate    acetaminophen  650 mg Oral Q6H PRN Keomanjit Huertas MD      acetaminophen  650 mg Oral Q4H PRN Keomanjit Huertas MD      acetaminophen  975 mg Oral Q6H PRN Keomanjit Huertas MD      benztropine  1 mg Intramuscular Q4H PRN Max 6/day Keo Eldon Huertas MD      benztropine  1 mg Oral Q4H PRN Max 6/day Keo Eldon Huertas MD      budesonide  3 mg Oral Daily Pepe Terrell MD      dicyclomine  10 mg Oral 4x Daily (AC & HS) Pepe Terrell MD      hydrOXYzine HCL  50 mg Oral Q6H PRN Max 4/day Keo Eldon Huertas MD      Or    diphenhydrAMINE  50 mg Intramuscular Q6H PRN Keo Eldon Huertas MD      ergocalciferol  50,000 Units Oral Weekly [facility] Eldon Huertas MD      famotidine  10 mg Oral Daily [facility] Eldon Huertas MD      gabapentin  600 mg Oral TID Ann Rodriguez MD      glycerin-hypromellose-  1 drop Both Eyes Q3H PRN Keomanjit Huertas MD      hydrOXYzine HCL  100 mg Oral Q6H PRN Max 4/day Keo Eldon Huertas MD      Or    LORazepam  2 mg Intramuscular Q6H PRN Keo Eldon Huertas MD      hydrOXYzine HCL  25 mg Oral Q6H PRN Max 4/day Keo Eldon Huertas MD      loxapine  20 mg Oral BID Gena Edwards PA-C      melatonin  9 mg Oral HS PRN Ann Rodriguez MD      methocarbamol  500 mg Oral 4x  Daily Keo Eldon Huertas MD      OLANZapine  10 mg Oral Q3H PRN Max 3/day Keo Huertas MD      Or    OLANZapine  10 mg Intramuscular Q3H PRN Max 3/day Keo Huertas MD      OLANZapine  5 mg Oral Q3H PRN Max 6/day Keo Huertas MD      Or    OLANZapine  5 mg Intramuscular Q3H PRN Max 6/day Keo Huertas MD      OLANZapine  2.5 mg Oral Q3H PRN Max 8/day Keo Huertas MD      paliperidone  9 mg Oral HS Gena Edwards PA-C      propranolol  10 mg Oral Q8H PRN Keo Huertas MD      traZODone  100 mg Oral HS Gena Edwards PA-C       Risks / Benefits of Treatment:    Risks, benefits, and possible side effects of medications explained to patient. Patient has limited understanding of risks and benefits of treatment at this time, but agrees to take medications as prescribed.  The patient has a history of at least 3 antipsychotic medication trials and at this time requires treatment with 2 antipsychotic agents (Invega and Loxitane) due to failed multiple trials of monotherapy.    Counseling / Coordination of Care:    Patient's progress discussed with staff in treatment team meeting.  Medications, treatment progress and treatment plan reviewed with patient.    Ann Rodriguez MD 01/22/24

## 2024-01-22 NOTE — TREATMENT TEAM
01/22/24 [id number]   Team Meeting   Meeting Type Daily Rounds   Team Members Present   Team Members Present Physician;Nurse;   Physician Team Member Jennifer   Nursing Team Member Atilio   Care Management Team Member Humberto   Patient/Family Present   Patient Present No   Patient's Family Present No     Patient currently holds 201 status. Patient denies all but remains visibly internally preoccupied. Patient is medication and meal compliant. Provider to increase current dosage of Invega to 12mg daily. Patient discharge date and time to be determined.

## 2024-01-22 NOTE — NURSING NOTE
Patient is calm and cooperative on the unit. Denies SI, HI, SIB, auditory and visual hallucinations. Patient is med and meal compliant. Withdrawn to self, spends most of shift in small TV room. Patient appears to be responding to internal stimuli less than previous days. Denies any unmet needs at this time. Q7 safety checks ongoing.

## 2024-01-22 NOTE — NURSING NOTE
Patient is calm and cooperative. Patient completed ADLs and wound care was completed. Patient denies SI/AVH at this time. Although, during our encounter, patient is responding quietly to self to internal stimuli. Patient is compliant with scheduled medications. Staff maintained continuous rounding for safety and support.

## 2024-01-22 NOTE — PLAN OF CARE
Problem: Alteration in Thoughts and Perception  Goal: Treatment Goal: Gain control of psychotic behaviors/thinking, reduce/eliminate presenting symptoms and demonstrate improved reality functioning upon discharge  Outcome: Progressing  Goal: Verbalize thoughts and feelings  Description: Interventions:  - Promote a nonjudgmental and trusting relationship with the patient through active listening and therapeutic communication  - Assess patient's level of functioning, behavior and potential for risk  - Engage patient in 1 on 1 interactions  - Encourage patient to express fears, feelings, frustrations, and discuss symptoms    - [place] patient to reality, help patient recognize reality-based thinking   - Administer medications as ordered and assess for potential side effects  - Provide the patient education related to the signs and symptoms of the illness and desired effects of prescribed medications  Outcome: Progressing  Goal: Refrain from acting on delusional thinking/internal stimuli  Description: Interventions:  - Monitor patient closely, per order   - Utilize least restrictive measures   - Set reasonable limits, give positive feedback for acceptable   - Administer medications as ordered and monitor of potential side effects  Outcome: Progressing  Goal: Agree to be compliant with medication regime, as prescribed and report medication side effects  Description: Interventions:  - Offer appropriate PRN medication and supervise ingestion; conduct AIMS, as needed   Outcome: Progressing  Goal: Recognize dysfunctional thoughts, communicate reality-based thoughts at the time of discharge  Description: Interventions:  - Provide medication and psycho-education to assist patient in compliance and developing insight into his/her illness   Outcome: Progressing  Goal: Complete daily ADLs, including personal hygiene independently, as able  Description: Interventions:  - Observe, teach, and assist patient with ADLS  - Monitor and  promote a balance of rest/activity, with adequate nutrition and elimination   Outcome: Progressing     Problem: Ineffective Coping  Goal: Identifies ineffective coping skills  Outcome: Progressing  Goal: Identifies healthy coping skills  Outcome: Progressing  Goal: Demonstrates healthy coping skills  Outcome: Progressing     Problem: Anxiety  Goal: Anxiety is at manageable level  Description: Interventions:  - Assess and monitor patient's anxiety level.   - Monitor for signs and symptoms (heart palpitations, chest pain, shortness of breath, headaches, nausea, feeling jumpy, restlessness, irritable, apprehensive).   - Collaborate with interdisciplinary team and initiate plan and interventions as ordered.  - [place] patient to unit/surroundings  - Explain treatment plan  - Encourage participation in care  - Encourage verbalization of concerns/fears  - Identify coping mechanisms  - Assist in developing anxiety-reducing skills  - Administer/offer alternative therapies  - Limit or eliminate stimulants  Outcome: Progressing     Problem: Alteration in Orientation  Goal: Treatment Goal: Demonstrate a reduction of confusion and improved orientation to person, place, time and/or situation upon discharge, according to optimum baseline/ability  Outcome: Progressing  Goal: Allow medical examinations, as recommended  Description: Interventions:  - Provide physical/neurological exams and/or referrals, per provider   Outcome: Progressing  Goal: Cooperate with recommended testing/procedures  Description: Interventions:  - Determine need for ancillary testing  - Observe for mental status changes  - Implement falls/precaution protocol   Outcome: Progressing     Problem: DISCHARGE PLANNING - CARE MANAGEMENT  Goal: Discharge to post-acute care or home with appropriate resources  Description: INTERVENTIONS:  - Conduct assessment to determine patient/family and health care team treatment goals, and need for post-acute services based on  payer coverage, community resources, and patient preferences, and barriers to discharge  - Address psychosocial, clinical, and financial barriers to discharge as identified in assessment in conjunction with the patient/family and health care team  - Arrange appropriate level of post-acute services according to patient’s   needs and preference and payer coverage in collaboration with the physician and health care team  - Communicate with and update the patient/family, physician, and health care team regarding progress on the discharge plan  - Arrange appropriate transportation to post-acute venues  Outcome: Progressing

## 2024-01-22 NOTE — NURSING NOTE
Patient completed his own wound care today per his request. No drainage noted from wound, does not appear to have any signs of infection.

## 2024-01-23 PROCEDURE — 99232 SBSQ HOSP IP/OBS MODERATE 35: CPT | Performed by: PSYCHIATRY & NEUROLOGY

## 2024-01-23 RX ORDER — PALIPERIDONE 6 MG/1
12 TABLET, EXTENDED RELEASE ORAL
Status: DISCONTINUED | OUTPATIENT
Start: 2024-01-23 | End: 2024-01-30 | Stop reason: HOSPADM

## 2024-01-23 RX ORDER — PALIPERIDONE 6 MG/1
12 TABLET, EXTENDED RELEASE ORAL
Status: CANCELLED | OUTPATIENT
Start: 2024-01-23

## 2024-01-23 RX ADMIN — DICYCLOMINE HYDROCHLORIDE 10 MG: 10 CAPSULE ORAL at 08:00

## 2024-01-23 RX ADMIN — FAMOTIDINE 10 MG: 20 TABLET ORAL at 08:06

## 2024-01-23 RX ADMIN — DICYCLOMINE HYDROCHLORIDE 10 MG: 10 CAPSULE ORAL at 15:45

## 2024-01-23 RX ADMIN — GABAPENTIN 600 MG: 300 CAPSULE ORAL at 08:06

## 2024-01-23 RX ADMIN — BUDESONIDE 3 MG: 3 CAPSULE ORAL at 08:15

## 2024-01-23 RX ADMIN — TRAZODONE HYDROCHLORIDE 100 MG: 100 TABLET ORAL at 21:35

## 2024-01-23 RX ADMIN — GABAPENTIN 600 MG: 300 CAPSULE ORAL at 21:35

## 2024-01-23 RX ADMIN — METHOCARBAMOL TABLETS 500 MG: 500 TABLET, COATED ORAL at 17:20

## 2024-01-23 RX ADMIN — ACETAMINOPHEN 975 MG: 325 TABLET ORAL at 15:46

## 2024-01-23 RX ADMIN — ACETAMINOPHEN 975 MG: 325 TABLET ORAL at 21:40

## 2024-01-23 RX ADMIN — METHOCARBAMOL TABLETS 500 MG: 500 TABLET, COATED ORAL at 21:35

## 2024-01-23 RX ADMIN — ACETAMINOPHEN 975 MG: 325 TABLET ORAL at 08:06

## 2024-01-23 RX ADMIN — LOXAPINE 20 MG: 10 CAPSULE ORAL at 17:20

## 2024-01-23 RX ADMIN — DICYCLOMINE HYDROCHLORIDE 10 MG: 10 CAPSULE ORAL at 21:35

## 2024-01-23 RX ADMIN — PALIPERIDONE 12 MG: 6 TABLET, EXTENDED RELEASE ORAL at 21:34

## 2024-01-23 RX ADMIN — METHOCARBAMOL TABLETS 500 MG: 500 TABLET, COATED ORAL at 12:17

## 2024-01-23 RX ADMIN — DICYCLOMINE HYDROCHLORIDE 10 MG: 10 CAPSULE ORAL at 12:17

## 2024-01-23 RX ADMIN — GABAPENTIN 600 MG: 300 CAPSULE ORAL at 15:45

## 2024-01-23 RX ADMIN — LOXAPINE 20 MG: 10 CAPSULE ORAL at 08:05

## 2024-01-23 RX ADMIN — METHOCARBAMOL TABLETS 500 MG: 500 TABLET, COATED ORAL at 08:06

## 2024-01-23 NOTE — PROGRESS NOTES
"Progress Note - Behavioral Health     Carlos Wolf 33 y.o. male MRN: [id number]   Unit/Bed#: [facility] 376-02 Encounter: [id number]    Documentation, nursing notes, medication reconciliation, labs, and vitals reviewed. Patient was seen for continuing care and reviewed with treatment team. No acute events over the past 24 hours.  Per nursing report, patient will deny psychiatric symptoms but observed responding to internal stimuli.  Pacing the unit, compliant with medication and unit routine..    No medication changes over the past 24 hours. Continues to tolerate current medications with no adverse effects.     On evaluation today, patient is seen in his room and resting in bed.  He is able to explain to me that he was originally admitted to the hospital for buttocks wound, but felt he needed psychiatric treatment for worsening hallucinations.  He does report improvement in hallucinations.  Will not hear voices when he is alone in the room.  Will still hear voices when in the community, and has some paranoia and ideas of reference.  Also some ongoing paranoia regarding people that live in his neighborhood and concerned that he will still have auditory hallucinations when he returns home.  He is focused on \"[place]-speaking people \"and cannot understand what they are saying, but will hear them talking in his head nonstop, this was occurring at home.  He does present less disorganized and clearly less paranoid here.  Agreeable to increase of his Invega.    No suicidal ideation, plan, or intent. Denies perceptual disturbances and does not exhibit any symptoms of lang on evaluation.    Psychiatric ROS:  Behavior over the last 24 hours: slowly improving  Sleep: slept off and on  Appetite: fair  Medication side effects: No   ROS: reports ongoing discomfort with buttocks wound, all other systems are negative    Mental Status Evaluation:    Appearance:  age appropriate   Behavior:  pleasant, cooperative   Speech:  " increased latency of response   Mood:  dysphoric   Affect:  blunted   Thought Process:  circumstantial   Associations: concrete associations   Thought Content:  persecutory delusions, ideas of reference   Perceptual Disturbances: auditory hallucinations still present, but less frequent   Risk Potential: Suicidal ideation - None  Homicidal ideation - None  Potential for aggression - No   Sensorium:  oriented to person, place, time/date, and situation   Memory:  recent and remote memory grossly intact   Consciousness:  alert and awake   Attention/Concentration: decreased concentration and decreased attention span   Insight:  limited   Judgment: limited   Gait/Station: normal gait/station, normal balance   Motor Activity: no abnormal movements     Vital signs in last 24 hours:    Temp:  [97.3 °F (36.3 °C)-97.6 °F (36.4 °C)] 97.6 °F (36.4 °C)  HR:  [] 88  Resp:  [16] 16  BP: (118-119)/(69-71) 119/71    Laboratory results: I have personally reviewed all pertinent laboratory/tests results    Results from the past 24 hours: No results found for this or any previous visit (from the past 24 hour(s)).    Suicide/Homicide Risk Assessment:    Risk of Harm to Self:   Current Specific Risk Factors include: current psychotic symptoms  Protective Factors: no current suicidal ideation, ability to communicate with staff on the unit, able to contract for safety on the unit, taking medications as ordered on the unit, improved psychotic symptoms  Based on today's assessment, Carlos presents the following risk of harm to self: minimal    Risk of Harm to Others:  Current Specific Risk Factors include: current psychotic symptoms  Protective Factors: no current homicidal ideation, impulse control is improving, mood is stabilizing, psychotic symptoms are less prominent  Based on today's assessment, Carlos presents the following risk of harm to others: minimal    The following interventions are recommended: behavioral checks every 7  minutes, continued hospitalization on locked unit    Progress Toward Goals: continues to improve, participates slightly more in milieu therapy    Assessment/Plan   Principal Problem:    Schizophrenia, paranoid type (HCC)  Active Problems:    Anemia    Crohn's disease (HCC)    Cellulitis and abscess of buttock      Recommended Treatment:     Planned medication and treatment changes:    All current active medications have been reviewed  Encourage group therapy, milieu therapy and occupational therapy  Behavioral Health checks every 7 minutes  Increase Invega to 12 mg tonight for ongoing psychosis  Continue all other medications:    Current Facility-Administered Medications   Medication Dose Route Frequency Provider Last Rate    acetaminophen  650 mg Oral Q6H PRN [facility] Eldon Huertas MD      acetaminophen  650 mg Oral Q4H PRN [facility] Eldon Huertas MD      acetaminophen  975 mg Oral Q6H PRN [facility] Eldon Huertas MD      benztropine  1 mg Intramuscular Q4H PRN Max 6/day [facility] Eldon Huertas MD      benztropine  1 mg Oral Q4H PRN Max 6/day [facility] Eldon Huertas MD      budesonide  3 mg Oral Daily Pepe Terrell MD      dicyclomine  10 mg Oral 4x Daily (AC & HS) Pepe Terrell MD      hydrOXYzine HCL  50 mg Oral Q6H PRN Max 4/day [facility] Eldon Huertas MD      Or    diphenhydrAMINE  50 mg Intramuscular Q6H PRN [facility] Eldon Huertas MD      ergocalciferol  50,000 Units Oral Weekly [facility] Eldon Huertas MD      famotidine  10 mg Oral Daily Keo Eldon Huertas MD      gabapentin  600 mg Oral TID Ann Rodriguez MD      glycerin-hypromellose-  1 drop Both Eyes Q3H PRN [facility] Eldon Huertas MD      hydrOXYzine HCL  100 mg Oral Q6H PRN Max 4/day Keo Eldon Huertas MD      Or    LORazepam  2 mg Intramuscular Q6H PRN Keo Eldon Huertas MD      hydrOXYzine HCL  25 mg Oral Q6H PRN Max 4/day [facility] Eldon Huertas MD      loxapine  20 mg Oral BID Gena Edwards PA-C      melatonin  9 mg Oral HS PRN Ann Rodriguez MD       methocarbamol  500 mg Oral 4x Daily Keo Eldon Huertas MD      OLANZapine  10 mg Oral Q3H PRN Max 3/day Keo Eldon Huertas MD      Or    OLANZapine  10 mg Intramuscular Q3H PRN Max 3/day Keo Eldon Huertas MD      OLANZapine  5 mg Oral Q3H PRN Max 6/day Keo Eldon Huertas MD      Or    OLANZapine  5 mg Intramuscular Q3H PRN Max 6/day Keo Eldon Huertas MD      OLANZapine  2.5 mg Oral Q3H PRN Max 8/day Keo Eldon Huertas MD      paliperidone  9 mg Oral HS Gena Edwards PA-C      propranolol  10 mg Oral Q8H PRN Keo Eldon Huertas MD      traZODone  100 mg Oral HS Gena Edwards PA-C       Risks / Benefits of Treatment:    Risks, benefits, and possible side effects of medications explained to patient and patient verbalizes understanding and agreement for treatment.    Counseling / Coordination of Care:    Patient's progress discussed with staff in treatment team meeting.  Medications, treatment progress and treatment plan reviewed with patient.    EDISON Julian 01/23/24

## 2024-01-23 NOTE — NURSING NOTE
This RN assessed patient's magda anal wound. Wound is leaking a clear drainage, with some bright red blood noted. There is a moderate amount of drainage, patient had gauze over wound and drainage had leaked through to his pants. Patient placed on medical list, there is [facility] wound care [facility]. Wound care completed.   Patient denies SI, HI, SIB, auditory and visual hallucination and reports sleeping all night. Med and meal compliant. Visible on the unit and social with peers. Denies any unmet needs at this time. Q7 safety checks ongoing.

## 2024-01-23 NOTE — NURSING NOTE
Patient pleasant on approach, denies all psychiatric symptoms at this time, but observed responding to internal stimuli. Patient visible pacing unit, compliant with medication and unit routine.

## 2024-01-23 NOTE — WOUND OSTOMY CARE
Progress Note - Wound   Carlos Wolf 33 y.o. male MRN: [id number]  Unit/Bed#: [facility] 376-02 Encounter: [id number]      Seen for weekly local wound care. Pt admitted to [facility] on 1/10. s/p I & D 1/5/2024 with cellulitis and abscess of buttocks.     He was seen by surgery on 1/5/24 by Dr Murray for I& D and a Seton drain placed. Consult to general surgery was placed on admission to [facility] 1/10/24.       Assessment:   Seen for weekly wound assessment. Wounds have epithelized in comparison with photo of 1/16. Pt and staff report an increased amount of drainage. Seton drain is intact, Pt is independent changing dressings and reports an increased amount of drainage.    Plan  Recommend follow up with surgery re drain and reported increased drainage.   Local wound care with Calcium Alginate ABD BID and prn resolving superficial wounds.   Rn EDISON aware of recommendations with general surgery   Wound care to continue with weekly assessments  Wound care  1. Bilateral buttocks wounds - cleanse with soap and water pat dry apply calcium alginate ag ( meglisorb ag ) then top with a ABD and secure with the mesh underwear BID and PRN     Wound 01/05/24 Buttocks N/A (Active)   Wound Image   01/23/24 1107   Wound Description Intact;Epithelialization 01/23/24 1107   Telma-wound Assessment Dry;Intact 01/23/24 1107   Wound Length (cm) 3 cm 01/23/24 1107   Wound Width (cm) 1 cm 01/23/24 1107   Wound Surface Area (cm^2) 3 cm^2 01/23/24 1107   Drainage Amount Other (Comment) 01/23/24 1107   Drainage Description Other (Comment) 01/23/24 1107   Treatments Cleansed;Site care 01/23/24 1107   Dressing Calcium Alginate with Silver;ABD 01/23/24 1107   Dressing Changed Other (Comment) 01/23/24 1107   Patient Tolerance Tolerated well 01/23/24 1107   Dressing Status Clean;Dry;Intact 01/23/24 1107       Call or tigertext with any questions  Wound Care will continue to follow weekly     Vale LOPEZN RN CWON

## 2024-01-23 NOTE — PLAN OF CARE
Problem: Alteration in Thoughts and Perception  Goal: Treatment Goal: Gain control of psychotic behaviors/thinking, reduce/eliminate presenting symptoms and demonstrate improved reality functioning upon discharge  Outcome: Progressing  Goal: Verbalize thoughts and feelings  Description: Interventions:  - Promote a nonjudgmental and trusting relationship with the patient through active listening and therapeutic communication  - Assess patient's level of functioning, behavior and potential for risk  - Engage patient in 1 on 1 interactions  - Encourage patient to express fears, feelings, frustrations, and discuss symptoms    - [place] patient to reality, help patient recognize reality-based thinking   - Administer medications as ordered and assess for potential side effects  - Provide the patient education related to the signs and symptoms of the illness and desired effects of prescribed medications  Outcome: Progressing  Goal: Refrain from acting on delusional thinking/internal stimuli  Description: Interventions:  - Monitor patient closely, per order   - Utilize least restrictive measures   - Set reasonable limits, give positive feedback for acceptable   - Administer medications as ordered and monitor of potential side effects  Outcome: Progressing  Goal: Agree to be compliant with medication regime, as prescribed and report medication side effects  Description: Interventions:  - Offer appropriate PRN medication and supervise ingestion; conduct AIMS, as needed   Outcome: Progressing  Goal: Attend and participate in unit activities, including therapeutic, recreational, and educational groups  Description: Interventions:  -Encourage Visitation and family involvement in care  Outcome: Progressing  Goal: Recognize dysfunctional thoughts, communicate reality-based thoughts at the time of discharge  Description: Interventions:  - Provide medication and psycho-education to assist patient in compliance and developing  insight into his/her illness   Outcome: Progressing  Goal: Complete daily ADLs, including personal hygiene independently, as able  Description: Interventions:  - Observe, teach, and assist patient with ADLS  - Monitor and promote a balance of rest/activity, with adequate nutrition and elimination   Outcome: Progressing     Problem: Ineffective Coping  Goal: Identifies ineffective coping skills  Outcome: Progressing  Goal: Identifies healthy coping skills  Outcome: Progressing  Goal: Demonstrates healthy coping skills  Outcome: Progressing     Problem: Anxiety  Goal: Anxiety is at manageable level  Description: Interventions:  - Assess and monitor patient's anxiety level.   - Monitor for signs and symptoms (heart palpitations, chest pain, shortness of breath, headaches, nausea, feeling jumpy, restlessness, irritable, apprehensive).   - Collaborate with interdisciplinary team and initiate plan and interventions as ordered.  - [place] patient to unit/surroundings  - Explain treatment plan  - Encourage participation in care  - Encourage verbalization of concerns/fears  - Identify coping mechanisms  - Assist in developing anxiety-reducing skills  - Administer/offer alternative therapies  - Limit or eliminate stimulants  Outcome: Progressing     Problem: Alteration in Orientation  Goal: Treatment Goal: Demonstrate a reduction of confusion and improved orientation to person, place, time and/or situation upon discharge, according to optimum baseline/ability  Outcome: Progressing  Goal: Allow medical examinations, as recommended  Description: Interventions:  - Provide physical/neurological exams and/or referrals, per provider   Outcome: Progressing  Goal: Cooperate with recommended testing/procedures  Description: Interventions:  - Determine need for ancillary testing  - Observe for mental status changes  - Implement falls/precaution protocol   Outcome: Progressing     Problem: Ineffective Coping  Goal: Participates in unit  activities  Description: Interventions:  - Provide therapeutic environment   - Provide required programming   - Redirect inappropriate behaviors   Outcome: Progressing     Problem: DISCHARGE PLANNING - CARE MANAGEMENT  Goal: Discharge to post-acute care or home with appropriate resources  Description: INTERVENTIONS:  - Conduct assessment to determine patient/family and health care team treatment goals, and need for post-acute services based on payer coverage, community resources, and patient preferences, and barriers to discharge  - Address psychosocial, clinical, and financial barriers to discharge as identified in assessment in conjunction with the patient/family and health care team  - Arrange appropriate level of post-acute services according to patient’s   needs and preference and payer coverage in collaboration with the physician and health care team  - Communicate with and update the patient/family, physician, and health care team regarding progress on the discharge plan  - Arrange appropriate transportation to post-acute venues  Outcome: Progressing

## 2024-01-23 NOTE — PROGRESS NOTES
01/23/24 0946   Team Meeting   Meeting Type Daily Rounds   Team Members Present   Team Members Present Physician;Nurse;   Physician Team Member Jennifer   Nursing Team Member Atilio   Care Management Team Member Humberto   Patient/Family Present   Patient Present No   Patient's Family Present No     Patient currently holds 201 status. Patient denies all symptoms. Patient is medication and meal compliant. Patient has been observed responding less to internal stimuli per nursing report. Patients thoughts appear to be more organized per doctor report. Patient to continue on Invega and Loxapine. Patient discharge date and time to be determined.

## 2024-01-23 NOTE — NURSING NOTE
Patient reports 6 out of 10 pain in lower right buttocks. Tylenol 650 mg administered, will continue to follow care plan.

## 2024-01-24 PROCEDURE — 99232 SBSQ HOSP IP/OBS MODERATE 35: CPT | Performed by: PSYCHIATRY & NEUROLOGY

## 2024-01-24 RX ADMIN — LOXAPINE 20 MG: 10 CAPSULE ORAL at 17:06

## 2024-01-24 RX ADMIN — DICYCLOMINE HYDROCHLORIDE 10 MG: 10 CAPSULE ORAL at 12:10

## 2024-01-24 RX ADMIN — METHOCARBAMOL TABLETS 500 MG: 500 TABLET, COATED ORAL at 17:06

## 2024-01-24 RX ADMIN — ACETAMINOPHEN 975 MG: 325 TABLET ORAL at 15:13

## 2024-01-24 RX ADMIN — METHOCARBAMOL TABLETS 500 MG: 500 TABLET, COATED ORAL at 08:38

## 2024-01-24 RX ADMIN — DICYCLOMINE HYDROCHLORIDE 10 MG: 10 CAPSULE ORAL at 21:20

## 2024-01-24 RX ADMIN — FAMOTIDINE 10 MG: 20 TABLET ORAL at 08:39

## 2024-01-24 RX ADMIN — TRAZODONE HYDROCHLORIDE 100 MG: 100 TABLET ORAL at 21:20

## 2024-01-24 RX ADMIN — METHOCARBAMOL TABLETS 500 MG: 500 TABLET, COATED ORAL at 12:10

## 2024-01-24 RX ADMIN — GABAPENTIN 600 MG: 300 CAPSULE ORAL at 21:20

## 2024-01-24 RX ADMIN — DICYCLOMINE HYDROCHLORIDE 10 MG: 10 CAPSULE ORAL at 06:52

## 2024-01-24 RX ADMIN — GABAPENTIN 600 MG: 300 CAPSULE ORAL at 15:13

## 2024-01-24 RX ADMIN — DICYCLOMINE HYDROCHLORIDE 10 MG: 10 CAPSULE ORAL at 15:13

## 2024-01-24 RX ADMIN — BUDESONIDE 3 MG: 3 CAPSULE ORAL at 08:38

## 2024-01-24 RX ADMIN — PALIPERIDONE 12 MG: 6 TABLET, EXTENDED RELEASE ORAL at 21:20

## 2024-01-24 RX ADMIN — METHOCARBAMOL TABLETS 500 MG: 500 TABLET, COATED ORAL at 21:20

## 2024-01-24 RX ADMIN — LOXAPINE 20 MG: 10 CAPSULE ORAL at 08:38

## 2024-01-24 RX ADMIN — ACETAMINOPHEN 650 MG: 325 TABLET ORAL at 07:08

## 2024-01-24 RX ADMIN — GABAPENTIN 600 MG: 300 CAPSULE ORAL at 08:38

## 2024-01-24 RX ADMIN — ERGOCALCIFEROL 50000 UNITS: 1.25 CAPSULE ORAL at 08:39

## 2024-01-24 NOTE — PROGRESS NOTES
Progress Note - Behavioral Health     Carlos Wolf 33 y.o. male MRN: [id number]   Unit/Bed#: [facility] 376-02 Encounter: [id number]    Documentation, nursing notes, medication reconciliation, labs, and vitals reviewed. Patient was seen for continuing care and reviewed with treatment team. No acute events over the past 24 hours.  Per nursing report, presents less preoccupied and less disorganized.  Denying hallucinations and cooperative on the unit.  Medication changes over the past 24 hours:  Invega was increased yesterday to 12 mg at bedtime. Continues to tolerate current medications with no adverse effects.     On evaluation today, he is seen in the community room and attending group.  He reports no hallucinations today, not even in the community setting.  States if he listens to music or concentrates on the groups, he does not hear voices.  Mood continues to improve.  Continues to be less disorganized and less suspicious`.  No suicidal ideation, plan, or intent. Denies perceptual disturbances and does not exhibit any symptoms of lang on evaluation.    Psychiatric ROS:  Behavior over the last 24 hours: improved  Sleep: slept off and on  Appetite: fair  Medication side effects: No   ROS: no complaints, all other systems are negative    Mental Status Evaluation:    Appearance:  marginal hygiene   Behavior:  pleasant, cooperative   Speech:  normal rate, normal volume   Mood:  improved   Affect:  brighter   Thought Process:  circumstantial   Associations: concrete associations   Thought Content:  mild paranoia   Perceptual Disturbances: denies auditory hallucinations when asked   Risk Potential: Suicidal ideation - None  Homicidal ideation - None  Potential for aggression - No   Sensorium:  oriented to person, place, time/date, and situation   Memory:  recent and remote memory grossly intact   Consciousness:  alert and awake   Attention/Concentration: decreased concentration and decreased attention span   Insight:   limited   Judgment: limited   Gait/Station: normal gait/station, normal balance   Motor Activity: no abnormal movements     Vital signs in last 24 hours:    Temp:  [97.9 °F (36.6 °C)-98.6 °F (37 °C)] 97.9 °F (36.6 °C)  HR:  [69-91] 91  Resp:  [16] 16  BP: (116-120)/(62-64) 120/62    Laboratory results: I have personally reviewed all pertinent laboratory/tests results    Results from the past 24 hours: No results found for this or any previous visit (from the past 24 hour(s)).    Suicide/Homicide Risk Assessment:    Risk of Harm to Self:   Current Specific Risk Factors include: diagnosis of mood disorder  Protective Factors: no current suicidal ideation, ability to communicate with staff on the unit, able to contract for safety on the unit, taking medications as ordered on the unit  Based on today's assessment, Carlos presents the following risk of harm to self: minimal    Risk of Harm to Others:  Current Specific Risk Factors include: none  Protective Factors: no current homicidal ideation, improved impulse control, mood is stabilizing, psychotic symptoms are less prominent  Based on today's assessment, Carlos presents the following risk of harm to others: minimal    The following interventions are recommended: behavioral checks every 7 minutes, continued hospitalization on locked unit    Progress Toward Goals: progressing    Assessment/Plan   Principal Problem:    Schizophrenia, paranoid type (HCC)  Active Problems:    Anemia    Crohn's disease (HCC)    Cellulitis and abscess of buttock      Recommended Treatment:     Planned medication and treatment changes:    All current active medications have been reviewed  Encourage group therapy, milieu therapy and occupational therapy  Behavioral Health checks every 7 minutes  Continue current medications:    Current Facility-Administered Medications   Medication Dose Route Frequency Provider Last Rate    acetaminophen  650 mg Oral Q6H PRN Keo Eldon Huertas MD       acetaminophen  650 mg Oral Q4H PRN Keo Eldon Huertas MD      acetaminophen  975 mg Oral Q6H PRN Keo Eldon Huertas MD      benztropine  1 mg Intramuscular Q4H PRN Max 6/day Keo Eldon Huertas MD      benztropine  1 mg Oral Q4H PRN Max 6/day Keo Eldon Huertas MD      budesonide  3 mg Oral Daily Pepe Terrell MD      dicyclomine  10 mg Oral 4x Daily (AC & HS) Pepe Terrell MD      hydrOXYzine HCL  50 mg Oral Q6H PRN Max 4/day Keo Eldon Huertas MD      Or    diphenhydrAMINE  50 mg Intramuscular Q6H PRN Keo Eldon Huertas MD      ergocalciferol  50,000 Units Oral Weekly Keo Eldon Huertas MD      famotidine  10 mg Oral Daily Keo Eldon Huertas MD      gabapentin  600 mg Oral TID Ann Rodriguez MD      glycerin-hypromellose-  1 drop Both Eyes Q3H PRN Keo Eldon Huertas MD      hydrOXYzine HCL  100 mg Oral Q6H PRN Max 4/day Keo Eldon Huertas MD      Or    LORazepam  2 mg Intramuscular Q6H PRN Keo Eldon Huertas MD      hydrOXYzine HCL  25 mg Oral Q6H PRN Max 4/day Keo Eldon Huertas MD      loxapine  20 mg Oral BID Gena Edwards, SAMMY      melatonin  9 mg Oral HS PRN Ann Rodriguez MD      methocarbamol  500 mg Oral 4x Daily Keo Eldon Huertas MD      OLANZapine  10 mg Oral Q3H PRN Max 3/day Keo Eldon Huertas MD      Or    OLANZapine  10 mg Intramuscular Q3H PRN Max 3/day Keo Eldon Huertas MD      OLANZapine  5 mg Oral Q3H PRN Max 6/day Keo Eldon Huertas MD      Or    OLANZapine  5 mg Intramuscular Q3H PRN Max 6/day Keo Eldon Huertas MD      OLANZapine  2.5 mg Oral Q3H PRN Max 8/day Keo Eldon Huertas MD      paliperidone  12 mg Oral HS EDISON Julian      propranolol  10 mg Oral Q8H PRN Keo Eldon Huertas MD      traZODone  100 mg Oral HS Gena Edwards, PAItzelC       Risks / Benefits of Treatment:    Risks, benefits, and possible side effects of medications explained to patient and patient verbalizes understanding and agreement for treatment.    Counseling /  Coordination of Care:    Patient's progress discussed with staff in treatment team meeting.  Medications, treatment progress and treatment plan reviewed with patient.    EDISON Julian 01/24/24

## 2024-01-24 NOTE — CASE MANAGEMENT
Writer spoke with patients sister to provide update on current treatment progress and status. Writer discussed current medications. Writer encouraged Cortney patients sister to contact her and pass around writers number to any concerned family members. Sister shared that her brother GI issues are severe and she is concerned about them. Sister then inquired about visitation. Writer shared all visitation is virtual and she would be content to set up a meeting but would need day time and email. Cortney explained she is currently driving and would call back writer to schedule.

## 2024-01-24 NOTE — PROGRESS NOTES
01/24/24 1530   Team Meeting   Meeting Type Daily Rounds   Team Members Present   Team Members Present Physician;Nurse;   Physician Team Member Jennifer   Nursing Team Member Atilio   Care Management Team Member Humberto   Patient/Family Present   Patient Present No   Patient's Family Present No     Patient currently holds 201 status. Patient denies all symptoms.Patient is medication and meal compliant.  Patient sometimes displays being internally preoccupied, however not as often as when he was first admitted. Provider to increase gabapentin. Staff to monitor wound care. Patient discharge date and time to be determined.

## 2024-01-24 NOTE — NURSING NOTE
Pt verbally denies SI, HI and A/V hallucinations. Compliant with meds, visible on unit, social and positive engagements in group. Reports improvement in psychiatric related symptoms due to medication use. Is independent with wound care, reports no worsening pain overall. Appears less preoccupied, less internal stimulation as evidenced buy less responding to self. Improved concentration when speaking with others.

## 2024-01-24 NOTE — NURSING NOTE
Patient reports 6 out of 10 pain in buttocks, tylenol 975 mg administered. Will continue to follow care plan.

## 2024-01-24 NOTE — NURSING NOTE
Patient pleasant on approach, denies psychiatric symptoms at this time. Visible on unit social with selective peers. Compliant with medication and unit routine.

## 2024-01-24 NOTE — PLAN OF CARE
Problem: Alteration in Thoughts and Perception  Goal: Treatment Goal: Gain control of psychotic behaviors/thinking, reduce/eliminate presenting symptoms and demonstrate improved reality functioning upon discharge  Outcome: Progressing  Goal: Verbalize thoughts and feelings  Description: Interventions:  - Promote a nonjudgmental and trusting relationship with the patient through active listening and therapeutic communication  - Assess patient's level of functioning, behavior and potential for risk  - Engage patient in 1 on 1 interactions  - Encourage patient to express fears, feelings, frustrations, and discuss symptoms    - [place] patient to reality, help patient recognize reality-based thinking   - Administer medications as ordered and assess for potential side effects  - Provide the patient education related to the signs and symptoms of the illness and desired effects of prescribed medications  Outcome: Progressing  Goal: Refrain from acting on delusional thinking/internal stimuli  Description: Interventions:  - Monitor patient closely, per order   - Utilize least restrictive measures   - Set reasonable limits, give positive feedback for acceptable   - Administer medications as ordered and monitor of potential side effects  Outcome: Progressing  Goal: Agree to be compliant with medication regime, as prescribed and report medication side effects  Description: Interventions:  - Offer appropriate PRN medication and supervise ingestion; conduct AIMS, as needed   Outcome: Progressing  Goal: Attend and participate in unit activities, including therapeutic, recreational, and educational groups  Description: Interventions:  -Encourage Visitation and family involvement in care  Outcome: Progressing  Goal: Recognize dysfunctional thoughts, communicate reality-based thoughts at the time of discharge  Description: Interventions:  - Provide medication and psycho-education to assist patient in compliance and developing  insight into his/her illness   Outcome: Progressing  Goal: Complete daily ADLs, including personal hygiene independently, as able  Description: Interventions:  - Observe, teach, and assist patient with ADLS  - Monitor and promote a balance of rest/activity, with adequate nutrition and elimination   Outcome: Progressing     Problem: Ineffective Coping  Goal: Identifies ineffective coping skills  Outcome: Progressing  Goal: Identifies healthy coping skills  Outcome: Progressing  Goal: Demonstrates healthy coping skills  Outcome: Progressing     Problem: Anxiety  Goal: Anxiety is at manageable level  Description: Interventions:  - Assess and monitor patient's anxiety level.   - Monitor for signs and symptoms (heart palpitations, chest pain, shortness of breath, headaches, nausea, feeling jumpy, restlessness, irritable, apprehensive).   - Collaborate with interdisciplinary team and initiate plan and interventions as ordered.  - [place] patient to unit/surroundings  - Explain treatment plan  - Encourage participation in care  - Encourage verbalization of concerns/fears  - Identify coping mechanisms  - Assist in developing anxiety-reducing skills  - Administer/offer alternative therapies  - Limit or eliminate stimulants  Outcome: Progressing     Problem: Alteration in Orientation  Goal: Treatment Goal: Demonstrate a reduction of confusion and improved orientation to person, place, time and/or situation upon discharge, according to optimum baseline/ability  Outcome: Progressing  Goal: Allow medical examinations, as recommended  Description: Interventions:  - Provide physical/neurological exams and/or referrals, per provider   Outcome: Progressing  Goal: Cooperate with recommended testing/procedures  Description: Interventions:  - Determine need for ancillary testing  - Observe for mental status changes  - Implement falls/precaution protocol   Outcome: Progressing     Problem: Ineffective Coping  Goal: Participates in unit  activities  Description: Interventions:  - Provide therapeutic environment   - Provide required programming   - Redirect inappropriate behaviors   Outcome: Progressing

## 2024-01-25 PROCEDURE — 99232 SBSQ HOSP IP/OBS MODERATE 35: CPT | Performed by: PSYCHIATRY & NEUROLOGY

## 2024-01-25 RX ADMIN — DICYCLOMINE HYDROCHLORIDE 10 MG: 10 CAPSULE ORAL at 11:38

## 2024-01-25 RX ADMIN — GABAPENTIN 600 MG: 300 CAPSULE ORAL at 21:29

## 2024-01-25 RX ADMIN — LOXAPINE 20 MG: 10 CAPSULE ORAL at 08:23

## 2024-01-25 RX ADMIN — DICYCLOMINE HYDROCHLORIDE 10 MG: 10 CAPSULE ORAL at 21:29

## 2024-01-25 RX ADMIN — GABAPENTIN 600 MG: 300 CAPSULE ORAL at 16:45

## 2024-01-25 RX ADMIN — BUDESONIDE 3 MG: 3 CAPSULE ORAL at 08:23

## 2024-01-25 RX ADMIN — METHOCARBAMOL TABLETS 500 MG: 500 TABLET, COATED ORAL at 11:38

## 2024-01-25 RX ADMIN — METHOCARBAMOL TABLETS 500 MG: 500 TABLET, COATED ORAL at 21:29

## 2024-01-25 RX ADMIN — TRAZODONE HYDROCHLORIDE 100 MG: 100 TABLET ORAL at 21:29

## 2024-01-25 RX ADMIN — ACETAMINOPHEN 650 MG: 325 TABLET ORAL at 21:29

## 2024-01-25 RX ADMIN — METHOCARBAMOL TABLETS 500 MG: 500 TABLET, COATED ORAL at 17:40

## 2024-01-25 RX ADMIN — DICYCLOMINE HYDROCHLORIDE 10 MG: 10 CAPSULE ORAL at 06:05

## 2024-01-25 RX ADMIN — GABAPENTIN 600 MG: 300 CAPSULE ORAL at 08:23

## 2024-01-25 RX ADMIN — PALIPERIDONE 12 MG: 6 TABLET, EXTENDED RELEASE ORAL at 21:44

## 2024-01-25 RX ADMIN — DICYCLOMINE HYDROCHLORIDE 10 MG: 10 CAPSULE ORAL at 16:45

## 2024-01-25 RX ADMIN — METHOCARBAMOL TABLETS 500 MG: 500 TABLET, COATED ORAL at 08:23

## 2024-01-25 RX ADMIN — ACETAMINOPHEN 650 MG: 325 TABLET ORAL at 13:46

## 2024-01-25 RX ADMIN — FAMOTIDINE 10 MG: 20 TABLET ORAL at 08:23

## 2024-01-25 RX ADMIN — LOXAPINE 20 MG: 10 CAPSULE ORAL at 17:40

## 2024-01-25 RX ADMIN — ACETAMINOPHEN 975 MG: 325 TABLET ORAL at 06:05

## 2024-01-25 NOTE — NURSING NOTE
Pt verbally denies SI, HI and A/V hallucinations. Overheard responding to self significantly during breakfast, however reports improvements with symptoms, is independent with hygiene and attendance with unit activities; attempts made to positively socialize with others and interact productively in milieu; compliant with meds, wearing personal attire by choice.

## 2024-01-25 NOTE — NURSING NOTE
PT observed interacting with other peers, cooperative and calm upon approach. PT denies SI/HI/VH/AH, denying depression and anxiety on assessment.  Appears doing well overall with pain management. During this shift there wasn't any evidence observed with internal stimulin, continues with self wound care. NO PRN at this time.

## 2024-01-25 NOTE — PROGRESS NOTES
01/25/24 9855   Team Meeting   Meeting Type Daily Rounds   Team Members Present   Team Members Present Physician;Nurse;   Physician Team Member Jennifer   Nursing Team Member Efraín   Care Management Team Member Humberto   Patient/Family Present   Patient Present No   Patient's Family Present No     Patient currently holds 201 status. Patient denies all symptoms. Patient is medication and meal compliant. Patient is social on unit per nursing report. Patient is to continue all current scheduled medications.Patient has a tentative discharge date for Tuesday. Writer to coordinate discharge plan.

## 2024-01-25 NOTE — PROGRESS NOTES
Progress Note - Behavioral Health     Carlos Wolf 33 y.o. male MRN: [id number]   Unit/Bed#: [facility] 376-02 Encounter: [id number]    Documentation, nursing notes, medication reconciliation, labs, and vitals reviewed. Patient was seen for continuing care and reviewed with treatment team. No acute events over the past 24 hours.  Per nursing report, overheard responding to self significantly during breakfast, however reports improvements in symptoms.    No medication changes over the past 24 hours. Continues to tolerate current medications with no adverse effects.     On evaluation today, he is seen ambulating the unit.  Has been attending groups.  He continues to tell me he is not currently hearing voices.  However, I did ask him about episodes of talking to himself at length.  He states he does this in an attempt to stay focused and so that he does not hear the voices.  Does present less paranoid and mood is improved.  Less disorganized.    No suicidal ideation, plan, or intent. Denies perceptual disturbances and does not exhibit any symptoms of lang on evaluation.    Psychiatric ROS:  Behavior over the last 24 hours: slowly improving  Sleep: slept off and on  Appetite: fair  Medication side effects: No   ROS:  wound discomfort is improved, all other systems are negative    Mental Status Evaluation:    Appearance:  age appropriate   Behavior:  cooperative, guarded   Speech:  slow   Mood:  improved   Affect:  brighter   Thought Process:  circumstantial   Associations: concrete associations   Thought Content:  some paranoia   Perceptual Disturbances: denies auditory hallucinations when asked, talks to self at times   Risk Potential: Suicidal ideation - None  Homicidal ideation - None  Potential for aggression - No   Sensorium:  oriented to person, place, and time/date   Memory:  recent and remote memory grossly intact   Consciousness:  alert and awake   Attention/Concentration: decreased concentration and  decreased attention span   Insight:  limited   Judgment: limited   Gait/Station: normal balance   Motor Activity: no abnormal movements     Vital signs in last 24 hours:    Temp:  [97.2 °F (36.2 °C)-98.2 °F (36.8 °C)] 98.2 °F (36.8 °C)  HR:  [97-98] 98  Resp:  [16-18] 16  BP: (115-126)/(60-73) 126/73    Laboratory results: I have personally reviewed all pertinent laboratory/tests results    Results from the past 24 hours: No results found for this or any previous visit (from the past 24 hour(s)).    Suicide/Homicide Risk Assessment:    Risk of Harm to Self:   Current Specific Risk Factors include: current psychotic symptoms  Protective Factors: no current suicidal ideation, ability to communicate with staff on the unit, able to contract for safety on the unit, taking medications as ordered on the unit  Based on today's assessment, Carlos presents the following risk of harm to self: minimal    Risk of Harm to Others:  Current Specific Risk Factors include: current psychotic symptoms  Protective Factors: no current homicidal ideation, impulse control is improving, mood is stabilizing, psychotic symptoms are less prominent  Based on today's assessment, Carlos presents the following risk of harm to others: minimal    The following interventions are recommended: behavioral checks every 7 minutes, continued hospitalization on locked unit    Progress Toward Goals: continues to improve    Assessment/Plan   Principal Problem:    Schizophrenia, paranoid type (HCC)  Active Problems:    Anemia    Crohn's disease (HCC)    Cellulitis and abscess of buttock      Recommended Treatment:     Planned medication and treatment changes:    All current active medications have been reviewed  Encourage group therapy, milieu therapy and occupational therapy  Behavioral Health checks every 7 minutes  Continue current medications:    Current Facility-Administered Medications   Medication Dose Route Frequency Provider Last Rate     acetaminophen  650 mg Oral Q6H PRN Keo Eldon Huertas MD      acetaminophen  650 mg Oral Q4H PRN Keo Eldon Huertas MD      acetaminophen  975 mg Oral Q6H PRN Keo Eldon Huertas MD      benztropine  1 mg Intramuscular Q4H PRN Max 6/day Keo Eldon Huertas MD      benztropine  1 mg Oral Q4H PRN Max 6/day Keo Eldon Huertas MD      budesonide  3 mg Oral Daily Pepe Terrell MD      dicyclomine  10 mg Oral 4x Daily (AC & HS) Pepe Terrell MD      hydrOXYzine HCL  50 mg Oral Q6H PRN Max 4/day Keo Eldon Huertas MD      Or    diphenhydrAMINE  50 mg Intramuscular Q6H PRN Keo Eldon Huertas MD      ergocalciferol  50,000 Units Oral Weekly Keo Eldon Huertas MD      famotidine  10 mg Oral Daily Keo Eldon Huertas MD      gabapentin  600 mg Oral TID Ann Rodriguez MD      glycerin-hypromellose-  1 drop Both Eyes Q3H PRN Keo Eldon Huertas MD      hydrOXYzine HCL  100 mg Oral Q6H PRN Max 4/day Keo Eldon Huertas MD      Or    LORazepam  2 mg Intramuscular Q6H PRN Keo Eldon Huertas MD      hydrOXYzine HCL  25 mg Oral Q6H PRN Max 4/day Keo Eldon Huertas MD      loxapine  20 mg Oral BID Gena Edwards, SAMMY      melatonin  9 mg Oral HS PRN Ann Rodriguez MD      methocarbamol  500 mg Oral 4x Daily Keo Eldon Huertas MD      OLANZapine  10 mg Oral Q3H PRN Max 3/day Keo Eldon Huertas MD      Or    OLANZapine  10 mg Intramuscular Q3H PRN Max 3/day Keo Eldon Huertas MD      OLANZapine  5 mg Oral Q3H PRN Max 6/day Keo Eldon Huertas MD      Or    OLANZapine  5 mg Intramuscular Q3H PRN Max 6/day Keo Eldon Huertas MD      OLANZapine  2.5 mg Oral Q3H PRN Max 8/day Keo Eldon Huertas MD      paliperidone  12 mg Oral HS EDISON Julian      propranolol  10 mg Oral Q8H PRN Keo Eldon Huertas MD      traZODone  100 mg Oral HS Gena Edwards PA-C       Risks / Benefits of Treatment:    Risks, benefits, and possible side effects of medications explained to patient and patient verbalizes  understanding and agreement for treatment.    Counseling / Coordination of Care:    Patient's progress discussed with staff in treatment team meeting.  Medications, treatment progress and treatment plan reviewed with patient.    EDISON Julian 01/25/24

## 2024-01-25 NOTE — PLAN OF CARE
Problem: Alteration in Thoughts and Perception  Goal: Treatment Goal: Gain control of psychotic behaviors/thinking, reduce/eliminate presenting symptoms and demonstrate improved reality functioning upon discharge  Outcome: Progressing  Goal: Verbalize thoughts and feelings  Description: Interventions:  - Promote a nonjudgmental and trusting relationship with the patient through active listening and therapeutic communication  - Assess patient's level of functioning, behavior and potential for risk  - Engage patient in 1 on 1 interactions  - Encourage patient to express fears, feelings, frustrations, and discuss symptoms    - [place] patient to reality, help patient recognize reality-based thinking   - Administer medications as ordered and assess for potential side effects  - Provide the patient education related to the signs and symptoms of the illness and desired effects of prescribed medications  Outcome: Progressing  Goal: Refrain from acting on delusional thinking/internal stimuli  Description: Interventions:  - Monitor patient closely, per order   - Utilize least restrictive measures   - Set reasonable limits, give positive feedback for acceptable   - Administer medications as ordered and monitor of potential side effects  Outcome: Progressing  Goal: Agree to be compliant with medication regime, as prescribed and report medication side effects  Description: Interventions:  - Offer appropriate PRN medication and supervise ingestion; conduct AIMS, as needed   Outcome: Progressing  Goal: Attend and participate in unit activities, including therapeutic, recreational, and educational groups  Description: Interventions:  -Encourage Visitation and family involvement in care  Outcome: Progressing  Goal: Recognize dysfunctional thoughts, communicate reality-based thoughts at the time of discharge  Description: Interventions:  - Provide medication and psycho-education to assist patient in compliance and developing  insight into his/her illness   Outcome: Progressing  Goal: Complete daily ADLs, including personal hygiene independently, as able  Description: Interventions:  - Observe, teach, and assist patient with ADLS  - Monitor and promote a balance of rest/activity, with adequate nutrition and elimination   Outcome: Progressing     Problem: Ineffective Coping  Goal: Identifies ineffective coping skills  Outcome: Progressing  Goal: Identifies healthy coping skills  Outcome: Progressing  Goal: Demonstrates healthy coping skills  Outcome: Progressing     Problem: Anxiety  Goal: Anxiety is at manageable level  Description: Interventions:  - Assess and monitor patient's anxiety level.   - Monitor for signs and symptoms (heart palpitations, chest pain, shortness of breath, headaches, nausea, feeling jumpy, restlessness, irritable, apprehensive).   - Collaborate with interdisciplinary team and initiate plan and interventions as ordered.  - [place] patient to unit/surroundings  - Explain treatment plan  - Encourage participation in care  - Encourage verbalization of concerns/fears  - Identify coping mechanisms  - Assist in developing anxiety-reducing skills  - Administer/offer alternative therapies  - Limit or eliminate stimulants  Outcome: Progressing     Problem: Alteration in Orientation  Goal: Treatment Goal: Demonstrate a reduction of confusion and improved orientation to person, place, time and/or situation upon discharge, according to optimum baseline/ability  Outcome: Progressing  Goal: Allow medical examinations, as recommended  Description: Interventions:  - Provide physical/neurological exams and/or referrals, per provider   Outcome: Progressing  Goal: Cooperate with recommended testing/procedures  Description: Interventions:  - Determine need for ancillary testing  - Observe for mental status changes  - Implement falls/precaution protocol   Outcome: Progressing     Problem: Ineffective Coping  Goal: Participates in unit  activities  Description: Interventions:  - Provide therapeutic environment   - Provide required programming   - Redirect inappropriate behaviors   Outcome: Progressing     Problem: DISCHARGE PLANNING - CARE MANAGEMENT  Goal: Discharge to post-acute care or home with appropriate resources  Description: INTERVENTIONS:  - Conduct assessment to determine patient/family and health care team treatment goals, and need for post-acute services based on payer coverage, community resources, and patient preferences, and barriers to discharge  - Address psychosocial, clinical, and financial barriers to discharge as identified in assessment in conjunction with the patient/family and health care team  - Arrange appropriate level of post-acute services according to patient’s   needs and preference and payer coverage in collaboration with the physician and health care team  - Communicate with and update the patient/family, physician, and health care team regarding progress on the discharge plan  - Arrange appropriate transportation to post-acute venues  Outcome: Progressing

## 2024-01-26 PROCEDURE — 99232 SBSQ HOSP IP/OBS MODERATE 35: CPT | Performed by: PSYCHIATRY & NEUROLOGY

## 2024-01-26 RX ADMIN — DICYCLOMINE HYDROCHLORIDE 10 MG: 10 CAPSULE ORAL at 12:48

## 2024-01-26 RX ADMIN — DICYCLOMINE HYDROCHLORIDE 10 MG: 10 CAPSULE ORAL at 06:37

## 2024-01-26 RX ADMIN — LOXAPINE 20 MG: 10 CAPSULE ORAL at 08:33

## 2024-01-26 RX ADMIN — LOXAPINE 20 MG: 10 CAPSULE ORAL at 17:38

## 2024-01-26 RX ADMIN — DICYCLOMINE HYDROCHLORIDE 10 MG: 10 CAPSULE ORAL at 15:52

## 2024-01-26 RX ADMIN — GABAPENTIN 600 MG: 300 CAPSULE ORAL at 21:15

## 2024-01-26 RX ADMIN — ACETAMINOPHEN 975 MG: 325 TABLET ORAL at 15:52

## 2024-01-26 RX ADMIN — PALIPERIDONE 12 MG: 6 TABLET, EXTENDED RELEASE ORAL at 21:15

## 2024-01-26 RX ADMIN — ACETAMINOPHEN 650 MG: 325 TABLET ORAL at 21:15

## 2024-01-26 RX ADMIN — METHOCARBAMOL TABLETS 500 MG: 500 TABLET, COATED ORAL at 08:33

## 2024-01-26 RX ADMIN — FAMOTIDINE 10 MG: 20 TABLET ORAL at 08:33

## 2024-01-26 RX ADMIN — TRAZODONE HYDROCHLORIDE 100 MG: 100 TABLET ORAL at 21:15

## 2024-01-26 RX ADMIN — GABAPENTIN 600 MG: 300 CAPSULE ORAL at 15:52

## 2024-01-26 RX ADMIN — GABAPENTIN 600 MG: 300 CAPSULE ORAL at 08:34

## 2024-01-26 RX ADMIN — METHOCARBAMOL TABLETS 500 MG: 500 TABLET, COATED ORAL at 17:38

## 2024-01-26 RX ADMIN — DICYCLOMINE HYDROCHLORIDE 10 MG: 10 CAPSULE ORAL at 21:15

## 2024-01-26 RX ADMIN — METHOCARBAMOL TABLETS 500 MG: 500 TABLET, COATED ORAL at 21:15

## 2024-01-26 RX ADMIN — ACETAMINOPHEN 975 MG: 325 TABLET ORAL at 06:37

## 2024-01-26 RX ADMIN — METHOCARBAMOL TABLETS 500 MG: 500 TABLET, COATED ORAL at 12:48

## 2024-01-26 RX ADMIN — BUDESONIDE 3 MG: 3 CAPSULE ORAL at 09:36

## 2024-01-26 NOTE — CASE MANAGEMENT
Writer called [facility] to set up an intake service.  Writer left message for intake personnel.

## 2024-01-26 NOTE — DISCHARGE INSTR - APPOINTMENTS
Upcoming appointments    EDISON Baca (PCP Office): Wednesday 1/31/2024 at 10:40AM at [address] [facility] [place] [address]    Keenan Rogers MD, Post- Op : Wednesday 1/31/2024 at 2:30pm at [address] [facility] [place] [address]    Alissa Spears MD, GI appointment : Saturday 4/30 at 8:40am at [address] [place] [address], [address], [place] [address]    [facility]- will be in contact with you to schedule for wound care.         Therapy and Medication Management- [address], [place] [address]     Dr Lucero Watkins (Med Management):   Feb 13, 2024 @ 1:30pm   Mar 12, 2024 @ 1:30pm     Pia Salinas (Talk Therapy):   Feb 19, 2024 @ 11:00am   Mar 4, 2024 @ 11:00am

## 2024-01-26 NOTE — PROGRESS NOTES
Progress Note - Behavioral Health     Carlos Wolf 33 y.o. male MRN: [id number]   Unit/Bed#: [facility] 376-02 Encounter: [id number]    Documentation, nursing notes, medication reconciliation, labs, and vitals reviewed. Patient was seen for continuing care and reviewed with treatment team. No acute events over the past 24 hours.  Per nursing report, has been calm and cooperative and less frequently noted to be talking to self.  Dissipating in therapeutic milieu.    Medication changes over the past 24 hours. Continues to tolerate current medications with no adverse effects.     On evaluation today, he has been active in the milieu.  Attending groups.  Social with select peers.  Notably less paranoia.  Staff have mentioned occasionally seeing patient talking to himself.  When I ask him about that, he denies it is in response to hallucinations.  Does report medications have been helping and reports no hallucinations, and notes that he feels he is better able to talk to others.  Less disorganized..  No suicidal ideation, plan, or intent. Denies perceptual disturbances and does not exhibit any symptoms of lang on evaluation.    Psychiatric ROS:  Behavior over the last 24 hours: improved  Sleep: improved  Appetite: fair  Medication side effects: No   ROS: all other systems are negative, reports no discomfort from buttocks    Mental Status Evaluation:    Appearance:  age appropriate   Behavior:  pleasant, cooperative   Speech:  normal rate, normal volume   Mood:  improved   Affect:  brighter   Thought Process:  circumstantial   Associations: concrete associations   Thought Content:  mild paranoia   Perceptual Disturbances: denies auditory hallucinations when asked, talks to self at times   Risk Potential: Suicidal ideation - None  Homicidal ideation - None  Potential for aggression - No   Sensorium:  oriented to person, place, and time/date   Memory:  recent and remote memory grossly intact   Consciousness:  alert and  awake   Attention/Concentration: decreased concentration and decreased attention span   Insight:  limited   Judgment: limited   Gait/Station: normal gait/station, normal balance   Motor Activity: no abnormal movements     Vital signs in last 24 hours:    Temp:  [97 °F (36.1 °C)-97.3 °F (36.3 °C)] 97 °F (36.1 °C)  HR:  [] 97  Resp:  [17-18] 17  BP: (115-127)/(70-71) 127/71    Laboratory results: I have personally reviewed all pertinent laboratory/tests results    Results from the past 24 hours: No results found for this or any previous visit (from the past 24 hour(s)).    Suicide/Homicide Risk Assessment:    Risk of Harm to Self:   Current Specific Risk Factors include: mental illness diagnosis  Protective Factors: no current suicidal ideation, ability to communicate with staff on the unit, able to contract for safety on the unit  Based on today's assessment, Carlos presents the following risk of harm to self: minimal    Risk of Harm to Others:  Current Specific Risk Factors include: current psychotic symptoms  Protective Factors: no current homicidal ideation, able to communicate with staff on the unit, improved impulse control, mood is stabilizing, psychotic symptoms are less prominent, compliant with medications on the unit as ordered  Based on today's assessment, Carlos presents the following risk of harm to others: minimal    The following interventions are recommended: behavioral checks every 7 minutes, continued hospitalization on locked unit    Progress Toward Goals: continues to improve    Assessment/Plan   Principal Problem:    Schizophrenia, paranoid type (HCC)  Active Problems:    Anemia    Crohn's disease (HCC)    Cellulitis and abscess of buttock      Recommended Treatment:     Planned medication and treatment changes:    All current active medications have been reviewed  Encourage group therapy, milieu therapy and occupational therapy  Behavioral Health checks every 7 minutes  Observe over the  weekend if continues to improve  Possible discharge next week if continues to improve  Continue current medications:    Current Facility-Administered Medications   Medication Dose Route Frequency Provider Last Rate    acetaminophen  650 mg Oral Q6H PRN Keomanjit Huertas MD      acetaminophen  650 mg Oral Q4H PRN Keo Eldon Huertas MD      acetaminophen  975 mg Oral Q6H PRN Keo Eldon Huertas MD      benztropine  1 mg Intramuscular Q4H PRN Max 6/day Keo Eldon Huertas MD      benztropine  1 mg Oral Q4H PRN Max 6/day Keo Eldon Huertas MD      budesonide  3 mg Oral Daily Pepe Terrell MD      dicyclomine  10 mg Oral 4x Daily (AC & HS) Pepe Terrell MD      hydrOXYzine HCL  50 mg Oral Q6H PRN Max 4/day Keo Eldon Huertas MD      Or    diphenhydrAMINE  50 mg Intramuscular Q6H PRN Keo Eldon Huertas MD      ergocalciferol  50,000 Units Oral Weekly Keo Eldon Huertas MD      famotidine  10 mg Oral Daily Keo Eldon Huertas MD      gabapentin  600 mg Oral TID Ann Rodriguez MD      glycerin-hypromellose-  1 drop Both Eyes Q3H PRN Keo Eldon Huertas MD      hydrOXYzine HCL  100 mg Oral Q6H PRN Max 4/day Keo Eldon Huertas MD      Or    LORazepam  2 mg Intramuscular Q6H PRN Keo Eldon Huertas MD      hydrOXYzine HCL  25 mg Oral Q6H PRN Max 4/day Keo Eldon Huertas MD      loxapine  20 mg Oral BID Gena Edwards PA-C      melatonin  9 mg Oral HS PRN Ann Rodriguez MD      methocarbamol  500 mg Oral 4x Daily Keo Eldon Huertas MD      OLANZapine  10 mg Oral Q3H PRN Max 3/day Keo Eldon Huertas MD      Or    OLANZapine  10 mg Intramuscular Q3H PRN Max 3/day Keo Eldon Huertas MD      OLANZapine  5 mg Oral Q3H PRN Max 6/day Keo Eldon Huertas MD      Or    OLANZapine  5 mg Intramuscular Q3H PRN Max 6/day Keo Eldon Huertas MD      OLANZapine  2.5 mg Oral Q3H PRN Max 8/day Keo Eldon Huertas MD      paliperidone  12 mg Oral HS EDISON Julian      propranolol  10 mg Oral Q8H PRN Keo  Eldon Huertas MD      traZODone  100 mg Oral HS Gena Edwards PA-C       Risks / Benefits of Treatment:    Risks, benefits, and possible side effects of medications explained to patient and patient verbalizes understanding and agreement for treatment.  The patient has a history of at least 3 antipsychotic medication trials and at this time requires treatment with 2 antipsychotic agents (Invega and Loxitane) due to failed multiple trials of monotherapy.    Counseling / Coordination of Care:    Patient's progress discussed with staff in treatment team meeting.  Medications, treatment progress and treatment plan reviewed with patient.    EDISON Julian 01/26/24

## 2024-01-26 NOTE — NURSING NOTE
Patient has been visible on the unit and attended morning group.  He is med/meal compliant.  Patient denies SI/HI and A/V hallucinations.

## 2024-01-26 NOTE — NURSING NOTE
PT cooperative and calm on approach, PT denies SI/HI/VH/AH, requested for PRN Tylenol 650mg rating pain 5/10, received Tylenol 650mg at 2129 with scheduled HS medication. Continues with self wound care. No further concerns at this time.       2229  PRN Tylenol 650mg effective at this time

## 2024-01-26 NOTE — CASE MANAGEMENT
Kendrickr made follow up appointments for patient post discharge. Patient is to see Keenan Roegrs MD for post-op on Wednesday at 2:30pm. Patient also has an upcoming appointment with PCP on Wednesday at 10:40am. Kendrickr referred patient to [facility]  for ICM services and is awaiting response.

## 2024-01-26 NOTE — PROGRESS NOTES
01/26/24 1502   Team Meeting   Meeting Type Daily Rounds   Team Members Present   Team Members Present Physician;Nurse;   Physician Team Member Jennifer   Nursing Team Member Jia   Care Management Team Member Humberto   Patient/Family Present   Patient Present No   Patient's Family Present No     Patient currently holds 201 status. Patient denies all symptoms. Patient has been medication and meal compliant. Patient to continue on all current scheduled medications. Patient has tentative discharge scheduled for Tuesday 1/30/2024 at 12pm with sister Cortney.

## 2024-01-26 NOTE — PLAN OF CARE
Problem: Alteration in Thoughts and Perception  Goal: Treatment Goal: Gain control of psychotic behaviors/thinking, reduce/eliminate presenting symptoms and demonstrate improved reality functioning upon discharge  Outcome: Progressing  Goal: Verbalize thoughts and feelings  Description: Interventions:  - Promote a nonjudgmental and trusting relationship with the patient through active listening and therapeutic communication  - Assess patient's level of functioning, behavior and potential for risk  - Engage patient in 1 on 1 interactions  - Encourage patient to express fears, feelings, frustrations, and discuss symptoms    - [place] patient to reality, help patient recognize reality-based thinking   - Administer medications as ordered and assess for potential side effects  - Provide the patient education related to the signs and symptoms of the illness and desired effects of prescribed medications  Outcome: Progressing  Goal: Refrain from acting on delusional thinking/internal stimuli  Description: Interventions:  - Monitor patient closely, per order   - Utilize least restrictive measures   - Set reasonable limits, give positive feedback for acceptable   - Administer medications as ordered and monitor of potential side effects  Outcome: Progressing  Goal: Agree to be compliant with medication regime, as prescribed and report medication side effects  Description: Interventions:  - Offer appropriate PRN medication and supervise ingestion; conduct AIMS, as needed   Outcome: Progressing  Goal: Attend and participate in unit activities, including therapeutic, recreational, and educational groups  Description: Interventions:  -Encourage Visitation and family involvement in care  Outcome: Progressing  Goal: Recognize dysfunctional thoughts, communicate reality-based thoughts at the time of discharge  Description: Interventions:  - Provide medication and psycho-education to assist patient in compliance and developing  insight into his/her illness   Outcome: Progressing  Goal: Complete daily ADLs, including personal hygiene independently, as able  Description: Interventions:  - Observe, teach, and assist patient with ADLS  - Monitor and promote a balance of rest/activity, with adequate nutrition and elimination   Outcome: Progressing     Problem: Ineffective Coping  Goal: Identifies ineffective coping skills  Outcome: Progressing  Goal: Identifies healthy coping skills  Outcome: Progressing  Goal: Demonstrates healthy coping skills  Outcome: Progressing     Problem: Anxiety  Goal: Anxiety is at manageable level  Description: Interventions:  - Assess and monitor patient's anxiety level.   - Monitor for signs and symptoms (heart palpitations, chest pain, shortness of breath, headaches, nausea, feeling jumpy, restlessness, irritable, apprehensive).   - Collaborate with interdisciplinary team and initiate plan and interventions as ordered.  - [place] patient to unit/surroundings  - Explain treatment plan  - Encourage participation in care  - Encourage verbalization of concerns/fears  - Identify coping mechanisms  - Assist in developing anxiety-reducing skills  - Administer/offer alternative therapies  - Limit or eliminate stimulants  Outcome: Progressing     Problem: Alteration in Orientation  Goal: Treatment Goal: Demonstrate a reduction of confusion and improved orientation to person, place, time and/or situation upon discharge, according to optimum baseline/ability  Outcome: Progressing  Goal: Allow medical examinations, as recommended  Description: Interventions:  - Provide physical/neurological exams and/or referrals, per provider   Outcome: Progressing  Goal: Cooperate with recommended testing/procedures  Description: Interventions:  - Determine need for ancillary testing  - Observe for mental status changes  - Implement falls/precaution protocol   Outcome: Progressing     Problem: Ineffective Coping  Goal: Participates in unit  activities  Description: Interventions:  - Provide therapeutic environment   - Provide required programming   - Redirect inappropriate behaviors   Outcome: Progressing     Problem: DISCHARGE PLANNING - CARE MANAGEMENT  Goal: Discharge to post-acute care or home with appropriate resources  Description: INTERVENTIONS:  - Conduct assessment to determine patient/family and health care team treatment goals, and need for post-acute services based on payer coverage, community resources, and patient preferences, and barriers to discharge  - Address psychosocial, clinical, and financial barriers to discharge as identified in assessment in conjunction with the patient/family and health care team  - Arrange appropriate level of post-acute services according to patient’s   needs and preference and payer coverage in collaboration with the physician and health care team  - Communicate with and update the patient/family, physician, and health care team regarding progress on the discharge plan  - Arrange appropriate transportation to post-acute venues  Outcome: Progressing

## 2024-01-27 PROCEDURE — 99232 SBSQ HOSP IP/OBS MODERATE 35: CPT | Performed by: STUDENT IN AN ORGANIZED HEALTH CARE EDUCATION/TRAINING PROGRAM

## 2024-01-27 RX ADMIN — GABAPENTIN 600 MG: 300 CAPSULE ORAL at 16:56

## 2024-01-27 RX ADMIN — METHOCARBAMOL TABLETS 500 MG: 500 TABLET, COATED ORAL at 08:24

## 2024-01-27 RX ADMIN — METHOCARBAMOL TABLETS 500 MG: 500 TABLET, COATED ORAL at 12:22

## 2024-01-27 RX ADMIN — LOXAPINE 20 MG: 10 CAPSULE ORAL at 08:24

## 2024-01-27 RX ADMIN — DICYCLOMINE HYDROCHLORIDE 10 MG: 10 CAPSULE ORAL at 08:00

## 2024-01-27 RX ADMIN — METHOCARBAMOL TABLETS 500 MG: 500 TABLET, COATED ORAL at 21:24

## 2024-01-27 RX ADMIN — GABAPENTIN 600 MG: 300 CAPSULE ORAL at 21:24

## 2024-01-27 RX ADMIN — ACETAMINOPHEN 650 MG: 325 TABLET ORAL at 21:24

## 2024-01-27 RX ADMIN — LOXAPINE 20 MG: 10 CAPSULE ORAL at 17:36

## 2024-01-27 RX ADMIN — DICYCLOMINE HYDROCHLORIDE 10 MG: 10 CAPSULE ORAL at 21:24

## 2024-01-27 RX ADMIN — DICYCLOMINE HYDROCHLORIDE 10 MG: 10 CAPSULE ORAL at 16:56

## 2024-01-27 RX ADMIN — PALIPERIDONE 12 MG: 6 TABLET, EXTENDED RELEASE ORAL at 21:24

## 2024-01-27 RX ADMIN — GABAPENTIN 600 MG: 300 CAPSULE ORAL at 08:24

## 2024-01-27 RX ADMIN — FAMOTIDINE 10 MG: 20 TABLET ORAL at 08:24

## 2024-01-27 RX ADMIN — TRAZODONE HYDROCHLORIDE 100 MG: 100 TABLET ORAL at 21:24

## 2024-01-27 RX ADMIN — DICYCLOMINE HYDROCHLORIDE 10 MG: 10 CAPSULE ORAL at 12:22

## 2024-01-27 RX ADMIN — ACETAMINOPHEN 650 MG: 325 TABLET ORAL at 08:25

## 2024-01-27 RX ADMIN — METHOCARBAMOL TABLETS 500 MG: 500 TABLET, COATED ORAL at 17:36

## 2024-01-27 RX ADMIN — BUDESONIDE 3 MG: 3 CAPSULE ORAL at 08:27

## 2024-01-27 NOTE — NURSING NOTE
"PT visible in the dayroom watching TV with few other peers. Cooperative and calm on approach. PT denies SI/HI/VH/AH, stated \"It was a good day, waiting till Tuesday for my discharge\". Endorse continuous pain with PRN Tylenol usage during HS meds pass.  PT compliant with HS medications added PRN Tylenol 650mg pain rating 5/10.   "

## 2024-01-27 NOTE — NURSING NOTE
Patient is calm and cooperative on the unit. Denies SI, HI, SIB, auditory and visual hallucinations. Patient is med and meal compliant. Social with peers and visible on the unit. Denies any unmet needs at this time. Q7 safety checks ongoing.

## 2024-01-27 NOTE — PROGRESS NOTES
"Progress Note - Behavioral Health     Carlos Wolf 33 y.o. male MRN: [id number]   Unit/Bed#: U 376-02 Encounter: [id number]    Behavior over the last 24 hours: unchanged.     Carlos today states he is doing \"good\", noting that before he presented he felt that his neighbor may be trying to \"listen\" to him and he was hearing voices as well, he states he heard voices \"a little after I got here\" however today denied auditory and visual hallucinations.  He does not report feeling that others on the unit are trying to listen and on him.  I saw him on the unit in common areas sitting with other patients.  Patient feels he has overall improved.  He denies suicidal and homicidal ideations.    Sleep: normal  Appetite: normal  Medication side effects: No   ROS: all other systems are negative    Mental Status Evaluation:    Appearance:  age appropriate, casually dressed   Behavior:  pleasant, cooperative, calm   Speech:  normal rate, normal volume, normal pitch   Mood:  \"Good\"   Affect:  constricted   Thought Process:  [place] [place]   Associations: concrete associations   Thought Content:  no overt delusions   Perceptual Disturbances: denies auditory hallucinations when asked   Risk Potential: Suicidal ideation - None  Homicidal ideation - None  Potential for aggression - No   Sensorium:  oriented to person, place, and time/date   Memory:  recent and remote memory grossly intact   Consciousness:  alert and awake   Attention/Concentration: attention span and concentration are age appropriate   Insight:  improving   Judgment: improving   Gait/Station: normal gait/station   Motor Activity: no abnormal movements     Vital signs in last 24 hours:    Temp:  [97.1 °F (36.2 °C)-97.6 °F (36.4 °C)] 97.1 °F (36.2 °C)  HR:  [100-106] 106  Resp:  [16-18] 16  BP: (112-115)/(64-72) 112/72    Laboratory results: I have personally reviewed all pertinent laboratory/tests results    Labs in last 72 hours: No new labs    The following " interventions are recommended: continued hospitalization on locked unit    Progress Toward Goals: improving    Assessment/Plan   Principal Problem:    Schizophrenia, paranoid type (HCC)  Active Problems:    Anemia    Crohn's disease (HCC)    Cellulitis and abscess of buttock      Recommended Treatment:     Planned medication and treatment changes:    All current active medications have been reviewed  Encourage group therapy, milieu therapy and occupational therapy  Continue all medications at current doses:  - Continue loxapine 20 mg twice a day  - Continue Invega 12 mg at night  - Continue trazodone 100 mg at night  - Continue gabapentin 600 mg 3 times a day    Current Facility-Administered Medications   Medication Dose Route Frequency Provider Last Rate    acetaminophen  650 mg Oral Q6H PRN Keo Eldon Huertas MD      acetaminophen  650 mg Oral Q4H PRN Keomanjit Huertas MD      acetaminophen  975 mg Oral Q6H PRN Keo Eldon Huertas MD      benztropine  1 mg Intramuscular Q4H PRN Max 6/day Banner Eldon Huertas MD      benztropine  1 mg Oral Q4H PRN Max 6/day Banner Eldon Huertas MD      budesonide  3 mg Oral Daily Pepe Terrell MD      dicyclomine  10 mg Oral 4x Daily (AC & HS) Pepe Terrell MD      hydrOXYzine HCL  50 mg Oral Q6H PRN Max 4/day Keo Eldon Huertas MD      Or    diphenhydrAMINE  50 mg Intramuscular Q6H PRN Banner Eldon Huertas MD      ergocalciferol  50,000 Units Oral Weekly Banner Eldon Huertas MD      famotidine  10 mg Oral Daily Banner Eldon Huertas MD      gabapentin  600 mg Oral TID Ann Rodriguez MD      glycerin-hypromellose-  1 drop Both Eyes Q3H PRN Keo Eldon Huertas MD      hydrOXYzine HCL  100 mg Oral Q6H PRN Max 4/day Keo Eldon Huertas MD      Or    LORazepam  2 mg Intramuscular Q6H PRN Keo Eldon Huertas MD      hydrOXYzine HCL  25 mg Oral Q6H PRN Max 4/day Keo Eldon Huertas MD      loxapine  20 mg Oral BID Gena Edwards PA-C      melatonin  9 mg Oral HS  PRN Ann Rodriguez MD      methocarbamol  500 mg Oral 4x Daily Keo Eldon Huertas MD      OLANZapine  10 mg Oral Q3H PRN Max 3/day Keo Eldon Huertas MD      Or    OLANZapine  10 mg Intramuscular Q3H PRN Max 3/day Keo Eldon Huertas MD      OLANZapine  5 mg Oral Q3H PRN Max 6/day Keo Eldon Huertas MD      Or    OLANZapine  5 mg Intramuscular Q3H PRN Max 6/day Keo Eldon Huertas MD      OLANZapine  2.5 mg Oral Q3H PRN Max 8/day Keo Eldon Huertas MD      paliperidone  12 mg Oral HS EDISON Julian      propranolol  10 mg Oral Q8H PRN Keo Eldon Huertas MD      traZODone  100 mg Oral HS Gena Edwards PA-C       Risks / Benefits of Treatment:    Risks, benefits, and possible side effects of medications explained to patient and patient verbalizes understanding and agreement for treatment.    Counseling / Coordination of Care:    Medications, treatment progress and treatment plan reviewed with patient.    Qi Medel MD 01/27/24

## 2024-01-27 NOTE — PLAN OF CARE
Problem: Alteration in Thoughts and Perception  Goal: Treatment Goal: Gain control of psychotic behaviors/thinking, reduce/eliminate presenting symptoms and demonstrate improved reality functioning upon discharge  Outcome: Progressing  Goal: Verbalize thoughts and feelings  Description: Interventions:  - Promote a nonjudgmental and trusting relationship with the patient through active listening and therapeutic communication  - Assess patient's level of functioning, behavior and potential for risk  - Engage patient in 1 on 1 interactions  - Encourage patient to express fears, feelings, frustrations, and discuss symptoms    - [place] patient to reality, help patient recognize reality-based thinking   - Administer medications as ordered and assess for potential side effects  - Provide the patient education related to the signs and symptoms of the illness and desired effects of prescribed medications  Outcome: Progressing  Goal: Refrain from acting on delusional thinking/internal stimuli  Description: Interventions:  - Monitor patient closely, per order   - Utilize least restrictive measures   - Set reasonable limits, give positive feedback for acceptable   - Administer medications as ordered and monitor of potential side effects  Outcome: Progressing  Goal: Agree to be compliant with medication regime, as prescribed and report medication side effects  Description: Interventions:  - Offer appropriate PRN medication and supervise ingestion; conduct AIMS, as needed   Outcome: Progressing  Goal: Recognize dysfunctional thoughts, communicate reality-based thoughts at the time of discharge  Description: Interventions:  - Provide medication and psycho-education to assist patient in compliance and developing insight into his/her illness   Outcome: Progressing  Goal: Complete daily ADLs, including personal hygiene independently, as able  Description: Interventions:  - Observe, teach, and assist patient with ADLS  - Monitor and  promote a balance of rest/activity, with adequate nutrition and elimination   Outcome: Progressing     Problem: Ineffective Coping  Goal: Identifies ineffective coping skills  Outcome: Progressing  Goal: Identifies healthy coping skills  Outcome: Progressing  Goal: Demonstrates healthy coping skills  Outcome: Progressing     Problem: Anxiety  Goal: Anxiety is at manageable level  Description: Interventions:  - Assess and monitor patient's anxiety level.   - Monitor for signs and symptoms (heart palpitations, chest pain, shortness of breath, headaches, nausea, feeling jumpy, restlessness, irritable, apprehensive).   - Collaborate with interdisciplinary team and initiate plan and interventions as ordered.  - [place] patient to unit/surroundings  - Explain treatment plan  - Encourage participation in care  - Encourage verbalization of concerns/fears  - Identify coping mechanisms  - Assist in developing anxiety-reducing skills  - Administer/offer alternative therapies  - Limit or eliminate stimulants  Outcome: Progressing     Problem: Alteration in Orientation  Goal: Treatment Goal: Demonstrate a reduction of confusion and improved orientation to person, place, time and/or situation upon discharge, according to optimum baseline/ability  Outcome: Progressing  Goal: Allow medical examinations, as recommended  Description: Interventions:  - Provide physical/neurological exams and/or referrals, per provider   Outcome: Progressing  Goal: Cooperate with recommended testing/procedures  Description: Interventions:  - Determine need for ancillary testing  - Observe for mental status changes  - Implement falls/precaution protocol   Outcome: Progressing

## 2024-01-28 PROCEDURE — 99232 SBSQ HOSP IP/OBS MODERATE 35: CPT | Performed by: STUDENT IN AN ORGANIZED HEALTH CARE EDUCATION/TRAINING PROGRAM

## 2024-01-28 RX ADMIN — ACETAMINOPHEN 650 MG: 325 TABLET ORAL at 08:18

## 2024-01-28 RX ADMIN — ACETAMINOPHEN 650 MG: 325 TABLET ORAL at 21:28

## 2024-01-28 RX ADMIN — PALIPERIDONE 12 MG: 6 TABLET, EXTENDED RELEASE ORAL at 21:21

## 2024-01-28 RX ADMIN — METHOCARBAMOL TABLETS 500 MG: 500 TABLET, COATED ORAL at 17:22

## 2024-01-28 RX ADMIN — DICYCLOMINE HYDROCHLORIDE 10 MG: 10 CAPSULE ORAL at 21:21

## 2024-01-28 RX ADMIN — ACETAMINOPHEN 975 MG: 325 TABLET ORAL at 15:28

## 2024-01-28 RX ADMIN — LOXAPINE 20 MG: 10 CAPSULE ORAL at 17:21

## 2024-01-28 RX ADMIN — FAMOTIDINE 10 MG: 20 TABLET ORAL at 08:18

## 2024-01-28 RX ADMIN — GABAPENTIN 600 MG: 300 CAPSULE ORAL at 08:18

## 2024-01-28 RX ADMIN — DICYCLOMINE HYDROCHLORIDE 10 MG: 10 CAPSULE ORAL at 16:50

## 2024-01-28 RX ADMIN — DICYCLOMINE HYDROCHLORIDE 10 MG: 10 CAPSULE ORAL at 12:05

## 2024-01-28 RX ADMIN — GABAPENTIN 600 MG: 300 CAPSULE ORAL at 21:21

## 2024-01-28 RX ADMIN — METHOCARBAMOL TABLETS 500 MG: 500 TABLET, COATED ORAL at 21:21

## 2024-01-28 RX ADMIN — DICYCLOMINE HYDROCHLORIDE 10 MG: 10 CAPSULE ORAL at 08:17

## 2024-01-28 RX ADMIN — TRAZODONE HYDROCHLORIDE 100 MG: 100 TABLET ORAL at 21:21

## 2024-01-28 RX ADMIN — GABAPENTIN 600 MG: 300 CAPSULE ORAL at 16:50

## 2024-01-28 RX ADMIN — LOXAPINE 20 MG: 10 CAPSULE ORAL at 08:18

## 2024-01-28 RX ADMIN — METHOCARBAMOL TABLETS 500 MG: 500 TABLET, COATED ORAL at 12:05

## 2024-01-28 RX ADMIN — BUDESONIDE 3 MG: 3 CAPSULE ORAL at 08:18

## 2024-01-28 RX ADMIN — METHOCARBAMOL TABLETS 500 MG: 500 TABLET, COATED ORAL at 08:18

## 2024-01-28 NOTE — PROGRESS NOTES
"Progress Note - Behavioral Health     Carlos Wolf 33 y.o. male MRN: [id number]   Unit/Bed#: [facility] 376-02 Encounter: [id number]    Behavior over the last 24 hours: unchanged.     Carlos today is calm and cooperative on interview.  He states he is looking forward today to watching the football game.  He otherwise reports no issues or concerns.  He has not had any bizarre experiences recently like feeling like someone is listening in on him, he denies paranoia.  He denies auditory and visual hallucinations.  He denies suicidal and homicidal ideations.    Sleep: normal  Appetite: normal  Medication side effects: No   ROS: all other systems are negative    Mental Status Evaluation:    Appearance:  age appropriate, casually dressed   Behavior:  pleasant, cooperative, calm   Speech:  normal rate, normal volume, normal pitch   Mood:  \"Good\"   Affect:  constricted   Thought Process:  concrete   Associations: concrete associations   Thought Content:  no overt delusions   Perceptual Disturbances: denies auditory hallucinations when asked   Risk Potential: Suicidal ideation - None  Homicidal ideation - None  Potential for aggression - No   Sensorium:  oriented to person, place, and time/date   Memory:  recent and remote memory grossly intact   Consciousness:  alert and awake   Attention/Concentration: attention span and concentration are age appropriate   Insight:  improving   Judgment: improving   Gait/Station: normal gait/station   Motor Activity: no abnormal movements     Vital signs in last 24 hours:    Temp:  [97.4 °F (36.3 °C)-97.6 °F (36.4 °C)] 97.6 °F (36.4 °C)  HR:  [101-122] 122  Resp:  [16] 16  BP: (104-107)/(57-69) 107/69    Laboratory results: I have personally reviewed all pertinent laboratory/tests results    Labs in last 72 hours: No new labs    Progress Toward Goals: improving    Assessment/Plan   Principal Problem:    Schizophrenia, paranoid type (HCC)  Active Problems:    Anemia    Crohn's disease " (HCC)    Cellulitis and abscess of buttock      Recommended Treatment:     Planned medication and treatment changes:    All current active medications have been reviewed  Encourage group therapy, milieu therapy and occupational therapy  Continue all medications at current doses:  - Continue loxapine 20 mg twice a day  - Continue Invega 12 mg at night  - Continue trazodone 100 mg at night  - Continue gabapentin 600 mg 3 times a day    Current Facility-Administered Medications   Medication Dose Route Frequency Provider Last Rate    acetaminophen  650 mg Oral Q6H PRN Keomanjti Huertas MD      acetaminophen  650 mg Oral Q4H PRN Keo Eldon Huertas MD      acetaminophen  975 mg Oral Q6H PRN Keo Eldon Huertas MD      benztropine  1 mg Intramuscular Q4H PRN Max 6/day Keo Eldon Huertas MD      benztropine  1 mg Oral Q4H PRN Max 6/day Keo Eldon Huertas MD      budesonide  3 mg Oral Daily Pepe Terrell MD      dicyclomine  10 mg Oral 4x Daily (AC & HS) Pepe Terrell MD      hydrOXYzine HCL  50 mg Oral Q6H PRN Max 4/day Keo Eldon Huertas MD      Or    diphenhydrAMINE  50 mg Intramuscular Q6H PRN Keo Eldon Huertas MD      ergocalciferol  50,000 Units Oral Weekly Keo Eldon Huertas MD      famotidine  10 mg Oral Daily Keo Eldon Huertas MD      gabapentin  600 mg Oral TID Ann Rodriguez MD      glycerin-hypromellose-  1 drop Both Eyes Q3H PRN Keomanjit Huertas MD      hydrOXYzine HCL  100 mg Oral Q6H PRN Max 4/day [facility] Eldon Huertas MD      Or    LORazepam  2 mg Intramuscular Q6H PRN Keo Eldon Huertas MD      hydrOXYzine HCL  25 mg Oral Q6H PRN Max 4/day Keo Eldon Huertas MD      loxapine  20 mg Oral BID Gena Edwards PA-C      melatonin  9 mg Oral HS PRN Ann Rodriguez MD      methocarbamol  500 mg Oral 4x Daily Keo Eldon Huertas MD      OLANZapine  10 mg Oral Q3H PRN Max 3/day Keo Huertas MD      Or    OLANZapine  10 mg Intramuscular Q3H PRN Max 3/day Keo Eldon  MD Kiya      OLANZapine  5 mg Oral Q3H PRN Max 6/day Keo Eldon Huertas MD      Or    OLANZapine  5 mg Intramuscular Q3H PRN Max 6/day Keo Eldon Huertas MD      OLANZapine  2.5 mg Oral Q3H PRN Max 8/day Keo Eldon Huertas MD      paliperidone  12 mg Oral HS EDISON Julian      propranolol  10 mg Oral Q8H PRN Keo Eldon Huertas MD      traZODone  100 mg Oral HS Gena Edwards PA-C       Risks / Benefits of Treatment:    Risks, benefits, and possible side effects of medications explained to patient and patient verbalizes understanding and agreement for treatment.    Counseling / Coordination of Care:    Medications, treatment progress and treatment plan reviewed with patient.    Qi Medel MD 01/28/24

## 2024-01-28 NOTE — NURSING NOTE
Patient is calm and cooperative on the unit. Denies SI, HI, SIB, auditory and visual hallucinations. Patient is med and meal compliant, is visible and social on the unit. Would care completed by patient, wound appears clean, has bloody and clear drainage. Denies any unmet needs at this time. Q7 safety checks ongoing.

## 2024-01-28 NOTE — PLAN OF CARE
Problem: Alteration in Thoughts and Perception  Goal: Treatment Goal: Gain control of psychotic behaviors/thinking, reduce/eliminate presenting symptoms and demonstrate improved reality functioning upon discharge  Outcome: Progressing  Goal: Verbalize thoughts and feelings  Description: Interventions:  - Promote a nonjudgmental and trusting relationship with the patient through active listening and therapeutic communication  - Assess patient's level of functioning, behavior and potential for risk  - Engage patient in 1 on 1 interactions  - Encourage patient to express fears, feelings, frustrations, and discuss symptoms    - [place] patient to reality, help patient recognize reality-based thinking   - Administer medications as ordered and assess for potential side effects  - Provide the patient education related to the signs and symptoms of the illness and desired effects of prescribed medications  Outcome: Progressing  Goal: Refrain from acting on delusional thinking/internal stimuli  Description: Interventions:  - Monitor patient closely, per order   - Utilize least restrictive measures   - Set reasonable limits, give positive feedback for acceptable   - Administer medications as ordered and monitor of potential side effects  Outcome: Progressing  Goal: Agree to be compliant with medication regime, as prescribed and report medication side effects  Description: Interventions:  - Offer appropriate PRN medication and supervise ingestion; conduct AIMS, as needed   Outcome: Progressing  Goal: Attend and participate in unit activities, including therapeutic, recreational, and educational groups  Description: Interventions:  -Encourage Visitation and family involvement in care  Outcome: Progressing  Goal: Recognize dysfunctional thoughts, communicate reality-based thoughts at the time of discharge  Description: Interventions:  - Provide medication and psycho-education to assist patient in compliance and developing  insight into his/her illness   Outcome: Progressing  Goal: Complete daily ADLs, including personal hygiene independently, as able  Description: Interventions:  - Observe, teach, and assist patient with ADLS  - Monitor and promote a balance of rest/activity, with adequate nutrition and elimination   Outcome: Progressing     Problem: Anxiety  Goal: Anxiety is at manageable level  Description: Interventions:  - Assess and monitor patient's anxiety level.   - Monitor for signs and symptoms (heart palpitations, chest pain, shortness of breath, headaches, nausea, feeling jumpy, restlessness, irritable, apprehensive).   - Collaborate with interdisciplinary team and initiate plan and interventions as ordered.  - [place] patient to unit/surroundings  - Explain treatment plan  - Encourage participation in care  - Encourage verbalization of concerns/fears  - Identify coping mechanisms  - Assist in developing anxiety-reducing skills  - Administer/offer alternative therapies  - Limit or eliminate stimulants  Outcome: Progressing     Problem: Alteration in Orientation  Goal: Treatment Goal: Demonstrate a reduction of confusion and improved orientation to person, place, time and/or situation upon discharge, according to optimum baseline/ability  Outcome: Progressing  Goal: Allow medical examinations, as recommended  Description: Interventions:  - Provide physical/neurological exams and/or referrals, per provider   Outcome: Progressing  Goal: Cooperate with recommended testing/procedures  Description: Interventions:  - Determine need for ancillary testing  - Observe for mental status changes  - Implement falls/precaution protocol   Outcome: Progressing     Problem: Ineffective Coping  Goal: Participates in unit activities  Description: Interventions:  - Provide therapeutic environment   - Provide required programming   - Redirect inappropriate behaviors   Outcome: Progressing

## 2024-01-28 NOTE — NURSING NOTE
Patient is pleasant on approach, calm and cooperative. Patient observed watching TV in small TV room. Patient denied SI, but reports AH. Patient was compliant with scheduled medications. Patient c/o mild pain and received tylenol 650mg, which was effective. Staff maintained continuous rounding for safety and support.

## 2024-01-29 ENCOUNTER — TELEPHONE (OUTPATIENT)
Dept: PSYCHIATRY | Facility: CLINIC | Age: 34
End: 2024-01-29

## 2024-01-29 PROBLEM — D64.9 ANEMIA: Status: RESOLVED | Noted: 2023-07-29 | Resolved: 2024-01-29

## 2024-01-29 PROCEDURE — 99232 SBSQ HOSP IP/OBS MODERATE 35: CPT | Performed by: PSYCHIATRY & NEUROLOGY

## 2024-01-29 RX ORDER — TRAZODONE HYDROCHLORIDE 100 MG/1
100 TABLET ORAL
Qty: 30 TABLET | Refills: 1 | Status: SHIPPED | OUTPATIENT
Start: 2024-01-29

## 2024-01-29 RX ORDER — BUDESONIDE 3 MG/1
3 CAPSULE, COATED PELLETS ORAL DAILY
Qty: 30 CAPSULE | Refills: 0 | Status: SHIPPED | OUTPATIENT
Start: 2024-01-29

## 2024-01-29 RX ORDER — ERGOCALCIFEROL 1.25 MG/1
50000 CAPSULE ORAL WEEKLY
Qty: 8 CAPSULE | Refills: 0 | Status: SHIPPED | OUTPATIENT
Start: 2024-01-29

## 2024-01-29 RX ORDER — GABAPENTIN 300 MG/1
600 CAPSULE ORAL 3 TIMES DAILY
Qty: 90 CAPSULE | Refills: 1 | Status: SHIPPED | OUTPATIENT
Start: 2024-01-29

## 2024-01-29 RX ORDER — PALIPERIDONE 6 MG/1
12 TABLET, EXTENDED RELEASE ORAL
Qty: 60 TABLET | Refills: 1 | Status: SHIPPED | OUTPATIENT
Start: 2024-01-29

## 2024-01-29 RX ORDER — DICYCLOMINE HYDROCHLORIDE 10 MG/1
10 CAPSULE ORAL
Qty: 120 CAPSULE | Refills: 0 | Status: SHIPPED | OUTPATIENT
Start: 2024-01-29

## 2024-01-29 RX ORDER — LOXAPINE SUCCINATE 10 MG/1
20 TABLET ORAL 2 TIMES DAILY
Qty: 120 CAPSULE | Refills: 1 | Status: SHIPPED | OUTPATIENT
Start: 2024-01-29

## 2024-01-29 RX ORDER — METHOCARBAMOL 500 MG/1
500 TABLET, FILM COATED ORAL 4 TIMES DAILY
Qty: 120 TABLET | Refills: 0 | Status: SHIPPED | OUTPATIENT
Start: 2024-01-29

## 2024-01-29 RX ORDER — FAMOTIDINE 10 MG
10 TABLET ORAL DAILY
Qty: 30 TABLET | Refills: 0 | Status: SHIPPED | OUTPATIENT
Start: 2024-01-30

## 2024-01-29 RX ADMIN — DICYCLOMINE HYDROCHLORIDE 10 MG: 10 CAPSULE ORAL at 12:29

## 2024-01-29 RX ADMIN — METHOCARBAMOL TABLETS 500 MG: 500 TABLET, COATED ORAL at 21:07

## 2024-01-29 RX ADMIN — LOXAPINE 20 MG: 10 CAPSULE ORAL at 17:16

## 2024-01-29 RX ADMIN — GABAPENTIN 600 MG: 300 CAPSULE ORAL at 08:02

## 2024-01-29 RX ADMIN — TRAZODONE HYDROCHLORIDE 100 MG: 100 TABLET ORAL at 21:07

## 2024-01-29 RX ADMIN — BUDESONIDE 3 MG: 3 CAPSULE ORAL at 08:02

## 2024-01-29 RX ADMIN — FAMOTIDINE 10 MG: 20 TABLET ORAL at 08:02

## 2024-01-29 RX ADMIN — DICYCLOMINE HYDROCHLORIDE 10 MG: 10 CAPSULE ORAL at 16:56

## 2024-01-29 RX ADMIN — PALIPERIDONE 12 MG: 6 TABLET, EXTENDED RELEASE ORAL at 21:07

## 2024-01-29 RX ADMIN — DICYCLOMINE HYDROCHLORIDE 10 MG: 10 CAPSULE ORAL at 08:00

## 2024-01-29 RX ADMIN — ACETAMINOPHEN 975 MG: 325 TABLET ORAL at 21:07

## 2024-01-29 RX ADMIN — METHOCARBAMOL TABLETS 500 MG: 500 TABLET, COATED ORAL at 12:29

## 2024-01-29 RX ADMIN — GABAPENTIN 600 MG: 300 CAPSULE ORAL at 16:56

## 2024-01-29 RX ADMIN — DICYCLOMINE HYDROCHLORIDE 10 MG: 10 CAPSULE ORAL at 21:07

## 2024-01-29 RX ADMIN — METHOCARBAMOL TABLETS 500 MG: 500 TABLET, COATED ORAL at 08:02

## 2024-01-29 RX ADMIN — ACETAMINOPHEN 650 MG: 325 TABLET ORAL at 08:02

## 2024-01-29 RX ADMIN — GABAPENTIN 600 MG: 300 CAPSULE ORAL at 21:07

## 2024-01-29 RX ADMIN — LOXAPINE 20 MG: 10 CAPSULE ORAL at 08:02

## 2024-01-29 RX ADMIN — METHOCARBAMOL TABLETS 500 MG: 500 TABLET, COATED ORAL at 17:16

## 2024-01-29 NOTE — NURSING NOTE
Pt is calm and cooperative upon approach. Visible in the milieu watching TV, social with peers at times. Denies SI, HI, and hallucinations. Compliant with HS medication and snack. Pt requested and given PRN tylenol 650mg for 3/10 mild pain. Denies further unmet needs/ concerns at this time.

## 2024-01-29 NOTE — NURSING NOTE
Patient is calm and cooperative on the unit. Denies SI, HI, SIB, auditory and visual hallucinations at this time. Patient is med and meal compliant. Wound care completed by patient, wound appears to be draining a slightly bloody, but clear fluid. No signs of infection. Patient is visible on the unit, social with peers and attends therapy groups. Denies any unmet needs at this time. Q7 safety checks ongoing.

## 2024-01-29 NOTE — PROGRESS NOTES
01/29/24 1558   Team Meeting   Meeting Type Daily Rounds   Team Members Present   Team Members Present Physician;Nurse;   Physician Team Member Jennifer   Nursing Team Member Eliud   Care Management Team Member Humberto   Patient/Family Present   Patient Present No   Patient's Family Present No     Patient currently holds 201 status. Patient denies all symptoms. Patient is medication and meal compliant. Patient to continue on all current scheduled medications. Patient to discharge tomorrow to home with sister at 12pM. Patient to follow up with several providers listed on AVS.

## 2024-01-29 NOTE — PROGRESS NOTES
Progress Note - Behavioral Health     Carlos Wolf 33 y.o. male MRN: [id number]   Unit/Bed#: [facility] 376-02 Encounter: [id number]    Documentation, nursing notes, medication reconciliation, labs, and vitals reviewed. Patient was seen for continuing care and reviewed with treatment team. No acute events over the past 24 hours.  Per nursing report, has been calm and cooperative.  Noted talking to himself, but denies hallucinations.    No Medication changes over the past 24 hours. Continues to tolerate current medications with no adverse effects.     On evaluation today, he has been participating in the therapeutic milieu.  Presents overall improved.  No longer disorganized.  Less internally preoccupied.  Significantly less paranoid.    No suicidal ideation, plan, or intent. Denies perceptual disturbances and does not exhibit any symptoms of lang on evaluation.    Psychiatric ROS:  Behavior over the last 24 hours: slowly improving  Sleep: slept off and on  Appetite: fair  Medication side effects: No   ROS: no complaints, all other systems are negative, he is independent with wound care, has drain in place and staff reports some scant drainage    Mental Status Evaluation:    Appearance:  age appropriate   Behavior:  pleasant, cooperative   Speech:  normal rate, normal volume   Mood:  dysphoric   Affect:  constricted   Thought Process:  decreased rate of thoughts   Associations: concrete associations   Thought Content:  mild paranoia   Perceptual Disturbances: denies auditory hallucinations when asked, talks to self at times   Risk Potential: Suicidal ideation - None  Homicidal ideation - None at present  Potential for aggression - No   Sensorium:  oriented to person, place, and time/date   Memory:  recent and remote memory grossly intact   Consciousness:  alert and awake   Attention/Concentration: decreased concentration and decreased attention span   Insight:  improving and limited   Judgment: improving and  limited   Gait/Station: normal gait/station, normal balance   Motor Activity: no abnormal movements     Vital signs in last 24 hours:    Temp:  [97.6 °F (36.4 °C)-98.1 °F (36.7 °C)] 98.1 °F (36.7 °C)  HR:  [] 107  Resp:  [16] 16  BP: (111-117)/(57-66) 111/66    Laboratory results: I have personally reviewed all pertinent laboratory/tests results    Results from the past 24 hours: No results found for this or any previous visit (from the past 24 hour(s)).    Suicide/Homicide Risk Assessment:    Risk of Harm to Self:   Current Specific Risk Factors include: mental illness diagnosis  Protective Factors: no current suicidal ideation, ability to communicate with staff on the unit, able to contract for safety on the unit, taking medications as ordered on the unit  Based on today's assessment, Carlos presents the following risk of harm to self: minimal    Risk of Harm to Others:  Current Specific Risk Factors include: diagnosis of mood disorder  Protective Factors: no current homicidal ideation, able to communicate with staff on the unit, impulse control is improving, mood is stabilizing  Based on today's assessment, Carlos presents the following risk of harm to others: minimal    The following interventions are recommended: behavioral checks every 7 minutes, continued hospitalization on locked unit    Progress Toward Goals: continues to improve    Assessment/Plan   Principal Problem:    Schizophrenia, paranoid type (HCC)  Active Problems:    Anemia    Crohn's disease (HCC)    Cellulitis and abscess of buttock      Recommended Treatment:     Planned medication and treatment changes:    All current active medications have been reviewed  Encourage group therapy, milieu therapy and occupational therapy  Behavioral Health checks every 7 minutes  Discharge planned for tomorrow  Continue current medications:    Current Facility-Administered Medications   Medication Dose Route Frequency Provider Last Rate    acetaminophen   650 mg Oral Q6H PRN Keo Eldon Huertas MD      acetaminophen  650 mg Oral Q4H PRN Keo Eldon Huertas MD      acetaminophen  975 mg Oral Q6H PRN Keo Eldon Huertas MD      benztropine  1 mg Intramuscular Q4H PRN Max 6/day Keo Eldon Huertas MD      benztropine  1 mg Oral Q4H PRN Max 6/day Keo Eldon Huertas MD      budesonide  3 mg Oral Daily Pepe Terrell MD      dicyclomine  10 mg Oral 4x Daily (AC & HS) Pepe Terrell MD      hydrOXYzine HCL  50 mg Oral Q6H PRN Max 4/day Keo Eldon Huertas MD      Or    diphenhydrAMINE  50 mg Intramuscular Q6H PRN Keo Eldon Huertas MD      ergocalciferol  50,000 Units Oral Weekly Keo Eldon Huertas MD      famotidine  10 mg Oral Daily Keo Eldon Huertas MD      gabapentin  600 mg Oral TID Ann Rodriguez MD      glycerin-hypromellose-  1 drop Both Eyes Q3H PRN Keo Eldon Huertas MD      hydrOXYzine HCL  100 mg Oral Q6H PRN Max 4/day Keo Eldon Huertas MD      Or    LORazepam  2 mg Intramuscular Q6H PRN Keo Eldon Huertas MD      hydrOXYzine HCL  25 mg Oral Q6H PRN Max 4/day Keo Eldon Huertas MD      loxapine  20 mg Oral BID Gena Edwards PA-C      melatonin  9 mg Oral HS PRN Ann Rodriguez MD      methocarbamol  500 mg Oral 4x Daily Keo Eldon Huertas MD      OLANZapine  10 mg Oral Q3H PRN Max 3/day Keo Eldon Huertas MD      Or    OLANZapine  10 mg Intramuscular Q3H PRN Max 3/day Keo Eldon Huertas MD      OLANZapine  5 mg Oral Q3H PRN Max 6/day Keo Eldon Huertas MD      Or    OLANZapine  5 mg Intramuscular Q3H PRN Max 6/day Keo Eldon Huertas MD      OLANZapine  2.5 mg Oral Q3H PRN Max 8/day Keo Eldon Huertas MD      paliperidone  12 mg Oral HS EDISON Julian      propranolol  10 mg Oral Q8H PRN Keo Eldon Huertas MD      traZODone  100 mg Oral HS Gena Edwards PA-C       Risks / Benefits of Treatment:    Risks, benefits, and possible side effects of medications explained to patient and patient verbalizes understanding and  agreement for treatment.  The patient has a history of at least 3 antipsychotic medication trials and at this time requires treatment with 2 antipsychotic agents (Invega and Loxitane) due to failed multiple trials of monotherapy.    Counseling / Coordination of Care:    Patient's progress discussed with staff in treatment team meeting.  Medications, treatment progress and treatment plan reviewed with patient.    EDISON Julian 01/29/24

## 2024-01-29 NOTE — TELEPHONE ENCOUNTER
[facility] from [facility]. Pt is discharging from inpatient tomorrow and needs follow-up med management and talk therapy appts in [place]. Pt scheduled at [facility] office:  Dr Lucero Watkins (Med Management):  Feb 13, 2024 @ 1:30pm  Mar 12, 2024 @ 1:30pm  Pia Salinas (Talk Therapy):  Feb 19, 2024 @ 11:00am  Mar 4, 2024 @ 11:00am    RTE not able to be run for appts until 2/12/2024.

## 2024-01-30 VITALS
WEIGHT: 146 LBS | TEMPERATURE: 97.5 F | HEIGHT: 66 IN | OXYGEN SATURATION: 100 % | RESPIRATION RATE: 16 BRPM | SYSTOLIC BLOOD PRESSURE: 122 MMHG | HEART RATE: 87 BPM | BODY MASS INDEX: 23.46 KG/M2 | DIASTOLIC BLOOD PRESSURE: 66 MMHG

## 2024-01-30 PROBLEM — Z00.00 ANNUAL PHYSICAL EXAM: Status: RESOLVED | Noted: 2023-11-17 | Resolved: 2024-01-30

## 2024-01-30 LAB
BASOPHILS # BLD AUTO: 0.05 THOUSANDS/ÂΜL (ref 0–0.1)
BASOPHILS NFR BLD AUTO: 0 % (ref 0–1)
EOSINOPHIL # BLD AUTO: 0.18 THOUSAND/ÂΜL (ref 0–0.61)
EOSINOPHIL NFR BLD AUTO: 1 % (ref 0–6)
ERYTHROCYTE [DISTWIDTH] IN BLOOD BY AUTOMATED COUNT: 23.7 % (ref 11.6–15.1)
HCT VFR BLD AUTO: 27.6 % (ref 36.5–49.3)
HGB BLD-MCNC: 7.9 G/DL (ref 12–17)
IMM GRANULOCYTES # BLD AUTO: 0.04 THOUSAND/UL (ref 0–0.2)
IMM GRANULOCYTES NFR BLD AUTO: 0 % (ref 0–2)
LYMPHOCYTES # BLD AUTO: 3.04 THOUSANDS/ÂΜL (ref 0.6–4.47)
LYMPHOCYTES NFR BLD AUTO: 24 % (ref 14–44)
MCH RBC QN AUTO: 20.7 PG (ref 26.8–34.3)
MCHC RBC AUTO-ENTMCNC: 28.6 G/DL (ref 31.4–37.4)
MCV RBC AUTO: 72 FL (ref 82–98)
MONOCYTES # BLD AUTO: 1.79 THOUSAND/ÂΜL (ref 0.17–1.22)
MONOCYTES NFR BLD AUTO: 14 % (ref 4–12)
NEUTROPHILS # BLD AUTO: 7.39 THOUSANDS/ÂΜL (ref 1.85–7.62)
NEUTS SEG NFR BLD AUTO: 61 % (ref 43–75)
NRBC BLD AUTO-RTO: 0 /100 WBCS
PLATELET # BLD AUTO: 609 THOUSANDS/UL (ref 149–390)
PMV BLD AUTO: 8.1 FL (ref 8.9–12.7)
RBC # BLD AUTO: 3.82 MILLION/UL (ref 3.88–5.62)
WBC # BLD AUTO: 12.49 THOUSAND/UL (ref 4.31–10.16)

## 2024-01-30 PROCEDURE — 99239 HOSP IP/OBS DSCHRG MGMT >30: CPT | Performed by: NURSE PRACTITIONER

## 2024-01-30 PROCEDURE — 85025 COMPLETE CBC W/AUTO DIFF WBC: CPT | Performed by: NURSE PRACTITIONER

## 2024-01-30 RX ORDER — BUDESONIDE 3 MG/1
3 CAPSULE, COATED PELLETS ORAL DAILY
Qty: 90 CAPSULE | Refills: 0 | OUTPATIENT
Start: 2024-01-30

## 2024-01-30 RX ADMIN — BUDESONIDE 3 MG: 3 CAPSULE ORAL at 08:23

## 2024-01-30 RX ADMIN — DICYCLOMINE HYDROCHLORIDE 10 MG: 10 CAPSULE ORAL at 08:21

## 2024-01-30 RX ADMIN — METHOCARBAMOL TABLETS 500 MG: 500 TABLET, COATED ORAL at 08:21

## 2024-01-30 RX ADMIN — METHOCARBAMOL TABLETS 500 MG: 500 TABLET, COATED ORAL at 11:53

## 2024-01-30 RX ADMIN — LOXAPINE 20 MG: 10 CAPSULE ORAL at 08:21

## 2024-01-30 RX ADMIN — FAMOTIDINE 10 MG: 20 TABLET ORAL at 08:21

## 2024-01-30 RX ADMIN — GABAPENTIN 600 MG: 300 CAPSULE ORAL at 08:21

## 2024-01-30 RX ADMIN — DICYCLOMINE HYDROCHLORIDE 10 MG: 10 CAPSULE ORAL at 11:53

## 2024-01-30 RX ADMIN — ACETAMINOPHEN 650 MG: 325 TABLET ORAL at 08:26

## 2024-01-30 NOTE — CASE MANAGEMENT
Sent (4)  To Contents          --  SLPA  Fax Summary of Care - for Follow Up Providers Only               --  Keenan Rogers MD  Fax Summary of Care - for Follow Up Providers Only               --  Yang Spears MD  Fax Summary of Care - for Follow Up Providers Only               --  EDISON Baca  Fax Summary of Care - for Follow Up Providers Only

## 2024-01-30 NOTE — CASE MANAGEMENT
Writer called sister to inform her of AVS in blue folder with listed scheduled follow up appointment for patient. Writer met with patient and had him sign IMM form. Writer discussed discharge with patient. Writer explained what an AVS is and encouraged patient to refer top it for follow up care. Writer stated she would fax over that document to his providers to they have it as well.

## 2024-01-30 NOTE — TREATMENT TEAM
Lidocaine cream,prednisone 5mg, liquid oxygen, methocarbamol 500mg, vit D12, Budenoside 3mg, ferosul 325 mg

## 2024-01-30 NOTE — PROGRESS NOTES
01/30/24 0942   Team Meeting   Meeting Type Daily Rounds   Team Members Present   Team Members Present Physician;Nurse;   Physician Team Member Jennifer   Nursing Team Member Efraín   Care Management Team Member Humberto   Patient/Family Present   Patient Present No   Patient's Family Present No     Patient currently holds 201 status. Patient denies all symptoms. Patient is compliant with meals and medications. Patient to continue on all current scheduled medications. Patient is to discharge today with his sister at 12pm to home. Patient to follow up with PCP, GI, Home Health, Therapist and medication management.

## 2024-01-30 NOTE — BH TRANSITION RECORD
Contact Information: If you have any questions, concerns, pended studies, tests and/or procedures, or emergencies regarding your inpatient behavioral health visit. Please contact [facility] [phone] and ask to speak to a , nurse or physician. A contact is available 24 hours/ 7 days a week at this number.     Summary of Procedures Performed During your Stay:  Below is a list of major procedures performed during your hospital stay and a summary of results:  - Cardiac Procedures/Studies: ECG on 1/11 and 1/19/2024 with no significant change, normal sinus rhythm and right bundle branch block.    Pending Studies (From admission, onward)      None          Please follow up on the above pending studies with your PCP and/or referring provider.

## 2024-01-30 NOTE — DISCHARGE SUMMARY
"  Discharge Summary - Behavioral Health   Carlos Wolf 33 y.o. male MRN: [id number]  Unit/Bed#: -02 Encounter: [id number]     Admission Date: 1/10/2024         Discharge Date: 1/30/2024    Attending Psychiatrist: Ann Rodriguez MD    Reason for Admission/HPI:     Patient is a 32 yo male admitted from medical floor due to psychosis.  According to admission report from Dr. Rodriguez:    Carlos Wolf is a 33 y.o. male with a history of Schizophrenia who was admitted to the inpatient psychiatric unit on a voluntary 201 commitment basis due to auditory hallucinations, visual hallucinations, delusional thoughts, and disorganized behavior.     Symptoms prior to admission included poor concentration, poor appetite, weight loss, difficulty sleeping, mood swings, auditory hallucinations of \"sounds\" and of \"whispers\", visual hallucinations of \"shadows\", delusional thinking with persecutory delusions, disorganized behavior, disorganized thinking process, anxiety symptoms, difficulty attending to activities of daily living, poor self-care, noncompliance with treatment, and noncompliance with medications. Onset of symptoms was gradual starting several weeks ago with progressively worsening course since that time. Stressors preceding admission included health issues, chronic mental illness, and noncompliance with treatment. 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 He was admitted to the medical floor for medical and surgical management of perianal cellulitis and abscess of buttocks. Psychiatric consultation was requested due to psychotic symptoms and inpatient psychiatric admission was recommended once Carlos was stabilized medically.     On initial evaluation after admission to the inpatient psychiatric unit Carlos had disorganized thinking process, also seemed very paranoid and preoccupied with " "thoughts that people were chasing him \"Someone was trying to desiree me. I was not keeping up with all the court cases here. You don't have to really be in it. Why would they use my name? It is like anxiety, PTSD and paranoia\". He reported anxiety symptoms, but denied significant depression and denied suicidal or homicidal thoughts. He was agreeable to continue Invega that was started by Psychiatric Consultation Service when he was on medical floor.          Past Medical History:   Diagnosis Date    Anemia 2004    hospitalization/transfusion    Cellulitis     Chronic knee pain     Crohn's disease (HCC)     Esophagitis     Leukocytosis     Perianal fistula     Pilonidal cyst     RBBB     Schizophrenia (HCC)     Seizure (HCC)      Past Surgical History:   Procedure Laterality Date    INCISION AND DRAINAGE OF WOUND N/A 1/5/2024    Procedure: INCISION AND DRAINAGE (I&D) PERIANAL ABSCESS, SETON PLACEMENT,EUA, FISTULECTOMY;  Surgeon: Carl Pace MD;  Location:  MAIN OR;  Service: General    CO ANRCT XM SURG REQ ANES GENERAL SPI/EDRL DX N/A 4/7/2016    Procedure: EXAM UNDER ANESTHESIA (EUA); possible REMOVAL OF FOREIGN BODY anoscopy ;  Surgeon: Reymundo Araujo MD;  Location:  MAIN OR;  Service: Colorectal    CO SURG TX ANAL FISTULA INTERSPHINCTERIC N/A 4/7/2016    Procedure: superficial FISTULOTOMY; SETON PROCEDURE drainage of chronic ischiofistula abscess and debridement;  Surgeon: Reymundo Araujo MD;  Location:  MAIN OR;  Service: Colorectal       Discharge Medications:  Current Discharge Medication List        START taking these medications    Details   dicyclomine (BENTYL) 10 mg capsule Take 1 capsule (10 mg total) by mouth 4 (four) times a day (before meals and at bedtime)  Qty: 120 capsule, Refills: 0    Associated Diagnoses: Crohn's disease (HCC)      famotidine (PEPCID) 10 mg tablet Take 1 tablet (10 mg total) by mouth daily  Qty: 30 tablet, Refills: 0    Associated Diagnoses: GERD (gastroesophageal " reflux disease)      gabapentin (NEURONTIN) 300 mg capsule Take 2 capsules (600 mg total) by mouth 3 (three) times a day  Qty: 90 capsule, Refills: 1    Associated Diagnoses: Schizophrenia, paranoid type (HCC)      loxapine (LOXITANE) 10 mg capsule Take 2 capsules (20 mg total) by mouth 2 (two) times a day  Qty: 120 capsule, Refills: 1    Associated Diagnoses: Schizophrenia, paranoid type (HCC)      paliperidone (INVEGA) 6 MG 24 hr tablet Take 2 tablets (12 mg total) by mouth daily at bedtime  Qty: 60 tablet, Refills: 1    Associated Diagnoses: Schizophrenia, paranoid type (HCC)      traZODone (DESYREL) 100 mg tablet Take 1 tablet (100 mg total) by mouth daily at bedtime  Qty: 30 tablet, Refills: 1    Associated Diagnoses: Schizophrenia, paranoid type (HCC)              Current Discharge Medication List        STOP taking these medications       acetaminophen (TYLENOL) 650 mg CR tablet Comments:   Reason for Stopping:         ferrous sulfate 325 (65 Fe) mg tablet Comments:   Reason for Stopping:         lidocaine (LIDODERM) 5 % Comments:   Reason for Stopping:         predniSONE 5 mg/5 mL solution Comments:   Reason for Stopping:                Current Discharge Medication List        CONTINUE these medications which have CHANGED    Details   budesonide (ENTOCORT EC) 3 MG capsule Take 1 capsule (3 mg total) by mouth daily  Qty: 30 capsule, Refills: 0    Associated Diagnoses: Crohn's disease (HCC)      ergocalciferol (VITAMIN D2) 50,000 units Take 1 capsule (50,000 Units total) by mouth once a week , For 8 weeks  Qty: 8 capsule, Refills: 0    Associated Diagnoses: Vitamin D deficiency      methocarbamol (ROBAXIN) 500 mg tablet Take 1 tablet (500 mg total) by mouth 4 (four) times a day  Qty: 120 tablet, Refills: 0    Associated Diagnoses: Chronic pain of left knee              Current Discharge Medication List           Allergies:     Allergies   Allergen Reactions    Haldol Decanoate [Haloperidol] Anaphylaxis     "Oxycodone-Acetaminophen Rash     \"swollon red rash\"    Sulfamethoxazole-Trimethoprim      \"never really worked\"       Objective     Vital signs in last 24 hours:    Temp:  [97.2 °F (36.2 °C)-97.5 °F (36.4 °C)] 97.5 °F (36.4 °C)  HR:  [87-95] 87  Resp:  [16] 16  BP: (122-124)/(66-71) 122/66    No intake or output data in the 24 hours ending 01/30/24 0848    Hospital Course:     Carlos was admitted to the inpatient psychiatric unit and started on Behavioral Health checks every 7 minutes. During the hospitalization he was encouraged to attend individual therapy, group therapy, milieu therapy and occupational therapy.    Psychiatric medications were restarted during the hospital stay.  Plan on admission: Continue Invega 6 mg at bedtime and titrate dose gradually to help with psychotic symptoms  Add Neurontin 100 mg tid to help with anxiety symptoms and mood. Medication doses were gradually increased during the hospital course. He continued to experience psychotic symptoms on Invega 12 mg, so Loxitane was added to augment for psychosis.   Prior to beginning of treatment medications risks and benefits and possible side effects including risk of parkinsonian symptoms, Tardive Dyskinesia and metabolic syndrome related to treatment with antipsychotic medications and risk of sedation with Gabapentin  were reviewed with Carlos. He verbalized understanding and agreement for treatment. Upon admission Carlos was seen by medical service for medical clearance for inpatient treatment and medical follow up.    Carlos's symptoms slowly improved over the hospital course. Initially after admission he was still feeling psychotic. With adjustment of medications and therapeutic milieu his symptoms gradually resolved. At the end of treatment Carlos was doing much better. His mood was significantly improved at the time of discharge. Carlos denied suicidal ideation, intent or plan at the time of discharge and denied homicidal ideation, " intent or plan at the time of discharge. Auditory hallucinations were resolved. Paranoid ideation was resolved.    Since Carlos was doing well at the end of the hospitalization, treatment team felt that Carlos could be safely discharged to outpatient care.    Patient set up with visiting nurse for wound care and follow up with surgeon arranged with  to address seton drain for abscess.    The outpatient follow up with a psychiatrist was arranged by the unit  upon discharge.    Mental Status at Time of Discharge:     Appearance:  age appropriate   Behavior:  cooperative, calm   Speech:  normal rate, normal volume   Mood:  improved   Affect:  blunted   Thought Process:  goal directed   Associations: concrete associations   Thought Content:  no overt delusions   Perceptual Disturbances: none   Risk Potential: Suicidal ideation - None  Homicidal ideation - None  Potential for aggression - No   Sensorium:  oriented to person, place, and time/date   Memory:  recent and remote memory grossly intact   Consciousness:  alert and awake   Attention/Concentration: decreased concentration and decreased attention span   Insight:  limited   Judgment: limited   Gait/Station: normal gait/station, normal balance   Motor Activity: no abnormal movements       Suicide/Homicide Risk Assessment:    Risk of Harm to Self:   Demographic risk factors include: never   Historical Risk Factors include: chronic psychiatric problems  Current Specific Risk Factors include: recent inpatient psychiatric admission - being discharged today  Protective Factors: no current suicidal ideation, no current depressive symptoms, improved mood, improved psychotic symptoms  Weapons/Firearms: none. The following steps have been taken to ensure weapons are properly secured: not applicable  Based on today's assessment, Carlos presents the following risk of harm to self: minimal    Risk of Harm to Others:  Demographic Risk Factors  include: male, unemployed.  Historical Risk Factors include: history of aggressive behavior.  Current Specific Risk Factors include: recent episode of mood instability  Protective Factors: no current homicidal ideation  Weapons/Firearms: none. The following steps have been taken to ensure weapons are properly secured: not applicable  Based on today's assessment, Carlos presents the following risk of harm to others: minimal    The following interventions are recommended: outpatient follow up with a psychiatrist    Admission Diagnosis:    Principal Problem:    Schizophrenia, paranoid type (HCC)  Active Problems:    Anemia    Crohn's disease (HCC)    Cellulitis and abscess of buttock      Discharge Diagnosis:     Principal Problem:    Schizophrenia, paranoid type (HCC)  Active Problems:    Anemia    Crohn's disease (HCC)    Cellulitis and abscess of buttock  Resolved Problems:    * No resolved hospital problems. *      Laboratory Results: I have personally reviewed all pertinent laboratory/tests results    Results from the past 24 hours:   Recent Results (from the past 24 hour(s))   CBC and differential    Collection Time: 01/30/24  6:08 AM   Result Value Ref Range    WBC 12.49 (H) 4.31 - 10.16 Thousand/uL    RBC 3.82 (L) 3.88 - 5.62 Million/uL    Hemoglobin 7.9 (L) 12.0 - 17.0 g/dL    Hematocrit 27.6 (L) 36.5 - 49.3 %    MCV 72 (L) 82 - 98 fL    MCH 20.7 (L) 26.8 - 34.3 pg    MCHC 28.6 (L) 31.4 - 37.4 g/dL    RDW 23.7 (H) 11.6 - 15.1 %    MPV 8.1 (L) 8.9 - 12.7 fL    Platelets 609 (H) 149 - 390 Thousands/uL    nRBC 0 /100 WBCs    Neutrophils Relative 61 43 - 75 %    Immat GRANS % 0 0 - 2 %    Lymphocytes Relative 24 14 - 44 %    Monocytes Relative 14 (H) 4 - 12 %    Eosinophils Relative 1 0 - 6 %    Basophils Relative 0 0 - 1 %    Neutrophils Absolute 7.39 1.85 - 7.62 Thousands/µL    Immature Grans Absolute 0.04 0.00 - 0.20 Thousand/uL    Lymphocytes Absolute 3.04 0.60 - 4.47 Thousands/µL    Monocytes Absolute 1.79  (H) 0.17 - 1.22 Thousand/µL    Eosinophils Absolute 0.18 0.00 - 0.61 Thousand/µL    Basophils Absolute 0.05 0.00 - 0.10 Thousands/µL       Discharge Medications:    See after visit summary for all reconciled discharge medications provided to patient and family.      Discharge instructions/Information to patient and family:     See after visit summary for information provided to patient and family.      Provisions for Follow-Up Care:    See after visit summary for information related to follow-up care and any pertinent home health orders.      Discharge Statement:    I spent 35 minutes discharging the patient. This time was spent on the day of discharge. I had direct contact with the patient on the day of discharge.     Additional documentation is required if more than 30 minutes were spent on discharge:    I reviewed with Carlos importance of compliance with medications and outpatient treatment after discharge.  I discussed the medication regimen and possible side effects of the medications with Carlos prior to discharge. At the time of discharge he was tolerating psychiatric medications.  I discussed outpatient follow up with Carlos.  I reviewed with Carlos crisis plan and safety plan upon discharge.  Outpatient Smoking Cessation referral was offered to Carlos. He declined the referral.  Smoking Cessation medication was offered to Carlos. He declined Smoking Cessation medication.  Carlos is being discharged on 2 antipsychotic agents (Invega and Loxitane) due to the history of at least 3 antipsychotic medication trials and failure of multiple trials of monotherapy.    Discharge on Two Antipsychotic Medications: Yes - Carols is being discharged on 2 antipsychotic agents (Invega and Loxitane) due to the history of at least 3 antipsychotic medication trials and failure of multiple trials of monotherapy.    EDISON Julian 01/30/24

## 2024-01-30 NOTE — CMS CERTIFICATION NOTE
Recertification: Based upon physical, mental and social evaluations, I certify that inpatient psychiatric services continue to be medically necessary for this patient for a duration of 1 midnights for the treatment of  Schizophrenia, paranoid type (HCC) Available alternative community resources still do not meet the patient's mental health care needs. I further attest that an established written individualized plan of care has been updated and is outlined in the patient's medical records.

## 2024-01-30 NOTE — NURSING NOTE
Pt is visible on the unit and social with select peers. Denying SI/HI/AH/VH at this time and excited for discharge. Medication and meal compliant. AVS gone over with pt and he states he intends to take medications as ordered and attend follow up appointments. When discussing smoking cessation pt states he is not ready to quit but is aware of resources should he change his mind at a later time. Phone numbers provided should any questions arise after discharge. Pt medications sent electronically to own pharmacy. Pt left unit with belongings and medications brought from home at his side. Picked up by sister and discharged home.

## 2024-01-30 NOTE — NURSING NOTE
Pt is calm and cooperative upon approach. Visible in the milieu watching TV, social with staff and peers. Denies SI, HI, and hallucinations. Pt c/o 7/10 buttocks pain, requested and given tylenol 975mg for severe pain at 2107. One hour later pt reported 3/10 pain and claims it was effective. Compliant with all other medication and snack. Denies further unmet needs/ concerns at this time.

## 2024-01-30 NOTE — PLAN OF CARE
Problem: Alteration in Thoughts and Perception  Goal: Treatment Goal: Gain control of psychotic behaviors/thinking, reduce/eliminate presenting symptoms and demonstrate improved reality functioning upon discharge  Outcome: Adequate for Discharge  Goal: Verbalize thoughts and feelings  Description: Interventions:  - Promote a nonjudgmental and trusting relationship with the patient through active listening and therapeutic communication  - Assess patient's level of functioning, behavior and potential for risk  - Engage patient in 1 on 1 interactions  - Encourage patient to express fears, feelings, frustrations, and discuss symptoms    - [place] patient to reality, help patient recognize reality-based thinking   - Administer medications as ordered and assess for potential side effects  - Provide the patient education related to the signs and symptoms of the illness and desired effects of prescribed medications  Outcome: Adequate for Discharge  Goal: Refrain from acting on delusional thinking/internal stimuli  Description: Interventions:  - Monitor patient closely, per order   - Utilize least restrictive measures   - Set reasonable limits, give positive feedback for acceptable   - Administer medications as ordered and monitor of potential side effects  Outcome: Adequate for Discharge  Goal: Agree to be compliant with medication regime, as prescribed and report medication side effects  Description: Interventions:  - Offer appropriate PRN medication and supervise ingestion; conduct AIMS, as needed   Outcome: Adequate for Discharge  Goal: Attend and participate in unit activities, including therapeutic, recreational, and educational groups  Description: Interventions:  -Encourage Visitation and family involvement in care  Outcome: Adequate for Discharge  Goal: Recognize dysfunctional thoughts, communicate reality-based thoughts at the time of discharge  Description: Interventions:  - Provide medication and  psycho-education to assist patient in compliance and developing insight into his/her illness   Outcome: Adequate for Discharge  Goal: Complete daily ADLs, including personal hygiene independently, as able  Description: Interventions:  - Observe, teach, and assist patient with ADLS  - Monitor and promote a balance of rest/activity, with adequate nutrition and elimination   Outcome: Adequate for Discharge     Problem: Ineffective Coping  Goal: Identifies ineffective coping skills  Outcome: Adequate for Discharge  Goal: Identifies healthy coping skills  Outcome: Adequate for Discharge  Goal: Demonstrates healthy coping skills  Outcome: Adequate for Discharge     Problem: Anxiety  Goal: Anxiety is at manageable level  Description: Interventions:  - Assess and monitor patient's anxiety level.   - Monitor for signs and symptoms (heart palpitations, chest pain, shortness of breath, headaches, nausea, feeling jumpy, restlessness, irritable, apprehensive).   - Collaborate with interdisciplinary team and initiate plan and interventions as ordered.  - [place] patient to unit/surroundings  - Explain treatment plan  - Encourage participation in care  - Encourage verbalization of concerns/fears  - Identify coping mechanisms  - Assist in developing anxiety-reducing skills  - Administer/offer alternative therapies  - Limit or eliminate stimulants  Outcome: Adequate for Discharge     Problem: Alteration in Orientation  Goal: Treatment Goal: Demonstrate a reduction of confusion and improved orientation to person, place, time and/or situation upon discharge, according to optimum baseline/ability  Outcome: Adequate for Discharge  Goal: Allow medical examinations, as recommended  Description: Interventions:  - Provide physical/neurological exams and/or referrals, per provider   Outcome: Adequate for Discharge  Goal: Cooperate with recommended testing/procedures  Description: Interventions:  - Determine need for ancillary testing  -  Observe for mental status changes  - Implement falls/precaution protocol   Outcome: Adequate for Discharge     Problem: Ineffective Coping  Goal: Participates in unit activities  Description: Interventions:  - Provide therapeutic environment   - Provide required programming   - Redirect inappropriate behaviors   Outcome: Adequate for Discharge     Problem: DISCHARGE PLANNING - CARE MANAGEMENT  Goal: Discharge to post-acute care or home with appropriate resources  Description: INTERVENTIONS:  - Conduct assessment to determine patient/family and health care team treatment goals, and need for post-acute services based on payer coverage, community resources, and patient preferences, and barriers to discharge  - Address psychosocial, clinical, and financial barriers to discharge as identified in assessment in conjunction with the patient/family and health care team  - Arrange appropriate level of post-acute services according to patient’s   needs and preference and payer coverage in collaboration with the physician and health care team  - Communicate with and update the patient/family, physician, and health care team regarding progress on the discharge plan  - Arrange appropriate transportation to post-acute venues  Outcome: Adequate for Discharge

## 2024-01-30 NOTE — PLAN OF CARE
Problem: Alteration in Thoughts and Perception  Goal: Treatment Goal: Gain control of psychotic behaviors/thinking, reduce/eliminate presenting symptoms and demonstrate improved reality functioning upon discharge  Outcome: Progressing  Goal: Verbalize thoughts and feelings  Description: Interventions:  - Promote a nonjudgmental and trusting relationship with the patient through active listening and therapeutic communication  - Assess patient's level of functioning, behavior and potential for risk  - Engage patient in 1 on 1 interactions  - Encourage patient to express fears, feelings, frustrations, and discuss symptoms    - [place] patient to reality, help patient recognize reality-based thinking   - Administer medications as ordered and assess for potential side effects  - Provide the patient education related to the signs and symptoms of the illness and desired effects of prescribed medications  Outcome: Progressing  Goal: Refrain from acting on delusional thinking/internal stimuli  Description: Interventions:  - Monitor patient closely, per order   - Utilize least restrictive measures   - Set reasonable limits, give positive feedback for acceptable   - Administer medications as ordered and monitor of potential side effects  Outcome: Progressing  Goal: Agree to be compliant with medication regime, as prescribed and report medication side effects  Description: Interventions:  - Offer appropriate PRN medication and supervise ingestion; conduct AIMS, as needed   Outcome: Progressing  Goal: Recognize dysfunctional thoughts, communicate reality-based thoughts at the time of discharge  Description: Interventions:  - Provide medication and psycho-education to assist patient in compliance and developing insight into his/her illness   Outcome: Progressing  Goal: Complete daily ADLs, including personal hygiene independently, as able  Description: Interventions:  - Observe, teach, and assist patient with ADLS  - Monitor and  promote a balance of rest/activity, with adequate nutrition and elimination   Outcome: Progressing     Problem: Ineffective Coping  Goal: Identifies ineffective coping skills  Outcome: Progressing  Goal: Identifies healthy coping skills  Outcome: Progressing  Goal: Demonstrates healthy coping skills  Outcome: Progressing     Problem: Anxiety  Goal: Anxiety is at manageable level  Description: Interventions:  - Assess and monitor patient's anxiety level.   - Monitor for signs and symptoms (heart palpitations, chest pain, shortness of breath, headaches, nausea, feeling jumpy, restlessness, irritable, apprehensive).   - Collaborate with interdisciplinary team and initiate plan and interventions as ordered.  - [place] patient to unit/surroundings  - Explain treatment plan  - Encourage participation in care  - Encourage verbalization of concerns/fears  - Identify coping mechanisms  - Assist in developing anxiety-reducing skills  - Administer/offer alternative therapies  - Limit or eliminate stimulants  Outcome: Progressing     Problem: Alteration in Orientation  Goal: Treatment Goal: Demonstrate a reduction of confusion and improved orientation to person, place, time and/or situation upon discharge, according to optimum baseline/ability  Outcome: Progressing  Goal: Allow medical examinations, as recommended  Description: Interventions:  - Provide physical/neurological exams and/or referrals, per provider   Outcome: Progressing  Goal: Cooperate with recommended testing/procedures  Description: Interventions:  - Determine need for ancillary testing  - Observe for mental status changes  - Implement falls/precaution protocol   Outcome: Progressing     Problem: Ineffective Coping  Goal: Participates in unit activities  Description: Interventions:  - Provide therapeutic environment   - Provide required programming   - Redirect inappropriate behaviors   Outcome: Progressing

## 2024-01-31 ENCOUNTER — OFFICE VISIT (OUTPATIENT)
Dept: FAMILY MEDICINE CLINIC | Facility: CLINIC | Age: 34
End: 2024-01-31

## 2024-01-31 ENCOUNTER — TRANSITIONAL CARE MANAGEMENT (OUTPATIENT)
Dept: FAMILY MEDICINE CLINIC | Facility: CLINIC | Age: 34
End: 2024-01-31

## 2024-01-31 VITALS
DIASTOLIC BLOOD PRESSURE: 80 MMHG | SYSTOLIC BLOOD PRESSURE: 124 MMHG | BODY MASS INDEX: 24.65 KG/M2 | HEART RATE: 60 BPM | HEIGHT: 66 IN | TEMPERATURE: 98 F | OXYGEN SATURATION: 100 % | WEIGHT: 153.4 LBS | RESPIRATION RATE: 20 BRPM

## 2024-01-31 DIAGNOSIS — K50.919 CROHN'S DISEASE WITH COMPLICATION, UNSPECIFIED GASTROINTESTINAL TRACT LOCATION (HCC): ICD-10-CM

## 2024-01-31 DIAGNOSIS — F20.0 SCHIZOPHRENIA, PARANOID TYPE (HCC): Chronic | ICD-10-CM

## 2024-01-31 DIAGNOSIS — L02.31 CELLULITIS AND ABSCESS OF BUTTOCK: ICD-10-CM

## 2024-01-31 DIAGNOSIS — L03.317 CELLULITIS AND ABSCESS OF BUTTOCK: ICD-10-CM

## 2024-01-31 DIAGNOSIS — Z92.89 HOSPITALIZATION WITHIN LAST 30 DAYS: Primary | ICD-10-CM

## 2024-01-31 NOTE — ASSESSMENT & PLAN NOTE
01/05/24 Surgery: I&D of Right Perianal abscess   Fistulectomy  Seton drain in place post-op  Has follow up with general surgery today 01/31/24    Reports mild pain to the right buttocks

## 2024-01-31 NOTE — ASSESSMENT & PLAN NOTE
Has follow up appointment with psychiatry post-hospitalization   Still has auditory hallucinations   Denies SH/SI  Current medications: Loxapine 10mg, Gabapentin 300mg, paliperidone 6mg, trazodone 100mg

## 2024-01-31 NOTE — PROGRESS NOTES
Assessment & Plan     1. Hospitalization within last 30 days    2. Crohn's disease with complication, unspecified gastrointestinal tract location (HCC)  Assessment & Plan:  Follows with GI   Short course of prednisone at hospital   Denies symptoms       3. Cellulitis and abscess of buttock  Assessment & Plan:  01/05/24 Surgery: I&D of Right Perianal abscess   Fistulectomy  [place] drain in place post-op  Has follow up with general surgery today 01/31/24    Reports mild pain to the right buttocks       4. Schizophrenia, paranoid type (HCC)  Assessment & Plan:  Has follow up appointment with psychiatry post-hospitalization   Still has auditory hallucinations   Denies SH/SI  Current medications: Loxapine 10mg, Gabapentin 300mg, paliperidone 6mg, trazodone 100mg            Subjective     Transitional Care Management Review:   Carlos Wolf is a 33 y.o. male here for TCM follow up.     During the TCM phone call patient stated:  TCM Call       Date and time call was made  1/31/2024 10:40 AM    Patient was hospitialized at  [facility]    Date of Admission  01/10/24    Date of discharge  01/30/24    Diagnosis  Schizophreniform disorder (HCC)  Anemia  Crohn's disease (HCC)  Cellulitis and abscess of buttock    Disposition  Home    Were the patients medications reviewed and updated  Yes    Current Symptoms  None          TCM Call       Scheduled for follow up?  Yes    Patients specialists  Other (comment)    Other specialists names  bradley Rogers    Referrals needed  pt re-admitted/moved to  unit    Did you obtain your prescribed medications  Yes    Do you need help managing your prescriptions or medications  No    Is transportation to your appointment needed  No    I have advised the patient to call PCP with any new or worsening symptoms  perry pemberton          Follow up from hospitalization 01/03/2024 till 01/30/2024  Cellulitis and abscess of buttock- I&D with drain placement and fistulectomy  "    Crohn's disease (HCC): short course of prednisone     Anemia   Hemoglobin on discharge 8.6. Received Venofer infusion per GI,  S/p 2 units PRBC transfusion on admission.    Schizophrenia, paranoid type  Admits to active auditory hallucinations, non commanding or threatening. Denies SH/SI, homicidal ideations.       Review of Systems   Constitutional:  Negative for activity change, chills, fatigue and fever.   HENT:  Negative for congestion, ear pain, rhinorrhea and sore throat.    Eyes:  Negative for pain and visual disturbance.   Respiratory:  Negative for cough, chest tightness and shortness of breath.    Cardiovascular:  Negative for chest pain, palpitations and leg swelling.   Gastrointestinal:  Negative for abdominal pain, constipation, diarrhea, nausea and vomiting.   Genitourinary:  Negative for decreased urine volume, dysuria, frequency, hematuria and urgency.   Musculoskeletal:  Negative for arthralgias and back pain.   Skin:  Negative for color change and rash.   Neurological:  Negative for seizures, syncope and headaches.   Psychiatric/Behavioral:  Negative for dysphoric mood. The patient is not nervous/anxious.    All other systems reviewed and are negative.      Objective     /80 (BP Location: Left arm, Patient Position: Sitting, Cuff Size: Standard)   Pulse 60   Temp 98 °F (36.7 °C) (Temporal)   Resp 20   Ht 5' 6\" (1.676 m)   Wt 69.6 kg (153 lb 6.4 oz)   SpO2 100%   BMI 24.76 kg/m²      Physical Exam  Vitals and nursing note reviewed.   Constitutional:       Appearance: Normal appearance. He is well-developed.   HENT:      Head: Normocephalic and atraumatic.      Right Ear: Tympanic membrane, ear canal and external ear normal. There is no impacted cerumen.      Left Ear: Tympanic membrane, ear canal and external ear normal. There is no impacted cerumen.      Nose: Nose normal.      Mouth/Throat:      Mouth: Mucous membranes are moist.   Eyes:      Conjunctiva/sclera: Conjunctivae " normal.      Pupils: Pupils are equal, round, and reactive to light.   Cardiovascular:      Rate and Rhythm: Normal rate and regular rhythm.      Pulses: Normal pulses.      Heart sounds: Normal heart sounds. No murmur heard.  Pulmonary:      Effort: Pulmonary effort is normal. No respiratory distress.      Breath sounds: Normal breath sounds.   Abdominal:      General: Bowel sounds are normal.      Palpations: Abdomen is soft.      Tenderness: There is no abdominal tenderness.   Musculoskeletal:         General: No swelling. Normal range of motion.      Cervical back: Normal range of motion and neck supple.      Right lower leg: No edema.      Left lower leg: No edema.   Skin:     General: Skin is warm and dry.      Capillary Refill: Capillary refill takes less than 2 seconds.   Neurological:      General: No focal deficit present.      Mental Status: He is alert and oriented to person, place, and time. Mental status is at baseline.   Psychiatric:         Mood and Affect: Mood normal.       Medications have been reviewed by provider in current encounter    EDISON Baca

## 2024-02-13 ENCOUNTER — TELEMEDICINE (OUTPATIENT)
Dept: PSYCHIATRY | Facility: CLINIC | Age: 34
End: 2024-02-13

## 2024-02-13 DIAGNOSIS — Z91.199 NO-SHOW FOR APPOINTMENT: Primary | ICD-10-CM

## 2024-02-13 NOTE — PSYCH
No Call No Show  No Charge      Carlos Wolf no showed on 02/13/24 virtual appointment. I called the patient and left a VM. He joined the visit 20 minutes late, with poor connection and noted that he is not able to figure out how to connect the camera and requested an in person visit. The patient is being scheduled for the initial intake on 2/14/24 at 2:30 PM. Denied SI/HI, intent or plan upon direct inquiry at this time.     Treatment Plan not completed within required time limits due to: Carlos Wolf no show appointment on 02/13/24

## 2024-02-13 NOTE — PSYCH
" PSYCHIATRIC EVALUATION     [facility]    Name and Date of Birth:  Carlos Wolf 33 y.o. 1990 MRN: [id number]    Date of Visit: February 14, 2024    Reason for visit:   Chief Complaint   Patient presents with    Psychiatric Evaluation    Medication Management    Psychosis    Schizophrenia    Anxiety       Visit Time  Visit Start Time: 2:18 PM  Visit Stop Time: 3:14 PM  Total Visit Duration: 56 minutes    HPI:     Carlos Wolf is a 33 y.o. AA male, single, domiciled alone in a room he rents from his step-father, currently unemployed, w/ PMH of seizure, RBBB, Crohn's disease, esophagitis, cellulitis and abscess of buttock, chronic pain of left knee, anemia and PPH of schizophrenia and ROBER, multiple prior psychiatric admissions (first in 2010 and most recently from 1/10/2024 to 1/30/2024 at [facility] 3P for A/VH, persecutory delusions and disorganized behavior; discharged on Invega 12 mg nightly, loxapine 20 mg BID, Neurontin 600 mg TID, trazodone 100 mg nightly), no prior SA, no h/o self-injurious behavior, history of medication noncompliance who presented to the mental health clinic for the initial intake and psychiatric evaluation on 2/14/24 following his recent discharge from inpatient psychiatric unit.    The pt was visited in the clinic; chart reviewed. Presented calm, cooperative, bearded and disheveled, malodorous, casually dressed w/ poor hygiene, good eye contact, anxious mood, constricted affect, talking in normal tone, volume and amount, w/ ruminating thought process, limited insight and judgement. The patient was ruminating on \"gaslighting\" and noted that \"they labeled it as schizophrenia\", but he thinks as \"medical gaslighting\". He noted that he does not tell others that he has Crohn's disease, but \"somehow they found out\" and reportedly others start talking \"subliminally\" about his medical problems which makes him \"paranoid\".  He also " "reported that in his last job, they made him feel the same way. He reported ideas of reference, paranoid ideations and persecutory delusions. He feels paranoid about his \"neighbors\" when they are \"moving around\", and reported \"thought echo\"ing and thought his neighbors were listening to him during the virtual visit yesterday. He noted that he was previously connected to [facility] for outpatient psychiatric care until 2013 when he stopped taking his meds. He reported AH as hearing \"a boy and a girl\", or a \"hermaphrodite\". He noted that the voices talk about \"stupid stuff\" like rappers, Rohan Trump, etc. He reported last heard the voices about half an hour ago before coming to the visit. He reported VH as seeing \"shadows\" and \"little silver lines\", last time was this morning. He was talking about \"Orthodoxy people gaslighting\" him and \"acting as Carlos Wolf\". He endorsed relatively good compliance with his meds and noted that it helps with \"anxiety and paranoia\", and reportedly only missed a couple of doses since was discharged. He noted that his anxiety \"dropped down\" and endorsed better mood since discharged from the inpatient unit. Endorsed good appetite and \"pretty good\" energy level. He sleeps about 9 hours nightly and denied insomnia, but later when he was filling the PHQ-9, he reported having problem initiating sleep more than half the days due to the voices. Denied any manic sxs. He reported panic attacks years ago, but not recently. Denied anhedonia, hopelessness, worthlessness or feeling guilty. Vehemently denied SI/HI, intent or plan upon direct inquiry at this time. He reported OCD sxs checking the locks in a certain order, and should make sure that all doors all locked. He fold his cloths in certain way and put them in certain order to be sure \"nobody touches the shirts\". Denied h/o eating disorder.     Psycho-education regarding indications, benefits, risks, side effects, and alternative options " provided to the patient, and the importance of the compliance with psychiatric treatment reiterated. The patient verbalized understanding and agreed to the proposed regimen. He was maintained on Neurontin 600 mg TID, Trazodone 100 mg nightly and Invega 12 mg nightly and Loxapine increased from 20 mg BID to 25 mg BID; doses to be adjusted as indicated. The patient has a history of at least 3 antipsychotic medication trials and at this time requires treatment with 2 antipsychotic agents due to multiple failed trials of monotherapy and not interested in considering Clozaril at this time.Will start individual psychotherapy on 24.      Review Of Systems:  Pertinent items are noted in HPI; all others are negative; no recent changes in medications or health status reported.   PHQ-2/9 Depression Screening    Little interest or pleasure in doing things: 0 - not at all  Feeling down, depressed, or hopeless: 1 - several days  Trouble falling or staying asleep, or sleeping too much: 1 - several days  Feeling tired or having little energy: 0 - not at all  Poor appetite or overeatin - several days  Feeling bad about yourself - or that you are a failure or have let yourself or your family down: 2 - more than half the days  Trouble concentrating on things, such as reading the newspaper or watching television: 1 - several days  Moving or speaking so slowly that other people could have noticed. Or the opposite - being so fidgety or restless that you have been moving around a lot more than usual: 2 - more than half the days  Thoughts that you would be better off dead, or of hurting yourself in some way: 0 - not at all  PHQ-9 Score: 8  PHQ-9 Interpretation: Mild depression         ROBER-7 Flowsheet Screening      Flowsheet Row Most Recent Value   Over the last 2 weeks, how often have you been bothered by any of the following problems?    Feeling nervous, anxious, or on edge 0   Not being able to stop or control worrying 2    Worrying too much about different things 3   Trouble relaxing 2   Being so restless that it is hard to sit still 3   Becoming easily annoyed or irritable 1   Feeling afraid as if something awful might happen 2   ROBER-7 Total Score 13              Past Psychiatric History:   Past Inpatient Psychiatric Treatment:   Multiple past inpatient psychiatric admissions (first in 2010 and most recently from 1/10/2024 to 1/30/2024 at [facility] 3P for A/VH, persecutory delusions and disorganized behavior; discharged on Invega 12 mg nightly, loxapine 20 mg BID, Neurontin 600 mg TID, trazodone 100 mg nightly)  Past Outpatient Psychiatric Treatment:    Was in outpatient psychiatric treatment in the past with a psychiatrist at [facility]; history of noncompliance with medication  Past Suicide Attempts: no  Past Violent Behavior: no  Past Psychiatric Medication Trials: multiple psychiatric medication trials; only remembered Haldol, Invega and Zyprexa; last discharged on 1/30/2024 on nvega 12 mg nightly, loxapine 20 mg BID, Neurontin 600 mg TID, trazodone 100 mg nightly    Traumatic History:     Abuse: no history of physical or sexual abuse  Other Traumatic Events: none     Family Psychiatric History:     History reviewed. No pertinent family history.    Substance Use History:  Reported smoking 4-5 cigarettes per day (smoking since age 18). He reportedly quit drinking alcohol in June 2023; denied any h/o binge drinking. He smoked Marijuana in the past, but denied any recent cannabis use. Denied other illicit substance use.     Social History     Substance and Sexual Activity   Alcohol Use Not Currently    Comment: Socially     Social History     Substance and Sexual Activity   Drug Use No       Social History:  Developmental:  Education: Graduated high school; some college (a couple of semester when voices started)  Marital history: single  Children: No  Living arrangement, social support: lives alone in the room he rents from his  stepfather  Occupational History: worked in a [employer] in the past (last worked in June 2023), currently unemployed - applying for SSDI  Access to firearms: Denied     Social History     Socioeconomic History    Marital status: Single     Spouse name: Not on file    Number of children: Not on file    Years of education: Not on file    Highest education level: Not on file   Occupational History    Not on file   Tobacco Use    Smoking status: Light Smoker     Current packs/day: 0.01     Types: Cigarettes, Cigars     Start date: 1/1/2009    Smokeless tobacco: Never    Tobacco comments:     Codie says 7 cigarettes per day   Vaping Use    Vaping status: Never Used   Substance and Sexual Activity    Alcohol use: Not Currently     Comment: Socially    Drug use: No    Sexual activity: Yes     Partners: Female   Other Topics Concern    Not on file   Social History Narrative    Not on file     Social Determinants of Health     Financial Resource Strain: Medium Risk (1/11/2024)    Overall Financial Resource Strain (CARDIA)     Difficulty of Paying Living Expenses: Somewhat hard   Food Insecurity: No Food Insecurity (1/11/2024)    Hunger Vital Sign     Worried About Running Out of Food in the Last Year: Never true     Ran Out of Food in the Last Year: Never true   Transportation Needs: No Transportation Needs (1/11/2024)    PRAPARE - Transportation     Lack of Transportation (Medical): No     Lack of Transportation (Non-Medical): No   Physical Activity: Not on file   Stress: Not on file   Social Connections: Not on file   Intimate Partner Violence: Not At Risk (1/11/2024)    Humiliation, Afraid, Rape, and Kick questionnaire     Fear of Current or Ex-Partner: No     Emotionally Abused: No     Physically Abused: No     Sexually Abused: No   Housing Stability: High Risk (1/11/2024)    Housing Stability Vital Sign     Unable to Pay for Housing in the Last Year: Yes     Number of Places Lived in the Last Year: 1     Unstable  "Housing in the Last Year: No       Past Medical History:    Past Medical History:   Diagnosis Date    Anemia 2004    hospitalization/transfusion    Cellulitis     Chronic knee pain     Crohn's disease (HCC)     Esophagitis     Leukocytosis     Perianal fistula     Pilonidal cyst     RBBB     Schizophrenia (HCC)     Seizure (HCC)         Past Surgical History:   Procedure Laterality Date    INCISION AND DRAINAGE OF WOUND N/A 1/5/2024    Procedure: INCISION AND DRAINAGE (I&D) PERIANAL ABSCESS, SETON PLACEMENT,EUA, FISTULECTOMY;  Surgeon: Carl Pace MD;  Location:  MAIN OR;  Service: General    DC ANRCT XM SURG REQ ANES GENERAL SPI/EDRL DX N/A 4/7/2016    Procedure: EXAM UNDER ANESTHESIA (EUA); possible REMOVAL OF FOREIGN BODY anoscopy ;  Surgeon: Reymundo Araujo MD;  Location:  MAIN OR;  Service: Colorectal    DC SURG TX ANAL FISTULA INTERSPHINCTERIC N/A 4/7/2016    Procedure: superficial FISTULOTOMY; SETON PROCEDURE drainage of chronic ischiofistula abscess and debridement;  Surgeon: Reymundo Araujo MD;  Location:  MAIN OR;  Service: Colorectal     Allergies   Allergen Reactions    Haldol Decanoate [Haloperidol] Anaphylaxis    Oxycodone-Acetaminophen Rash     \"swollon red rash\"    Sulfamethoxazole-Trimethoprim      \"never really worked\"       History Review:    The following portions of the patient's history were reviewed and updated as appropriate: allergies, current medications, past family history, past medical history, past social history, past surgical history, and problem list.    OBJECTIVE:    Vital signs in last 24 hours:    Vitals:    02/14/24 1421   Weight: 76.2 kg (168 lb)   Height: 5' 6\" (1.676 m)       Mental Status Evaluation:  Appearance and attitude: appeared as stated age, cooperative and attentive, disheveled, bearded, malodorous, with poor hygiene  Eye contact: good  Motor Function: within normal limits, intact gait, No PMA/PMR  Gait/station: normal gait/station and normal " "balance  Speech: normal for rate, rhythm, volume, latency, amount  Language: No overt abnormality  Mood/affect: anxious / Affect was constricted but reactive, mood congruent  Thought Processes: illogical, perseverative, ruminating  Thought content: denied suicidal ideations or homicidal ideations, persecutory delusions, paranoid ideation, ruminating thoughts, preoccupied with \"gaslighting\"  Associations: perseverative  Perceptual disturbances: auditory hallucinations, visual hallucinations  Orientation: oriented to time, person, place and to the situational context  Cognitive Function: intact  Memory: recent and remote memory grossly intact  Intellect: unable to assess  Fund of knowledge: aware of current events, aware of past history, and vocabulary average  Impulse control: good  Insight/judgment: limited/limited    Pain: denied  Pain scale: 0    Lab Results: I have personally reviewed pertinent lab results.        WBC   Date Value Ref Range Status   01/30/2024 12.49 (H) 4.31 - 10.16 Thousand/uL Final   04/11/2018 12.1 (H) 4.5 - 11.0 K/MCL Final     WBC, UA   Date Value Ref Range Status   01/03/2024 1-2 None Seen, 0-1, 1-2, 0-5, 2-4 /hpf Final     MCV   Date Value Ref Range Status   01/30/2024 72 (L) 82 - 98 fL Final   04/11/2018 88 80 - 100 FL Final     Lab Results   Component Value Date    BUN 7 01/11/2024    SODIUM 138 01/11/2024    CO2 27 01/11/2024     Lab Results   Component Value Date    ALKPHOS 47 01/11/2024     No results found for: \"CKMB\"  No results found for: \"TSH\"  INR   Date Value Ref Range Status   01/03/2024 1.07 0.84 - 1.19 Final     No results found for: \"APTT\"  No results found for: \"PHENO\"  Sodium   Date Value Ref Range Status   01/11/2024 138 135 - 147 mmol/L Final   09/14/2023 138 135 - 145 mmol/L Final     BUN   Date Value Ref Range Status   01/11/2024 7 5 - 25 mg/dL Final   09/14/2023 5 (L) 7 - 28 mg/dL Final     Creatinine   Date Value Ref Range Status   01/11/2024 0.74 0.60 - 1.30 mg/dL " "Final     Comment:     Standardized to IDMS reference method   09/14/2023 0.87 0.53 - 1.30 mg/dL Final     TSH 3RD GENERATON   Date Value Ref Range Status   01/11/2024 1.486 0.450 - 4.500 uIU/mL Final     Comment:     Adult TSH (3rd generation) reference range follows the recommended guidelines of the American Thyroid Association, January, 2020.     WBC   Date Value Ref Range Status   01/30/2024 12.49 (H) 4.31 - 10.16 Thousand/uL Final   04/11/2018 12.1 (H) 4.5 - 11.0 K/MCL Final     WBC, UA   Date Value Ref Range Status   01/03/2024 1-2 None Seen, 0-1, 1-2, 0-5, 2-4 /hpf Final     No components found for: \"B12\"  Lab Results   Component Value Date    FOLATE 7.3 01/04/2024     No results found for: \"RPR\"    Imaging Studies: reviewed    EKG, Pathology, and Other Studies: reviewed    Suicide/Homicide Risk Assessment:    Risk of Harm to Self:  The following ratings are based on assessment at the time of the interview  Demographic risk factors include: never , male  Historical Risk Factors include: chronic psychiatric problems, history of psychosis  Recent Specific Risk Factors include: mental illness diagnosis, presence of hallucinations, presence of paranoid ideation, presence of delusions  Protective Factors: no current suicidal ideation, access to mental health treatment  Weapons: none. The following steps have been taken to ensure weapons are properly secured: not applicable  Based on today's assessment, Carlos presents the following risk of harm to self: low    Risk of Harm to Others:  The following ratings are based on assessment at the time of the interview  Demographic Risk Factors include: male, unemployed, under age 40.  Historical Risk Factors include: none.  Recent Specific Risk Factors include: none.  Protective Factors: no current homicidal ideation  Weapons: none. The following steps have been taken to ensure weapons are properly secured: not applicable  Based on today's assessment, Carlos " "presents the following risk of harm to others: low    The following interventions are recommended: contracts for safety at present - agrees to go to ED if feeling unsafe, contracts for safety at present - agrees to call Crisis Intervention Service if feeling unsafe, referral for psychotherapy - safety plan developed with the patient and signed in the office    Assessment/Plan:   In summary, the patient is a 33 y.o. AA male, single, domiciled alone in a room he rents from his step-father, currently unemployed, w/ PMH of seizure, RBBB, Crohn's disease, esophagitis, cellulitis and abscess of buttock, chronic pain of left knee, anemia and PPH of schizophrenia and ROBER, multiple prior psychiatric admissions (most recently from 1/10/2024 to 1/30/2024 at [facility] 3P for A/VH, persecutory delusions and disorganized behavior; discharged on Invega 12 mg nightly, loxapine 20 mg BID, Neurontin 600 mg TID, trazodone 100 mg nightly), no prior SA, no h/o self-injurious behavior, history of medication noncompliance who presented to the mental health clinic for the initial intake and psychiatric evaluation on 2/14/24 following his recent discharge from inpatient psychiatric unit. His current presentation meets criteria for Schizophrenia and ROBER by history. Currently he is not at risk for suicide, homicide, self-injury, aggressive behaviors, self-neglect, or neglect of dependents or children. Given this presentation, the patient will benefit from further outpatient follow up for management of his symptoms.  Presented with ideas of reference, thought echoing, paranoid ideations, persecutory delusions, A/VH with perseverative thought process, preoccupied with \"gaslighting\", with limited insight and judgement. Endorsed better mood and less anxiety sxs since was discharged from the hospital. Denied SI/HI, intent or plan upon direct inquiry at this time. Maintained on Neurontin 600 mg TID, Trazodone 100 mg nightly and Invega 12 mg nightly and " Loxapine increased from 20 mg BID to 25 mg BID; doses to be adjusted as indicated. Will start individual psychotherapy with Pia Salinas on 2/19/2024.     Diagnoses and all orders for this visit:    Schizophrenia, paranoid type (HCC)  -     loxapine (LOXITANE) 25 mg capsule; Take 1 capsule (25 mg total) by mouth 2 (two) times a day    ROBER (generalized anxiety disorder)          TREATMENT AND RECOMMENDATIONS:  - The patient has a history of at least 3 antipsychotic medication trials and at this time requires treatment with 2 antipsychotic agents due to multiple failed trials of monotherapy  - Continue Invega 12 mg p.o. nightly for psychotic symptoms - may consider AUGUSTE Invega Sustenna if the patient agrees given the history of medication noncompliance  - Increase loxapine 20 mg to 25 mg p.o. twice daily as the adjunct treatment for psychotic symptoms; doses to be adjusted as indicated  - Continue Neurontin 600 mg 3 times daily for pain and anxiety  - Continue trazodone 100 mg nightly  - Psychoeducation regarding medication benefits and risks, side effects, indications  and alternatives provided to the patient and the importance of compliance with psychiatric medication reiterated. The pt verbalized understanding and agreed with the plan  - Patient will start individual psychotherapy with Pia Salinas on 2/19/2024.  - RTC in 4 weeks  - The patient was educated about 24 hour and weekend coverage for urgent situations accessed by calling [facility] main practice number  - Patient was educated to call National Suicide Prevention Lifeline (5-595-532-PGUO [7837]) for behavioral crisis at anytime or 911 for any safety concerns, or go to nearest ER if his symptoms become overwhelming or unmanageable.    Medications Risks/Benefits:      Risks, Benefits And Possible Side Effects Of Medications:    Risks, benefits, and possible side effects of medications explained to Carlos and he verbalizes understanding  and agreement for treatment.    Controlled Medication Discussion:     Not applicable    Treatment Plan:    Completed and signed during the session: Yes - with Carlos Watkins MD 02/14/24

## 2024-02-14 ENCOUNTER — OFFICE VISIT (OUTPATIENT)
Dept: PSYCHIATRY | Facility: CLINIC | Age: 34
End: 2024-02-14
Payer: COMMERCIAL

## 2024-02-14 VITALS — BODY MASS INDEX: 27 KG/M2 | WEIGHT: 168 LBS | HEIGHT: 66 IN

## 2024-02-14 DIAGNOSIS — F20.0 SCHIZOPHRENIA, PARANOID TYPE (HCC): Primary | Chronic | ICD-10-CM

## 2024-02-14 DIAGNOSIS — F41.1 GAD (GENERALIZED ANXIETY DISORDER): Chronic | ICD-10-CM

## 2024-02-14 PROCEDURE — 96127 BRIEF EMOTIONAL/BEHAV ASSMT: CPT | Performed by: STUDENT IN AN ORGANIZED HEALTH CARE EDUCATION/TRAINING PROGRAM

## 2024-02-14 PROCEDURE — 90792 PSYCH DIAG EVAL W/MED SRVCS: CPT | Performed by: STUDENT IN AN ORGANIZED HEALTH CARE EDUCATION/TRAINING PROGRAM

## 2024-02-14 RX ORDER — LOXAPINE SUCCINATE 25 MG/1
25 TABLET ORAL 2 TIMES DAILY
Qty: 60 CAPSULE | Refills: 0 | Status: SHIPPED | OUTPATIENT
Start: 2024-02-14 | End: 2024-03-15

## 2024-02-14 NOTE — BH TREATMENT PLAN
"TREATMENT PLAN (Medication Management Only)        [facility] - [facility]    Name and Date of Birth:  Carlos Wolf 33 y.o. 1990  Date of Treatment Plan: February 14, 2024  Diagnosis/Diagnoses:    1. Schizophrenia, paranoid type (HCC)    2. ROBER (generalized anxiety disorder)      Strengths/Personal Resources for Self-Care: \"supportive sister, playing video games, watching movies\".  Area/Areas of need (in own words): \"paranoid ideations, ideas of reference, AH and VH and anxiety sxs\"  1. Long Term Goal:  Improve paranoid ideations, ideas of reference, AH and VH and anxiety sxs .  Target Date:6 months - 8/14/2024  Person/Persons responsible for completion of goal: Carlos  2.  Short Term Objective (s) - How will we reach this goal?:   A. Provider new recommended medication/dosage changes and/or continue medication(s): continue current medications as prescribed.  B. Attend psychotherapy regularly.  C. Attend medication management appointments regularly.  Target Date:6 months - 8/14/2024  Person/Persons Responsible for Completion of Goal: Carlos  Progress Towards Goals: initiating treatment  Treatment Modality: medication management every 6 months, referral for individual psychotherapy  Review due 180 days from date of this plan: 6 months - 8/14/2024  Expected length of service: maintenance  My Physician/PA/NP and I have developed this plan together and I agree to work on the goals and objectives. I understand the treatment goals that were developed for my treatment.      "

## 2024-02-14 NOTE — BH CRISIS PLAN
"Client Name: Carlos Wolf       Client YOB: 1990    EdvinAzael Safety Plan      Creation Date: 2/14/24 Update Date: 2/14/24   Created By: Lucero aWtkins MD Last Updated By: Lucero Watkins MD      Step 1: Warning Signs:   Warning Signs   \"started listening to answering myself at the same time\"            Step 2: Internal Coping Strategies:   Internal Coping Strategies   playing video games on the phone            Step 3: People and social settings that provide distraction:   Name Contact Information   Sister (Cortney) saved in cellphone    Places   going to bathroom and playing video games           Step 4: People whom I can ask for help during a crisis:      Name Contact Information    Sister (Cortney) saved in cellphone      Step 5: Professionals or agencies I can contact during a crisis:      Clinican/Agency Name Phone Emergency Contact    Lucero Watikns MD (psychiatrist) [phone]       Local Emergency Department Emergency Department Phone Emergency Department Address    [facility] [phone] [address] [place] [address]        Crisis Phone Numbers:   Suicide Prevention Lifeline: Call or Text  842 Crisis Text Line: Text HOME to 341-894   Please note: Some [facility] do not have a separate number for Child/Adolescent specific crisis. If your county is not listed under Child/Adolescent, please call the adult number for your county      Adult Crisis Numbers: Child/Adolescent Crisis Numbers   [facility]: [phone] [facility]: [phone]   [facility]: [phone] [facility]: [phone]   [facility]: [phone] [place]: [phone]   [facility]: [phone] [place] [facility]: [phone]   [place] [facility]: [phone]   [facility]: [phone]   [facility]: [phone]   [facility]: [phone] (daytime) [phone] (after hours, weekends, holidays)      Step 6: Making " the environment safer (plan for lethal means safety):   Patient did not identify any lethal methods: Yes     Optional: What is most important to me and worth living for?      [employer] Safety Plan. Lizzette Pardo and Marvel Addison. Used with permission of the authors.

## 2024-02-19 ENCOUNTER — SOCIAL WORK (OUTPATIENT)
Dept: BEHAVIORAL/MENTAL HEALTH CLINIC | Facility: CLINIC | Age: 34
End: 2024-02-19
Payer: COMMERCIAL

## 2024-02-19 DIAGNOSIS — F20.0 SCHIZOPHRENIA, PARANOID TYPE (HCC): Primary | ICD-10-CM

## 2024-02-19 PROCEDURE — 90791 PSYCH DIAGNOSTIC EVALUATION: CPT

## 2024-02-19 NOTE — PSYCH
" Behavioral Health Psychotherapy Assessment    Date of Initial Psychotherapy Assessment: 02/19/24  Referral Source: Self/Hospital  Has a release of information been signed for the referral source? NA    Preferred Name: Carlos Wolf  Preferred Pronouns: He/him  YOB: 1990 Age: 33 y.o.  Sex assigned at birth: male   Gender Identity: male  Race:   Preferred Language: English    Emergency Contact:  Full Name: Brittney Koch  Relationship to Client: Mother  Contact information: [phone]     Primary Care Physician:  Kaykay Monroe DO  [address] [facility] [place] [address]  [phone]  Has a release of information been signed? NA    Physical Health History:  Past surgical procedures: cyst removed, blood transfusion   Do you have a history of any of the following: other reports 2-3 seizures from a medication at [facility], Crohn's Disease   Do you have any mobility issues? No    Relevant Family History:  None that he is aware of    Presenting Problem (What brings you in?)  \"I try to talk about the voices\" and that it is labeled Schizophrenia. Calros reports that the \"end of last year was my worse year yet\" as the voices increased. Carlos feels the voices follow him, that his thoughts have echos, feels people are listening to him and then over thinks. He reports the voices tell him things (like go left or go right) but he is able to ignore the voices for the most part, though often has to argue with the voices, who want him to tell more than he wants and ask, \"Why can't I talk to you?\"   He feels the medication is helping with anxiety due to voices but auditory hallucinations are still present. He has a routine for medication in which he takes his medications whenever he eats a meal. Appetite is \"pretty good,\" eating about 4x per day; no weight loss or gain. When asked about his energy level, he reports, \"I just drunk a monster\" and that his energy is a little sluggish.  Carlos " "also shared that he ate a green apple last night and vomited all night, so he is a little tired today. Carlos enjoys video games and listening to music. He feels guilt for hearing voices and feeling it's his fault. He also feels he is being gaslighted. He sometimes sees \"silver lining thing\" like shadows. Sleep is \"random\" as he finds it hard to fall asleep at night but can sleep a few hours; wakes up early- 9am but typically falls back asleep around 5-6pm which affects sleep at night time. Carlos has a hard time concentrating due to ruminating thoughts and \"I've been thinking about a lot of stuff.\" Denies suicidal ideations. He does not drive but is able to utilize [employer] for transportation purposes. Treatment Goal for Psychotherapy: \"Cure for the voices\"      HPI from Dr. BLANCA Watkins psychiatric evaluation on 2/14/24: Carlos Wolf is a 33 y.o. AA male, single, domiciled alone in a room he rents from his step-father, currently unemployed, w/ PMH of seizure, RBBB, Crohn's disease, esophagitis, cellulitis and abscess of buttock, chronic pain of left knee, anemia and PPH of schizophrenia and ROBER, multiple prior psychiatric admissions (first in 2010 and most recently from 1/10/2024 to 1/30/2024 at [facility] for A/VH, persecutory delusions and disorganized behavior; no prior SA, no h/o self-injurious behavior, history of medication noncompliance. Presented to clinic with ruminating thought process, limited insight and judgement. The patient was ruminating on \"gaslighting\" and noted that \"they labeled it as schizophrenia\", but he thinks as \"medical gaslighting\". He noted that he does not tell others that he has Crohn's disease, but \"somehow they found out\" and reportedly others start talking \"subliminally\" about his medical problems which makes him \"paranoid\". He also reported that in his last job, they made him feel the same way. He reported ideas of reference, paranoid ideations and persecutory delusions. He " "feels paranoid about his \"neighbors\" when they are \"moving around\", and reported \"thought echo\"ing and thought his neighbors were listening to him during the virtual visit yesterday. He noted that he was previously connected to [facility] for outpatient psychiatric care until 2013 when he stopped taking his meds. He reported AH as hearing \"a boy and a girl\", or a \"hermaphrodite\". He noted that the voices talk about \"stupid stuff\" like rappers, Rohan Trump, etc. He reported last heard the voices about half an hour ago before coming to the visit. He reported VH as seeing \"shadows\" and \"little silver lines\", last time was this morning. He was talking about \"[employer] people gaslighting\" him and \"acting as Carlos Luciano\". He endorsed relatively good compliance with his meds and noted that it helps with \"anxiety and paranoia\", and reportedly only missed a couple of doses since was discharged. He noted that his anxiety \"dropped down\" and endorsed better mood since discharged from the inpatient unit. Endorsed good appetite and \"pretty good\" energy level. He sleeps about 9 hours nightly and denied insomnia, but later when he was filling the PHQ-9, he reported having problem initiating sleep more than half the days due to the voices. Denied any manic sxs. He reported panic attacks years ago, but not recently. Denied anhedonia, hopelessness, worthlessness or feeling guilty. Vehemently denied SI/HI, intent or plan upon direct inquiry at this time. He reported OCD sxs checking the locks in a certain order, and should make sure that all doors all locked. He fold his cloths in certain way and put them in certain order to be sure \"nobody touches the shirts\". Denied h/o eating disorder.     Mental Health Advance Directive:  Do you currently have a Mental Health Advance Directive?no    Diagnosis:   Diagnosis ICD-10-CM Associated Orders   1. Schizophrenia, paranoid type (HCC)  F20.0           Initial Assessment:     Current Mental " Status:    Appearance: casual      Behavior/Manner: cooperative      Affect/Mood:  Sad, relaxed and euthymic    Speech:  Normal    Sleep:  Insomnia and interrupted    Oriented to: oriented to self, oriented to place and oriented to time       Clinical Symptoms    Depression: yes      Anxiety: yes      Psychosis: yes      Depression Symptoms: restlessness, social isolation, poor concentration, sleep disturbance and irritable      Anxiety Symptoms: excessive worry, irritable, nervous/anxious, difficulty controlling worry and restlessness      Psychosis Symptoms: hallucinations      Were you under the influence of drugs or alcohol: No      Have you ever been assaultive to others or the environment: No      Have you ever been self-injurious: No      Counseling History:  Previous Counseling or Treatment  (Mental Health or Drug & Alcohol): Yes    Previous Counseling Details:  [facility]-- was doing counseling with them but had complications with medication     Has had acute in patient hospitalizations in the past; uncertain the number of hospitalizations but recently discharged from [facility] behavioral unit  Have you previously taken psychiatric medications: Yes    Previous Medications Attempted:  See psychiatric evaluation    Suicide Risk Assessment  Have you ever had a suicide attempt: No    Have you had incidents of suicidal ideation: No    Are you currently experiencing suicidal thoughts: No      Substance Abuse/Addiction Assessment:  Alcohol: Yes    Age of First Use:  21  Age of regular use:  20s  Frequency:  Weekly  Amount:  3x per week (3-4 glasses of liquor)  Last use:  Stopped drinking around last June 2023- due to Crohn's  Heroin: No    Fentanyl: No    Opiates: No    Cocaine: No    Amphetamines: No    Hallucinogens: No    Club Drugs: No    Benzodiazepines: No    Other Rx Meds: No    Marijuana: Yes    Age of First Use:  17  Age of regular use:  20s  Frequency:  Other  Other frequency:  At party  Amount:   A few hits  Method:  Smoke/pipe  Last Use:  Can't remember  Tobacco/Nicotine: Yes    Age of First Use:  18  Age of regular use:  20s  Frequency:  Daily  Amount:  4 cigarettes per day  Method:  Smoke/pipe  Last Use:  Today  Are you interested in resources for smoking cessation: No    Have you experienced blackouts as a result of substance use: No    Have you had any periods of abstinence: No    Have you experienced symptoms of withdrawal: No    Have you ever overdosed on any substances?: No    Are you currently using any Medication Assisted Treatment for Substance Use: No      Compulsive Behaviors:  Compulsive Behavior Information:  Denies compulsive bx    Disordered Eating History:  Do you have a history of disordered eating: No      Social Determinants of Health:    SDOH:  Housing needs/homelessness, unemployment/underemployment, transportation and social isolation    Trauma and Abuse History:    Have you ever been abused: No      Legal History:    Have you ever been arrested or had a DUI: Yes      Have you been incarcerated: No      Are you currently on parole/probation: No      Any current Children and Youth involvement: No      Any pending legal charges: No      Additional Legal History:  Called police because he thought neighbor was in the house-- said arguing too loud    Charges are unclear        Relationship History:    Current marital status: single      Natural Supports:  Siblings and mother    Relationship History:  Rents room from his step-father: Eldon, get along    Mom- mom and stepdad split when he was 17     Sister- Cortney (35) -mat half- lives by self ([place]); good relationship with her  Brother- Eldon (19)-  mat half w stepdad-- lives w mom  Brother- Luis (31)- mat half- lives in [facility]     Bio dad- met him 1x, lives in [place], [place]    Born in [place] and raised in [place]    Employment History    Are you currently employed: No      Currently seeking employment: No      Longest period of  employment:  Worked in a [employer] in the past (last worked in June 2023)    Future work goals:  Currently unemployed - applying for SSDI    Sources of income/financial support:  Family members     History:      Status: no history of  duty  Educational History:     Have you ever been diagnosed with a learning disability: No      Highest level of education:  Some college    School attended/attending:  [employer] graduate, was studying nursing    Have you ever had an IEP or 504-plan: No      Do you need assistance with reading or writing: No      Recommended Treatment:     Psychotherapy:  Individual sessions    Frequency:  2 times    Session frequency:  Monthly      Visit start and stop times:    02/19/24  Start Time: 1100  Stop Time: 1155  Total Visit Time: 55 minutes

## 2024-02-19 NOTE — BH TREATMENT PLAN
"Outpatient Behavioral Health Psychotherapy Treatment Plan    Carlso Wolf  1990     Date of Initial Psychotherapy Assessment: 2/19/24   Date of Current Treatment Plan: 02/19/24  Treatment Plan Target Date: 7/19/24  Treatment Plan Expiration Date: 8/19/24    Diagnosis:   1. Schizophrenia, paranoid type (HCC)            Area(s) of Need: voices, anxiety, paranoia    Long Term Goal 1 (in the client's own words): \"Cure for the voices\"     Stage of Change: Preparation    Target Date for completion: 8/19/24     Anticipated therapeutic modalities: engagement strategies, client-centered therapy, cognitive behavioral therapy (CBT), cognitive processing therapy, dialectical behavior therapy skills, family therapy, mindfulness-based strategies, solution-focused therapy, supportive psychotherapy, psychoeducation     People identified to complete this goal: Carlos      Objective 1: (identify the means of measuring success in meeting the objective): Carlos will learn and implement at least 2 calming skills and problem-solving strategies to reduce overall anxiety and manage symptoms.      Objective 2: (identify the means of measuring success in meeting the objective): Carlos  will develop at least one strategy to help challenge negative thinking errors caused by voices    Objective 3: (identify the means of measuring success in meeting the objective): Carlos will maintain a level of anxiety that does not surpass a 5/10 on most days. Incidents that surpass this limit will be process in therapy sessions.     I am currently under the care of a [facility] psychiatric provider: yes    My [facility] psychiatric provider is: Dr. Lucero Watkins     I am currently taking psychiatric medications: Yes, as prescribed    I feel that I will be ready for discharge from mental health care when I reach the following (measurable goal/objective): when able to identify and practice at least 3 coping skills, utilize at least 2 social " supports and discuss with therapist my readiness at 6 month treatment review or when I feel progress has been made    For children and adults who have a legal guardian:   Has there been any change to custody orders and/or guardianship status? NA. If yes, attach updated documentation.    I have created my Crisis Plan and have been offered a copy of this plan    Behavioral Health Treatment Plan [facility]: Diagnosis and Treatment Plan explained to Carlos Wolf acknowledges an understanding of their diagnosis. Carlos Wolf agrees to this treatment plan.    I have been offered a copy of this Treatment Plan. yes

## 2024-02-26 NOTE — DISCHARGE SUMMARY
[facility]  Discharge- Brooke Gil 1990, 35 y.o. male MRN: [id number]  Unit/Bed#: 1575 [address] [place] 221-02 Encounter: [id number]  Primary Care Provider: No primary care provider on file. Date and time admitted to hospital: 7/29/2023 11:36 AM      Admitting Provider:  Henrietta Joshua MD  Discharge Provider:  Tracy Herzog DO  Admission Date: 7/29/2023       Discharge Date: 08/04/23   LOS: 6  Primary Care Physician at Discharge: No primary care provider on file. None    HOSPITAL COURSE:  Brooke Gil is a 35 y.o. male who presented with anemia. The patient has a past medical history of Crohn's disease for which she is not currently on treatment. He also has a history of schizophrenia. The patient was transfused trans of packed red blood cells. His hemoglobin remained stable. He underwent endoscopy and colonoscopy. EGD was concerning for possible esophageal candidiasis he was started on fluconazole. He was noted to have severe disease and was started on corticosteroid treatment with improvement in his symptoms. Likely etiology of the patient's anemia is related to his Crohn's disease. He will need likely Remicade as an outpatient once his steroids have been completed. Given the extent of disease he will need repeat colonoscopy in 6 months. Patient did undergo operative management by the surgical service for fistula, he was started on antibiotics postoperatively. At the time of discharge the patient was tolerating oral diet they were without acute complaint and they were medically cleared for discharge. All questions were answered the patient's satisfaction and they were in agreement with the discharge plan. DISCHARGE DIAGNOSES  * Anemia  Assessment & Plan  Received 3 units packed RBC so far. We will continue monitoring hemoglobin levels.     · Appreciate GI recommendations, for bidirectional evaluation with an EGD and colonoscopy  · Continue PPI · Suspect anemia from malabsorption from Crohn's disease  · Will need outpatient B12 level, patient was transfused and B12 likely to be unreliable    Results from last 7 days   Lab Units 08/03/23  0443 08/02/23  0557 08/01/23  0450 07/30/23  1616 07/30/23  0608   HEMOGLOBIN g/dL 7.9* 8.0* 8.8* 8.7* 6.6*   MCV fL 75* 73* 73*  --  70*   RDW % 26.2* 26.0* 25.5*  --  22.8*   IRON ug/dL  --   --   --   --  34*   TIBC ug/dL  --   --   --   --  179*   FERRITIN ng/mL  --   --   --   --  19*         Esophagitis  Assessment & Plan  Thought to have candidal esophagitis  Continue Diflucan day #5 of 14    Severe protein-calorie malnutrition (HCC)  Assessment & Plan  Malnutrition Findings:   Adult Malnutrition type: Acute illness  Adult Degree of Malnutrition: Other severe protein calorie malnutrition  Malnutrition Characteristics: Inadequate energy, Weight loss                  360 Statement: Severe calorie-protein malnutrition in context of acute illness r/t poor appetite, inadequate PO intake, altered GI function as evidance by energy intake less than 50% compared to estimated needsx3 weeks, significant 9.5% wt loss x 1 month ( 64.2kg 6/25/23-> 58kg 7/29/23); Treated with PO diet    BMI Findings: Body mass index is 20.64 kg/m². Crohn's disease [facility])  Assessment & Plan  Patient was diagnosed with Crohns disease at age 15  Found to have severe Crohn's on colonoscopy, as well as stricture at the ileocecal valve. 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 Has disease of the terminal ileum  Given fistula with purulent material we will plan for course of Keflex over the next 5 to 7 days. Continue a course of antibiotics       Schizophrenia ([address])  Assessment & Plan  Currently not on any medications.     Will need outpatient follow-up with psychiatry at discharge      [address] PSYCHIATRY  IP CONSULT TO ACUTE CARE SURGERY    PROCEDURES PERFORMED  * No surgery found *    RADIOLOGY RESULTS  XR chest portable    Result Date: 8/3/2023    Impression: No acute consolidation No congestion Resident: Kim Carrasco I, the attending radiologist, have reviewed the images and agree with the final report above. Colonoscopy   RECOMMENDATION:  Await pathology results  Repeat colonoscopy in 6 months  Inflammatory bowel disease  Start IV Solu-Medrol 20 mg every 8 hours. Patient will benefit from Remicade as an outpatient. If he does not improve with steroids during hospital stay will consider giving dose inpatient. Low residue diet. DVT prophylaxis. GI will continue to follow. Result Date: 8/1/2023    Impression: Multiple ulcers in the terminal ileum, ileocecal valve, cecum, ascending colon, hepatic flexure, transverse colon, splenic flexure, descending colon and sigmoid colon Severe abnormal mucosa, consistent with IBD in the terminal ileum and throughout the colon Large complex, cutaneous fistula with no drainage and no fluctuance in the anal region RECOMMENDATION:  Await pathology results  Repeat colonoscopy in 6 months  Inflammatory bowel disease  Start IV Solu-Medrol 20 mg every 8 hours. Patient will benefit from Remicade as an outpatient. If he does not improve with steroids during hospital stay will consider giving dose inpatient. Low residue diet. DVT prophylaxis. GI will continue to follow. EGD    Result Date: 7/31/2023    Impression: 2 cm hiatal hernia White plaque in the esophagus with the appearance of Candida esophagitis Mild antral gastritis. Biopsied The duodenum appeared normal. Performed random biopsy to rule out celiac disease. RECOMMENDATION:  Await pathology results Start fluconazole course for Candida esophagitis    CT abdomen pelvis w contrast    Addendum Date: 7/30/2023 Addendum:   ADDENDUM: Addition to the impression: 6. Mild periportal edema.   This is a nonspecific finding that can be seen in hepatitis, cholangitis, blunt abdominal trauma, meghan hepatis lymphadenopathy, CHF and several less common entities. Result Date: 7/30/2023      Impression: 1. Crohn's disease involving the distal 6 cm of terminal ileum. 2.  Small intestine otherwise normal in appearance. 3.  Pancolitis, presumably Crohn's disease. 4.  No evidence of intestinal fistula or sinus tract. 5.  Cutaneous and subcutaneous thickening in the inferomedial portions of both buttocks, along the gluteal cleft, possibly cellulitis related to perianal Crohn's disease. In addition, 2 cm subcutaneous collection, consistent with abscess, in the left inferomedial buttock.          LABS  Results from last 7 days   Lab Units 08/03/23  0443 08/02/23  0557 08/01/23  0450 07/30/23  1616 07/30/23  0608 07/29/23  1224   WBC Thousand/uL 19.47* 22.73* 14.30*  --  15.08* 14.85*   HEMOGLOBIN g/dL 7.9* 8.0* 8.8* 8.7* 6.6* 4.9*   HEMATOCRIT % 28.1* 28.0* 30.9* 29.5* 23.0* 18.8*   MCV fL 75* 73* 73*  --  70* 66*   PLATELETS Thousands/uL 592* 702* 706*  --  683* 803*     Results from last 7 days   Lab Units 08/03/23 0443 08/02/23  0557 08/01/23  0450 07/30/23  0608 07/29/23  1224   SODIUM mmol/L 135 136 133* 135 135   POTASSIUM mmol/L 4.1 3.9 4.1 3.8 3.6   CHLORIDE mmol/L 103 103 102 103 102   CO2 mmol/L 27 27 23 27 25   BUN mg/dL 10 6 6 3* 5   CREATININE mg/dL 0.74 0.75 0.87 0.63 0.83   CALCIUM mg/dL 8.2* 8.5 8.3* 8.2* 8.5   ALBUMIN g/dL  --   --   --  2.7* 2.9*   TOTAL BILIRUBIN mg/dL  --   --   --  0.45 0.19*   ALK PHOS U/L  --   --   --  56 66   ALT U/L  --   --   --  4* 5*   AST U/L  --   --   --  6* 10*   EGFR ml/min/1.73sq m 121 120 113 129 115   GLUCOSE RANDOM mg/dL 122 105 165* 78 100                                        Cultures:         Invalid input(s): "URIBILINOGEN"        Results from last 7 days   Lab Units 07/30/23  0339   C DIFF TOXIN B BY PCR  Negative       PHYSICAL EXAM:  Vitals:   Blood Pressure: 123/74 (08/04/23 0740)  Pulse: (!) 52 (08/04/23 0740)  Temperature: 98.9 °F (37.2 °C) (08/04/23 0740)  Temp Source: Oral (08/03/23 2135)  Respirations: 16 (08/04/23 0740)  Height: 5' 6" (167.6 cm) (07/29/23 1805)  Weight - Scale: 58 kg (127 lb 13.9 oz) (07/29/23 1805)  SpO2: 97 % (08/04/23 0740)      General: well appearing, no acute distress  HEENT: atraumatic, PERRLA, moist mucosa, normal pharynx, normal tonsils and adenoids, normal tongue, no fluid in sinuses  Neck: Trachea midline, no carotid bruit, no masses  Respiratory: normal chest wall expansion, CTA B, no r/r/w, no rubs  Cardiovascular: RRR, no m/r/g, Normal S1 and S2  Abdomen: Soft, non-tender, non-distended, normal bowel sounds in all quadrants, no hepatosplenomegaly, no tympany  Rectal: deferred  Musculoskeletal: normal ROM in upper and lower extremities  Integumentary: warm, dry, and pink, with no rash, purpura, or petechia  Heme/Lymph: no lymphadenopathy, no bruises  Neurological: Cranial Nerves II-XII grossly intact, no tics, normal sensation to pressure and light touch  Psychiatric: cooperative with normal mood, affect, and cognition      Discharge Disposition: Home/Self Care      Test Results Pending at Discharge:   Pending Labs     Order Current Status    Tissue Exam In process    Tissue Exam In process              Medications   · Summary of Medication Adjustments made as a result of this hospitalization: See discharge list  · Medication Dosing Tapers - Please refer to Discharge Medication List for details on any medication dosing tapers (if applicable to patient). · Discharge Medication List: See after visit summary for reconciled discharge medications.      Diet restrictions:         Diet Orders   (From admission, onward)             Start     Ordered    07/31/23 1841  Diet GI; Lo Fiber/Lo Residue  Diet effective now        References:    Adult Nutrition Support Algorithm    RD Therapeutic Diet Order Protocol   Question Answer Comment   Diet Type GI GI Lo Fiber/Lo Residue        07/31/23 2831              Activity restrictions: No strenuous activity  Discharge Condition: good    Outpatient Follow-Up and Discharge Instructions  See after visit summary section titled Discharge Instructions for information provided to patient and family. Code Status: Level 1 - Full Code  Discharge Statement   I spent 35 minutes discharging the patient. This time was spent on the day of discharge. Greater than 50% of total time was spent with the patient and / or family counseling and / or coordination of care. ** Please Note: This note was completed in part utilizing M-[employer] Fluency Direct Software. Grammatical errors, random word insertions, spelling mistakes, and incomplete sentences may be an occasional consequence of this system secondary to software limitations, ambient noise, and hardware issues. If you have any questions or concerns about the content, text, or information contained within the body of this dictation, please contact the provider for clarification. ** [Routine Follow-Up] : a routine follow-up visit for [Shortness of Breath] : shortness of breath [Asthma/RAD] : asthma/RAD [Patient] : patient [Father] : father [Other: _____] : [unfilled]

## 2024-02-27 DIAGNOSIS — F20.0 SCHIZOPHRENIA, PARANOID TYPE (HCC): Chronic | ICD-10-CM

## 2024-02-27 RX ORDER — GABAPENTIN 300 MG/1
600 CAPSULE ORAL 3 TIMES DAILY
Qty: 90 CAPSULE | Refills: 1 | Status: SHIPPED | OUTPATIENT
Start: 2024-02-27

## 2024-03-12 ENCOUNTER — OFFICE VISIT (OUTPATIENT)
Dept: PSYCHIATRY | Facility: CLINIC | Age: 34
End: 2024-03-12
Payer: COMMERCIAL

## 2024-03-12 VITALS — WEIGHT: 171 LBS | BODY MASS INDEX: 27.48 KG/M2 | HEIGHT: 66 IN

## 2024-03-12 DIAGNOSIS — F41.1 GAD (GENERALIZED ANXIETY DISORDER): ICD-10-CM

## 2024-03-12 DIAGNOSIS — F20.0 SCHIZOPHRENIA, PARANOID TYPE (HCC): Primary | ICD-10-CM

## 2024-03-12 PROCEDURE — 90833 PSYTX W PT W E/M 30 MIN: CPT | Performed by: STUDENT IN AN ORGANIZED HEALTH CARE EDUCATION/TRAINING PROGRAM

## 2024-03-12 PROCEDURE — 99214 OFFICE O/P EST MOD 30 MIN: CPT | Performed by: STUDENT IN AN ORGANIZED HEALTH CARE EDUCATION/TRAINING PROGRAM

## 2024-03-12 RX ORDER — TRAZODONE HYDROCHLORIDE 100 MG/1
100 TABLET ORAL
Qty: 30 TABLET | Refills: 1 | Status: SHIPPED | OUTPATIENT
Start: 2024-03-12

## 2024-03-12 RX ORDER — GABAPENTIN 300 MG/1
600 CAPSULE ORAL 3 TIMES DAILY
Qty: 90 CAPSULE | Refills: 1 | Status: SHIPPED | OUTPATIENT
Start: 2024-03-12

## 2024-03-12 RX ORDER — PALIPERIDONE 6 MG/1
12 TABLET, EXTENDED RELEASE ORAL
Qty: 60 TABLET | Refills: 1 | Status: SHIPPED | OUTPATIENT
Start: 2024-03-12
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
## 2024-03-19 ENCOUNTER — APPOINTMENT (EMERGENCY)
Dept: CT IMAGING | Facility: HOSPITAL | Age: 34
End: 2024-03-19
Payer: COMMERCIAL

## 2024-03-19 ENCOUNTER — HOSPITAL ENCOUNTER (EMERGENCY)
Facility: HOSPITAL | Age: 34
Discharge: HOME | End: 2024-03-19
Attending: EMERGENCY MEDICINE | Admitting: EMERGENCY MEDICINE
Payer: COMMERCIAL

## 2024-03-19 ENCOUNTER — APPOINTMENT (EMERGENCY)
Dept: RADIOLOGY | Facility: HOSPITAL | Age: 34
End: 2024-03-19
Payer: COMMERCIAL

## 2024-03-19 VITALS
SYSTOLIC BLOOD PRESSURE: 102 MMHG | OXYGEN SATURATION: 97 % | DIASTOLIC BLOOD PRESSURE: 57 MMHG | RESPIRATION RATE: 16 BRPM | HEART RATE: 102 BPM | TEMPERATURE: 97.8 F

## 2024-03-19 DIAGNOSIS — R41.82 AMS (ALTERED MENTAL STATUS): ICD-10-CM

## 2024-03-19 DIAGNOSIS — E16.2 HYPOGLYCEMIA: Primary | ICD-10-CM

## 2024-03-19 DIAGNOSIS — F20.0 SCHIZOPHRENIA, PARANOID TYPE (HCC): Chronic | ICD-10-CM

## 2024-03-19 LAB
ALBUMIN SERPL BCP-MCNC: 4 G/DL (ref 3.5–5)
ALP SERPL-CCNC: 66 U/L (ref 34–104)
ALT SERPL W P-5'-P-CCNC: 12 U/L (ref 7–52)
AMPHETAMINES SERPL QL SCN: NEGATIVE
ANION GAP SERPL CALCULATED.3IONS-SCNC: 8 MMOL/L (ref 4–13)
AST SERPL W P-5'-P-CCNC: 11 U/L (ref 13–39)
ATRIAL RATE: 91 BPM
BARBITURATES UR QL: NEGATIVE
BASOPHILS # BLD AUTO: 0.05 THOUSANDS/ÂΜL (ref 0–0.1)
BASOPHILS NFR BLD AUTO: 0 % (ref 0–1)
BENZODIAZ UR QL: NEGATIVE
BILIRUB SERPL-MCNC: 0.19 MG/DL (ref 0.2–1)
BILIRUB UR QL STRIP: NEGATIVE
BUN SERPL-MCNC: 8 MG/DL (ref 5–25)
CALCIUM SERPL-MCNC: 9.1 MG/DL (ref 8.4–10.2)
CHLORIDE SERPL-SCNC: 101 MMOL/L (ref 96–108)
CLARITY UR: CLEAR
CO2 SERPL-SCNC: 28 MMOL/L (ref 21–32)
COCAINE UR QL: NEGATIVE
COLOR UR: NORMAL
CREAT SERPL-MCNC: 0.99 MG/DL (ref 0.6–1.3)
EOSINOPHIL # BLD AUTO: 0.07 THOUSAND/ÂΜL (ref 0–0.61)
EOSINOPHIL NFR BLD AUTO: 0 % (ref 0–6)
ERYTHROCYTE [DISTWIDTH] IN BLOOD BY AUTOMATED COUNT: 19.6 % (ref 11.6–15.1)
GFR SERPL CREATININE-BSD FRML MDRD: 99 ML/MIN/1.73SQ M
GLUCOSE SERPL-MCNC: 51 MG/DL (ref 65–140)
GLUCOSE SERPL-MCNC: 64 MG/DL (ref 65–140)
GLUCOSE SERPL-MCNC: 93 MG/DL (ref 65–140)
GLUCOSE UR STRIP-MCNC: NEGATIVE MG/DL
HCT VFR BLD AUTO: 30.7 % (ref 36.5–49.3)
HGB BLD-MCNC: 8.8 G/DL (ref 12–17)
HGB UR QL STRIP.AUTO: NEGATIVE
IMM GRANULOCYTES # BLD AUTO: 0.08 THOUSAND/UL (ref 0–0.2)
IMM GRANULOCYTES NFR BLD AUTO: 1 % (ref 0–2)
KETONES UR STRIP-MCNC: NEGATIVE MG/DL
LEUKOCYTE ESTERASE UR QL STRIP: NEGATIVE
LIPASE SERPL-CCNC: 12 U/L (ref 11–82)
LYMPHOCYTES # BLD AUTO: 2 THOUSANDS/ÂΜL (ref 0.6–4.47)
LYMPHOCYTES NFR BLD AUTO: 13 % (ref 14–44)
MAGNESIUM SERPL-MCNC: 1.8 MG/DL (ref 1.9–2.7)
MCH RBC QN AUTO: 20.8 PG (ref 26.8–34.3)
MCHC RBC AUTO-ENTMCNC: 28.7 G/DL (ref 31.4–37.4)
MCV RBC AUTO: 73 FL (ref 82–98)
METHADONE UR QL: NEGATIVE
MONOCYTES # BLD AUTO: 0.87 THOUSAND/ÂΜL (ref 0.17–1.22)
MONOCYTES NFR BLD AUTO: 6 % (ref 4–12)
NEUTROPHILS # BLD AUTO: 12.75 THOUSANDS/ÂΜL (ref 1.85–7.62)
NEUTS SEG NFR BLD AUTO: 80 % (ref 43–75)
NITRITE UR QL STRIP: NEGATIVE
NRBC BLD AUTO-RTO: 0 /100 WBCS
OPIATES UR QL SCN: NEGATIVE
OXYCODONE+OXYMORPHONE UR QL SCN: NEGATIVE
P AXIS: 66 DEGREES
PCP UR QL: NEGATIVE
PH UR STRIP.AUTO: 6.5 [PH]
PLATELET # BLD AUTO: 618 THOUSANDS/UL (ref 149–390)
PMV BLD AUTO: 7.8 FL (ref 8.9–12.7)
POTASSIUM SERPL-SCNC: 4 MMOL/L (ref 3.5–5.3)
PR INTERVAL: 134 MS
PROT SERPL-MCNC: 8 G/DL (ref 6.4–8.4)
PROT UR STRIP-MCNC: NEGATIVE MG/DL
QRS AXIS: 39 DEGREES
QRSD INTERVAL: 138 MS
QT INTERVAL: 402 MS
QTC INTERVAL: 494 MS
RBC # BLD AUTO: 4.23 MILLION/UL (ref 3.88–5.62)
SODIUM SERPL-SCNC: 137 MMOL/L (ref 135–147)
SP GR UR STRIP.AUTO: 1.01 (ref 1–1.04)
T WAVE AXIS: 28 DEGREES
THC UR QL: NEGATIVE
UROBILINOGEN UA: NEGATIVE MG/DL
VENTRICULAR RATE: 91 BPM
WBC # BLD AUTO: 15.82 THOUSAND/UL (ref 4.31–10.16)

## 2024-03-19 PROCEDURE — 80053 COMPREHEN METABOLIC PANEL: CPT

## 2024-03-19 PROCEDURE — 96375 TX/PRO/DX INJ NEW DRUG ADDON: CPT

## 2024-03-19 PROCEDURE — 99285 EMERGENCY DEPT VISIT HI MDM: CPT | Performed by: EMERGENCY MEDICINE

## 2024-03-19 PROCEDURE — 96361 HYDRATE IV INFUSION ADD-ON: CPT

## 2024-03-19 PROCEDURE — 81003 URINALYSIS AUTO W/O SCOPE: CPT

## 2024-03-19 PROCEDURE — 99284 EMERGENCY DEPT VISIT MOD MDM: CPT

## 2024-03-19 PROCEDURE — 70450 CT HEAD/BRAIN W/O DYE: CPT

## 2024-03-19 PROCEDURE — 96374 THER/PROPH/DIAG INJ IV PUSH: CPT

## 2024-03-19 PROCEDURE — 83690 ASSAY OF LIPASE: CPT

## 2024-03-19 PROCEDURE — 36415 COLL VENOUS BLD VENIPUNCTURE: CPT

## 2024-03-19 PROCEDURE — 80307 DRUG TEST PRSMV CHEM ANLYZR: CPT

## 2024-03-19 PROCEDURE — 93010 ELECTROCARDIOGRAM REPORT: CPT | Performed by: STUDENT IN AN ORGANIZED HEALTH CARE EDUCATION/TRAINING PROGRAM

## 2024-03-19 PROCEDURE — 82948 REAGENT STRIP/BLOOD GLUCOSE: CPT

## 2024-03-19 PROCEDURE — 83735 ASSAY OF MAGNESIUM: CPT

## 2024-03-19 PROCEDURE — 71045 X-RAY EXAM CHEST 1 VIEW: CPT

## 2024-03-19 PROCEDURE — 85025 COMPLETE CBC W/AUTO DIFF WBC: CPT

## 2024-03-19 PROCEDURE — 93005 ELECTROCARDIOGRAM TRACING: CPT

## 2024-03-19 RX ORDER — LOXAPINE SUCCINATE 25 MG/1
25 TABLET ORAL 2 TIMES DAILY
Qty: 60 CAPSULE | Refills: 0 | Status: SHIPPED | OUTPATIENT
Start: 2024-03-19

## 2024-03-19 RX ORDER — ONDANSETRON 2 MG/ML
4 INJECTION INTRAMUSCULAR; INTRAVENOUS ONCE
Status: COMPLETED | OUTPATIENT
Start: 2024-03-19 | End: 2024-03-19

## 2024-03-19 RX ORDER — DEXTROSE MONOHYDRATE 25 G/50ML
50 INJECTION, SOLUTION INTRAVENOUS ONCE
Status: COMPLETED | OUTPATIENT
Start: 2024-03-19 | End: 2024-03-19

## 2024-03-19 RX ORDER — DEXTROSE MONOHYDRATE 25 G/50ML
25 INJECTION, SOLUTION INTRAVENOUS ONCE
Status: DISCONTINUED | OUTPATIENT
Start: 2024-03-19 | End: 2024-03-19

## 2024-03-19 RX ADMIN — DEXTROSE MONOHYDRATE 50 ML: 25 INJECTION, SOLUTION INTRAVENOUS at 15:26

## 2024-03-19 RX ADMIN — SODIUM CHLORIDE 1000 ML: 0.9 INJECTION, SOLUTION INTRAVENOUS at 14:46

## 2024-03-19 RX ADMIN — ONDANSETRON 4 MG: 2 INJECTION INTRAMUSCULAR; INTRAVENOUS at 14:46

## 2024-03-19 NOTE — DISCHARGE INSTRUCTIONS
Please follow up with your family doctor for reassessment and management of symptoms.    Thank you for allowing us to take part in your care.

## 2024-03-19 NOTE — ED PROVIDER NOTES
History  Chief Complaint   Patient presents with    Medical Problem     Pt found unresponsive in alley. Arrives with EMS, pt vomited spaghetti. States he is tired. Denies drug use     33-year-old male with history of K2 use, anemia, seizure BIBA due to being found sleeping with vomit on himself in an alley.  Patient is very tired on examination but able to be woken up momentarily and answering questions appropriately, oriented to time, place and self.  Airway is intact on arrival, bilateral breath sounds, pulses 2+ in all extremities, moving all 4 extremities and following commands.  Patient states he was eating spaghetti and felt sick and threw up.  Denies any recent drug use or alcohol use.  No chest pain or shortness of breath at this time.        Prior to Admission Medications   Prescriptions Last Dose Informant Patient Reported? Taking?   budesonide (ENTOCORT EC) 3 MG capsule   No No   Sig: Take 1 capsule (3 mg total) by mouth daily   dicyclomine (BENTYL) 10 mg capsule   No No   Sig: Take 1 capsule (10 mg total) by mouth 4 (four) times a day (before meals and at bedtime)   ergocalciferol (VITAMIN D2) 50,000 units   No No   Sig: Take 1 capsule (50,000 Units total) by mouth once a week , For 8 weeks   famotidine (PEPCID) 10 mg tablet   No No   Sig: Take 1 tablet (10 mg total) by mouth daily   gabapentin (NEURONTIN) 300 mg capsule   No No   Sig: Take 2 capsules (600 mg total) by mouth 3 (three) times a day   loxapine (LOXITANE) 25 mg capsule   No No   Sig: TAKE 1 CAPSULE BY MOUTH 2 TIMES A DAY   methocarbamol (ROBAXIN) 500 mg tablet   No No   Sig: Take 1 tablet (500 mg total) by mouth 4 (four) times a day   paliperidone (INVEGA) 6 MG 24 hr tablet   No No   Sig: Take 2 tablets (12 mg total) by mouth daily at bedtime   traZODone (DESYREL) 100 mg tablet   No No   Sig: Take 1 tablet (100 mg total) by mouth daily at bedtime      Facility-Administered Medications: None       Past Medical History:   Diagnosis Date     Anemia 2004    hospitalization/transfusion    Cellulitis     Chronic knee pain     Crohn's disease (HCC)     Esophagitis     Leukocytosis     Perianal fistula     Pilonidal cyst     RBBB     Schizophrenia (HCC)     Seizure (HCC)        Past Surgical History:   Procedure Laterality Date    INCISION AND DRAINAGE OF WOUND N/A 1/5/2024    Procedure: INCISION AND DRAINAGE (I&D) PERIANAL ABSCESS, SETON PLACEMENT,EUA, FISTULECTOMY;  Surgeon: Carl Pace MD;  Location:  MAIN OR;  Service: General    CA ANRCT XM SURG REQ ANES GENERAL SPI/EDRL DX N/A 4/7/2016    Procedure: EXAM UNDER ANESTHESIA (EUA); possible REMOVAL OF FOREIGN BODY anoscopy ;  Surgeon: Reymundo Araujo MD;  Location:  MAIN OR;  Service: Colorectal    CA SURG TX ANAL FISTULA INTERSPHINCTERIC N/A 4/7/2016    Procedure: superficial FISTULOTOMY; SETON PROCEDURE drainage of chronic ischiofistula abscess and debridement;  Surgeon: Reymundo Araujo MD;  Location:  MAIN OR;  Service: Colorectal       History reviewed. No pertinent family history.  I have reviewed and agree with the history as documented.    E-Cigarette/Vaping    E-Cigarette Use Never User      E-Cigarette/Vaping Substances    Nicotine No     THC No     CBD No     Flavoring No     Other No     Unknown No      Social History     Tobacco Use    Smoking status: Light Smoker     Current packs/day: 0.01     Types: Cigarettes, Cigars     Start date: 1/1/2009    Smokeless tobacco: Never    Tobacco comments:     Codie says 7 cigarettes per day   Vaping Use    Vaping status: Never Used   Substance Use Topics    Alcohol use: Not Currently     Comment: Socially    Drug use: No        Review of Systems   Constitutional:  Positive for fatigue. Negative for chills and fever.   HENT:  Negative for ear pain and sore throat.    Eyes:  Negative for pain and visual disturbance.   Respiratory:  Positive for shortness of breath (resolved). Negative for cough.    Cardiovascular:  Negative for chest pain and  palpitations.   Gastrointestinal:  Positive for nausea and vomiting. Negative for abdominal pain, constipation and diarrhea.   Genitourinary:  Negative for dysuria and hematuria.   Musculoskeletal:  Negative for arthralgias and back pain.   Skin:  Negative for color change and rash.   Neurological:  Negative for dizziness, seizures, syncope, facial asymmetry, speech difficulty, weakness, light-headedness and headaches.   All other systems reviewed and are negative.      Physical Exam  ED Triage Vitals [03/19/24 1335]   Temperature Pulse Respirations Blood Pressure SpO2   97.8 °F (36.6 °C) (!) 108 16 99/59 97 %      Temp Source Heart Rate Source Patient Position - Orthostatic VS BP Location FiO2 (%)   Tympanic Monitor Lying Left arm --      Pain Score       --             Orthostatic Vital Signs  Vitals:    03/19/24 1335 03/19/24 1339 03/19/24 1346   BP: 99/59  102/57   Pulse: (!) 108 102    Patient Position - Orthostatic VS: Lying         Physical Exam  Vitals and nursing note reviewed.   Constitutional:       General: He is not in acute distress.     Appearance: He is well-developed.      Comments: Pt appears tired but arrousable and answering question appropriately.   HENT:      Head: Normocephalic and atraumatic.      Nose: Nose normal. No congestion.      Mouth/Throat:      Pharynx: Oropharynx is clear.   Eyes:      Extraocular Movements: Extraocular movements intact.      Conjunctiva/sclera: Conjunctivae normal.      Pupils: Pupils are equal, round, and reactive to light.   Cardiovascular:      Rate and Rhythm: Regular rhythm. Tachycardia present.      Pulses: Normal pulses.      Heart sounds: Normal heart sounds. No murmur heard.  Pulmonary:      Effort: Pulmonary effort is normal. No respiratory distress.      Breath sounds: Normal breath sounds.   Chest:      Chest wall: No tenderness.   Abdominal:      General: Abdomen is flat. Bowel sounds are normal. There is no distension.      Palpations: Abdomen is  soft.      Tenderness: There is no abdominal tenderness. There is no right CVA tenderness or left CVA tenderness.   Musculoskeletal:         General: No deformity or signs of injury. Normal range of motion.      Cervical back: Normal range of motion and neck supple. No rigidity or tenderness.   Skin:     General: Skin is warm and dry.      Findings: No bruising, lesion or rash.   Neurological:      General: No focal deficit present.      Mental Status: He is alert.      Cranial Nerves: No cranial nerve deficit.      Sensory: No sensory deficit.         ED Medications  Medications   ondansetron (ZOFRAN) injection 4 mg (4 mg Intravenous Given 3/19/24 1446)   sodium chloride 0.9 % bolus 1,000 mL (0 mL Intravenous Stopped 3/19/24 1549)   dextrose 50 % IV solution 50 mL (50 mL Intravenous Given 3/19/24 1526)       Diagnostic Studies  Results Reviewed       Procedure Component Value Units Date/Time    Fingerstick Glucose (POCT) [[id number]]  (Normal) Collected: 03/19/24 1557    Lab Status: Final result Updated: 03/19/24 1558     POC Glucose 93 mg/dl     Rapid drug screen, urine [[id number]]  (Normal) Collected: 03/19/24 1525    Lab Status: Final result Specimen: Urine, Other Updated: 03/19/24 1548     Amph/Meth UR Negative     Barbiturate Ur Negative     Benzodiazepine Urine Negative     Cocaine Urine Negative     Methadone Urine Negative     Opiate Urine Negative     PCP Ur Negative     THC Urine Negative     Oxycodone Urine Negative    Narrative:      FOR MEDICAL PURPOSES ONLY.   IF CONFIRMATION NEEDED PLEASE CONTACT THE LAB WITHIN 5 DAYS.    Drug Screen Cutoff Levels:  AMPHETAMINE/METHAMPHETAMINES  1000 ng/mL  BARBITURATES     200 ng/mL  BENZODIAZEPINES     200 ng/mL  COCAINE      300 ng/mL  METHADONE      300 ng/mL  OPIATES      300 ng/mL  PHENCYCLIDINE     25 ng/mL  THC       50 ng/mL  OXYCODONE      100 ng/mL    UA w Reflex to Microscopic w Reflex to Culture [[id number]]  (Normal) Collected: 03/19/24 1525    Lab  Status: Final result Specimen: Urine, Other Updated: 03/19/24 1538     Color, UA Straw     Clarity, UA Clear     Specific Gravity, UA 1.010     pH, UA 6.5     Leukocytes, UA Negative     Nitrite, UA Negative     Protein, UA Negative mg/dl      Glucose, UA Negative mg/dl      Ketones, UA Negative mg/dl      Bilirubin, UA Negative     Occult Blood, UA Negative     UROBILINOGEN UA Negative mg/dL     Fingerstick Glucose (POCT) [[id number]]  (Abnormal) Collected: 03/19/24 1523    Lab Status: Final result Updated: 03/19/24 1526     POC Glucose 51 mg/dl     Comprehensive metabolic panel [[id number]]  (Abnormal) Collected: 03/19/24 1445    Lab Status: Final result Specimen: Blood from Arm, Right Updated: 03/19/24 1508     Sodium 137 mmol/L      Potassium 4.0 mmol/L      Chloride 101 mmol/L      CO2 28 mmol/L      ANION GAP 8 mmol/L      BUN 8 mg/dL      Creatinine 0.99 mg/dL      Glucose 64 mg/dL      Calcium 9.1 mg/dL      AST 11 U/L      ALT 12 U/L      Alkaline Phosphatase 66 U/L      Total Protein 8.0 g/dL      Albumin 4.0 g/dL      Total Bilirubin 0.19 mg/dL      eGFR 99 ml/min/1.73sq m     Narrative:      National Kid[employer] Disease Foundation guidelines for Chronic Kidney Disease (CKD):     Stage 1 with normal or high GFR (GFR > 90 mL/min/1.73 square meters)    Stage 2 Mild CKD (GFR = 60-89 mL/min/1.73 square meters)    Stage 3A Moderate CKD (GFR = 45-59 mL/min/1.73 square meters)    Stage 3B Moderate CKD (GFR = 30-44 mL/min/1.73 square meters)    Stage 4 Severe CKD (GFR = 15-29 mL/min/1.73 square meters)    Stage 5 End Stage CKD (GFR <15 mL/min/1.73 square meters)  Note: GFR calculation is accurate only with a steady state creatinine    Lipase [[id number]]  (Normal) Collected: 03/19/24 1445    Lab Status: Final result Specimen: Blood from Arm, Right Updated: 03/19/24 1508     Lipase 12 u/L     Magnesium [[id number]]  (Abnormal) Collected: 03/19/24 1445    Lab Status: Final result Specimen: Blood from Arm, Right Updated:  03/19/24 1508     Magnesium 1.8 mg/dL     CBC and differential [[id number]]  (Abnormal) Collected: 03/19/24 1445    Lab Status: Final result Specimen: Blood from Arm, Right Updated: 03/19/24 1453     WBC 15.82 Thousand/uL      RBC 4.23 Million/uL      Hemoglobin 8.8 g/dL      Hematocrit 30.7 %      MCV 73 fL      MCH 20.8 pg      MCHC 28.7 g/dL      RDW 19.6 %      MPV 7.8 fL      Platelets 618 Thousands/uL      nRBC 0 /100 WBCs      Neutrophils Relative 80 %      Immature Grans % 1 %      Lymphocytes Relative 13 %      Monocytes Relative 6 %      Eosinophils Relative 0 %      Basophils Relative 0 %      Neutrophils Absolute 12.75 Thousands/µL      Absolute Immature Grans 0.08 Thousand/uL      Absolute Lymphocytes 2.00 Thousands/µL      Absolute Monocytes 0.87 Thousand/µL      Eosinophils Absolute 0.07 Thousand/µL      Basophils Absolute 0.05 Thousands/µL                    XR chest 1 view portable   Final Result by Keenan Matthews MD (03/19 1508)      No acute cardiopulmonary disease.            Workstation performed: [id number]         CT head without contrast   Final Result by Keenan Matthews MD (03/19 1507)      No acute intracranial abnormality.                  Workstation performed: [id number]               Procedures  ECG 12 Lead Documentation Only    Date/Time: 3/19/2024 1:50 PM    Performed by: Carlos Villarreal MD  Authorized by: Carlos Villarreal MD    Patient location:  ED  Rhythm:     Rhythm: sinus rhythm    Ectopy:     Ectopy: none    QRS:     QRS axis:  Normal    QRS intervals:  Normal  Conduction:     Conduction: abnormal      Abnormal conduction: complete RBBB    ST segments:     ST segments:  Normal  T waves:     T waves: normal          ED Course  ED Course as of 03/20/24 2123   Tue Mar 19, 2024   1504 CBC and differential(!)  Leukocytosis with neutrophil predominance, Hgb 8.8. near baseline for patient over the last few months.   1505 Vitals reviewed, Tachycardic but otherwise WNL   1519  MAGNESIUM(!): 1.8  Mild hypomag   1519 LIPASE: 12  WNL   1519 Comprehensive metabolic panel(!)  Hypoglycemia   1525 On finger stick Glu-51, will give 50 mL D50, Pt awake and alert at this time. No complaints other than feels like he needs to drink a pepsi.   1605 Repeat POC Glu 93, pt ate sand which and drinking pepsi, feeling good now, no complaints. Plan to d/c and follow up with family doctor for reassessment.  Pt is agreeable and appropriate for d/c at this time.   1607 Rapid drug screen, urine  Neg   1607 POC Glucose: 93   1607 UA w Reflex to Microscopic w Reflex to Culture  Not suggestive of UTI                             SBIRT 22yo+      Flowsheet Row Most Recent Value   Initial Alcohol Screen: US AUDIT-C     1. How often do you have a drink containing alcohol? 0 Filed at: 03/19/2024 1338   2. How many drinks containing alcohol do you have on a typical day you are drinking?  0 Filed at: 03/19/2024 1338   3a. Male UNDER 65: How often do you have five or more drinks on one occasion? 0 Filed at: 03/19/2024 1338   Audit-C Score 0 Filed at: 03/19/2024 1338   PREMA: How many times in the past year have you...    Used an illegal drug or used a prescription medication for non-medical reasons? Never Filed at: 03/19/2024 1338                  Medical Decision Making  Amount and/or Complexity of Data Reviewed  Labs: ordered. Decision-making details documented in ED Course.  Radiology: ordered.    Risk  Prescription drug management.          Disposition  Final diagnoses:   Hypoglycemia   AMS (altered mental status)     Time reflects when diagnosis was documented in both MDM as applicable and the Disposition within this note       Time User Action Codes Description Comment    3/19/2024  4:04 PM Carlos Villarreal [E16.2] Hypoglycemia     3/19/2024  4:04 PM Carlos Villarreal [R41.82] AMS (altered mental status)           ED Disposition       ED Disposition   Discharge    Condition   Stable    Date/Time   Tue Mar 19,  2024 1605    Comment   Carlos Wolf discharge to home/self care.                   Follow-up Information       Follow up With Specialties Details Why Contact Info Additional Information    Kaykay Monroe DO Family Medicine   [address] [facility] [address]  [phone]       [facility] Emergency Medicine   [address] [facility] [address] [phone] [facility]            Discharge Medication List as of 3/19/2024  4:05 PM        CONTINUE these medications which have NOT CHANGED    Details   budesonide (ENTOCORT EC) 3 MG capsule Take 1 capsule (3 mg total) by mouth daily, Starting Mon 1/29/2024, Normal      dicyclomine (BENTYL) 10 mg capsule Take 1 capsule (10 mg total) by mouth 4 (four) times a day (before meals and at bedtime), Starting Mon 1/29/2024, Normal      ergocalciferol (VITAMIN D2) 50,000 units Take 1 capsule (50,000 Units total) by mouth once a week , For 8 weeks, Starting Mon 1/29/2024, Normal      famotidine (PEPCID) 10 mg tablet Take 1 tablet (10 mg total) by mouth daily, Starting Tue 1/30/2024, Normal      gabapentin (NEURONTIN) 300 mg capsule Take 2 capsules (600 mg total) by mouth 3 (three) times a day, Starting Tue 3/12/2024, Normal      loxapine (LOXITANE) 25 mg capsule TAKE 1 CAPSULE BY MOUTH 2 TIMES A DAY, Starting Tue 3/19/2024, Normal      methocarbamol (ROBAXIN) 500 mg tablet Take 1 tablet (500 mg total) by mouth 4 (four) times a day, Starting Mon 1/29/2024, Normal      paliperidone (INVEGA) 6 MG 24 hr tablet Take 2 tablets (12 mg total) by mouth daily at bedtime, Starting Tue 3/12/2024, Normal      traZODone (DESYREL) 100 mg tablet Take 1 tablet (100 mg total) by mouth daily at bedtime, Starting Tue 3/12/2024, Normal           No discharge procedures on file.    PDMP Review         Value Time User    PDMP Reviewed  Yes 1/29/2024 11:03 AM EDISON Julian             ED  Provider  Attending physically available and evaluated Carlos Wolf. I managed the patient along with the ED Attending.    Electronically Signed by           Carlos Villarreal MD  03/20/24 1015

## 2024-03-22 ENCOUNTER — HOSPITAL ENCOUNTER (EMERGENCY)
Facility: HOSPITAL | Age: 34
Discharge: HOME | End: 2024-03-22
Attending: EMERGENCY MEDICINE
Payer: COMMERCIAL

## 2024-03-22 VITALS
OXYGEN SATURATION: 97 % | RESPIRATION RATE: 16 BRPM | HEART RATE: 99 BPM | SYSTOLIC BLOOD PRESSURE: 92 MMHG | WEIGHT: 171.08 LBS | DIASTOLIC BLOOD PRESSURE: 50 MMHG | BODY MASS INDEX: 27.61 KG/M2

## 2024-03-22 DIAGNOSIS — F19.10 SUBSTANCE ABUSE (HCC): Primary | ICD-10-CM

## 2024-03-22 PROCEDURE — 99284 EMERGENCY DEPT VISIT MOD MDM: CPT

## 2024-03-22 PROCEDURE — 96360 HYDRATION IV INFUSION INIT: CPT

## 2024-03-22 RX ADMIN — NALOXONE HYDROCHLORIDE 4 MG: 4 SPRAY NASAL at 14:05

## 2024-03-22 RX ADMIN — SODIUM CHLORIDE 1000 ML: 0.9 INJECTION, SOLUTION INTRAVENOUS at 13:22

## 2024-03-22 NOTE — ED PROVIDER NOTES
History  Chief Complaint   Patient presents with    Overdose - Accidental     Pt brought in by EMS; was found by [employer] stumbling in the street. Per EMS K2 was confiscated by [employer] pta to ER     Patient is a 33 year old male coming in y way of EMS after being found on the side of the road. Found with what police believe to be k2, disposed of prior to transport to hospital. Is awake, and denies drug use. States he just felt tired due to using too much cologne. No signs of trauma, responding appropriately to questions. Denies any complaints at this time. No narcan prior to arrival      Overdose - Accidental  Associated symptoms: no abdominal pain, no chest pain and no shortness of breath        Prior to Admission Medications   Prescriptions Last Dose Informant Patient Reported? Taking?   budesonide (ENTOCORT EC) 3 MG capsule   No No   Sig: Take 1 capsule (3 mg total) by mouth daily   dicyclomine (BENTYL) 10 mg capsule   No No   Sig: Take 1 capsule (10 mg total) by mouth 4 (four) times a day (before meals and at bedtime)   ergocalciferol (VITAMIN D2) 50,000 units   No No   Sig: Take 1 capsule (50,000 Units total) by mouth once a week , For 8 weeks   famotidine (PEPCID) 10 mg tablet   No No   Sig: Take 1 tablet (10 mg total) by mouth daily   gabapentin (NEURONTIN) 300 mg capsule   No No   Sig: Take 2 capsules (600 mg total) by mouth 3 (three) times a day   loxapine (LOXITANE) 25 mg capsule   No No   Sig: TAKE 1 CAPSULE BY MOUTH 2 TIMES A DAY   methocarbamol (ROBAXIN) 500 mg tablet   No No   Sig: Take 1 tablet (500 mg total) by mouth 4 (four) times a day   paliperidone (INVEGA) 6 MG 24 hr tablet   No No   Sig: Take 2 tablets (12 mg total) by mouth daily at bedtime   traZODone (DESYREL) 100 mg tablet   No No   Sig: Take 1 tablet (100 mg total) by mouth daily at bedtime      Facility-Administered Medications: None       Past Medical History:   Diagnosis Date    Anemia 2004    hospitalization/transfusion    Cellulitis      Chronic knee pain     Crohn's disease (HCC)     Esophagitis     Leukocytosis     Perianal fistula     Pilonidal cyst     RBBB     Schizophrenia (HCC)     Seizure (HCC)        Past Surgical History:   Procedure Laterality Date    INCISION AND DRAINAGE OF WOUND N/A 1/5/2024    Procedure: INCISION AND DRAINAGE (I&D) PERIANAL ABSCESS, SETON PLACEMENT,EUA, FISTULECTOMY;  Surgeon: Carl Pace MD;  Location:  MAIN OR;  Service: General    DE ANRCT XM SURG REQ ANES GENERAL SPI/EDRL DX N/A 4/7/2016    Procedure: EXAM UNDER ANESTHESIA (EUA); possible REMOVAL OF FOREIGN BODY anoscopy ;  Surgeon: Reymundo Araujo MD;  Location: [facility];  Service: Colorectal    DE SURG TX ANAL FISTULA INTERSPHINCTERIC N/A 4/7/2016    Procedure: superficial FISTULOTOMY; SETON PROCEDURE drainage of chronic ischiofistula abscess and debridement;  Surgeon: Reymundo Araujo MD;  Location:  MAIN OR;  Service: Colorectal       History reviewed. No pertinent family history.  I have reviewed and agree with the history as documented.    E-Cigarette/Vaping    E-Cigarette Use Never User      E-Cigarette/Vaping Substances    Nicotine No     THC No     CBD No     Flavoring No     Other No     Unknown No      Social History     Tobacco Use    Smoking status: Light Smoker     Current packs/day: 0.01     Types: Cigarettes, Cigars     Start date: 1/1/2009    Smokeless tobacco: Never    Tobacco comments:     Codie says 7 cigarettes per day   Vaping Use    Vaping status: Never Used   Substance Use Topics    Alcohol use: Not Currently     Comment: Socially    Drug use: No       Review of Systems   Constitutional:  Negative for fatigue and fever.   Respiratory:  Negative for shortness of breath.    Cardiovascular:  Negative for chest pain.   Gastrointestinal:  Negative for abdominal pain.       Physical Exam  Physical Exam  Vitals reviewed.   Constitutional:       Appearance: Normal appearance. He is normal weight.   HENT:      Head: Normocephalic and  atraumatic.      Comments: No racoon eyes, or lua sign. No osseus instability. Neck movements easy with no sign of pain     Right Ear: External ear normal.      Left Ear: External ear normal.      Nose: Nose normal.   Eyes:      Conjunctiva/sclera: Conjunctivae normal.   Cardiovascular:      Rate and Rhythm: Normal rate.   Pulmonary:      Effort: Pulmonary effort is normal.   Musculoskeletal:         General: Normal range of motion.      Cervical back: Normal range of motion.   Skin:     General: Skin is warm and dry.   Neurological:      Mental Status: He is alert.         Vital Signs  ED Triage Vitals [03/22/24 1309]   Temp Pulse Respirations Blood Pressure SpO2   -- 99 16 (!) 85/50 97 %      Temp Source Heart Rate Source Patient Position - Orthostatic VS BP Location FiO2 (%)   Tympanic Monitor Lying Right arm --      Pain Score       --           Vitals:    03/22/24 1309 03/22/24 1405   BP: (!) 85/50 92/50   Pulse: 99    Patient Position - Orthostatic VS: Lying          Visual Acuity      ED Medications  Medications   sodium chloride 0.9 % bolus 1,000 mL (1,000 mL Intravenous New Bag 3/22/24 1322)   naloxone nasal- Given to patient by provider at discharge. (NARCAN) 4 mg/0.1 mL nasal spray 4 mg (4 mg Does not apply Given by Other 3/22/24 1405)       Diagnostic Studies  Results Reviewed       None                   No orders to display              Procedures  Procedures         ED Course  ED Course as of 03/22/24 1411   Fri Mar 22, 2024   1401 Patient more awake, states he feels better, would like to go home. BP is improving, received minimal fluids.                                SBIRT 22yo+      Flowsheet Row Most Recent Value   Initial Alcohol Screen: US AUDIT-C     1. How often do you have a drink containing alcohol? 0 Filed at: 03/22/2024 1316   2. How many drinks containing alcohol do you have on a typical day you are drinking?  0 Filed at: 03/22/2024 1316   3a. Male UNDER 65: How often do you have five  or more drinks on one occasion? 0 Filed at: 03/22/2024 1316   3b. FEMALE Any Age, or MALE 65+: How often do you have 4 or more drinks on one occassion? 0 Filed at: 03/22/2024 1316   Audit-C Score 0 Filed at: 03/22/2024 1316   PREMA: How many times in the past year have you...    Used an illegal drug or used a prescription medication for non-medical reasons? Never Filed at: 03/22/2024 1316                      Medical Decision Making  Patient comes in for evaluation after what was likely substance abuse, potentially k2. Patient slightly hypotensive, but mentation normally. Awake and oriented, with no signs of distress. Fluids started, but patient asked to leave. Ambulated out of Ed wtihout assistance. Given warm handoff,  referral, and community dispensed narcan    Risk  Prescription drug management.             Disposition  Final diagnoses:   Substance abuse (HCC)     Time reflects when diagnosis was documented in both MDM as applicable and the Disposition within this note       Time User Action Codes Description Comment    3/22/2024  2:01 PM Carlos Alfonso Add [F19.10] Substance abuse (HCC)           ED Disposition       ED Disposition   Discharge    Condition   Stable    Date/Time   Fri Mar 22, 2024 1401    Comment   Carlos Wolf discharge to home/self care.                   Follow-up Information       Follow up With Specialties Details Why Contact Info Additional Information    Kaykay Monroe DO Family Medicine   [facility] [facility] [address]  [phone]       [facility] Emergency Medicine  As needed, If symptoms worsen [address] [facility] [address] [phone] [facility]            Patient's Medications   Discharge Prescriptions    No medications on file       No discharge procedures on file.    PDMP Review         Value Time User    PDMP Reviewed  Yes 1/29/2024 11:03 AM  EDISON Julian            ED Provider  Electronically Signed by             Carlos Alfonso PA-C  03/22/24 7012

## 2024-03-22 NOTE — Clinical Note
Carlos Wolf was seen and treated in our emergency department on 3/22/2024.    No restrictions            Diagnosis:     Carlos  .    He may return on this date: 03/25/2024         If you have any questions or concerns, please don't hesitate to call.      Carlos Alfonso PA-C    ______________________________           _______________          _______________  Hospital Representative                              Date                                Time

## 2024-04-03 ENCOUNTER — SOCIAL WORK (OUTPATIENT)
Dept: BEHAVIORAL/MENTAL HEALTH CLINIC | Facility: CLINIC | Age: 34
End: 2024-04-03

## 2024-04-03 DIAGNOSIS — F20.0 SCHIZOPHRENIA, PARANOID TYPE (HCC): Primary | ICD-10-CM
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
## 2024-04-09 ENCOUNTER — OFFICE VISIT (OUTPATIENT)
Dept: PSYCHIATRY | Facility: CLINIC | Age: 34
End: 2024-04-09

## 2024-04-09 VITALS — BODY MASS INDEX: 25.88 KG/M2 | WEIGHT: 161 LBS | HEIGHT: 66 IN

## 2024-04-09 DIAGNOSIS — F20.0 SCHIZOPHRENIA, PARANOID TYPE (HCC): Primary | ICD-10-CM

## 2024-04-09 DIAGNOSIS — F19.10 SUBSTANCE ABUSE (HCC): ICD-10-CM

## 2024-04-09 DIAGNOSIS — F41.1 GAD (GENERALIZED ANXIETY DISORDER): ICD-10-CM

## 2024-04-09 RX ORDER — LOXAPINE SUCCINATE 50 MG/1
50 TABLET ORAL 2 TIMES DAILY
Qty: 60 CAPSULE | Refills: 0 | Status: SHIPPED | OUTPATIENT
Start: 2024-04-09 | End: 2024-05-09
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
## 2024-04-15 ENCOUNTER — HOSPITAL ENCOUNTER (EMERGENCY)
Facility: HOSPITAL | Age: 34
Discharge: HOME | End: 2024-04-15
Attending: EMERGENCY MEDICINE | Admitting: EMERGENCY MEDICINE
Payer: COMMERCIAL

## 2024-04-15 VITALS
HEART RATE: 95 BPM | RESPIRATION RATE: 16 BRPM | OXYGEN SATURATION: 98 % | SYSTOLIC BLOOD PRESSURE: 117 MMHG | WEIGHT: 169.97 LBS | DIASTOLIC BLOOD PRESSURE: 59 MMHG | TEMPERATURE: 99.8 F | BODY MASS INDEX: 27.43 KG/M2

## 2024-04-15 VITALS
RESPIRATION RATE: 16 BRPM | TEMPERATURE: 97.5 F | DIASTOLIC BLOOD PRESSURE: 61 MMHG | HEART RATE: 111 BPM | OXYGEN SATURATION: 93 % | SYSTOLIC BLOOD PRESSURE: 156 MMHG

## 2024-04-15 DIAGNOSIS — T50.901A OVERDOSE: Primary | ICD-10-CM

## 2024-04-15 DIAGNOSIS — F19.10 DRUG ABUSE (HCC): Primary | ICD-10-CM

## 2024-04-15 LAB
ATRIAL RATE: 96 BPM
GLUCOSE SERPL-MCNC: 134 MG/DL (ref 65–140)
P AXIS: 70 DEGREES
PR INTERVAL: 124 MS
QRS AXIS: 48 DEGREES
QRSD INTERVAL: 120 MS
QT INTERVAL: 384 MS
QTC INTERVAL: 485 MS
T WAVE AXIS: 26 DEGREES
VENTRICULAR RATE: 96 BPM

## 2024-04-15 PROCEDURE — 99284 EMERGENCY DEPT VISIT MOD MDM: CPT

## 2024-04-15 PROCEDURE — 82948 REAGENT STRIP/BLOOD GLUCOSE: CPT

## 2024-04-15 PROCEDURE — 93005 ELECTROCARDIOGRAM TRACING: CPT

## 2024-04-15 PROCEDURE — 93010 ELECTROCARDIOGRAM REPORT: CPT | Performed by: STUDENT IN AN ORGANIZED HEALTH CARE EDUCATION/TRAINING PROGRAM

## 2024-04-15 PROCEDURE — 99284 EMERGENCY DEPT VISIT MOD MDM: CPT | Performed by: EMERGENCY MEDICINE

## 2024-04-15 NOTE — ED PROVIDER NOTES
History  Chief Complaint   Patient presents with    Addiction Problem     Pt found unresponsive on the sidewalk, suspected K2 use.  Pt arrives lethargic, no answering any questions or speaking to staff     Patient with frequent encounters for overdosing on QT was found by EMS unresponsive using K2 the patient denies doing any drugs other than eating to relieve chips he blames on his being sent to the emergency department patient denies chest pain shortness of breath vomiting diarrhea suicidal      Addiction Problem  Associated symptoms: no abdominal pain, no palpitations, no seizures, no shortness of breath, no suicidal ideation, no vomiting and no weakness        Prior to Admission Medications   Prescriptions Last Dose Informant Patient Reported? Taking?   budesonide (ENTOCORT EC) 3 MG capsule   No No   Sig: Take 1 capsule (3 mg total) by mouth daily   dicyclomine (BENTYL) 10 mg capsule   No No   Sig: Take 1 capsule (10 mg total) by mouth 4 (four) times a day (before meals and at bedtime)   ergocalciferol (VITAMIN D2) 50,000 units   No No   Sig: Take 1 capsule (50,000 Units total) by mouth once a week , For 8 weeks   famotidine (PEPCID) 10 mg tablet   No No   Sig: Take 1 tablet (10 mg total) by mouth daily   gabapentin (NEURONTIN) 300 mg capsule   No No   Sig: Take 2 capsules (600 mg total) by mouth 3 (three) times a day   loxapine (LOXITANE) 50 MG capsule   No No   Sig: Take 1 capsule (50 mg total) by mouth 2 (two) times a day   methocarbamol (ROBAXIN) 500 mg tablet   No No   Sig: Take 1 tablet (500 mg total) by mouth 4 (four) times a day   paliperidone (INVEGA) 6 MG 24 hr tablet   No No   Sig: Take 2 tablets (12 mg total) by mouth daily at bedtime   traZODone (DESYREL) 100 mg tablet   No No   Sig: Take 1 tablet (100 mg total) by mouth daily at bedtime      Facility-Administered Medications: None       Past Medical History:   Diagnosis Date    Anemia 2004    hospitalization/transfusion    Cellulitis     Chronic  knee pain     Crohn's disease (HCC)     Drug abuse (HCC)     Esophagitis     Leukocytosis     Perianal fistula     Pilonidal cyst     RBBB     Schizophrenia (HCC)     Seizure (HCC)        Past Surgical History:   Procedure Laterality Date    INCISION AND DRAINAGE OF WOUND N/A 1/5/2024    Procedure: INCISION AND DRAINAGE (I&D) PERIANAL ABSCESS, SETON PLACEMENT,EUA, FISTULECTOMY;  Surgeon: Carl Pace MD;  Location:  MAIN OR;  Service: General    ND ANRCT XM SURG REQ ANES GENERAL SPI/EDRL DX N/A 4/7/2016    Procedure: EXAM UNDER ANESTHESIA (EUA); possible REMOVAL OF FOREIGN BODY anoscopy ;  Surgeon: Reymundo Araujo MD;  [facility];  Service: Colorectal    ND SURG TX ANAL FISTULA INTERSPHINCTERIC N/A 4/7/2016    Procedure: superficial FISTULOTOMY; SETON PROCEDURE drainage of chronic ischiofistula abscess and debridement;  Surgeon: Reymundo Araujo MD;  Location:  MAIN OR;  Service: Colorectal       History reviewed. No pertinent family history.  I have reviewed and agree with the history as documented.    E-Cigarette/Vaping    E-Cigarette Use Never User      E-Cigarette/Vaping Substances    Nicotine No     THC No     CBD No     Flavoring No     Other No     Unknown No      Social History     Tobacco Use    Smoking status: Light Smoker     Current packs/day: 0.01     Types: Cigarettes, Cigars     Start date: 1/1/2009    Smokeless tobacco: Never    Tobacco comments:     Codie says 7 cigarettes per day   Vaping Use    Vaping status: Never Used   Substance Use Topics    Alcohol use: Not Currently     Comment: Socially    Drug use: Yes       Review of Systems   Constitutional:  Negative for chills and fever.   Respiratory:  Negative for cough and shortness of breath.    Cardiovascular:  Negative for chest pain and palpitations.   Gastrointestinal:  Negative for abdominal pain and vomiting.   Musculoskeletal:  Negative for neck pain.   Skin:  Negative for color change and rash.   Neurological:   Negative for seizures, syncope, speech difficulty, weakness and numbness.   Psychiatric/Behavioral:  Negative for suicidal ideas.    All other systems reviewed and are negative.      Physical Exam  Physical Exam  Vitals and nursing note reviewed.   Constitutional:       General: He is not in acute distress.     Appearance: He is well-developed. He is not toxic-appearing.      Comments: Sleepy  but easily arousable   HENT:      Head: Normocephalic and atraumatic.      Mouth/Throat:      Mouth: Mucous membranes are moist.   Eyes:      Extraocular Movements: Extraocular movements intact.      Conjunctiva/sclera: Conjunctivae normal.      Pupils: Pupils are equal, round, and reactive to light.      Comments: No nystagmus   Cardiovascular:      Rate and Rhythm: Normal rate and regular rhythm.      Heart sounds: No murmur heard.  Pulmonary:      Effort: Pulmonary effort is normal. No respiratory distress.      Breath sounds: Normal breath sounds.   Abdominal:      Palpations: Abdomen is soft.      Tenderness: There is no abdominal tenderness.   Musculoskeletal:         General: No swelling.      Cervical back: Normal range of motion. No rigidity or tenderness.   Skin:     General: Skin is warm and dry.      Capillary Refill: Capillary refill takes less than 2 seconds.   Neurological:      General: No focal deficit present.      Cranial Nerves: No cranial nerve deficit.      Sensory: No sensory deficit.      Motor: No weakness.   Psychiatric:         Mood and Affect: Mood normal.         Behavior: Behavior normal.         Vital Signs  ED Triage Vitals [04/15/24 0933]   Temperature Pulse Respirations Blood Pressure SpO2   97.5 °F (36.4 °C) (!) 111 16 156/61 93 %      Temp Source Heart Rate Source Patient Position - Orthostatic VS BP Location FiO2 (%)   Tympanic Monitor Sitting Left arm --      Pain Score       --           Vitals:    04/15/24 0933   BP: 156/61   Pulse: (!) 111   Patient Position - Orthostatic VS: Sitting          Visual Acuity      ED Medications  Medications - No data to display    Diagnostic Studies  Results Reviewed       Procedure Component Value Units Date/Time    Fingerstick Glucose (POCT) [[id number]]  (Normal) Collected: 04/15/24 0959    Lab Status: Final result Specimen: Blood Updated: 04/15/24 1000     POC Glucose 134 mg/dl                    No orders to display              Procedures  ECG 12 Lead Documentation Only    Date/Time: 4/15/2024 10:06 AM    Performed by: Marcus Griffith MD  Authorized by: Marcus Griffith MD    Indications / Diagnosis:  Drugs  Patient location:  ED  Interpretation:     Interpretation: normal    Rate:     ECG rate:  96  Rhythm:     Rhythm: sinus rhythm    Ectopy:     Ectopy: none    ST segments:     ST segments:  Normal  Comments:      Qt  nml .. rbbb           ED Course                                             Medical Decision Making  Patient is awake and alert at the time pacing the room alert EKG was unremarkable sugar was patient is felt safe for discharge again advised against recreational drug use no clinical signs of toxidromes at this time    Amount and/or Complexity of Data Reviewed  Labs: ordered.             Disposition  Final diagnoses:   Drug abuse (HCC)     Time reflects when diagnosis was documented in both MDM as applicable and the Disposition within this note       Time User Action Codes Description Comment    4/15/2024 10:34 AM Marcus Griffith Add [F19.10] Drug abuse (HCC)           ED Disposition       ED Disposition   Discharge    Condition   Stable    Date/Time   Mon Apr 15, 2024 10:34 AM    Comment   Carlos Wolf discharge to home/self care.                   Follow-up Information       Follow up With Specialties Details Why Contact Jacob Monroe DO Family Medicine In 3 days  [address] [facility] [id number] [phone]              Patient's Medications   Discharge Prescriptions    No medications on file       No discharge procedures on  file.    PDMP Review         Value Time User    PDMP Reviewed  Yes 1/29/2024 11:03 AM EDISON Julian            ED Provider  Electronically Signed by             Marcus Griffith MD  04/15/24 3916

## 2024-04-15 NOTE — Clinical Note
Carlos Wolf was seen and treated in our emergency department on 4/15/2024.    No restrictions            Diagnosis:     Carlos  .    He may return on this date: 04/16/2024         If you have any questions or concerns, please don't hesitate to call.      Carlos Alfonso PA-C    ______________________________           _______________          _______________  Hospital Representative                              Date                                Time

## 2024-04-15 NOTE — ED PROVIDER NOTES
"History  Chief Complaint   Patient presents with    Overdose - Accidental     Pt brought in via EMS after being found unconscious in a puddle of his own vomit. Pt denies any drug use stating \"I just ate a cheesesteak and threw up\". No other complaints.      Patient is a 33-year-old male who comes in by way of EMS after being found in a pile of his vomit on the side of the road.  Per EMS, they did not give him any Narcan.  Patient is awake at this time, is oriented.  Does have vomit on the side of his face.  Denies any drug abuse today.  Reports that he threw up after eating a large cheese steak. Glucose normal per EMS. Denies any symptoms at this time. Would like to be discharged          Prior to Admission Medications   Prescriptions Last Dose Informant Patient Reported? Taking?   budesonide (ENTOCORT EC) 3 MG capsule   No No   Sig: Take 1 capsule (3 mg total) by mouth daily   dicyclomine (BENTYL) 10 mg capsule   No No   Sig: Take 1 capsule (10 mg total) by mouth 4 (four) times a day (before meals and at bedtime)   ergocalciferol (VITAMIN D2) 50,000 units   No No   Sig: Take 1 capsule (50,000 Units total) by mouth once a week , For 8 weeks   famotidine (PEPCID) 10 mg tablet   No No   Sig: Take 1 tablet (10 mg total) by mouth daily   gabapentin (NEURONTIN) 300 mg capsule   No No   Sig: Take 2 capsules (600 mg total) by mouth 3 (three) times a day   loxapine (LOXITANE) 50 MG capsule   No No   Sig: Take 1 capsule (50 mg total) by mouth 2 (two) times a day   methocarbamol (ROBAXIN) 500 mg tablet   No No   Sig: Take 1 tablet (500 mg total) by mouth 4 (four) times a day   paliperidone (INVEGA) 6 MG 24 hr tablet   No No   Sig: Take 2 tablets (12 mg total) by mouth daily at bedtime   traZODone (DESYREL) 100 mg tablet   No No   Sig: Take 1 tablet (100 mg total) by mouth daily at bedtime      Facility-Administered Medications: None       Past Medical History:   Diagnosis Date    Anemia 2004    hospitalization/transfusion    " Cellulitis     Chronic knee pain     Crohn's disease (HCC)     Drug abuse (HCC)     Esophagitis     Leukocytosis     Perianal fistula     Pilonidal cyst     RBBB     Schizophrenia (HCC)     Seizure (HCC)        Past Surgical History:   Procedure Laterality Date    INCISION AND DRAINAGE OF WOUND N/A 1/5/2024    Procedure: INCISION AND DRAINAGE (I&D) PERIANAL ABSCESS, SETON PLACEMENT,EUA, FISTULECTOMY;  Surgeon: Carl Pace MD;  Location:  MAIN OR;  Service: General    UT ANRCT XM SURG REQ ANES GENERAL SPI/EDRL DX N/A 4/7/2016    Procedure: EXAM UNDER ANESTHESIA (EUA); possible REMOVAL OF FOREIGN BODY anoscopy ;  Surgeon: Reymundo Araujo MD;  [facility];  Service: Colorectal    UT SURG TX ANAL FISTULA INTERSPHINCTERIC N/A 4/7/2016    Procedure: superficial FISTULOTOMY; SETON PROCEDURE drainage of chronic ischiofistula abscess and debridement;  Surgeon: Reymundo Araujo MD;  Location:  MAIN OR;  Service: Colorectal       History reviewed. No pertinent family history.  I have reviewed and agree with the history as documented.    E-Cigarette/Vaping    E-Cigarette Use Never User      E-Cigarette/Vaping Substances    Nicotine No     THC No     CBD No     Flavoring No     Other No     Unknown No      Social History     Tobacco Use    Smoking status: Light Smoker     Current packs/day: 0.01     Types: Cigarettes, Cigars     Start date: 1/1/2009    Smokeless tobacco: Never    Tobacco comments:     Codie says 7 cigarettes per day   Vaping Use    Vaping status: Never Used   Substance Use Topics    Alcohol use: Not Currently     Comment: Socially    Drug use: Yes       Review of Systems   Constitutional:  Negative for fatigue and fever.   Gastrointestinal:  Positive for vomiting. Negative for abdominal pain and nausea.       Physical Exam  Physical Exam  Vitals reviewed.   Constitutional:       Appearance: He is normal weight.   HENT:      Head: Normocephalic and atraumatic.      Comments: Vomit on the  side of his face.  Maintaining his airway, no sign of distress     Right Ear: External ear normal.      Left Ear: External ear normal.      Nose: Nose normal.   Eyes:      Conjunctiva/sclera: Conjunctivae normal.   Cardiovascular:      Rate and Rhythm: Normal rate.   Pulmonary:      Effort: Pulmonary effort is normal.   Musculoskeletal:         General: Normal range of motion.      Cervical back: Normal range of motion.   Skin:     General: Skin is warm and dry.   Neurological:      Mental Status: He is alert.         Vital Signs  ED Triage Vitals [04/15/24 1925]   Temperature Pulse Respirations Blood Pressure SpO2   99.8 °F (37.7 °C) 95 16 117/59 98 %      Temp Source Heart Rate Source Patient Position - Orthostatic VS BP Location FiO2 (%)   Oral Monitor Lying Left arm --      Pain Score       No Pain           Vitals:    04/15/24 1925   BP: 117/59   Pulse: 95   Patient Position - Orthostatic VS: Lying         Visual Acuity      ED Medications  Medications - No data to display    Diagnostic Studies  Results Reviewed       None                   No orders to display              Procedures  Procedures         ED Course                               SBIRT 22yo+      Flowsheet Row Most Recent Value   Initial Alcohol Screen: US AUDIT-C     1. How often do you have a drink containing alcohol? 0 Filed at: 04/15/2024 1939   2. How many drinks containing alcohol do you have on a typical day you are drinking?  0 Filed at: 04/15/2024 1939   3a. Male UNDER 65: How often do you have five or more drinks on one occasion? 0 Filed at: 04/15/2024 1939   Audit-C Score 0 Filed at: 04/15/2024 1939   PREMA: How many times in the past year have you...    Used an illegal drug or used a prescription medication for non-medical reasons? Never Filed at: 04/15/2024 1939                      Medical Decision Making  Patient comes in after substance abuse.  Is in no acute distress at this time.  Request to leave shortly after arrival.  Patient  discharged home, and ambulated in the emergency department with no need of assistance             Disposition  Final diagnoses:   Overdose     Time reflects when diagnosis was documented in both MDM as applicable and the Disposition within this note       Time User Action Codes Description Comment    4/15/2024  7:32 PM Carlos Alfonso Add [T50.901A] Overdose           ED Disposition       ED Disposition   Discharge    Condition   Stable    Date/Time   Mon Apr 15, 2024 1932    Comment   Carlos Donovan Wolf discharge to home/self care.                   Follow-up Information       Follow up With Specialties Details Why Contact Info Additional Information    Kaykay Monroe DO Family Medicine   [address] [facility]  [place] [address]  [phone]       [facility] Emergency Department Emergency Medicine  As needed, If symptoms worsen [address] [facility] [id number] [phone] [facility] Emergency Department            Patient's Medications   Discharge Prescriptions    No medications on file       No discharge procedures on file.    PDMP Review         Value Time User    PDMP Reviewed  Yes 1/29/2024 11:03 AM EDISON Julian            ED Provider  Electronically Signed by             Carlos Alfonso PA-C  04/15/24 1941

## 2024-04-15 NOTE — ED NOTES
"Pt states \"Can I get up and get outta here?\" Provider made aware.      Belén Vogel RN  04/15/24 1941    "

## 2024-04-16 ENCOUNTER — SOCIAL WORK (OUTPATIENT)
Dept: BEHAVIORAL/MENTAL HEALTH CLINIC | Facility: CLINIC | Age: 34
End: 2024-04-16
Payer: COMMERCIAL

## 2024-04-16 DIAGNOSIS — F20.0 SCHIZOPHRENIA, PARANOID TYPE (HCC): Primary | ICD-10-CM

## 2024-04-16 PROCEDURE — 90834 PSYTX W PT 45 MINUTES: CPT

## 2024-04-16 NOTE — PSYCH
Behavioral Health Psychotherapy Progress Note    Psychotherapy Provided: Individual Psychotherapy     1. Schizophrenia, paranoid type (HCC)            Goals addressed in session: Goal 1     DATA: Carlos shared about current delusions including an algorithm and sorcery. He feels the auditory hallucinations are becoming more secretive and in Albanian. Therapist questioned and asked him about more incidents with the police/EMS as he was found on the sidewalks in [place] with vomit on his clothing and minimally responsive. Carlos reports he suspects it is the food from the [facility]. When asked about drug and alcohol use, he denies. Carlos continues to have positive interactions with family and reviewed most recent visit from his brother. Carlos has some routine throughout his day and looks forward to visiting niece and nephew. Reviewed coping mechanisms and distraction techniques, but Carlos feels this does not work. He does acknowledge some anxiety regarding his medical conditions but is glad that he is on the infusion medication. Therapist provided coping skills list for Carlos to review and practice.    During this session, this clinician used the following therapeutic modalities: Client-centered Therapy, Cognitive Behavioral Therapy, Motivational Interviewing, and Supportive Psychotherapy    Substance Abuse was addressed during this session. If the client is diagnosed with a co-occurring substance use disorder, please indicate any changes in the frequency or amount of use: n/a- client denies. Stage of change for addressing substance use diagnoses: No substance use/Not applicable    ASSESSMENT:  Carlos Wolf presents with a Euthymic/ normal mood. Carlos presents to session malodorous and inappropriately dressed for the weather with full-length sweatpants and shirt and a winter hat, when the temperature outside was in the 70s.       his affect is Normal range and intensity, which is congruent,  "with his mood and the content of the session. The client has not made progress on their goals.     Carlos Wolf presents with a low risk of suicide, low risk of self-harm, and low risk of harm to others.    For any risk assessment that surpasses a \"low\" rating, a safety plan must be developed.    A safety plan was indicated: no  If yes, describe in detail n/a    PLAN: Between sessions, Carlos Wolf will \"try not to be paranoid from the algorithym\"; Carlos will aim to wake up by 9am every day. At the next session, the therapist will use Supportive Psychotherapy to address stressors and thinking errors.    Behavioral Health Treatment Plan and Discharge Planning: Carlos Wolf is aware of and agrees to continue to work on their treatment plan. They have identified and are working toward their discharge goals. yes    Visit start and stop times:    04/16/24  Start Time: 1400  Stop Time: 1438  Total Visit Time: 38 minutes  "

## 2024-04-30 ENCOUNTER — TELEPHONE (OUTPATIENT)
Dept: PSYCHIATRY | Facility: CLINIC | Age: 34
End: 2024-04-30

## 2024-04-30 ENCOUNTER — OFFICE VISIT (OUTPATIENT)
Dept: GASTROENTEROLOGY | Facility: CLINIC | Age: 34
End: 2024-04-30
Payer: COMMERCIAL

## 2024-04-30 ENCOUNTER — SOCIAL WORK (OUTPATIENT)
Dept: BEHAVIORAL/MENTAL HEALTH CLINIC | Facility: CLINIC | Age: 34
End: 2024-04-30
Payer: COMMERCIAL

## 2024-04-30 VITALS
HEIGHT: 67 IN | DIASTOLIC BLOOD PRESSURE: 87 MMHG | TEMPERATURE: 97.9 F | WEIGHT: 166.4 LBS | SYSTOLIC BLOOD PRESSURE: 138 MMHG | HEART RATE: 79 BPM | BODY MASS INDEX: 26.12 KG/M2 | OXYGEN SATURATION: 99 %

## 2024-04-30 DIAGNOSIS — K60.3 PERIANAL FISTULA: ICD-10-CM

## 2024-04-30 DIAGNOSIS — F20.0 SCHIZOPHRENIA, PARANOID TYPE (HCC): Primary | ICD-10-CM

## 2024-04-30 DIAGNOSIS — K61.0 PERIANAL ABSCESS: ICD-10-CM

## 2024-04-30 DIAGNOSIS — K50.014 CROHN'S DISEASE OF SMALL INTESTINE WITH ABSCESS (HCC): Primary | ICD-10-CM

## 2024-04-30 PROCEDURE — 99215 OFFICE O/P EST HI 40 MIN: CPT | Performed by: INTERNAL MEDICINE

## 2024-04-30 PROCEDURE — 90832 PSYTX W PT 30 MINUTES: CPT

## 2024-04-30 NOTE — PSYCH
"Behavioral Health Psychotherapy Progress Note    Psychotherapy Provided: Individual Psychotherapy     1. Schizophrenia, paranoid type (HCC)            Goals addressed in session: Goal 1     DATA: Carlos reports he is doing well since last session but unfortunately spent his birthday with family to attend a  for his cousin. However, when they got home, his sister and other family ordered and ate Chinese food together for his birthday. Carlos met with his CM at [facility] and is waiting to hear back about social security. He did go to the gastroenterologist today and continues infusions for better control of Crohn's symptoms. Carlos continues to have some socialization with family but was receptive to the [facility] as well as [facility] Support group (online) which therapist provided information to Carlos. Therapist provided information on the [facility] and was accepting of referral, signing CLEM. Therapist confronted him about suspected substance abuse and that it would be best for him to be honest with therapist. Therapist provided some psychoeducation about certain drugs attributing to psychosis that may not benefit from medication; he seemed to drift off topic with this and references the voices. The voices are not command in nature. Carlos reports he tries to talk back (in his head) but then the voices and sounds get worse and that it is \"hard to think it's fake\" due to the \"time and algorithm of it.\" When asked about AIP, he went off topic and that the [place] or [employer] needed hospitalization.  Reports voices \"spurt in and out\" and currently are discussing [employer] and [place]. Discussed spreading out medication doseages such as taking at 9am and 9pm for better use and more consistency. Identifies current coping mechanisms as watching movies/shows or sleeping. He reports he is sleeping well and is trying to get on a routine. He continues to take the bus or ride " "shares to different appointments or activities, like the [facility] for lunch.    During this session, this clinician used the following therapeutic modalities: Client-centered Therapy and Supportive Psychotherapy    Substance Abuse was addressed during this session. If the client is diagnosed with a co-occurring substance use disorder, please indicate any changes in the frequency or amount of use: client declines substance use. Stage of change for addressing substance use diagnoses: Pre-contemplation    ASSESSMENT:  Carlos Wolf presents with a Euthymic/ normal mood.     his affect is Blunted, which is congruent, with his mood and the content of the session. The client has not made progress on their goals.     Carlos Wolf presents with a low risk of suicide, low risk of self-harm, and low risk of harm to others.    For any risk assessment that surpasses a \"low\" rating, a safety plan must be developed.    A safety plan was indicated: no  If yes, describe in detail n/a    PLAN: Between sessions, Carlos Wolf will continue to identify and follow through with a  daily routine. At the next session, the therapist will use Supportive Psychotherapy to address stressors and mood.    Behavioral Health Treatment Plan and Discharge Planning: Carlos Wolf is aware of and agrees to continue to work on their treatment plan. They have identified and are working toward their discharge goals. yes    Visit start and stop times:    04/30/24  Start Time: 1403  Stop Time: 1438  Total Visit Time: 35 minutes  "

## 2024-04-30 NOTE — TELEPHONE ENCOUNTER
----- Message from Pia Salinas LCSW sent at 4/30/2024  2:43 PM EDT -----  Regarding: [facility]  CLEM signed and scanned to Ned for referral to [facility]. Client made aware of services.

## 2024-05-02 NOTE — PROGRESS NOTES
Gastroenterology Outpatient Follow-up - Crohn's Disease  Carlos Wolf 34 y.o. male MRN: [id number]  Encounter: [id number]    Carlos Wolf is a 34 y.o. male with Crohn's disease.    Symptom onset:     Diagnosis:  Crohns disease  Year of diagnosis:  2003    IBD Summary: Carlos has schizoaffective disorder, history of MI in 2015, and severe ileocolonic and perianal fistulizing Crohn's disease.  He has never been treated with biologic therapy.  He smokes.  I am hoping that he will establish care with me in 2023.    CD Summary  Current Crohn's disease phenotype:  both stricturing and penetrating    Involved sites since disease onset   Esophagus: unknown   Stomach: unknown     Duodenum: unknown   Jejunum: no   Ileum: yes   Right colon: yes   Transverse colon: yes   Left colon: yes   Rectum: yes   Anus: no     History of stricture  Esophagus: no   Stomach: no   Duodenum: no   Jejunum: no   Ileum: yes   Right colon: no   Transverse colon: no   Left colon: no   Rectum: no   Anus: no     History of fistula other than perianal:  Intra-abdominal   abcess: no      Enteroenteric fistula: no      Enterocutaneous fistula: no      Enterovesical fistula: no      Other fistula: no            Surgical History  Number of IBD surgeries: 0      First IBD surgery:    Most Recent IBD surgery:    Esophageal:  0    Gastroduodenal:  0    Small bowel resection(s):  0    Ileocolonic resection(s): 0      Colonic resection(s):  0    Ileostomy or colostomy:  no previous ostomy    Complete colectomy:  No         Medications     Year last used Reason for discontinuation   Corticosteroids prior     PNR     Thiopurines   never         Methotrexate   never         Infliximab   never         Adalimumab   never         Certolizumab   never         Golimumab   never         Natalizumab   never         Mesalamine prior     PNR     Sulfasalzine   never         Vedolizumab   never         Ustekinumab   never         Tofacitinib   never          Other biologic   never             Extraintestinal Manifestations    IBD-associated arthropathy No    Uveitis No    Oral aphthous ulcers No   Erythema nodosum No    Pyoderma gangrenosum No    Primary sclerosing cholangitis No    Thrombotic complications No          Cancer / Dysplasia History  History of IBD-associated dysplasia: none    Date of diagnosis (Year):     History of colorectal cancer: No   History of cervical dysplasia: No   History of skin cancer: none         Laboratory Data   Most recent (date) Result   PPD   unknown   Quanitferon gold 1/6/2024 indeterminate   TPMT   unknown   Hepatitis A   unknown   HBsAb 1/6/2024 positive   HBcAb 1/6/2024 negative   HBsAg 1/6/2024 negative   HCV Ab   unknown       Imaging / Diagnostic Procedures   Most recent (date) Findings   Colonoscopy or sigmoidoscopy #1 7/31/2023            Ulcers/Erosions  large           Strictures  primary           Stricture Severity  cannot pass           Endoscopic Score Ocasio Score:    Rutgeert's:               Findings  (1) Stricture at the ileocecal valve.  This could not be traversed with the colonoscope, however ileum distal to the stricture was visualized noting moderate ulceration throughout.  Biopsied  (2) Multiple ulcers in the terminal ileum, ileocecal valve, cecum, ascending colon, hepatic flexure, transverse colon, splenic flexure, descending colon and sigmoid colon  (3) Severe abnormal mucosa, consistent with IBD in the terminal ileum and throughout the colon  (4) Large complex, cutaneous fistula with no drainage and no fluctuance in the anal region           Pathology  A. Duodenum, biopsy retrieved by cold biopsy forceps:  -  Duodenal mucosa with brunner gland hyperplasia, focal acute inflammation and gastric foveolar metaplasia consistent with peptic duodenitis.  -  No villous atrophy, intraepithelial lymphocytosis or crypt hyperplasia; negative for features of malabsorptive enteropathy.  -  Negative for chronic or active  duodenitis, dysplasia or malignancy.  B. Terminal Ileum, biopsy retrieved by cold biopsy forceps, H/O Crohn's:  -  Chronic and severely active ileitis.  -  Negative for dysplasia.   C. Large Intestine, Right/Ascending Colon, biopsy retrieved by cold biopsy forceps, H/O Crohn's:  -  Chronic active colitis.   -  Negative for granuloma or dysplasia.   D. Large Intestine, Transverse Colon, biopsy retrieved by cold biopsy forceps, R/O CMV, H/O Crohn's:  -  Chronic and severely active colitis.   -  Negative for granuloma or dysplasia.   E. Large Intestine, Left/Descending Colon, biopsy retrieved by cold biopsy forceps, H/O Crohn's:  -  Chronic and severely active colitis.   -  Negative for granuloma or dysplasia.    Colonoscopy or sigmoidoscopy #2              Ulcers/Erosions                 Strictures                 Stricture Severity              Endoscopic Score Ocasio Score:    Rugeert's:              Findings              Pathology      EGD 7/31/2023 Candida esophagitis, 2 cm hiatal hernia, gastric erythema.   Small bowel follow-through       CT enterography       CT without enterography 1/3/2024 (1) Extensive perianal and bilateral gluteal fold cellulitis. Collection extending from the perianal region 1:00 o'clock,  along the left gluteal fold and perineum measuring 3 x 1 x 3 cm, consistent with an abscess and/or fistula. (2) Mild diffuse colitis sparing the sigmoid colon. Mild distal ileitis. (3) Suggestion of mild proctitis.   MRI enterography       Capsule study       DEXA scan   Lowest Z-score:      CXR (for TB)           HPI:   Interval History:  4/30/2024 Follow up  Carlos returns for follow-up.  He is in the office alone.  After the last visit, I had several conversations with his sister regarding his management.  He chose to continue follow-up at [facility].    He underwent exam under anesthesia with Dr. Bernard in January with drainage of abscess, fistulotomy, and seton placement.  He then continues to follow[facility] with  [facility] and is now on infliximab.  He has received 2 loading doses so far.  Abdominal pain has improved, stools more formed.  Seton is still in place.  He still smokes.  Follows with psychiatrist.  Most recent labs I reviewed are from March.  WBC 13.3, hemoglobin 8.7, MCV 69, platelets 746.  Consistent with active inflammation and iron deficiency anemia.  LFTs from the same day were normal.  Creatinine was 1.02.  CRP was 28.9.  Ferritin low at 12.  Celiac labs negative.  B12 low at 289.  Vitamin D at 52.    10/11/2023 Initial visit  Carlos presents to establish care after recent hospital admission.  He is in the office with his sister.  He has history of schizoaffective disorder, NSTEMI in 2015, and ileocolonic and perianal Crohn's disease.  He is currently also seeing physicians at [facility] and does not know where he wants to establish care.  He was diagnosed at age 13 or 14 when he began losing weight and developed GI bleeding and severe anemia.  I believe he had ileocolonic Crohn's disease, but he does not remember many details of his history.  He recalls being treated with Pentasa and a \"small white pill,\" but he is not sure what it was.  He was also treated with steroids.    He has a complex perianal fistula which has been present at least since 2015.  He is convinced that this fistula was caused by skin injury from shaving, rather than Crohn's disease.  He was self managing this with a razor blade, Neosporin, and packing at home.  Exam under anesthesia by Dr. Araujo and 2016 describes a complex fistula, large Ischiorectal abscess which was debrided, and placement of seton.  He has never been on Biologics.  He smokes.  He presented to the ER in late July with hemoglobin 4.9, leukocytosis, thrombocytosis, and weight loss.  His symptoms were fatigue and hematochezia.  CT showed terminal ileitis, possible colitis, and a gluteal abscess.  Colonoscopy showed severe ileitis and pancolitis with perianal fistula.  " EGD with Candida esophagitis.  The abscess was drained, he received antibiotics, and was treated with IV Solu-Medrol.  He received blood transfusions.  He was discharged on p.o. prednisone.  He also received fluconazole for Candida esophagitis.  He then followed up at [facility] because he had indeterminate QuantiFERON gold (which was almost certainly because he was on steroids when it was drawn).  He is currently no longer on steroids which she discontinued at home.  He is also not on antibiotics.  He continues to smoke 4 to 5 cigarettes/day.  He is not ready to start biologic therapy.    Carlos reports the following symptoms over the last 7 days:    Stool Frequency normal   Average stools per day: 3   Average liquid stools per day: 0   Consistency of bowel: soft or semi-formed   Awakening from sleep to move bowels? Yes   Urgency mild fecal urgency   Visible blood in stool? none   Abdominal pain? mild   General Wellbeing? slightly under par     Carlos also reports the following symptoms in the last month:    Leakage of stool while sleeping? No   Leakage of stool while awake? No       REVIEW OF SYSTEMS:  During the last 7 days, Carlos experienced the following symptoms:  Unintentional Weight Loss (in the last month) No   Fever No   Eye irritation No   Mouth sores No   Sore throat No   Chest pain No   Shortness of breath No   Numbness or tingling in hands or feet No   Skin rash No   Pain or swelling in joints No   Bruising or bleeding No   Felt depressed or blue No   Fatigue Yes   Dysuria No   Please see HPI for additional pertinent review of systems; otherwise remainder of ROS was unremarkable    MEDICATIONS:    Current Outpatient Medications:     ergocalciferol (VITAMIN D2) 50,000 units    famotidine (PEPCID) 10 mg tablet    gabapentin (NEURONTIN) 300 mg capsule    loxapine (LOXITANE) 50 MG capsule    methocarbamol (ROBAXIN) 500 mg tablet    paliperidone (INVEGA) 6 MG 24 hr tablet    traZODone (DESYREL) 100 mg  "tablet    ALLERGIES:  Allergies   Allergen Reactions    Haldol Decanoate [Haloperidol] Anaphylaxis    Oxycodone-Acetaminophen Rash     \"swollon red rash\"    Sulfamethoxazole-Trimethoprim      \"never really worked\"       OBJECTIVE:  /87 (BP Location: Left arm, Patient Position: Sitting, Cuff Size: Standard)   Pulse 79   Temp 97.9 °F (36.6 °C) (Tympanic)   Ht 5' 7\" (1.702 m)   Wt 75.5 kg (166 lb 6.4 oz)   SpO2 99%   BMI 26.06 kg/m²      PHYSICAL EXAM:    General Appearance:   Alert, cooperative, no distress   HEENT:   Normocephalic, atraumatic, anicteric.     Neck:  Supple, symmetrical, trachea midline   Lungs:   Clear to auscultation bilaterally; no rales, rhonchi or wheezing; respirations unlabored    Heart::   Regular rate and rhythm; no murmur, rub, or gallop.   Abdomen:   Soft, non-distended; normal bowel sounds    Abdominal exam was notable for mild tenderness with questionable mass. Comment: RLQ ttp and firmness; seton in place; chronic skin changes   Genitalia:   Deferred    Rectal:   Perirectal assessment demonstrated the following-       fistula: no or scantly draining             Extremities:  No cyanosis, clubbing or edema    Pulses:  2+ and symmetric    Skin:  No jaundice, rashes, or lesions    Lymph nodes:  No palpable cervical lymphadenopathy        Lab Results   Component Value Date    WBC 15.82 (H) 03/19/2024    HGB 8.8 (L) 03/19/2024    HCT 30.7 (L) 03/19/2024    MCV 73 (L) 03/19/2024     (H) 03/19/2024     Lab Results   Component Value Date     04/11/2018    SODIUM 135 03/28/2024    K 4.4 03/28/2024    CL 95 (L) 03/28/2024    CO2 28 03/28/2024    ANIONGAP 13 04/11/2018    AGAP 12 (H) 03/28/2024    BUN 8 03/28/2024    CREATININE 1.02 03/28/2024    GLUC 100 (H) 03/28/2024    GLUF 75 01/11/2024    CALCIUM 9.6 03/28/2024    AST 11 03/28/2024    ALT 8 03/28/2024    ALKPHOS 69 03/28/2024    PROT 8.3 04/11/2018    TP 8.1 03/28/2024    BILITOT 0.3 04/11/2018    TBILI 0.3 03/28/2024 "    EGFR 99 03/28/2024     Lab Results   Component Value Date    CRP 28.9 (H) 03/28/2024     Lab Results   Component Value Date    JHIKUOQY26 289 03/28/2024     Lab Results   Component Value Date    FERRITIN 12.0 (L) 03/28/2024       ASSESSMENT AND PLAN:    Carlos has a history of both stricturing and penetrating Crohn’s disease diagnosed in 2003 with involvement in the following areas:      Ileum, Right colon, Transverse colon, Left colon, Rectum,  with perianal fistula . Surgical history includes no small bowel resections, no colon resections, and no prior ostomy. Current medical therapy is with infliximab. My global assessment is that the clinical disease activity is currently severely active.    The 6-point Ocasio score was 0 and the 9-point Ocasio score was 3.  The short CDAI was 128.      Carlos has schizoaffective disorder, history of MI, and severe ileocolonic and perianal Crohn's disease.  He smokes.  He has an addiction problem and has had several recent ER visits for drug overdose.  Fortunately, Carlos has reestablished care with colorectal surgery and GI and has had drainage of perirectal abscess with seton placement and initiation of infliximab.  He has chosen to follow-up at [facility], so I will not interfere and I strongly encouraged him to continue close follow-up.  He is welcome to reach out to me at any time.  1.  Continue infliximab.  I recommend therapeutic drug monitoring and adjusting the dose as needed.  Reassess disease with colonoscopy and imaging in 6 months.  2.  Continue follow-up with colorectal surgery.  3.  Smoking cessation is very strongly encouraged.  4.  Monitor for and correct iron, vitamin D, vitamin B12 deficiencies.  5.  Continue follow-up with psychiatry.  6.  Health maintenance recommendations are outlined below.     Health Maintenance Recommendations:  Vaccines & Infections  COVID-19 vaccination and boosters are recommended. There is no evidence that the COVID-19 vaccine would  cause an IBD flare.  Avoid live vaccines if on immunosuppressive therapy.  Yearly influenza vaccine (flu shot).  Pneumonia vaccines for patients on immunosuppression. These include Prevnar 20, followed by Pneumovax 23 at least 8 weeks later.  Shingrix vaccine (series of 2 injections) for al patients 65 and older. Patients on tofacitinib or upadacitinib should be vaccinated regardless of age.  If not immune to measles mumps or rubella, MMR vaccine is recommended. However, this is a live vaccine and should be given prior to immunosuppressive therapy.  HPV vaccination as per national guidelines.  Hepatitis A and B vaccinations if not previously vaccinated.  Testing for tuberculosis with QuantiFERON Gold blood test and/or chest xray prior to starting immunosuppressive medications, and then annually    Cancer screening  Dysplasia surveillance for colorectal cancer. Colonoscopy in all patients with extensive colitis (more than 1/3 of the colon involved) who had disease for at least 8 or more years.  Repeat colonoscopy approximately every 12-24 months.  In patients with concurrent primary sclerosing cholangitis, history of dysplasia, or family history of colon cancer, repeat colonoscopy annually.    Females: Pap smear annually for woman on immunosuppression.  Annual dermatologic/skin exam in all patients with IBD, especially those on immunosuppression with thiopurines or BRODERICK inhibitors.    Miscellaneous  DEXA scan, once off steroids for 3 months  Depression screening recommended annually  Routine dental and ophthalmology examinations    Problem List Items Addressed This Visit    None      Yang Spears MD

## 2024-05-10 PROBLEM — I21.9 MYOCARDIAL INFARCTION (HCC): Status: ACTIVE | Noted: 2024-05-10

## 2024-05-10 PROBLEM — K50.114 CROHN'S DISEASE OF LARGE INTESTINE WITH ABSCESS (HCC): Status: ACTIVE | Noted: 2024-05-10

## 2024-05-10 PROBLEM — R19.4 CHANGE IN BOWEL HABIT: Status: ACTIVE | Noted: 2024-05-10

## 2024-05-10 PROBLEM — R06.9 BREATHING PROBLEM: Status: ACTIVE | Noted: 2024-05-10

## 2024-05-10 PROBLEM — R56.9 SEIZURES (HCC): Status: ACTIVE | Noted: 2024-05-10

## 2024-06-02 DIAGNOSIS — F20.0 SCHIZOPHRENIA, PARANOID TYPE (HCC): ICD-10-CM

## 2024-06-03 RX ORDER — LOXAPINE SUCCINATE 50 MG/1
50 TABLET ORAL 2 TIMES DAILY
Qty: 60 CAPSULE | Refills: 0 | Status: SHIPPED | OUTPATIENT
Start: 2024-06-03

## 2024-06-03 RX ORDER — PALIPERIDONE 6 MG/1
12 TABLET, EXTENDED RELEASE ORAL
Qty: 60 TABLET | Refills: 0 | Status: SHIPPED | OUTPATIENT
Start: 2024-06-03

## 2024-06-03 RX ORDER — TRAZODONE HYDROCHLORIDE 100 MG/1
100 TABLET ORAL
Qty: 30 TABLET | Refills: 0 | Status: SHIPPED | OUTPATIENT
Start: 2024-06-03

## 2024-06-03 NOTE — TELEPHONE ENCOUNTER
The patient no showed to his last appointment on 4/6/24 and did not schedule a f/u visit. Staff will contact the patient to schedule a f/u; otherwise, not more refills could be provided.

## 2024-06-07 ENCOUNTER — SOCIAL WORK (OUTPATIENT)
Dept: BEHAVIORAL/MENTAL HEALTH CLINIC | Facility: CLINIC | Age: 34
End: 2024-06-07

## 2024-06-07 DIAGNOSIS — F20.0 SCHIZOPHRENIA, PARANOID TYPE (HCC): Primary | ICD-10-CM

## 2024-06-07 NOTE — PSYCH
Behavioral Health Psychotherapy Progress Note    Psychotherapy Provided: Individual Psychotherapy     1. Schizophrenia, paranoid type (HCC)            Goals addressed in session: Goal 1      DATA: Carlos shared that he did not start the [facility] program yet as he is awaiting job orientation information for a new job at a [employer], but he is unsure of the details yet. Therapist encouraged him to start work on a part time basis if he is able to, as opposed to full time, so it is a smoother transition back to the workforce. Carlos reports that he feels pressure to be out on his own and in his own place, which is why he tends to walk around [place] and avoid being at home. Therapist empathized and expressed understanding as many places in the area are unaffordable at this time. Therapist reviewed his diagnosis with him and provided information from the [employer] about schizophrenia and psychosis as he has not had proper education about this in the past. He reports having some voices and negative thoughts but was unable to put these into words. Carlos reports that his Crohn's disease is improving due to the infusion medication, however he has a toothache today and was encouraged to call the dentist as soon as possible. He continues to see his sister, niece and nephew on a regular basis and has supports from his family. Carlos asked if he would be able to end the session early today as he wanted to go get lunch at [employer]. Therapist and Carlos discussed decreasing frequency of sessions to monthly and that the office will call him to schedule future appointments as he's unaware of possible work schedule at this time.     During this session, this clinician used the following therapeutic modalities: Client-centered Therapy, Motivational Interviewing, Solution-Focused Therapy, and Supportive Psychotherapy    Substance Abuse was not addressed during this session. If the client  "is diagnosed with a co-occurring substance use disorder, please indicate any changes in the frequency or amount of use: n/a. Stage of change for addressing substance use diagnoses: No substance use/Not applicable    ASSESSMENT:  Carlos Wolf presents with a Euthymic/ normal and tired  mood. Therapist suspects that Carlos appeared to be under the influence of a substance (possibly marijuana, due to a faint smell) as observed by his speech patterns, eye contact, and gait. However, he was appropriately dressed for the weather and season, as he was not always dressed appropriately in the past.      his affect is Blunted, which is congruent, with his mood and the content of the session. The client has made progress on their goals.     Carlos Wolf presents with a low risk of suicide, low risk of self-harm, and low risk of harm to others.    For any risk assessment that surpasses a \"low\" rating, a safety plan must be developed.    A safety plan was indicated: no  If yes, describe in detail n/a    PLAN: Between sessions, Carlos Wolf will review [facility] information and call dentist to make an appointment. At the next session, the therapist will use Client-centered Therapy, Cognitive Behavioral Therapy, and Supportive Psychotherapy to address stressors and work on challenging thoughts through CBT.    Behavioral Health Treatment Plan and Discharge Planning: Carlos Wolf is aware of and agrees to continue to work on their treatment plan. They have identified and are working toward their discharge goals. yes    Visit start and stop times:    06/07/24  Start Time: 1301  Stop Time: 1317  Total Visit Time: 16 minutes  "

## 2024-06-25 ENCOUNTER — TELEPHONE (OUTPATIENT)
Dept: BEHAVIORAL/MENTAL HEALTH CLINIC | Facility: CLINIC | Age: 34
End: 2024-06-25

## 2024-06-25 NOTE — TELEPHONE ENCOUNTER
Therapist called Carlos and left a voicemail explaining that he does not have any appointments scheduled with this therapist. Therapist requested a return call to the office to schedule therapy appointments and continue psychotherapy services, or if no longer interested to let us know so we can discharge.

## 2024-06-25 NOTE — TELEPHONE ENCOUNTER
Carlos returned the call explaining he was at orientation for his new job and will soon get his schedule. He will call the office to schedule when he knows more information about his availability.

## 2024-07-08 ENCOUNTER — DOCUMENTATION (OUTPATIENT)
Dept: PSYCHIATRY | Facility: CLINIC | Age: 34
End: 2024-07-08

## 2024-07-08 DIAGNOSIS — F19.10 SUBSTANCE ABUSE (HCC): Primary | ICD-10-CM

## 2024-07-08 DIAGNOSIS — F20.0 SCHIZOPHRENIA, PARANOID TYPE (HCC): ICD-10-CM

## 2024-07-08 DIAGNOSIS — F41.1 GAD (GENERALIZED ANXIETY DISORDER): ICD-10-CM

## 2024-07-08 RX ORDER — TRAZODONE HYDROCHLORIDE 100 MG/1
100 TABLET ORAL
Qty: 30 TABLET | Refills: 0 | Status: SHIPPED | OUTPATIENT
Start: 2024-07-08

## 2024-07-08 RX ORDER — PALIPERIDONE 6 MG/1
12 TABLET, EXTENDED RELEASE ORAL
Qty: 60 TABLET | Refills: 0 | Status: SHIPPED | OUTPATIENT
Start: 2024-07-08

## 2024-07-08 RX ORDER — LOXAPINE SUCCINATE 50 MG/1
50 TABLET ORAL 2 TIMES DAILY
Qty: 60 CAPSULE | Refills: 0 | Status: SHIPPED | OUTPATIENT
Start: 2024-07-08

## 2024-07-08 NOTE — PSYCH
"PSYCHIATRIC DISCHARGE SUMMARY    [facility] - [facility]    Name and Date of Birth:  Carlos Wolf 34 y.o. 1990    Admission Date: 2/14/24  Discharge Date: 7/8/2024 Referral source: hospital    Discharge Type: Did not return for follow up    Discharge Diagnosis:     1. Substance abuse - K2        2. Schizophrenia, paranoid type (HCC)        3. ROBER (generalized anxiety disorder)          Treating Physician: Lucero Watkins MD     Treatment Complications: Non Compliance with Treatment. Did not return for follow up. Not willing to follow recommendations.     Admit with discharge: No    Prognosis at time of discharge: Guarded    Presenting Problems/Pertinent Findings:      Carlos ELIAS 33 y.o. AA male, single, domiciled alone in a room he rents from his step-father, currently unemployed, w/ PMH of seizure, RBBB, Crohn's disease, esophagitis, cellulitis and abscess of buttock, chronic pain of left knee, anemia and PPH of schizophrenia and ROBER, multiple prior psychiatric admissions (most recently from 1/10/2024 to 1/30/2024 at [facility]P for A/VH, persecutory delusions and disorganized behavior; discharged on Invega 12 mg nightly, loxapine 20 mg BID, Neurontin 600 mg TID, trazodone 100 mg nightly), no prior SA, no h/o self-injurious behavior, history of medication noncompliance who presented to the mental health clinic for the initial intake and psychiatric evaluation on 2/14/24 following his recent discharge from inpatient psychiatric unit. His current presentation meets criteria for Schizophrenia and ROBER by history. Presented with ideas of reference, thought echoing, paranoid ideations, persecutory delusions, A/VH with perseverative thought process, preoccupied with \"gaslighting\", with limited insight and judgement. Endorsed better mood and less anxiety sxs since was discharged from the hospital. Denied SI/HI, intent or plan upon direct inquiry at this time.     Therapist:  " "Pia Salinas    Course of Treatment: Psychiatric Evaluation and Medication Management    Summary of Treatment Progress:     Carlos was seen for initial Psychiatric Evaluation and medication management on 2/14/24 following his recent discharge from inpatient psychiatric unit. His current presentation meets criteria for Schizophrenia and ROBER by history. Presented with ideas of reference, thought echoing, paranoid ideations, persecutory delusions, A/VH with perseverative thought process, preoccupied with \"gaslighting\", with limited insight and judgement. Endorsed better mood and less anxiety sxs since was discharged from the hospital. Denied SI/HI, intent or plan upon direct inquiry at this time. Maintained on Neurontin 600 mg TID, Trazodone 100 mg nightly and Invega 12 mg nightly and Loxapine increased from 20 mg BID to 25 mg BID and then to 50 mg BID on 4/9/24; declined referral to the inpatient unit; doses to be adjusted as indicated. Of note, the patient had an ED visit on 3/21/24 due to K2 abuse and then on 4/15 due to drug OD. The patient was last visited on 4/9/24, and cancelled his f/u appointment on 5/10/24 and did not schedule a f/u appointment despite outreach attempts.  The patient is being discharged as he did not return for outpatient psychiatric care and also will benefit from dual diagnosis services given the current substance abuse.    Past Psychiatric History:   Past Inpatient Psychiatric Treatment:   Multiple past inpatient psychiatric admissions (first in 2010 and most recently from 1/10/2024 to 1/30/2024 at [facility] for A/VH, persecutory delusions and disorganized behavior; discharged on Invega 12 mg nightly, loxapine 20 mg BID, Neurontin 600 mg TID, trazodone 100 mg nightly)  Past Outpatient Psychiatric Treatment:    Was in outpatient psychiatric treatment in the past with a psychiatrist at [facility]; history of noncompliance with medication  Past Suicide Attempts: no  Past Violent Behavior: " "no  Past Psychiatric Medication Trials: multiple psychiatric medication trials; only remembered Haldol, Invega and Zyprexa; last discharged on 1/30/2024 on nvega 12 mg nightly, loxapine 20 mg BID, Neurontin 600 mg TID, trazodone 100 mg nightly     Traumatic History:      Abuse: no history of physical or sexual abuse  Other Traumatic Events: none      Past Medical History:    Past Medical History:   Diagnosis Date    Anemia 2004    hospitalization/transfusion    Cellulitis     Chronic knee pain     Crohn's disease (HCC)     Drug abuse (HCC)     Esophagitis     Leukocytosis     Perianal fistula     Pilonidal cyst     RBBB     Schizophrenia (HCC)     Seizure (HCC)         Past Surgical History:   Procedure Laterality Date    INCISION AND DRAINAGE OF WOUND N/A 1/5/2024    Procedure: INCISION AND DRAINAGE (I&D) PERIANAL ABSCESS, SETON PLACEMENT,EUA, FISTULECTOMY;  Surgeon: Carl Pace MD;  Location:  MAIN OR;  Service: General    DE ANRCT XM SURG REQ ANES GENERAL SPI/EDRL DX N/A 4/7/2016    Procedure: EXAM UNDER ANESTHESIA (EUA); possible REMOVAL OF FOREIGN BODY anoscopy ;  Surgeon: Reymundo Araujo MD;  Location:  MAIN OR;  Service: Colorectal    DE SURG TX ANAL FISTULA INTERSPHINCTERIC N/A 4/7/2016    Procedure: superficial FISTULOTOMY; SETON PROCEDURE drainage of chronic ischiofistula abscess and debridement;  Surgeon: Reymundo Araujo MD;  Location:  MAIN OR;  Service: Colorectal       Allergies:    Allergies   Allergen Reactions    Haldol Decanoate [Haloperidol] Anaphylaxis    Oxycodone-Acetaminophen Rash     \"swollon red rash\"    Sulfamethoxazole-Trimethoprim      \"never really worked\"       Substance Abuse History:     Social History     Substance and Sexual Activity   Drug Use Yes     Social History     Substance and Sexual Activity   Alcohol Use Not Currently    Comment: Socially       Family Psychiatric History:     No family history on file.    Social History/Trauma History/Past Psychiatric " History:    Social History     Socioeconomic History    Marital status: Single     Spouse name: Not on file    Number of children: Not on file    Years of education: Not on file    Highest education level: Not on file   Occupational History    Not on file   Tobacco Use    Smoking status: Light Smoker     Current packs/day: 0.01     Types: Cigarettes, Cigars     Start date: 1/1/2009    Smokeless tobacco: Never    Tobacco comments:     Codie says 7 cigarettes per day   Vaping Use    Vaping status: Never Used   Substance and Sexual Activity    Alcohol use: Not Currently     Comment: Socially    Drug use: Yes    Sexual activity: Yes     Partners: Female   Other Topics Concern    Not on file   Social History Narrative    Not on file     Social Determinants of Health     Financial Resource Strain: Medium Risk (1/11/2024)    Overall Financial Resource Strain (CARDIA)     Difficulty of Paying Living Expenses: Somewhat hard   Food Insecurity: No Food Insecurity (1/11/2024)    Hunger Vital Sign     Worried About Running Out of Food in the Last Year: Never true     Ran Out of Food in the Last Year: Never true   Transportation Needs: No Transportation Needs (1/11/2024)    PRAPARE - Transportation     Lack of Transportation (Medical): No     Lack of Transportation (Non-Medical): No   Physical Activity: Not on file   Stress: Not on file   Social Connections: Not on file   Intimate Partner Violence: Not At Risk (1/11/2024)    Humiliation, Afraid, Rape, and Kick questionnaire     Fear of Current or Ex-Partner: No     Emotionally Abused: No     Physically Abused: No     Sexually Abused: No   Housing Stability: High Risk (1/11/2024)    Housing Stability Vital Sign     Unable to Pay for Housing in the Last Year: Yes     Number of Places Lived in the Last Year: 1     Unstable Housing in the Last Year: No       History Review:  The following portions of the patient's history were reviewed and updated as appropriate: allergies, current  "medications, past family history, past medical history, past social history, past surgical history, and problem list.. Reviewed on 4/9/24.      MENTAL STATUS EVALUATION (at time of most recent visit on 4/9/24):  Appearance and attitude: appeared as stated age, cooperative and attentive, casually dressed, disheveled, bearded, malodorous, with poor hygiene  Eye contact: good  Motor Function: within normal limits, intact gait, No PMA/PMR  Gait/station: normal gait/station and normal balance  Speech: normal for rate, rhythm, volume, latency, amount  Language: No overt abnormality  Mood/affect: \"good\" / Affect was blunted  Thought Processes: disorganized, illogical, circumstantial, ruminating  Thought content: denied suicidal ideations or homicidal ideations, persecutory delusions, paranoid ideation, ideas of reference, ruminating thoughts  Associations: circumstantial associations, concrete associations, perseverative  Perceptual disturbances: auditory hallucinations, vague visual hallucinations  Orientation: oriented to time, person, place and to the situational context  Cognitive Function: intact  Memory: recent and remote memory grossly intact  Intellect: unable to assess  Fund of knowledge: aware of current events, aware of past history, and vocabulary average  Impulse control: good  Insight/judgment: limited/limited    To what extent did Carlos achieve his goals?: None    Criteria for Discharge: Did not return for treatment. Did not respond to reminder letter to reschedule follow up appointment. Has demonstrated failure to uphold their treatment plan/contract.    Aftercare Recommendations:     Follow up with therapist recommended.  Follow up with psychiatrist recommended.  Follow up with Drug and Alcohol counseling recommended.    Discharge Medications:     Current Outpatient Medications:     acetaminophen (TYLENOL) 650 mg CR tablet, Take by mouth, Disp: , Rfl:     ergocalciferol (VITAMIN D2) 50,000 units, Take 1 " capsule (50,000 Units total) by mouth once a week , For 8 weeks, Disp: 8 capsule, Rfl: 0    famotidine (PEPCID) 10 mg tablet, Take 1 tablet (10 mg total) by mouth daily, Disp: 30 tablet, Rfl: 0    gabapentin (NEURONTIN) 300 mg capsule, Take 2 capsules (600 mg total) by mouth 3 (three) times a day, Disp: 90 capsule, Rfl: 1    ibuprofen (MOTRIN) 600 mg tablet, Take by mouth Every 6 hours as needed, Disp: , Rfl:     loxapine (LOXITANE) 50 MG capsule, take 1 capsule by mouth twice a day, Disp: 60 capsule, Rfl: 0    methocarbamol (ROBAXIN) 500 mg tablet, Take 1 tablet (500 mg total) by mouth 4 (four) times a day, Disp: 120 tablet, Rfl: 0    paliperidone (INVEGA) 6 MG 24 hr tablet, take 2 tablets by mouth daily at bedtime, Disp: 60 tablet, Rfl: 0    predniSONE 5 mg tablet, Take by mouth, Disp: , Rfl:     simethicone (Gas-X Extra Strength) 125 MG chewable tablet, Chew, Disp: , Rfl:     traZODone (DESYREL) 100 mg tablet, take 1 tablet by mouth daily at bedtime, Disp: 30 tablet, Rfl: 0     Describe ability and willingness to work and solve mental problems:     May have significant difficulty solving his mental health problems    Lucero Watkins MD 07/08/24

## 2024-07-15 ENCOUNTER — TELEPHONE (OUTPATIENT)
Dept: FAMILY MEDICINE CLINIC | Facility: CLINIC | Age: 34
End: 2024-07-15

## 2024-07-22 ENCOUNTER — DOCUMENTATION (OUTPATIENT)
Dept: BEHAVIORAL/MENTAL HEALTH CLINIC | Facility: CLINIC | Age: 34
End: 2024-07-22

## 2024-07-22 DIAGNOSIS — F20.0 SCHIZOPHRENIA, PARANOID TYPE (HCC): Primary | ICD-10-CM

## 2024-07-22 NOTE — PROGRESS NOTES
"Psychotherapy Discharge Summary    Preferred Name: Carlos Wolf  YOB: 1990    Admission date to psychotherapy: 2/19/24    Referred by: Self (hospital discharge)     Presenting Problem: \"Cure for the voices\"    Course of treatment included : individual therapy     Progress/Outcome of Treatment Goals (brief summary of course of treatment): Therapist met with Carlos to review and process his social skills primarily with family and in the community. Therapist validated emotions and concerns brought forth in sessions and encouraged better coping mechanisms. Therapist provided psychoeducation regarding substances and schizophrenia. Carlos obtained a new job and said he would call back to schedule but did not do so.     Treatment Complications (if any): inconsistent attendance and poor historian in regards to substances used and/or emergency department visits     Treatment Progress: poor    Current [facility] Psychiatric Provider: None- was discharged by Dr. BLANCA Watkins on 7/8/24    Discharge Medications include: N/A- working with  staff (PCP and former psychiatrist) for medication refills    Discharge Date: 7/22/2024  Carlos was called multiple times and was sent a letter (7/9/24) to call back the office to schedule appointments and did not do so.     Discharge Diagnosis:   1. Schizophrenia, paranoid type (HCC)            Criteria for Discharge: demonstrated failure to uphold their treatment plan/contract, multiple missed appointments    Aftercare recommendations include (include specific referral names and phone numbers, if appropriate): Carlos is encouraged to continue working with PCP as well as other care team members to help manage mood and stressors. He is encouraged to utilize the supports and coping mechanisms identified on his safety crisis plan. List of resources was sent via [place] mail to address on file in discharge letter.    Prognosis: poor    "

## 2025-02-01 ENCOUNTER — HOSPITAL ENCOUNTER (EMERGENCY)
Facility: HOSPITAL | Age: 35
Discharge: HOME | End: 2025-02-02

## 2025-02-01 DIAGNOSIS — H10.9 CONJUNCTIVITIS: Primary | ICD-10-CM

## 2025-02-01 DIAGNOSIS — K50.10 CROHN'S COLITIS (HCC): ICD-10-CM

## 2025-02-01 DIAGNOSIS — D64.9 ANEMIA: ICD-10-CM

## 2025-02-01 LAB
ABO GROUP BLD: NORMAL
ANION GAP SERPL CALCULATED.3IONS-SCNC: 8 MMOL/L (ref 4–13)
BASOPHILS # BLD AUTO: 0.06 THOUSANDS/ΜL (ref 0–0.1)
BASOPHILS NFR BLD AUTO: 0 % (ref 0–1)
BLD GP AB SCN SERPL QL: NEGATIVE
BUN SERPL-MCNC: 4 MG/DL (ref 5–25)
CALCIUM SERPL-MCNC: 8.7 MG/DL (ref 8.4–10.2)
CHLORIDE SERPL-SCNC: 100 MMOL/L (ref 96–108)
CO2 SERPL-SCNC: 27 MMOL/L (ref 21–32)
CREAT SERPL-MCNC: 0.89 MG/DL (ref 0.6–1.3)
EOSINOPHIL # BLD AUTO: 0.09 THOUSAND/ΜL (ref 0–0.61)
EOSINOPHIL NFR BLD AUTO: 1 % (ref 0–6)
ERYTHROCYTE [DISTWIDTH] IN BLOOD BY AUTOMATED COUNT: 19.6 % (ref 11.6–15.1)
GFR SERPL CREATININE-BSD FRML MDRD: 111 ML/MIN/1.73SQ M
GLUCOSE SERPL-MCNC: 146 MG/DL (ref 65–140)
HCT VFR BLD AUTO: 21.9 % (ref 36.5–49.3)
HGB BLD-MCNC: 5.8 G/DL (ref 12–17)
IMM GRANULOCYTES # BLD AUTO: 0.09 THOUSAND/UL (ref 0–0.2)
IMM GRANULOCYTES NFR BLD AUTO: 1 % (ref 0–2)
LYMPHOCYTES # BLD AUTO: 1.99 THOUSANDS/ΜL (ref 0.6–4.47)
LYMPHOCYTES NFR BLD AUTO: 11 % (ref 14–44)
MCH RBC QN AUTO: 17.7 PG (ref 26.8–34.3)
MCHC RBC AUTO-ENTMCNC: 26.5 G/DL (ref 31.4–37.4)
MCV RBC AUTO: 67 FL (ref 82–98)
MONOCYTES # BLD AUTO: 1.31 THOUSAND/ΜL (ref 0.17–1.22)
MONOCYTES NFR BLD AUTO: 7 % (ref 4–12)
NEUTROPHILS # BLD AUTO: 14.16 THOUSANDS/ΜL (ref 1.85–7.62)
NEUTS SEG NFR BLD AUTO: 80 % (ref 43–75)
NRBC BLD AUTO-RTO: 0 /100 WBCS
PLATELET # BLD AUTO: 801 THOUSANDS/UL (ref 149–390)
PMV BLD AUTO: 8.3 FL (ref 8.9–12.7)
POTASSIUM SERPL-SCNC: 3.6 MMOL/L (ref 3.5–5.3)
RBC # BLD AUTO: 3.28 MILLION/UL (ref 3.88–5.62)
RH BLD: POSITIVE
SODIUM SERPL-SCNC: 135 MMOL/L (ref 135–147)
SPECIMEN EXPIRATION DATE: NORMAL
WBC # BLD AUTO: 17.7 THOUSAND/UL (ref 4.31–10.16)

## 2025-02-01 PROCEDURE — 85025 COMPLETE CBC W/AUTO DIFF WBC: CPT

## 2025-02-01 PROCEDURE — 99283 EMERGENCY DEPT VISIT LOW MDM: CPT

## 2025-02-01 PROCEDURE — P9016 RBC LEUKOCYTES REDUCED: HCPCS

## 2025-02-01 PROCEDURE — 86900 BLOOD TYPING SEROLOGIC ABO: CPT

## 2025-02-01 PROCEDURE — 99285 EMERGENCY DEPT VISIT HI MDM: CPT

## 2025-02-01 PROCEDURE — 36415 COLL VENOUS BLD VENIPUNCTURE: CPT

## 2025-02-01 PROCEDURE — 86850 RBC ANTIBODY SCREEN: CPT

## 2025-02-01 PROCEDURE — 80048 BASIC METABOLIC PNL TOTAL CA: CPT

## 2025-02-01 PROCEDURE — 86901 BLOOD TYPING SEROLOGIC RH(D): CPT

## 2025-02-01 PROCEDURE — 86923 COMPATIBILITY TEST ELECTRIC: CPT

## 2025-02-01 RX ORDER — OFLOXACIN 3 MG/ML
1 SOLUTION/ DROPS OPHTHALMIC ONCE
Status: COMPLETED | OUTPATIENT
Start: 2025-02-01 | End: 2025-02-01

## 2025-02-01 RX ORDER — KETOTIFEN FUMARATE 0.35 MG/ML
1 SOLUTION/ DROPS OPHTHALMIC 2 TIMES DAILY
Status: DISCONTINUED | OUTPATIENT
Start: 2025-02-02 | End: 2025-02-02 | Stop reason: HOSPADM

## 2025-02-01 RX ORDER — DIPHENHYDRAMINE HCL 25 MG
25 TABLET ORAL ONCE
Status: COMPLETED | OUTPATIENT
Start: 2025-02-01 | End: 2025-02-01

## 2025-02-01 RX ORDER — OLOPATADINE HYDROCHLORIDE 1 MG/ML
1 SOLUTION/ DROPS OPHTHALMIC 2 TIMES DAILY
Qty: 5 ML | Refills: 0 | Status: SHIPPED | OUTPATIENT
Start: 2025-02-01 | End: 2025-02-01

## 2025-02-01 RX ORDER — KETOTIFEN FUMARATE 0.35 MG/ML
1 SOLUTION/ DROPS OPHTHALMIC 2 TIMES DAILY
Qty: 3 ML | Refills: 0 | Status: SHIPPED | OUTPATIENT
Start: 2025-02-01

## 2025-02-01 RX ADMIN — DIPHENHYDRAMINE HYDROCHLORIDE 25 MG: 25 TABLET ORAL at 23:04

## 2025-02-01 RX ADMIN — OFLOXACIN 1 DROP: 3 SOLUTION OPHTHALMIC at 19:34

## 2025-02-01 NOTE — Clinical Note
Carlos Wolf was seen and treated in our emergency department on 2/1/2025.                Diagnosis:     Carlos  .    He may return on this date: 02/05/2025         If you have any questions or concerns, please don't hesitate to call.      Megan Chen RN    ______________________________           _______________          _______________  Hospital Representative                              Date                                Time

## 2025-02-02 VITALS
TEMPERATURE: 98.7 F | HEART RATE: 82 BPM | SYSTOLIC BLOOD PRESSURE: 110 MMHG | DIASTOLIC BLOOD PRESSURE: 68 MMHG | OXYGEN SATURATION: 99 % | RESPIRATION RATE: 18 BRPM

## 2025-02-02 LAB
ABO GROUP BLD BPU: NORMAL
ABO GROUP BLD BPU: NORMAL
BPU ID: NORMAL
BPU ID: NORMAL
CROSSMATCH: NORMAL
CROSSMATCH: NORMAL
UNIT DISPENSE STATUS: NORMAL
UNIT DISPENSE STATUS: NORMAL
UNIT PRODUCT CODE: NORMAL
UNIT PRODUCT CODE: NORMAL
UNIT PRODUCT VOLUME: 350 ML
UNIT PRODUCT VOLUME: 350 ML
UNIT RH: NORMAL
UNIT RH: NORMAL

## 2025-02-02 PROCEDURE — P9016 RBC LEUKOCYTES REDUCED: HCPCS

## 2025-02-02 NOTE — ED PROVIDER NOTES
Time reflects when diagnosis was documented in both MDM as applicable and the Disposition within this note       Time User Action Codes Description Comment    2/1/2025  9:13 PM Yokubaitis, Uday Add [H10.9] Conjunctivitis     2/1/2025  9:13 PM Yokubaitis, Uday Add [D64.9] Anemia     2/1/2025  9:13 PM Yokubaitis, Uday Add [K50.10] Crohn's colitis (HCC)           ED Disposition       ED Disposition   Discharge    Condition   Stable    Date/Time   Sat Feb 1, 2025  9:13 PM    Comment   Carlos Wolf discharge to home/self care.                   Assessment & Plan       Medical Decision Making  34-year-old male present emergency department due to multiple complaints.  Right eye likely due to allergic/irritant reaction, however given lack of response to antihistamines, will trial course of antibiotic.  Could be related to patient's inflammatory bowel disease, but no cells, flares, or change in vision to suggest.  Labs obtained to evaluate for potential anemia and found to have a hemoglobin of 5.8.  Also with elevated inflammatory markers including platelet of 801 and elevated white blood cell count.  Patient otherwise with benign complaints to suggest emergent pathology from his Crohn's at this time.  Discussed blood transfusion due to symptoms, patient agreeable with this plan.  Opting for discharge after transfusions.  Recommend close follow-up with GI for discussion and resumption of long-term care.    Amount and/or Complexity of Data Reviewed  Labs: ordered. Decision-making details documented in ED Course.    Risk  OTC drugs.  Prescription drug management.        ED Course as of 02/06/25 0718   Sat Feb 01, 2025 1955 Hemoglobin(!!): 5.8   2108 Platelet Count(!): 801  Chronic, likely reactive to crohns that isn't actively medicated.    2142 WBC(!): 17.70  Chronic elevation, doubt acute infection given benign abdomen/exam       Medications   ofloxacin (OCUFLOX) 0.3 % ophthalmic solution 1 drop (1 drop Right Eye  Given 2/1/25 1934)   diphenhydrAMINE (BENADRYL) tablet 25 mg (25 mg Oral Given 2/1/25 2304)       ED Risk Strat Scores                          SBIRT 20yo+      Flowsheet Row Most Recent Value   Initial Alcohol Screen: US AUDIT-C     1. How often do you have a drink containing alcohol? 0 Filed at: 02/01/2025 1912   2. How many drinks containing alcohol do you have on a typical day you are drinking?  0 Filed at: 02/01/2025 1912   3a. Male UNDER 65: How often do you have five or more drinks on one occasion? 0 Filed at: 02/01/2025 1912   3b. FEMALE Any Age, or MALE 65+: How often do you have 4 or more drinks on one occassion? 0 Filed at: 02/01/2025 1912   Audit-C Score 0 Filed at: 02/01/2025 1912   PREMA: How many times in the past year have you...    Used an illegal drug or used a prescription medication for non-medical reasons? Never Filed at: 02/01/2025 1912                            History of Present Illness       Chief Complaint   Patient presents with    Eye Pain     Pt has had eye pain x 2 weeks. Has tried benadryl and eye drops with some relief but has not fixed the problem. Last took benadryl last night.        Past Medical History:   Diagnosis Date    Anemia 2004    hospitalization/transfusion    Cellulitis     Chronic knee pain     Crohn's disease (HCC)     Drug abuse (HCC)     Esophagitis     Leukocytosis     Perianal fistula     Pilonidal cyst     RBBB     Schizophrenia (HCC)     Seizure (HCC)       Past Surgical History:   Procedure Laterality Date    INCISION AND DRAINAGE OF WOUND N/A 1/5/2024    Procedure: INCISION AND DRAINAGE (I&D) PERIANAL ABSCESS, SETON PLACEMENT,EUA, FISTULECTOMY;  Surgeon: Carl Pace MD;  Location:  MAIN OR;  Service: General    [facility] ANRCT XM SURG REQ ANES GENERAL SPI/EDRL DX N/A 4/7/2016    Procedure: EXAM UNDER ANESTHESIA (EUA); possible REMOVAL OF FOREIGN BODY anoscopy ;  Surgeon: Reymundo Araujo MD;  Location:  MAIN OR;  Service: Colorectal    VA SURG TX ANAL FISTULA  INTERSPHINCTERIC N/A 4/7/2016    Procedure: superficial FISTULOTOMY; SETON PROCEDURE drainage of chronic ischiofistula abscess and debridement;  Surgeon: Reymundo Araujo MD;  Location: [facility];  Service: Colorectal      History reviewed. No pertinent family history.   Social History     Tobacco Use    Smoking status: Light Smoker     Current packs/day: 0.01     Types: Cigarettes, Cigars     Start date: 1/1/2009    Smokeless tobacco: Never    Tobacco comments:     Codie says 7 cigarettes per day   Vaping Use    Vaping status: Never Used   Substance Use Topics    Alcohol use: Not Currently     Comment: Socially    Drug use: Not Currently      E-Cigarette/Vaping    E-Cigarette Use Never User       E-Cigarette/Vaping Substances    Nicotine No     THC No     CBD No     Flavoring No     Other No     Unknown No       I have reviewed and agree with the history as documented.     34-year-old male presenting to emergency department due to right eye discomfort that started about 4- 5 days ago.  Notes that has been itchy, and red.  Has been taking Benadryl with some transient improvement.  Tried eyedrops, does not have them with them and is unsure what they are.  Otherwise, denies vision changes, pain with extraocular movements, or other complaints.  Given ongoing symptoms, patient decided come Emergency Department today.    On further discussion, patient also noting that he has been significantly fatigued and thinks that he may be anemic.  Notes that he has a history of inflammatory bowel disease that he no longer is on infusion treatments for.  Has become anemic from this in the past and notes that it feels like he is at this point in time due to generalized fatigue, especially while exerting himself.  Denies brisk bleeding, nausea, vomiting, abdominal pain, or other complaints.        Review of Systems   All other systems reviewed and are negative.          Objective       ED Triage Vitals [02/01/25 1910]    Temperature Pulse Blood Pressure Respirations SpO2 Patient Position - Orthostatic VS   97.8 °F (36.6 °C) 92 143/79 18 100 % Sitting      Temp Source Heart Rate Source BP Location FiO2 (%) Pain Score    Oral Monitor Right arm -- --      Vitals      Date and Time Temp Pulse SpO2 Resp BP Pain Score FACES Pain Rating User   02/02/25 0413 -- 82 99 % 18 110/68 -- -- KB   02/02/25 0330 98.7 °F (37.1 °C) 81 98 % 18 113/65 -- -- KB   02/02/25 0324 -- -- 100 % -- -- -- -- KB   02/02/25 0323 98.5 °F (36.9 °C) 83 100 % 18 118/77 -- -- KB   02/02/25 0230 98.9 °F (37.2 °C) 82 99 % 18 108/70 -- -- KB   02/02/25 0200 98.7 °F (37.1 °C) 88 99 % 18 123/71 -- -- KB   02/02/25 0145 99 °F (37.2 °C) 93 100 % 18 124/78 -- -- KB   02/02/25 0135 98.7 °F (37.1 °C) 94 100 % 18 120/81 -- -- KB   02/02/25 0130 98.8 °F (37.1 °C) 86 -- 18 129/79 -- -- KB   02/02/25 0107 98.6 °F (37 °C) 77 99 % 18 126/73 -- -- KB   02/02/25 0048 99 °F (37.2 °C) 91 98 % 18 125/74 -- -- KB   02/02/25 0043 98.5 °F (36.9 °C) 92 -- 18 121/78 -- -- KB   02/01/25 2359 98.7 °F (37.1 °C) 98 -- 18 142/77 -- -- KB   02/01/25 2348 98.7 °F (37.1 °C) 97 100 % 18 124/84 -- -- KB   02/01/25 2314 99 °F (37.2 °C) 98 -- 18 142/77 -- -- KB   02/01/25 2248 98.7 °F (37.1 °C) 90 100 % 18 123/74 -- -- KB   02/01/25 2225 98.9 °F (37.2 °C) 80 -- 18 126/75 -- -- KB   02/01/25 2218 98.9 °F (37.2 °C) 96 100 % 18 130/79 -- -- KB   02/01/25 2203 99.1 °F (37.3 °C) 95 98 % 18 122/82 -- -- KB   02/01/25 2158 99 °F (37.2 °C) 79 -- 18 116/74 -- -- KB   02/01/25 2156 99 °F (37.2 °C) 82 100 % 18 123/79 -- -- KB   02/01/25 2133 99.1 °F (37.3 °C) 79 100 % 18 121/78 -- -- KB   02/01/25 2041 98.6 °F (37 °C) 81 100 % 18 112/72 -- -- KB   02/01/25 2008 -- 85 100 % 18 109/73 -- -- KB   02/01/25 1910 97.8 °F (36.6 °C) 92 100 % 18 143/79 -- -- AB            Physical Exam  Vitals and nursing note reviewed.   Constitutional:       General: He is not in acute distress.     Appearance: He is well-developed.    HENT:      Head: Normocephalic and atraumatic.   Eyes:      Extraocular Movements: Extraocular movements intact.      Pupils: Pupils are equal, round, and reactive to light.      Comments: Right scleral and conjunctival injection   Cardiovascular:      Rate and Rhythm: Normal rate and regular rhythm.      Heart sounds: No murmur heard.  Pulmonary:      Effort: Pulmonary effort is normal. No respiratory distress.      Breath sounds: Normal breath sounds.   Abdominal:      Palpations: Abdomen is soft.      Tenderness: There is no abdominal tenderness.   Musculoskeletal:         General: No swelling.      Cervical back: Neck supple.   Skin:     General: Skin is warm and dry.      Capillary Refill: Capillary refill takes less than 2 seconds.   Neurological:      Mental Status: He is alert.   Psychiatric:         Mood and Affect: Mood normal.         Results Reviewed       Procedure Component Value Units Date/Time    CBC and differential [[id number]]  (Abnormal) Collected: 02/01/25 1932    Lab Status: Final result Specimen: Blood from Arm, Right Updated: 02/01/25 1956     WBC 17.70 Thousand/uL      RBC 3.28 Million/uL      Hemoglobin 5.8 g/dL      Hematocrit 21.9 %      MCV 67 fL      MCH 17.7 pg      MCHC 26.5 g/dL      RDW 19.6 %      MPV 8.3 fL      Platelets 801 Thousands/uL      nRBC 0 /100 WBCs      Segmented % 80 %      Immature Grans % 1 %      Lymphocytes % 11 %      Monocytes % 7 %      Eosinophils Relative 1 %      Basophils Relative 0 %      Absolute Neutrophils 14.16 Thousands/µL      Absolute Immature Grans 0.09 Thousand/uL      Absolute Lymphocytes 1.99 Thousands/µL      Absolute Monocytes 1.31 Thousand/µL      Eosinophils Absolute 0.09 Thousand/µL      Basophils Absolute 0.06 Thousands/µL     Narrative:      This is an appended report.  These results have been appended to a previously verified report.    Basic metabolic panel [[id number]]  (Abnormal) Collected: 02/01/25 1932    Lab Status: Final result  Specimen: Blood from Arm, Right Updated: 02/01/25 1955     Sodium 135 mmol/L      Potassium 3.6 mmol/L      Chloride 100 mmol/L      CO2 27 mmol/L      ANION GAP 8 mmol/L      BUN 4 mg/dL      Creatinine 0.89 mg/dL      Glucose 146 mg/dL      Calcium 8.7 mg/dL      eGFR 111 ml/min/1.73sq m     Narrative:      [employer] guidelines for Chronic Kidney Disease (CKD):     Stage 1 with normal or high GFR (GFR > 90 mL/min/1.73 square meters)    Stage 2 Mild CKD (GFR = 60-89 mL/min/1.73 square meters)    Stage 3A Moderate CKD (GFR = 45-59 mL/min/1.73 square meters)    Stage 3B Moderate CKD (GFR = 30-44 mL/min/1.73 square meters)    Stage 4 Severe CKD (GFR = 15-29 mL/min/1.73 square meters)    Stage 5 End Stage CKD (GFR <15 mL/min/1.73 square meters)  Note: GFR calculation is accurate only with a steady state creatinine            No orders to display       Procedures    ED Medication and Procedure Management   Prior to Admission Medications   Prescriptions Last Dose Informant Patient Reported? Taking?   acetaminophen (TYLENOL) 650 mg CR tablet   Yes No   Sig: Take by mouth   ergocalciferol (VITAMIN D2) 50,000 units  Self No No   Sig: Take 1 capsule (50,000 Units total) by mouth once a week , For 8 weeks   famotidine (PEPCID) 10 mg tablet  Self No No   Sig: Take 1 tablet (10 mg total) by mouth daily   gabapentin (NEURONTIN) 300 mg capsule  Self No No   Sig: Take 2 capsules (600 mg total) by mouth 3 (three) times a day   ibuprofen (MOTRIN) 600 mg tablet   Yes No   Sig: Take by mouth Every 6 hours as needed   loxapine (LOXITANE) 50 MG capsule   No No   Sig: take 1 capsule by mouth twice a day   methocarbamol (ROBAXIN) 500 mg tablet  Self No No   Sig: Take 1 tablet (500 mg total) by mouth 4 (four) times a day   paliperidone (INVEGA) 6 MG 24 hr tablet   No No   Sig: take 2 tablets by mouth daily at bedtime   predniSONE 5 mg tablet   Yes No   Sig: Take by mouth   simethicone (Gas-X Extra Strength) 125 MG  chewable tablet   Yes No   Sig: Chew   traZODone (DESYREL) 100 mg tablet   No No   Sig: take 1 tablet by mouth daily at bedtime      Facility-Administered Medications: None     Discharge Medication List as of 2/1/2025 10:54 PM        START taking these medications    Details   Ketotifen Fumarate (ZADITOR) 0.035 % ophthalmic solution Administer 1 drop to the right eye 2 (two) times a day, Starting Sat 2/1/2025, Normal           CONTINUE these medications which have NOT CHANGED    Details   acetaminophen (TYLENOL) 650 mg CR tablet Take by mouth, Historical Med      ergocalciferol (VITAMIN D2) 50,000 units Take 1 capsule (50,000 Units total) by mouth once a week , For 8 weeks, Starting Mon 1/29/2024, Normal      famotidine (PEPCID) 10 mg tablet Take 1 tablet (10 mg total) by mouth daily, Starting Tue 1/30/2024, Normal      gabapentin (NEURONTIN) 300 mg capsule Take 2 capsules (600 mg total) by mouth 3 (three) times a day, Starting Tue 3/12/2024, Normal      ibuprofen (MOTRIN) 600 mg tablet Take by mouth Every 6 hours as needed, Historical Med      loxapine (LOXITANE) 50 MG capsule take 1 capsule by mouth twice a day, Starting Mon 7/8/2024, Normal      methocarbamol (ROBAXIN) 500 mg tablet Take 1 tablet (500 mg total) by mouth 4 (four) times a day, Starting Mon 1/29/2024, Normal      paliperidone (INVEGA) 6 MG 24 hr tablet take 2 tablets by mouth daily at bedtime, Starting Mon 7/8/2024, Normal      predniSONE 5 mg tablet Take by mouth, Historical Med      simethicone (Gas-X Extra Strength) 125 MG chewable tablet Chew, Historical Med      traZODone (DESYREL) 100 mg tablet take 1 tablet by mouth daily at bedtime, Starting Mon 7/8/2024, Normal             ED SEPSIS DOCUMENTATION   Time reflects when diagnosis was documented in both MDM as applicable and the Disposition within this note       Time User Action Codes Description Comment    2/1/2025  9:13 PM Uday Blankenship Add [H10.9] Conjunctivitis     2/1/2025  9:13 PM  Uday Blankenship [D64.9] Anemia     2/1/2025  9:13 PM Uday Blankenship [K50.10] Crohn's colitis (HCC)                  Uday Blankenship MD  02/06/25 0718

## 2025-02-02 NOTE — DISCHARGE INSTRUCTIONS
"Patient Education     Anemia caused by low iron   The Basics   Written by the doctors and editors at [facility]   What is anemia? -- This is when a person has too few red blood cells or too little hemoglobin in their blood. Hemoglobin is inside red blood cells and helps the cells carry oxygen to all parts of the body. If your hemoglobin level is low, your body might not get all of the oxygen it needs.  What is iron deficiency anemia? -- Anemia can happen for a few different reasons. A common reason is not having enough iron. This is called \"iron deficiency\" or sometimes \"low iron.\"  You might not have enough iron if:   You have lost a large amount of blood - This can happen slowly over time, or all of a sudden. It is the most common cause of iron deficiency anemia.  Menstrual periods and pregnancy are common reasons to lose blood. In older people, tumors in the intestine can bleed. Sometimes, bleeding happens so slowly that you do not see the blood in your bowel movements.   Your body cannot absorb enough iron from food - This can happen if you had surgery on your stomach or intestines. It can also happen if you have a condition like celiac disease that affects your intestines.   You do not get enough iron in your food - This can be a problem in infants who do not get enough iron from formula, food, or supplements. It can also happen in parts of the world where people do not get enough iron in their diet.  What are the symptoms of iron deficiency anemia? -- Some people have no symptoms. People who do have symptoms might:   Feel irritable   Feel tired or weak, especially if they try to exercise or walk up stairs   Have headaches   Have chest pain or trouble breathing   Have abnormal cravings that make them want to eat ice or substances like pily or wallpaper   Have \"restless legs syndrome,\" where the legs feel like they need to keep moving, especially at night  Is there a test for anemia? -- Yes. Your doctor or nurse " "can test your blood for anemia. They most often check your \"hemoglobin\" and \"hematocrit.\" These are part of a test called a \"complete blood count,\" or \"CBC.\"  If blood tests show that you have anemia, or if you have symptoms of iron deficiency, your doctor or nurse will ask questions and do other blood tests. This will help them figure out what is causing your anemia and how best to treat it.  How is iron deficiency anemia treated? -- It is treated by giving you extra iron. Eating foods that contain iron is not enough. If your anemia is severe, you might need a blood transfusion. You might also need treatment for the cause of bleeding.  If you need treatment with iron, there are some things to know:   Iron comes in pills, or in a liquid you can get through an IV. (An IV is a thin tube that goes into a vein.) Your doctor or nurse will talk with you about which is best for you.   Iron pills are taken once every other day or once a day. They need to be taken for several months. In most cases, IV iron can be given in a single treatment or a small number of treatments.   Iron pills can cause side effects such as upset stomach and constipation (too few bowel movements, or bowel movements that are hard or painful).   Some people cannot get enough iron from pills. This might be the case if you had weight loss surgery or a condition called inflammatory bowel disease, or if you are pregnant and nearing the end of your pregnancy.   If you have side effects or cannot get enough iron from pills, there are things you can do to reduce these side effects, or you might switch to IV iron.  It is also important to find out why your iron was low. If it was caused by blood loss, the cause of bleeding needs to be found. Other causes also have important treatments. For example, if you have heavy menstrual periods, your doctor might do tests to find out why. There are medicines that can make your period lighter or stop it completely. " Follow all instructions about testing and treatment.  If you have questions about your care, or want to know more about your options, talk to your doctor or nurse. They can help.  All topics are updated as new evidence becomes available and our peer review process is complete.  This topic retrieved from [employer] on: Mar 22, 2024.  Topic 59277 Version 16.0  Release: 32.2.4 - C32.80  © 2024 UpToDate, Inc. and/or its affiliates. All rights reserved.  Consumer Information Use and Disclaimer   Disclaimer: This generalized information is a limited summary of diagnosis, treatment, and/or medication information. It is not meant to be comprehensive and should be used as a tool to help the user understand and/or assess potential diagnostic and treatment options. It does NOT include all information about conditions, treatments, medications, side effects, or risks that may apply to a specific patient. It is not intended to be medical advice or a substitute for the medical advice, diagnosis, or treatment of a health care provider based on the health care provider's examination and assessment of a patient's specific and unique circumstances. Patients must speak with a health care provider for complete information about their health, medical questions, and treatment options, including any risks or benefits regarding use of medications. This information does not endorse any treatments or medications as safe, effective, or approved for treating a specific patient. UpToDate, Inc. and its affiliates disclaim any warranty or liability relating to this information or the use thereof.The use of this information is governed by the Terms of Use, available at https://www.wolterskluwer.com/en/know/clinical-effectiveness-terms. 2024© UpToDate, Inc. and its affiliates and/or licensors. All rights reserved.  Copyright   © 2024 UpToDate, Inc. and/or its affiliates. All rights reserved.  Patient Education     Conjunctivitis (Pink Eye) ED    General Information   You came to the Emergency Department (ED) for pink eye. The medical name for this is conjunctivitis. Your eye is irritated or infected. Sometimes an allergy is bothering your eye. This means something in the air has come into contact with your eye and it is red and irritated. Your eye may also itch, burn, or be sensitive to light. If an infection is causing your pink eye, it is easy to spread from person to person.  Infections are often caused by viruses and antibiotics won’t help. Most of the time, pink eye will go away on its own without treatment after a few days.  If the doctor thinks you have a bacterial infection in your eye, you will need antibiotics to treat the infection. It is important to take all of your antibiotics even if your eye starts to feel better.  What care is needed at home?   Call your regular doctor to let them know you were in the ED. Make a follow-up appointment if you were told to.  Gently remove your eye drainage or crusting with a clean cloth and warm water.  Avoid pollen if an allergy is causing your pink eye. Stay inside as much as you can with the windows closed during peak allergy seasons.  Lubricating eye drops or allergy eye drops may help your eye feel better. Make sure to read the directions carefully. Wash your hands with care before and after you touch your eye.  When you use eye drops, take care not to touch the bottle or dropper to your eye.  If you wear contact lenses, you will need to stop wearing them for a short time until your symptoms go away. If your contacts are disposable, start with fresh ones after your eye gets better. If not, clean your contacts with care. Clean your contact case thoroughly or get a new one.  Avoid sharing towels, washcloths, bedding, or personal items when you have pink eye.  You may need to stay out of work or school for a few days.  When do I need to call the doctor?   You have trouble seeing clearly after  blinking.  Your eye is still red or has drainage after 3 days.  You have eye pain that is getting worse.  You have new or worsening symptoms.  Last Reviewed Date   2020-12-16  Consumer Information Use and Disclaimer   This generalized information is a limited summary of diagnosis, treatment, and/or medication information. It is not meant to be comprehensive and should be used as a tool to help the user understand and/or assess potential diagnostic and treatment options. It does NOT include all information about conditions, treatments, medications, side effects, or risks that may apply to a specific patient. It is not intended to be medical advice or a substitute for the medical advice, diagnosis, or treatment of a health care provider based on the health care provider's examination and assessment of a patient’s specific and unique circumstances. Patients must speak with a health care provider for complete information about their health, medical questions, and treatment options, including any risks or benefits regarding use of medications. This information does not endorse any treatments or medications as safe, effective, or approved for treating a specific patient. UpToDate, Inc. and its affiliates disclaim any warranty or liability relating to this information or the use thereof. The use of this information is governed by the Terms of Use, available at https://www.woltersNow In Storeuwer.com/en/know/clinical-effectiveness-terms   Copyright   Copyright © 2024 UpToDate, Inc. and its affiliates and/or licensors. All rights reserved.

## 2025-04-16 DIAGNOSIS — H10.9 CONJUNCTIVITIS: ICD-10-CM

## 2025-04-16 RX ORDER — KETOTIFEN FUMARATE 0.35 MG/ML
1 SOLUTION/ DROPS OPHTHALMIC 2 TIMES DAILY
Qty: 3 ML | Refills: 0 | Status: SHIPPED | OUTPATIENT
Start: 2025-04-16

## 2025-05-13 DIAGNOSIS — H10.9 CONJUNCTIVITIS: ICD-10-CM

## 2025-05-13 RX ORDER — KETOTIFEN FUMARATE 0.35 MG/ML
1 SOLUTION/ DROPS OPHTHALMIC 2 TIMES DAILY
Qty: 3 ML | Refills: 0 | Status: SHIPPED | OUTPATIENT
Start: 2025-05-13

## (undated) DEVICE — INTENDED FOR TISSUE SEPARATION, AND OTHER PROCEDURES THAT REQUIRE A SHARP SURGICAL BLADE TO PUNCTURE OR CUT.: Brand: BARD-PARKER SAFETY BLADES SIZE 15, STERILE

## (undated) DEVICE — STERILE POLYISOPRENE POWDER-FREE SURGICAL GLOVES: Brand: PROTEXIS

## (undated) DEVICE — TELFA NON-ADHERENT ABSORBENT DRESSING: Brand: TELFA

## (undated) DEVICE — LIGHT GLOVE GREEN

## (undated) DEVICE — SYRINGE 30ML LL

## (undated) DEVICE — 1820 FOAM BLOCK NEEDLE COUNTER: Brand: DEVON

## (undated) DEVICE — NEEDLE 25G X 1 1/2

## (undated) DEVICE — PENCIL ELECTROSURG E-Z CLEAN -0035H

## (undated) DEVICE — SYRINGE 10ML LL CONTROL TOP

## (undated) DEVICE — DISPOSABLE OR TOWEL: Brand: CARDINAL HEALTH

## (undated) DEVICE — NEEDLE BLUNT 18 G X 1 1/2IN

## (undated) DEVICE — BASIC SINGLE BASIN-LF: Brand: MEDLINE INDUSTRIES, INC.